# Patient Record
Sex: MALE | Race: ASIAN | NOT HISPANIC OR LATINO | URBAN - METROPOLITAN AREA
[De-identification: names, ages, dates, MRNs, and addresses within clinical notes are randomized per-mention and may not be internally consistent; named-entity substitution may affect disease eponyms.]

---

## 2017-02-06 ENCOUNTER — EMERGENCY (EMERGENCY)
Facility: HOSPITAL | Age: 77
LOS: 1 days | Discharge: HOME | End: 2017-02-06
Attending: EMERGENCY MEDICINE | Admitting: EMERGENCY MEDICINE
Payer: MEDICARE

## 2017-02-06 VITALS
OXYGEN SATURATION: 100 % | SYSTOLIC BLOOD PRESSURE: 157 MMHG | HEART RATE: 71 BPM | DIASTOLIC BLOOD PRESSURE: 74 MMHG | RESPIRATION RATE: 16 BRPM

## 2017-02-06 VITALS
WEIGHT: 139.99 LBS | HEIGHT: 67 IN | HEART RATE: 69 BPM | DIASTOLIC BLOOD PRESSURE: 58 MMHG | TEMPERATURE: 98 F | RESPIRATION RATE: 16 BRPM | OXYGEN SATURATION: 97 % | SYSTOLIC BLOOD PRESSURE: 125 MMHG

## 2017-02-06 LAB
ALBUMIN SERPL ELPH-MCNC: 4.2 G/DL — SIGNIFICANT CHANGE UP (ref 3.3–5)
ALP SERPL-CCNC: 90 U/L — SIGNIFICANT CHANGE UP (ref 40–120)
ALT FLD-CCNC: 20 U/L — SIGNIFICANT CHANGE UP (ref 4–41)
APTT BLD: 31.9 SEC — SIGNIFICANT CHANGE UP (ref 27.5–37.4)
AST SERPL-CCNC: 26 U/L — SIGNIFICANT CHANGE UP (ref 4–40)
BASOPHILS # BLD AUTO: 0.08 K/UL — SIGNIFICANT CHANGE UP (ref 0–0.2)
BASOPHILS NFR BLD AUTO: 0.8 % — SIGNIFICANT CHANGE UP (ref 0–2)
BILIRUB SERPL-MCNC: 0.8 MG/DL — SIGNIFICANT CHANGE UP (ref 0.2–1.2)
BUN SERPL-MCNC: 25 MG/DL — HIGH (ref 7–23)
CALCIUM SERPL-MCNC: 10 MG/DL — SIGNIFICANT CHANGE UP (ref 8.4–10.5)
CHLORIDE SERPL-SCNC: 98 MMOL/L — SIGNIFICANT CHANGE UP (ref 98–107)
CO2 SERPL-SCNC: 27 MMOL/L — SIGNIFICANT CHANGE UP (ref 22–31)
CREAT SERPL-MCNC: 1.21 MG/DL — SIGNIFICANT CHANGE UP (ref 0.5–1.3)
EOSINOPHIL # BLD AUTO: 0.36 K/UL — SIGNIFICANT CHANGE UP (ref 0–0.5)
EOSINOPHIL NFR BLD AUTO: 3.5 % — SIGNIFICANT CHANGE UP (ref 0–6)
GLUCOSE SERPL-MCNC: 132 MG/DL — HIGH (ref 70–99)
HCT VFR BLD CALC: 45.4 % — SIGNIFICANT CHANGE UP (ref 39–50)
HGB BLD-MCNC: 15 G/DL — SIGNIFICANT CHANGE UP (ref 13–17)
IMM GRANULOCYTES NFR BLD AUTO: 0.2 % — SIGNIFICANT CHANGE UP (ref 0–1.5)
INR BLD: 1 — SIGNIFICANT CHANGE UP (ref 0.87–1.18)
LYMPHOCYTES # BLD AUTO: 2.66 K/UL — SIGNIFICANT CHANGE UP (ref 1–3.3)
LYMPHOCYTES # BLD AUTO: 26 % — SIGNIFICANT CHANGE UP (ref 13–44)
MCHC RBC-ENTMCNC: 30.9 PG — SIGNIFICANT CHANGE UP (ref 27–34)
MCHC RBC-ENTMCNC: 33 % — SIGNIFICANT CHANGE UP (ref 32–36)
MCV RBC AUTO: 93.6 FL — SIGNIFICANT CHANGE UP (ref 80–100)
MONOCYTES # BLD AUTO: 0.85 K/UL — SIGNIFICANT CHANGE UP (ref 0–0.9)
MONOCYTES NFR BLD AUTO: 8.3 % — SIGNIFICANT CHANGE UP (ref 2–14)
NEUTROPHILS # BLD AUTO: 6.27 K/UL — SIGNIFICANT CHANGE UP (ref 1.8–7.4)
NEUTROPHILS NFR BLD AUTO: 61.2 % — SIGNIFICANT CHANGE UP (ref 43–77)
PLATELET # BLD AUTO: 195 K/UL — SIGNIFICANT CHANGE UP (ref 150–400)
PMV BLD: 11.5 FL — SIGNIFICANT CHANGE UP (ref 7–13)
POTASSIUM SERPL-MCNC: 4.7 MMOL/L — SIGNIFICANT CHANGE UP (ref 3.5–5.3)
POTASSIUM SERPL-SCNC: 4.7 MMOL/L — SIGNIFICANT CHANGE UP (ref 3.5–5.3)
PROT SERPL-MCNC: 8.4 G/DL — HIGH (ref 6–8.3)
PROTHROM AB SERPL-ACNC: 11.4 SEC — SIGNIFICANT CHANGE UP (ref 10–13.1)
RBC # BLD: 4.85 M/UL — SIGNIFICANT CHANGE UP (ref 4.2–5.8)
RBC # FLD: 12.9 % — SIGNIFICANT CHANGE UP (ref 10.3–14.5)
SODIUM SERPL-SCNC: 137 MMOL/L — SIGNIFICANT CHANGE UP (ref 135–145)
WBC # BLD: 10.24 K/UL — SIGNIFICANT CHANGE UP (ref 3.8–10.5)
WBC # FLD AUTO: 10.24 K/UL — SIGNIFICANT CHANGE UP (ref 3.8–10.5)

## 2017-02-06 PROCEDURE — 72125 CT NECK SPINE W/O DYE: CPT | Mod: 26

## 2017-02-06 PROCEDURE — 99284 EMERGENCY DEPT VISIT MOD MDM: CPT | Mod: GC

## 2017-02-06 PROCEDURE — 70450 CT HEAD/BRAIN W/O DYE: CPT | Mod: 26

## 2017-02-06 RX ORDER — TETANUS TOXOID, REDUCED DIPHTHERIA TOXOID AND ACELLULAR PERTUSSIS VACCINE, ADSORBED 5; 2.5; 8; 8; 2.5 [IU]/.5ML; [IU]/.5ML; UG/.5ML; UG/.5ML; UG/.5ML
0.5 SUSPENSION INTRAMUSCULAR ONCE
Qty: 0 | Refills: 0 | Status: COMPLETED | OUTPATIENT
Start: 2017-02-06 | End: 2017-02-06

## 2017-02-06 RX ADMIN — TETANUS TOXOID, REDUCED DIPHTHERIA TOXOID AND ACELLULAR PERTUSSIS VACCINE, ADSORBED 0.5 MILLILITER(S): 5; 2.5; 8; 8; 2.5 SUSPENSION INTRAMUSCULAR at 21:51

## 2017-02-06 NOTE — ED PROVIDER NOTE - PLAN OF CARE
- Follow up with your primary care doctor in 1-2 days.    - Bring results with you to the appointment.   - Take tylenol 650mg or motrin 600mg every 6 hours for pain or fever.   - Return to the ED for new of worsening symptoms.

## 2017-02-06 NOTE — ED ADULT NURSE NOTE - CHIEF COMPLAINT QUOTE
Pt tripped on uneven raised pavement while outside. Pt hit right forehead, abrasions noted to fingers and abrasions noted to right knee. Pt denies LOC. Denies dizziness or nausea. Pt on Plavix and ASA for cardiac stent.   Note: Pt up triaged at 2020 due to eval by TELMA AWAD, pt to be assessed further in main ED.

## 2017-02-06 NOTE — ED ADULT NURSE REASSESSMENT NOTE - NS ED NURSE REASSESS COMMENT FT1
Patient discharged by MD and given discharge instructions.  IV d/c by MD.  Patient accompanied home by family member.

## 2017-02-06 NOTE — ED PROVIDER NOTE - OBJECTIVE STATEMENT
76M h/o DM, RA, CAD s/p stent w/ASA and plavix presents s/p mechanical fall. Was walking on sidewalk, tripped on uneven sidewalk, has abrasion over R eye and small abrasions to both hands. Denies CP, SOB, dizziness, LOC, weakness, numbness, tingling. Was ambulatory post-fall. Unknown last tetanus.

## 2017-02-06 NOTE — ED PROVIDER NOTE - ATTENDING CONTRIBUTION TO CARE
WADE Sanon MD: Patient seen and evaluated, on plavix, tripped on uneven sidewalk and fell, hit his head, no loc, no n or v, ni numbness or weakness, no cp or sob, no neck pain. Pt also with RA. PE large hematoma on his forehead and abrasion to his cheek, abrasions to his right fingers with RA changes but no limit in motion, abrasion to his right knee, FROM without pain, abd is SNT, CW non tender and FROM of both hips. Will get CT and reassess. Ice to forehead.

## 2017-02-06 NOTE — ED ADULT TRIAGE NOTE - CHIEF COMPLAINT QUOTE
Pt tripped on uneven raised pavement while outside. Pt hit right forehead, abrasions noted to fingers and abrasions noted to right knee. Pt denies LOC. Denies dizziness or nausea. Pt on Plavix and ASA for cardiac stent. Pt tripped on uneven raised pavement while outside. Pt hit right forehead, abrasions noted to fingers and abrasions noted to right knee. Pt denies LOC. Denies dizziness or nausea. Pt on Plavix and ASA for cardiac stent.   Note: Pt up triaged at 2020 due to eval by TELMA AWAD, pt to be assessed further in main ED.

## 2017-02-06 NOTE — ED PROVIDER NOTE - EXTREMITY EXAM
b/l hands with ulnar deviation and RA deformities, FROM. multiple abrasions to knuckles on both hands (R>L). 1 cm abrasion to R knee.

## 2017-02-06 NOTE — ED PROVIDER NOTE - PROGRESS NOTE DETAILS
Magnus PGY-2: While going to discharge the pt he states that his left 5th digit is now more swollen but he does not want an xray, he would like to go home and take care of it outpatient

## 2017-02-06 NOTE — ED PROVIDER NOTE - CARE PLAN
Principal Discharge DX:	Fall  Instructions for follow-up, activity and diet:	- Follow up with your primary care doctor in 1-2 days.    - Bring results with you to the appointment.   - Take tylenol 650mg or motrin 600mg every 6 hours for pain or fever.   - Return to the ED for new of worsening symptoms.

## 2017-06-13 ENCOUNTER — APPOINTMENT (OUTPATIENT)
Dept: DERMATOLOGY | Facility: CLINIC | Age: 77
End: 2017-06-13

## 2017-06-13 VITALS
BODY MASS INDEX: 21.97 KG/M2 | DIASTOLIC BLOOD PRESSURE: 54 MMHG | SYSTOLIC BLOOD PRESSURE: 122 MMHG | WEIGHT: 140 LBS | HEIGHT: 67 IN

## 2017-06-13 DIAGNOSIS — L85.3 XEROSIS CUTIS: ICD-10-CM

## 2019-12-17 ENCOUNTER — EMERGENCY (EMERGENCY)
Facility: HOSPITAL | Age: 79
LOS: 1 days | Discharge: HOME | End: 2019-12-17
Attending: EMERGENCY MEDICINE | Admitting: EMERGENCY MEDICINE
Payer: MEDICARE

## 2019-12-17 VITALS
HEART RATE: 73 BPM | TEMPERATURE: 99 F | DIASTOLIC BLOOD PRESSURE: 59 MMHG | RESPIRATION RATE: 17 BRPM | SYSTOLIC BLOOD PRESSURE: 127 MMHG

## 2019-12-17 VITALS
DIASTOLIC BLOOD PRESSURE: 51 MMHG | HEART RATE: 74 BPM | OXYGEN SATURATION: 99 % | RESPIRATION RATE: 16 BRPM | SYSTOLIC BLOOD PRESSURE: 143 MMHG | TEMPERATURE: 98 F

## 2019-12-17 LAB
ALBUMIN SERPL ELPH-MCNC: 3.7 G/DL — SIGNIFICANT CHANGE UP (ref 3.3–5)
ALP SERPL-CCNC: 74 U/L — SIGNIFICANT CHANGE UP (ref 40–120)
ALT FLD-CCNC: 19 U/L — SIGNIFICANT CHANGE UP (ref 4–41)
ANION GAP SERPL CALC-SCNC: 11 MMO/L — SIGNIFICANT CHANGE UP (ref 7–14)
AST SERPL-CCNC: 25 U/L — SIGNIFICANT CHANGE UP (ref 4–40)
BASOPHILS # BLD AUTO: 0.03 K/UL — SIGNIFICANT CHANGE UP (ref 0–0.2)
BASOPHILS NFR BLD AUTO: 0.4 % — SIGNIFICANT CHANGE UP (ref 0–2)
BILIRUB SERPL-MCNC: 0.9 MG/DL — SIGNIFICANT CHANGE UP (ref 0.2–1.2)
BUN SERPL-MCNC: 26 MG/DL — HIGH (ref 7–23)
CALCIUM SERPL-MCNC: 9.1 MG/DL — SIGNIFICANT CHANGE UP (ref 8.4–10.5)
CHLORIDE SERPL-SCNC: 98 MMOL/L — SIGNIFICANT CHANGE UP (ref 98–107)
CO2 SERPL-SCNC: 25 MMOL/L — SIGNIFICANT CHANGE UP (ref 22–31)
CREAT SERPL-MCNC: 1.55 MG/DL — HIGH (ref 0.5–1.3)
EOSINOPHIL # BLD AUTO: 0.03 K/UL — SIGNIFICANT CHANGE UP (ref 0–0.5)
EOSINOPHIL NFR BLD AUTO: 0.4 % — SIGNIFICANT CHANGE UP (ref 0–6)
GLUCOSE SERPL-MCNC: 260 MG/DL — HIGH (ref 70–99)
HCT VFR BLD CALC: 38.6 % — LOW (ref 39–50)
HGB BLD-MCNC: 12.4 G/DL — LOW (ref 13–17)
IMM GRANULOCYTES NFR BLD AUTO: 0.3 % — SIGNIFICANT CHANGE UP (ref 0–1.5)
LYMPHOCYTES # BLD AUTO: 0.61 K/UL — LOW (ref 1–3.3)
LYMPHOCYTES # BLD AUTO: 8.8 % — LOW (ref 13–44)
MCHC RBC-ENTMCNC: 31.7 PG — SIGNIFICANT CHANGE UP (ref 27–34)
MCHC RBC-ENTMCNC: 32.1 % — SIGNIFICANT CHANGE UP (ref 32–36)
MCV RBC AUTO: 98.7 FL — SIGNIFICANT CHANGE UP (ref 80–100)
MONOCYTES # BLD AUTO: 0.78 K/UL — SIGNIFICANT CHANGE UP (ref 0–0.9)
MONOCYTES NFR BLD AUTO: 11.3 % — SIGNIFICANT CHANGE UP (ref 2–14)
NEUTROPHILS # BLD AUTO: 5.43 K/UL — SIGNIFICANT CHANGE UP (ref 1.8–7.4)
NEUTROPHILS NFR BLD AUTO: 78.8 % — HIGH (ref 43–77)
NRBC # FLD: 0 K/UL — SIGNIFICANT CHANGE UP (ref 0–0)
PLATELET # BLD AUTO: 134 K/UL — LOW (ref 150–400)
PMV BLD: 13.1 FL — HIGH (ref 7–13)
POTASSIUM SERPL-MCNC: 4.6 MMOL/L — SIGNIFICANT CHANGE UP (ref 3.5–5.3)
POTASSIUM SERPL-SCNC: 4.6 MMOL/L — SIGNIFICANT CHANGE UP (ref 3.5–5.3)
PROT SERPL-MCNC: 7 G/DL — SIGNIFICANT CHANGE UP (ref 6–8.3)
RBC # BLD: 3.91 M/UL — LOW (ref 4.2–5.8)
RBC # FLD: 13.9 % — SIGNIFICANT CHANGE UP (ref 10.3–14.5)
SODIUM SERPL-SCNC: 134 MMOL/L — LOW (ref 135–145)
WBC # BLD: 6.9 K/UL — SIGNIFICANT CHANGE UP (ref 3.8–10.5)
WBC # FLD AUTO: 6.9 K/UL — SIGNIFICANT CHANGE UP (ref 3.8–10.5)

## 2019-12-17 PROCEDURE — 70498 CT ANGIOGRAPHY NECK: CPT | Mod: 26

## 2019-12-17 PROCEDURE — 70450 CT HEAD/BRAIN W/O DYE: CPT | Mod: 26

## 2019-12-17 PROCEDURE — 71045 X-RAY EXAM CHEST 1 VIEW: CPT | Mod: 26

## 2019-12-17 PROCEDURE — 99284 EMERGENCY DEPT VISIT MOD MDM: CPT

## 2019-12-17 PROCEDURE — 70496 CT ANGIOGRAPHY HEAD: CPT | Mod: 26

## 2019-12-17 RX ORDER — LIDOCAINE 4 G/100G
1 CREAM TOPICAL ONCE
Refills: 0 | Status: COMPLETED | OUTPATIENT
Start: 2019-12-17 | End: 2019-12-17

## 2019-12-17 RX ORDER — DIAZEPAM 5 MG
2 TABLET ORAL ONCE
Refills: 0 | Status: DISCONTINUED | OUTPATIENT
Start: 2019-12-17 | End: 2019-12-17

## 2019-12-17 RX ORDER — SODIUM CHLORIDE 9 MG/ML
1000 INJECTION INTRAMUSCULAR; INTRAVENOUS; SUBCUTANEOUS ONCE
Refills: 0 | Status: COMPLETED | OUTPATIENT
Start: 2019-12-17 | End: 2019-12-17

## 2019-12-17 RX ORDER — ACETAMINOPHEN 500 MG
650 TABLET ORAL ONCE
Refills: 0 | Status: COMPLETED | OUTPATIENT
Start: 2019-12-17 | End: 2019-12-17

## 2019-12-17 RX ADMIN — SODIUM CHLORIDE 1000 MILLILITER(S): 9 INJECTION INTRAMUSCULAR; INTRAVENOUS; SUBCUTANEOUS at 17:15

## 2019-12-17 NOTE — ED ADULT NURSE NOTE - OBJECTIVE STATEMENT
Received pt to spot 21 A&Ox4 ambulatory at baseline reports generalized weakness and posterior neck pain. Denies CP or SOB, N/V, subjective fevers, no upper or lower extremity weakness noted. On assessment, pt offered medications states "I don't want medication I want you to scan my head". Uncooperative with staff, repeating same statement. MD made aware, IV placed, labs sent, VS as documented, will continue to monitor.

## 2019-12-17 NOTE — ED PROVIDER NOTE - CLINICAL SUMMARY MEDICAL DECISION MAKING FREE TEXT BOX
78 y/o M w/ hx DM2, x1 stent p/w episode of blacking out. Seizure vs syncope. Labs, ekg, CTH, neurology consult. Likely d/c with neurology outpatient f/u. 78 y/o M w/ hx DM2, x1 stent p/w episode of blacking out. Seizure vs syncope. Labs, ekg, CTH, neurology consult. will reassess 78 y/o M w/ hx DM2, x1 stent p/w episode of blacking out. Seizure vs syncope. Labs, ekg, CTH, neurology consult. will reassess  CTH without new intracranial pathology  CTA head and neck without any new acute pathology  CXR showed poss infectious process, but pt has no cough, sob, fevers, chills therefore will not treat  Can follow up with neurology clinic

## 2019-12-17 NOTE — ED PROVIDER NOTE - PATIENT PORTAL LINK FT
You can access the FollowMyHealth Patient Portal offered by [facility] by registering at the following website: [email or web address]. By joining [employer]’s FollowMyHealth portal, you will also be able to view your health information using other applications (apps) compatible with our system.

## 2019-12-17 NOTE — ED PROVIDER NOTE - NSFOLLOWUPINSTRUCTIONS_ED_ALL_ED_FT
You came to the hospital because you had an episode of blacking out and being confused for about 10 minutes. We worked you up for this syncopal episode and found that your head CT scan and CTA of your head and neck were negative. Additionally we found that there was an opacity in the lungs, which could be infectious etiology, but because you have not had fevers, chills, shortness of breath or cough, we will not treat you. Additionally, we recommend that you follow with neurology clinic as outpatient.    Should you have seizure activity, loss of consciousness, fevers, chills or nausea and vomiting please return to the emergency room.

## 2019-12-17 NOTE — CONSULT NOTE ADULT - SUBJECTIVE AND OBJECTIVE BOX
SHANNA [id number] is a 79y old  Male who presents with a chief complaint of     HPI: 79 y/ RH male with past medical history of RA, DM2, CAD s/p stent on Aspirin and Plavix, chronic LBP and neck pain (at least for past 1 year) presents to the ED for possible syncopal episode. Patient reports he was sitting at the dining table around 12:30pm on 12/17 eating lunch when he started to feel a dull pain in the back of his head (occipital region). Patient reports that he then slowly lowered his head onto to the dining table and when he awoke his daughter had called 911. Per EMR, patient's daughter reported patient was not responding for ~5 minutes. Patient admits to loss of consciousness but denies any prodromal symptoms such as blurry vision, unilateral weakness, problems with speech. Patient reports after he came to, he recognized his environment (knew he was home and in his dining room) and was not confused. Denied any accompanying urinary incontinence or tongue biting.   At the time of my interview, patient denies any focal neurologic complaints. Reports he had a low grade temperature "99.6F" earlier in the morning and felt generally weak after awakening on 12/17. Denies any prior history of seizures or stroke.       MEDICATIONS  (STANDING):    MEDICATIONS  (PRN):    PAST MEDICAL & SURGICAL HISTORY:    FAMILY HISTORY:    Allergies    tetracyclines (Rash)    Intolerances      Review of Systems:  As per HPI, otherwise negative.     Vital Signs Last 24 Hrs  T(C): 37.2 (17 Dec 2019 19:11), Max: 37.2 (17 Dec 2019 19:11)  T(F): 99 (17 Dec 2019 19:11), Max: 99 (17 Dec 2019 19:11)  HR: 75 (17 Dec 2019 19:11) (73 - 75)  BP: 130/53 (17 Dec 2019 19:11) (130/53 - 146/64)  BP(mean): --  RR: 17 (17 Dec 2019 19:11) (16 - 17)  SpO2: 98% (17 Dec 2019 19:11) (98% - 100%)    General Exam:   General appearance: No acute distress            HEENT: No meningismus    Neurological Exam:  Mental Status: Orientated to self, date and place.  Attention intact.  No dysarthria. Speech fluent.  Cranial Nerves: EOMI, VFF, no nystagmus. CN V1-3 intact to light touch. No facial asymmetry. Tongue, uvula and palate midline.  Sternocleidomastoid and Trapezius intact bilaterally.    Motor:   Tone: normal.                  Strength:     [] Upper extremity                      Delt       Bicep    Tricep                                                  R         5/5        5/5        5/5       5/5                                               L          5/5        5/5        5/5       5/5  [] Lower extremity                       HF          KE          KF        DF         PF                                               R        5/5 5/5 5/5 5/5       5/5                                               L         5/5 5/5       5/5       5/5        5/5  Pronator drift: none                 Dysmetria: None to finger-nose-finger or heel-shin-heel  No truncal ataxia  Tremor: No resting, postural or action tremor.  No myoclonus.    Sensation: intact to light touch    Deep Tendon Reflexes:     Biceps          Triceps      BR        Patellar        Ankle         Babinski                                         R       2+                   2+           2+            2+               0           downgoing                                         L        2+                  2+           2+            2+               0           downgoing    Gait: Normal arm swing. Shortened stride length.     Other:    12-17    134<L>  |  98  |  26<H>  ----------------------------<  260<H>  4.6   |  25  |  1.55<H>    Ca    9.1      17 Dec 2019 17:15    TPro  7.0  /  Alb  3.7  /  TBili  0.9  /  DBili  x   /  AST  25  /  ALT  19  /  AlkPhos  74  12-17                            12.4   6.90  )-----------( 134      ( 17 Dec 2019 17:15 )             38.6       Radiology    CT:   MRI  EKG:  tele:  TTE:  EEG:

## 2019-12-17 NOTE — ED ADULT NURSE REASSESSMENT NOTE - NS ED NURSE REASSESS COMMENT FT1
Received report from JIMMY Joshi. Pt resting comfortably with breathing even and unlabored. Pt tolerating PO intake well. VSS and as noted. Pt awaiting head CT. MD at bedside, will continue to monitor. Received report from JIMMY Joshi. Pt is AxOx4 at this time. Pt resting comfortably with breathing even and unlabored. Pt tolerating PO intake well. VSS and as noted. Pt awaiting head CT. MD at bedside, will continue to monitor.

## 2019-12-17 NOTE — ED ADULT TRIAGE NOTE - CHIEF COMPLAINT QUOTE
Arrives from home with ems, his daughter called stating he is weak today. Patient states he had pain in the back of his neck from the weather and his daughter got nervous and called ems. Patient comfortable in triage, has chronic  leg pain.

## 2019-12-17 NOTE — CONSULT NOTE ADULT - ASSESSMENT
79 y/ RH male with past medical history of RA, DM2, CAD s/p stent on Aspirin and Plavix, chronic LBP and neck pain (at least for past 1 year) presents to the ED for possible syncopal episode. Patient reports he was sitting at the dining table around 12:30pm on 12/17 eating lunch when he started to feel a dull pain in the back of his head (occipital region). Patient reports that he then slowly lowered his head onto to the dining table and when he awoke his daughter had called 911. Per EMR, patient's daughter reported patient was not responding for ~5 minutes. Patient admits to loss of consciousness but denies any prodromal symptoms such as blurry vision, unilateral weakness, problems with speech. Patient reports after he came to, he recognized his environment (knew he was home and in his dining room) and was not confused. Denied any accompanying urinary incontinence or tongue biting.   At the time of my interview, patient denies any focal neurologic complaints. Reports he had a low grade temperature "99.6F" earlier in the morning and felt generally weak after awakening on 12/17. Denies any prior history of seizures or stroke.   Neurologic exam non-focal.     Impression: Unresponsiveness a/w loss of consciousness w/ recovery w/o post-ictal state likely 2/2 cardiac etiology due to sudden onset of symptoms; would rule out global hypoperfusion due to vessel stenosis; r/o toxic/metabolic etiology; seizure is considered less likely    Recommendations:   [] UA + CXR  [] Cardiac enzymes  [] CTH non-contrast  [] CTA H/N w/ contrast   [] If CTH, and CTA H/N w/o abnormality, patient can follow-up with outpatient Neurology Clinic at [facility]  [] Remaining management and disposition per ED

## 2019-12-17 NOTE — ED PROVIDER NOTE - NSFOLLOWUPCLINICS_GEN_ALL_ED_FT
[facility]  Neurology  [address] [place] [address] [address] [place] [address]  Phone: [phone]  Fax:   Follow Up Time:

## 2019-12-17 NOTE — ED PROVIDER NOTE - OBJECTIVE STATEMENT
78 y/o M w/ hx RA, eczema, DM2, CAD s/p x1 stent, chronic neck/back pain here for evaluation of x1 episode of syncope around 1pm. 80 y/o M w/ hx RA, eczema, DM2, CAD s/p x1 stent, chronic neck/back pain here for evaluation of x1 episode of seizure vs syncope around 1pm. Patient states he "blacked out" does not seem sure of his symptoms. Collateral obtained from daughter who witnessed event--lasted 5-10 minutes and during this time patient did not respond to commands, eyes rolled back/were crooked, and patient had difficulty breathing. Afterwards, patient was very sleeping/lethargic for 10 minutes. Patient states these episodes of blacking out have happened before but nobody has witnessed them (his daughter lives in [place], his wife is 'sick.'). Does not have a neurologist. Also states for the past few months he's had chronic neck/back pain and cramping/pain in his buttocks/thighs/calves when he walks, worsens with standing/walking and improves with sitting. 78 y/o M w/ hx RA, eczema, DM2, CAD s/p x1 stent, chronic neck/back pain here for evaluation of x1 episode of seizure vs syncope around 1pm. Patient states he "blacked out" does not seem sure of his symptoms. Collateral obtained from daughter who witnessed event--lasted 5-10 minutes and during this time patient did not respond to commands, eyes rolled back/were crooked, and patient had difficulty breathing. Afterwards, patient was very sleeping/lethargic for 10 minutes. Patient states these episodes of blacking out have happened before but nobody has witnessed them (his daughter lives in [place], his wife is 'sick.'). Does not have a neurologist. Also states for the past few months he's had chronic neck/back pain and cramping/pain in his buttocks/thighs/calves when he walks, worsens with standing/walking and improves with sitting.    Attendinyo male presents with episode of 'blacking out' for about 15 min.  pt states he does not recall episode and was confused for a period of time after. feels fine now.  daughter stated he was not responsive for about 10 min and his head was having a shaking movement.

## 2019-12-17 NOTE — ED PROVIDER NOTE - PROGRESS NOTE DETAILS
Jennifer: pt seen and examined at bedside, confirmed story. States that he does not have a headache anymore, no focal neurologic deficits at this point, able to ambulate. Neurology consulted, will f/u recommendations. UA, CXR, CTH, CTA head and neck w IV con, cardiac enzymes. CTM. Jennifer: pt reevaluated, would like to go home, pending CTA head and neck. CXR demonstrated poss infectious etiology, but patient afebrile without cough, chest pain, shortness of breath, would not treat at this point.

## 2019-12-17 NOTE — ED PROVIDER NOTE - PHYSICAL EXAMINATION
[Const] well-appearing, resting comfortably, no acute distress  [HEENT] PERRL, EOM, MMM  [Neck] Supple, trachea midline  [CV] +S1/S2, no m/r/g appreciated  [Lungs] CTABL, no adventitious lung sounds  [Abd] soft, non-tender, nondistended in all 4 quadrants  [MSK] 5/5 UE and LR str BL  [Skin] warm, dry, well-perfused  [Neuro] A&Ox3, CN II-XII intact, no pronator drift, dysmetria/ataxia, gait intact

## 2019-12-18 PROBLEM — I25.10 ATHEROSCLEROTIC HEART DISEASE OF NATIVE CORONARY ARTERY WITHOUT ANGINA PECTORIS: Chronic | Status: ACTIVE | Noted: 2017-02-06

## 2019-12-18 LAB — TROPONIN T, HIGH SENSITIVITY: 20 NG/L — SIGNIFICANT CHANGE UP (ref ?–14)

## 2020-01-24 ENCOUNTER — APPOINTMENT (OUTPATIENT)
Dept: ANESTHESIOLOGY | Facility: CLINIC | Age: 80
End: 2020-01-24

## 2020-01-28 ENCOUNTER — APPOINTMENT (OUTPATIENT)
Dept: ANESTHESIOLOGY | Facility: CLINIC | Age: 80
End: 2020-01-28

## 2020-01-28 ENCOUNTER — OUTPATIENT (OUTPATIENT)
Dept: OUTPATIENT SERVICES | Facility: HOSPITAL | Age: 80
LOS: 1 days | End: 2020-01-28
Payer: MEDICARE

## 2020-01-28 DIAGNOSIS — M54.16 RADICULOPATHY, LUMBAR REGION: ICD-10-CM

## 2020-01-28 PROCEDURE — 64483 NJX AA&/STRD TFRM EPI L/S 1: CPT

## 2020-02-03 DIAGNOSIS — E11.65 TYPE 2 DIABETES MELLITUS WITH HYPERGLYCEMIA: ICD-10-CM

## 2021-03-10 ENCOUNTER — INPATIENT (INPATIENT)
Facility: HOSPITAL | Age: 81
LOS: 0 days | Discharge: HOME | End: 2021-03-10
Attending: HOSPITALIST | Admitting: HOSPITALIST
Payer: MEDICARE

## 2021-03-10 VITALS
HEIGHT: 67 IN | SYSTOLIC BLOOD PRESSURE: 137 MMHG | TEMPERATURE: 97 F | OXYGEN SATURATION: 97 % | RESPIRATION RATE: 20 BRPM | HEART RATE: 62 BPM | DIASTOLIC BLOOD PRESSURE: 66 MMHG

## 2021-03-10 VITALS
DIASTOLIC BLOOD PRESSURE: 85 MMHG | RESPIRATION RATE: 16 BRPM | HEART RATE: 88 BPM | OXYGEN SATURATION: 100 % | SYSTOLIC BLOOD PRESSURE: 150 MMHG

## 2021-03-10 DIAGNOSIS — R41.89 OTHER SYMPTOMS AND SIGNS INVOLVING COGNITIVE FUNCTIONS AND AWARENESS: ICD-10-CM

## 2021-03-10 LAB
A1C WITH ESTIMATED AVERAGE GLUCOSE RESULT: 6.4 % — HIGH (ref 4–5.6)
ALBUMIN SERPL ELPH-MCNC: 4 G/DL — SIGNIFICANT CHANGE UP (ref 3.3–5)
ALP SERPL-CCNC: 81 U/L — SIGNIFICANT CHANGE UP (ref 40–120)
ALT FLD-CCNC: 14 U/L — SIGNIFICANT CHANGE UP (ref 4–41)
ANION GAP SERPL CALC-SCNC: 12 MMOL/L — SIGNIFICANT CHANGE UP (ref 7–14)
APPEARANCE UR: CLEAR — SIGNIFICANT CHANGE UP
AST SERPL-CCNC: 27 U/L — SIGNIFICANT CHANGE UP (ref 4–40)
BASOPHILS # BLD AUTO: 0 K/UL — SIGNIFICANT CHANGE UP (ref 0–0.2)
BASOPHILS NFR BLD AUTO: 0 % — SIGNIFICANT CHANGE UP (ref 0–2)
BILIRUB SERPL-MCNC: 0.6 MG/DL — SIGNIFICANT CHANGE UP (ref 0.2–1.2)
BILIRUB UR-MCNC: NEGATIVE — SIGNIFICANT CHANGE UP
BLOOD GAS VENOUS COMPREHENSIVE RESULT: SIGNIFICANT CHANGE UP
BUN SERPL-MCNC: 27 MG/DL — HIGH (ref 7–23)
CALCIUM SERPL-MCNC: 10 MG/DL — SIGNIFICANT CHANGE UP (ref 8.4–10.5)
CHLORIDE SERPL-SCNC: 99 MMOL/L — SIGNIFICANT CHANGE UP (ref 98–107)
CO2 SERPL-SCNC: 26 MMOL/L — SIGNIFICANT CHANGE UP (ref 22–31)
COLOR SPEC: SIGNIFICANT CHANGE UP
CREAT SERPL-MCNC: 1.36 MG/DL — HIGH (ref 0.5–1.3)
DIFF PNL FLD: NEGATIVE — SIGNIFICANT CHANGE UP
EOSINOPHIL # BLD AUTO: 0.26 K/UL — SIGNIFICANT CHANGE UP (ref 0–0.5)
EOSINOPHIL NFR BLD AUTO: 2.6 % — SIGNIFICANT CHANGE UP (ref 0–6)
ESTIMATED AVERAGE GLUCOSE: 137 MG/DL — HIGH (ref 68–114)
GLUCOSE BLDC GLUCOMTR-MCNC: 299 MG/DL — HIGH (ref 70–99)
GLUCOSE SERPL-MCNC: 93 MG/DL — SIGNIFICANT CHANGE UP (ref 70–99)
GLUCOSE UR QL: NEGATIVE — SIGNIFICANT CHANGE UP
HCT VFR BLD CALC: 43.9 % — SIGNIFICANT CHANGE UP (ref 39–50)
HGB BLD-MCNC: 13.7 G/DL — SIGNIFICANT CHANGE UP (ref 13–17)
IANC: 6.95 K/UL — SIGNIFICANT CHANGE UP (ref 1.5–8.5)
KETONES UR-MCNC: NEGATIVE — SIGNIFICANT CHANGE UP
LEUKOCYTE ESTERASE UR-ACNC: NEGATIVE — SIGNIFICANT CHANGE UP
LYMPHOCYTES # BLD AUTO: 1.31 K/UL — SIGNIFICANT CHANGE UP (ref 1–3.3)
LYMPHOCYTES # BLD AUTO: 13 % — SIGNIFICANT CHANGE UP (ref 13–44)
MCHC RBC-ENTMCNC: 29.3 PG — SIGNIFICANT CHANGE UP (ref 27–34)
MCHC RBC-ENTMCNC: 31.2 GM/DL — LOW (ref 32–36)
MCV RBC AUTO: 93.8 FL — SIGNIFICANT CHANGE UP (ref 80–100)
MONOCYTES # BLD AUTO: 1.23 K/UL — HIGH (ref 0–0.9)
MONOCYTES NFR BLD AUTO: 12.2 % — SIGNIFICANT CHANGE UP (ref 2–14)
NEUTROPHILS # BLD AUTO: 7.19 K/UL — SIGNIFICANT CHANGE UP (ref 1.8–7.4)
NEUTROPHILS NFR BLD AUTO: 67 % — SIGNIFICANT CHANGE UP (ref 43–77)
NITRITE UR-MCNC: NEGATIVE — SIGNIFICANT CHANGE UP
PH UR: 6.5 — SIGNIFICANT CHANGE UP (ref 5–8)
PLATELET # BLD AUTO: 194 K/UL — SIGNIFICANT CHANGE UP (ref 150–400)
POTASSIUM SERPL-MCNC: 4.2 MMOL/L — SIGNIFICANT CHANGE UP (ref 3.5–5.3)
POTASSIUM SERPL-SCNC: 4.2 MMOL/L — SIGNIFICANT CHANGE UP (ref 3.5–5.3)
PROT SERPL-MCNC: 7.6 G/DL — SIGNIFICANT CHANGE UP (ref 6–8.3)
PROT UR-MCNC: NEGATIVE — SIGNIFICANT CHANGE UP
RBC # BLD: 4.68 M/UL — SIGNIFICANT CHANGE UP (ref 4.2–5.8)
RBC # FLD: 13.2 % — SIGNIFICANT CHANGE UP (ref 10.3–14.5)
SARS-COV-2 RNA SPEC QL NAA+PROBE: SIGNIFICANT CHANGE UP
SODIUM SERPL-SCNC: 137 MMOL/L — SIGNIFICANT CHANGE UP (ref 135–145)
SP GR SPEC: 1.01 — SIGNIFICANT CHANGE UP (ref 1.01–1.02)
TROPONIN T, HIGH SENSITIVITY RESULT: 19 NG/L — SIGNIFICANT CHANGE UP
TROPONIN T, HIGH SENSITIVITY RESULT: 20 NG/L — SIGNIFICANT CHANGE UP
TSH SERPL-MCNC: 2.82 UIU/ML — SIGNIFICANT CHANGE UP (ref 0.27–4.2)
UROBILINOGEN FLD QL: SIGNIFICANT CHANGE UP
WBC # BLD: 10.08 K/UL — SIGNIFICANT CHANGE UP (ref 3.8–10.5)
WBC # FLD AUTO: 10.08 K/UL — SIGNIFICANT CHANGE UP (ref 3.8–10.5)

## 2021-03-10 PROCEDURE — 99285 EMERGENCY DEPT VISIT HI MDM: CPT | Mod: CS,25

## 2021-03-10 PROCEDURE — 70450 CT HEAD/BRAIN W/O DYE: CPT | Mod: 26

## 2021-03-10 PROCEDURE — 71046 X-RAY EXAM CHEST 2 VIEWS: CPT | Mod: 26

## 2021-03-10 PROCEDURE — 93010 ELECTROCARDIOGRAM REPORT: CPT

## 2021-03-10 RX ORDER — GLUCAGON INJECTION, SOLUTION 0.5 MG/.1ML
1 INJECTION, SOLUTION SUBCUTANEOUS ONCE
Refills: 0 | Status: DISCONTINUED | OUTPATIENT
Start: 2021-03-10 | End: 2021-03-10

## 2021-03-10 RX ORDER — ACETAMINOPHEN 500 MG
650 TABLET ORAL EVERY 4 HOURS
Refills: 0 | Status: DISCONTINUED | OUTPATIENT
Start: 2021-03-10 | End: 2021-03-10

## 2021-03-10 RX ORDER — DEXTROSE 50 % IN WATER 50 %
25 SYRINGE (ML) INTRAVENOUS ONCE
Refills: 0 | Status: DISCONTINUED | OUTPATIENT
Start: 2021-03-10 | End: 2021-03-10

## 2021-03-10 RX ORDER — INSULIN LISPRO 100/ML
VIAL (ML) SUBCUTANEOUS AT BEDTIME
Refills: 0 | Status: DISCONTINUED | OUTPATIENT
Start: 2021-03-10 | End: 2021-03-10

## 2021-03-10 RX ORDER — DEXTROSE 50 % IN WATER 50 %
15 SYRINGE (ML) INTRAVENOUS ONCE
Refills: 0 | Status: DISCONTINUED | OUTPATIENT
Start: 2021-03-10 | End: 2021-03-10

## 2021-03-10 RX ORDER — SODIUM CHLORIDE 9 MG/ML
1000 INJECTION, SOLUTION INTRAVENOUS
Refills: 0 | Status: DISCONTINUED | OUTPATIENT
Start: 2021-03-10 | End: 2021-03-10

## 2021-03-10 RX ORDER — INSULIN LISPRO 100/ML
VIAL (ML) SUBCUTANEOUS
Refills: 0 | Status: DISCONTINUED | OUTPATIENT
Start: 2021-03-10 | End: 2021-03-10

## 2021-03-10 RX ORDER — ONDANSETRON 8 MG/1
4 TABLET, FILM COATED ORAL EVERY 6 HOURS
Refills: 0 | Status: DISCONTINUED | OUTPATIENT
Start: 2021-03-10 | End: 2021-03-10

## 2021-03-10 RX ORDER — DEXTROSE 50 % IN WATER 50 %
12.5 SYRINGE (ML) INTRAVENOUS ONCE
Refills: 0 | Status: DISCONTINUED | OUTPATIENT
Start: 2021-03-10 | End: 2021-03-10

## 2021-03-10 RX ORDER — INSULIN GLARGINE 100 [IU]/ML
5 INJECTION, SOLUTION SUBCUTANEOUS AT BEDTIME
Refills: 0 | Status: DISCONTINUED | OUTPATIENT
Start: 2021-03-10 | End: 2021-03-10

## 2021-03-10 RX ORDER — INSULIN GLARGINE 100 [IU]/ML
5 INJECTION, SOLUTION SUBCUTANEOUS ONCE
Refills: 0 | Status: COMPLETED | OUTPATIENT
Start: 2021-03-10 | End: 2021-03-10

## 2021-03-10 RX ADMIN — INSULIN GLARGINE 5 UNIT(S): 100 INJECTION, SOLUTION SUBCUTANEOUS at 21:31

## 2021-03-10 RX ADMIN — Medication 1: at 21:46

## 2021-03-10 NOTE — ED PROVIDER NOTE - CARE PROVIDER_API CALL
Ludy Manning  ENDOCRINOLOGY/METAB/DIABETES  [address] [place] [address] [address]  [place] [address]  Phone: [phone]  Fax: [phone]  Follow Up Time: 1-3 days    Karson Garcia  INTERNAL MEDICINE  [address] [place] [address]  Phone: [phone]  Fax: [phone]  Follow Up Time: 1-3 days

## 2021-03-10 NOTE — ED PROVIDER NOTE - PROVIDER TOKENS
PROVIDER:[TOKEN:[[id number]],FOLLOWUP:[1-3 days]],PROVIDER:[TOKEN:[[id number]],FOLLOWUP:[1-3 days]]

## 2021-03-10 NOTE — ED ADULT NURSE REASSESSMENT NOTE - NS ED NURSE REASSESS COMMENT FT1
Handoff receive pt in bed, pt awaiting insulin, no signs of hyperglycemia or hypoglycemia will monitor pt

## 2021-03-10 NOTE — ED PROVIDER NOTE - PROGRESS NOTE DETAILS
Patient refused admission. Will try to contact endocrinologist however patient will call his endocrinologists offcie tomorrow about hypoglycemic event. Will give 5 units of lantus tonight and patient will not take any insulin tonight before bed and agrees to call endocrinology in the AM.

## 2021-03-10 NOTE — ED PROVIDER NOTE - NSFOLLOWUPINSTRUCTIONS_ED_ALL_ED_FT
You came to the office because of drowsiness and were found to have severely low blood sugars. Your A1C was low at 6.4 and may indicate other episodes of low blood sugars. We initially wanted you to be admitted for further monitoring however you preferred to manage this at home and follow up with your endocrinologist in the morning. We gave you 5 units of lantus just prior to discharge. DO NOT take more insulin until you speak to your endocrinologist in the morning. If you find that your blood sugars are persistently over 300 please seek medical attention.

## 2021-03-10 NOTE — ED PROVIDER NOTE - PLAN OF CARE
patient responded to IV glucose. Patient is now eating and no longer hypoglycemic. Will give 5 units lantus and send home with endocrinology follow up.

## 2021-03-10 NOTE — ED ADULT TRIAGE NOTE - BP NONINVASIVE SYSTOLIC (MM HG)
Data: Vital signs stable, postpartum assessments within normal limits.   Eating and drinking without nausea or vomiting.  Up ad juan carlos, and voiding without difficulty. Passing gas, declined Senna today.   Feeding baby independently-  breast and formula.   Pain managed with ibuprofen . Pt states she is comfortable.  Perineum appears to be healing well, no s/s infection.  Discharge outcomes on care plan met.   Action: Review of care plan, teaching, and discharge instructions done. Infant identification with ID bands done, verification with signature obtained.   Response: Patient states understanding and comfort with her discharge instructions and plan of care. All questions addressed. She will discharge home with her infant.      137

## 2021-03-10 NOTE — ED PROVIDER NOTE - CARE PLAN
Principal Discharge DX:	Unresponsive episode  Secondary Diagnosis:	Hypoglycemia   Principal Discharge DX:	Hypoglycemia  Assessment and plan of treatment:	patient responded to IV glucose. Patient is now eating and no longer hypoglycemic. Will give 5 units lantus and send home with endocrinology follow up.

## 2021-03-10 NOTE — ED PROVIDER NOTE - CLINICAL SUMMARY MEDICAL DECISION MAKING FREE TEXT BOX
Pt is an 79 y/o M PMHx RA, DM, CAD x 1 stent p/w headache and drowsiness today -- hypoglycemia -- labs, a1c, trop, ekg, cxr Pt is an 81 y/o M PMHx RA, DM, CAD x 1 stent p/w headache and drowsiness today -- hypoglycemia -- labs, a1c, trop, ekg, cxr. patient refused admission. Will give 5 units of lantus to cover until he can talk to endocrinologist in the AM.

## 2021-03-10 NOTE — ED PROVIDER NOTE - OBJECTIVE STATEMENT
Pt is an 79 y/o M PMHx RA, DM, CAD x 1 stent p/w headache and drowsiness today.  Pt states he last took Insulin 70/30 17 units yesterday at 7 PM and Novolog Insulin 17 units today at 11:30 AM.  At 11:30 AM today, pt had toast and drank juice for brunch.  At ~ noon, pt reports developing gradual onset occipital headache associated with feeling drowsy.  He reports that family members state that he was unresponsive for which EMS was notified.  EMS found pt to be hypoglycemic Pt is an 79 y/o M PMHx RA, DM, CAD x 1 stent p/w headache and drowsiness today.  Pt states he last took Insulin 70/30 17 units yesterday at 7 PM and Novolog Insulin 17 units today at 11:30 AM.  At 11:30 AM today, pt had toast and drank juice for brunch.  At ~ noon, pt reports developing gradual onset occipital headache associated with feeling drowsy.  He reports that family members state that he was unresponsive for which EMS was notified.  EMS found pt to be hypoglycemic and administered D10 with which pt has returned to baseline.  Pt also reports associated nausea and vomiting en route to hospital.  Collateral information provided by pt's wife and daughter who states that pt was observed to be unresponsive, seated upright w/o fall or head trauma, for ~ 10 minutes w/o associated convulsions or incontinence.  Pt denies any fevers, chills, chest pain, neck pain/stiffness, numbness, weakness, dizziness, abdominal pain, dysuria, illicit drug use, ETOH abuse.

## 2021-03-10 NOTE — ED ADULT TRIAGE NOTE - CHIEF COMPLAINT QUOTE
from home for some unresponsiveness while EMS  was home had similar episode pt became near syncope her BP 88/50,  FS=45, IV Left arm 18g angio cath by EMS D 10% given at approx. 100cc given prior to arrival.

## 2021-03-10 NOTE — ED PROVIDER NOTE - PATIENT PORTAL LINK FT
You can access the FollowMyHealth Patient Portal offered by [facility] by registering at the following website: [email or web address]. By joining [employer]’s FollowMyHealth portal, you will also be able to view your health information using other applications (apps) compatible with our system.

## 2021-03-10 NOTE — ED ADULT NURSE REASSESSMENT NOTE - NS ED NURSE REASSESS COMMENT FT1
Break RN: A&Ox4 and alert. BS 79, MD Del Angel aware. Pt. given apple juice and 1/2 turkey sandwich to eat. BS to be checked in 30 min.

## 2021-03-10 NOTE — ED PROVIDER NOTE - ATTENDING CONTRIBUTION TO CARE
Dr. Del Angel:  I performed a face to face bedside interview with patient regarding history of present illness, review of symptoms and past medical history. I completed an independent physical exam.  I have discussed patient's plan of care with PA.   I agree with note as stated above, having amended the EMR as needed to reflect my findings.   This includes HISTORY OF PRESENT ILLNESS, HIV, PAST MEDICAL/SURGICAL/FAMILY/SOCIAL HISTORY, ALLERGIES AND HOME MEDICATIONS, REVIEW OF SYSTEMS, PHYSICAL EXAM, and any PROGRESS NOTES during the time I functioned as the attending physician for this patient.    80M h/o CAD, DM, bibems after episode of ?near syncope today.  Pt found to have BP 80's/50s and fingerstick 45 upon EMS arrival.  Pt states he was sitting and eating breakfast this morning as per usual, noted gradual onset occipital headache with feeling tired/drowsy, and put his head down on the table.  Family members thought he was unresponsive, but pt states he was hearing them speak around him.  Currently feels tired but denies other complaints.    Exam:  - nad  - rrr  - ctab  - abd soft ntd  - no focal neuro deficits    A/P  - near syncope, hypoglycemia, transient hypotension; eval cardiac etiology, eval infectious source; given headache, r/o bleed  - cbc, cmp, trop, coags, ekg, CT head, CXR, UA

## 2021-03-10 NOTE — ED ADULT TRIAGE NOTE - NS ED NURSE BANDS TYPE
Name band; seen with acp: well appearing adult female with palpitations and LH; found to have tachyarrythmia, afib vs SVT; responded well to ED intervention; appreciate cards consult; will remain in obs o/n for echo, tele, serial labs, and med management

## 2021-03-10 NOTE — ED ADULT NURSE NOTE - OBJECTIVE STATEMENT
Received pt to bed 15, A+Ox4, ambulatory. arrived to the ED post near syncopal episode. as per pt, family told him he "passed out" pt states he was sitting down to eat breakfast at 1130AM, took 17 units of Novalog prior to breakfast. FS 45 upon EMS arrival. pt states he vomited x1 after incident. Respirations even and unlabored, normal work of breathing, no accessory muscle use, speaking in full clear uninterrupted sentences. ABD is soft, non tender, non distended. Pt denies any chest pain, SOB, headache, dizziness,  fever, chills. . 18G to LFA from EMS, no redness or swelling noted, flushed well, Labs sent,  will continue to monitor.

## 2021-03-12 LAB
CULTURE RESULTS: SIGNIFICANT CHANGE UP
SPECIMEN SOURCE: SIGNIFICANT CHANGE UP

## 2021-06-21 NOTE — ED PROVIDER NOTE - GASTROINTESTINAL, MLM
Problem: Pain - Standard  Goal: Alleviation of pain or a reduction in pain to the patient’s comfort goal  Outcome: Progressing     The patient is Stable - Low risk of patient condition declining or worsening    Shift Goals  Clinical Goals: pain control  Patient Goals: pain control, comfort   Family Goals: n/a    Progress made toward(s) clinical / shift goals: PRN pain medications administered; pt repositioned Q2 hr; extra pillows and blankets provided for comfort     Patient is not progressing towards the following goals:       Abdomen soft, non-tender, no guarding.

## 2021-08-09 ENCOUNTER — APPOINTMENT (OUTPATIENT)
Dept: ORTHOPEDIC SURGERY | Facility: CLINIC | Age: 81
End: 2021-08-09
Payer: MEDICARE

## 2021-08-09 VITALS — HEART RATE: 76 BPM | DIASTOLIC BLOOD PRESSURE: 65 MMHG | SYSTOLIC BLOOD PRESSURE: 148 MMHG

## 2021-08-09 DIAGNOSIS — M48.061 SPINAL STENOSIS, LUMBAR REGION WITHOUT NEUROGENIC CLAUDICATION: ICD-10-CM

## 2021-08-09 PROCEDURE — 72100 X-RAY EXAM L-S SPINE 2/3 VWS: CPT

## 2021-08-09 PROCEDURE — 99204 OFFICE O/P NEW MOD 45 MIN: CPT

## 2021-09-01 ENCOUNTER — APPOINTMENT (OUTPATIENT)
Dept: ORTHOPEDIC SURGERY | Facility: CLINIC | Age: 81
End: 2021-09-01

## 2021-09-03 NOTE — ED ADULT NURSE NOTE - HOW OFTEN DO YOU HAVE A DRINK CONTAINING ALCOHOL?
Never Mohs Histo Method Verbiage: Each section was then chromacoded and processed in the Mohs lab using the Mohs protocol and submitted for frozen section.

## 2021-09-09 ENCOUNTER — APPOINTMENT (OUTPATIENT)
Dept: ORTHOPEDIC SURGERY | Facility: CLINIC | Age: 81
End: 2021-09-09
Payer: MEDICARE

## 2021-09-09 VITALS
DIASTOLIC BLOOD PRESSURE: 68 MMHG | SYSTOLIC BLOOD PRESSURE: 127 MMHG | WEIGHT: 139 LBS | HEIGHT: 67 IN | HEART RATE: 98 BPM | BODY MASS INDEX: 21.82 KG/M2

## 2021-09-09 PROCEDURE — 99214 OFFICE O/P EST MOD 30 MIN: CPT

## 2021-09-15 ENCOUNTER — OUTPATIENT (OUTPATIENT)
Dept: OUTPATIENT SERVICES | Facility: HOSPITAL | Age: 81
LOS: 1 days | End: 2021-09-15
Payer: MEDICARE

## 2021-09-15 VITALS
TEMPERATURE: 97 F | SYSTOLIC BLOOD PRESSURE: 126 MMHG | HEIGHT: 66 IN | WEIGHT: 136.91 LBS | DIASTOLIC BLOOD PRESSURE: 58 MMHG | OXYGEN SATURATION: 96 % | HEART RATE: 63 BPM | RESPIRATION RATE: 16 BRPM

## 2021-09-15 DIAGNOSIS — Z87.438 PERSONAL HISTORY OF OTHER DISEASES OF MALE GENITAL ORGANS: ICD-10-CM

## 2021-09-15 DIAGNOSIS — E78.5 HYPERLIPIDEMIA, UNSPECIFIED: ICD-10-CM

## 2021-09-15 DIAGNOSIS — I10 ESSENTIAL (PRIMARY) HYPERTENSION: ICD-10-CM

## 2021-09-15 DIAGNOSIS — M48.07 SPINAL STENOSIS, LUMBOSACRAL REGION: ICD-10-CM

## 2021-09-15 DIAGNOSIS — E11.9 TYPE 2 DIABETES MELLITUS WITHOUT COMPLICATIONS: ICD-10-CM

## 2021-09-15 DIAGNOSIS — I25.10 ATHEROSCLEROTIC HEART DISEASE OF NATIVE CORONARY ARTERY WITHOUT ANGINA PECTORIS: ICD-10-CM

## 2021-09-15 LAB
A1C WITH ESTIMATED AVERAGE GLUCOSE RESULT: 7.1 % — HIGH (ref 4–5.6)
ALBUMIN SERPL ELPH-MCNC: 4.2 G/DL — SIGNIFICANT CHANGE UP (ref 3.3–5)
ALP SERPL-CCNC: 105 U/L — SIGNIFICANT CHANGE UP (ref 40–120)
ALT FLD-CCNC: 15 U/L — SIGNIFICANT CHANGE UP (ref 4–41)
ANION GAP SERPL CALC-SCNC: 13 MMOL/L — SIGNIFICANT CHANGE UP (ref 7–14)
AST SERPL-CCNC: 23 U/L — SIGNIFICANT CHANGE UP (ref 4–40)
BILIRUB SERPL-MCNC: 0.5 MG/DL — SIGNIFICANT CHANGE UP (ref 0.2–1.2)
BLD GP AB SCN SERPL QL: NEGATIVE — SIGNIFICANT CHANGE UP
BUN SERPL-MCNC: 30 MG/DL — HIGH (ref 7–23)
CALCIUM SERPL-MCNC: 9.6 MG/DL — SIGNIFICANT CHANGE UP (ref 8.4–10.5)
CHLORIDE SERPL-SCNC: 102 MMOL/L — SIGNIFICANT CHANGE UP (ref 98–107)
CO2 SERPL-SCNC: 22 MMOL/L — SIGNIFICANT CHANGE UP (ref 22–31)
CREAT SERPL-MCNC: 1.51 MG/DL — HIGH (ref 0.5–1.3)
ESTIMATED AVERAGE GLUCOSE: 157 — SIGNIFICANT CHANGE UP
GLUCOSE SERPL-MCNC: 187 MG/DL — HIGH (ref 70–99)
HCT VFR BLD CALC: 40 % — SIGNIFICANT CHANGE UP (ref 39–50)
HGB BLD-MCNC: 13.4 G/DL — SIGNIFICANT CHANGE UP (ref 13–17)
MCHC RBC-ENTMCNC: 30.9 PG — SIGNIFICANT CHANGE UP (ref 27–34)
MCHC RBC-ENTMCNC: 33.5 GM/DL — SIGNIFICANT CHANGE UP (ref 32–36)
MCV RBC AUTO: 92.4 FL — SIGNIFICANT CHANGE UP (ref 80–100)
NRBC # BLD: 0 /100 WBCS — SIGNIFICANT CHANGE UP
NRBC # FLD: 0 K/UL — SIGNIFICANT CHANGE UP
PLATELET # BLD AUTO: 178 K/UL — SIGNIFICANT CHANGE UP (ref 150–400)
POTASSIUM SERPL-MCNC: 4.4 MMOL/L — SIGNIFICANT CHANGE UP (ref 3.5–5.3)
POTASSIUM SERPL-SCNC: 4.4 MMOL/L — SIGNIFICANT CHANGE UP (ref 3.5–5.3)
PROT SERPL-MCNC: 8 G/DL — SIGNIFICANT CHANGE UP (ref 6–8.3)
RBC # BLD: 4.33 M/UL — SIGNIFICANT CHANGE UP (ref 4.2–5.8)
RBC # FLD: 12.9 % — SIGNIFICANT CHANGE UP (ref 10.3–14.5)
RH IG SCN BLD-IMP: POSITIVE — SIGNIFICANT CHANGE UP
SODIUM SERPL-SCNC: 137 MMOL/L — SIGNIFICANT CHANGE UP (ref 135–145)
WBC # BLD: 5.99 K/UL — SIGNIFICANT CHANGE UP (ref 3.8–10.5)
WBC # FLD AUTO: 5.99 K/UL — SIGNIFICANT CHANGE UP (ref 3.8–10.5)

## 2021-09-15 PROCEDURE — 93010 ELECTROCARDIOGRAM REPORT: CPT

## 2021-09-15 NOTE — H&P PST ADULT - NSICDXPASTMEDICALHX_GEN_ALL_CORE_FT
PAST MEDICAL HISTORY:  CAD (coronary artery disease)     DM (diabetes mellitus)     HLD (hyperlipidemia)     HTN (hypertension)     RA (rheumatoid arthritis)     Spinal stenosis

## 2021-09-15 NOTE — H&P PST ADULT - PROBLEM SELECTOR PLAN 2
pt instructed to continue meds as prescribed and take ramipril on am of the surgery with a sip of water

## 2021-09-15 NOTE — H&P PST ADULT - PROBLEM SELECTOR PLAN 5
pt instructed to hold insulin on am of the procedure pt instructed to decrease Novolog mix 70/30 dose by 20 % night before surgery and use 13 units, pt instructed to hold insulin on am of the procedure, pt verbalized understanding

## 2021-09-15 NOTE — H&P PST ADULT - MUSCULOSKELETAL
detailed exam low back pain, ambulates with cane/no calf tenderness/decreased ROM due to pain details…

## 2021-09-15 NOTE — H&P PST ADULT - NSANTHOSAYNRD_GEN_A_CORE
No. SIMÓN screening performed.  STOP BANG Legend: 0-2 = LOW Risk; 3-4 = INTERMEDIATE Risk; 5-8 = HIGH Risk

## 2021-09-15 NOTE — H&P PST ADULT - HISTORY OF PRESENT ILLNESS
80 y.o male with h/o HTN, HLD, CAD x 1 stent, DM, BPH, RA, c/o low back pain x 10 years, states pain got progressively worse in the last year, c/o difficulty ambulating due pain, ambulates with cane, states pain is severe, 10/10, aggravated by activity, radiating to b/l lower extremities, c/o numbness, s/p Xray and MRI, preop diagnosis spinal stenosis lumbosacral region, scheduled for lumbar laminectomy 80 y.o male with h/o HTN, HLD, CAD x 1 stent, DM, BPH, RA, c/o low back pain x 10 years, states pain got progressively worse in the last year, c/o difficulty ambulating due to pain, ambulates with cane, states pain is severe, 10/10, aggravated by activity, radiating to b/l lower extremities, c/o numbness, s/p Xray and MRI, preop diagnosis spinal stenosis lumbosacral region, scheduled for lumbar laminectomy

## 2021-09-15 NOTE — H&P PST ADULT - PROBLEM SELECTOR PLAN 4
x 1 stent, on Plavix and Aspirin,   pt instructed to continue Aspirin, and follow cardiologist instructions regarding Plavix use preop  cardiac eval pending on 09/16/21

## 2021-09-15 NOTE — H&P PST ADULT - PROBLEM SELECTOR PLAN 1
Pt scheduled for lumbar laminectomy L4-L5 on 09/28/21  Preop instructions provided. Pt verbalized understanding.    written and verbal instructions with teach back on chlorhexidine shampoo provided,  pt verbalized understanding with returned demonstration  cardiac eval scheduled on 09/16/21, h/o CAD-copy requested

## 2021-09-16 LAB
MRSA PCR RESULT.: SIGNIFICANT CHANGE UP
S AUREUS DNA NOSE QL NAA+PROBE: SIGNIFICANT CHANGE UP

## 2021-09-25 ENCOUNTER — APPOINTMENT (OUTPATIENT)
Dept: DISASTER EMERGENCY | Facility: CLINIC | Age: 81
End: 2021-09-25

## 2021-09-25 DIAGNOSIS — Z01.818 ENCOUNTER FOR OTHER PREPROCEDURAL EXAMINATION: ICD-10-CM

## 2021-09-25 PROBLEM — M48.00 SPINAL STENOSIS, SITE UNSPECIFIED: Chronic | Status: ACTIVE | Noted: 2021-09-15

## 2021-09-25 PROBLEM — E78.5 HYPERLIPIDEMIA, UNSPECIFIED: Chronic | Status: ACTIVE | Noted: 2021-09-15

## 2021-09-25 PROBLEM — I10 ESSENTIAL (PRIMARY) HYPERTENSION: Chronic | Status: ACTIVE | Noted: 2021-09-15

## 2021-09-28 ENCOUNTER — APPOINTMENT (OUTPATIENT)
Dept: ORTHOPEDIC SURGERY | Facility: HOSPITAL | Age: 81
End: 2021-09-28

## 2021-10-05 ENCOUNTER — APPOINTMENT (OUTPATIENT)
Dept: PULMONOLOGY | Facility: CLINIC | Age: 81
End: 2021-10-05

## 2021-10-11 ENCOUNTER — APPOINTMENT (OUTPATIENT)
Dept: ORTHOPEDIC SURGERY | Facility: CLINIC | Age: 81
End: 2021-10-11

## 2021-10-28 ENCOUNTER — APPOINTMENT (OUTPATIENT)
Dept: PULMONOLOGY | Facility: CLINIC | Age: 81
End: 2021-10-28

## 2021-11-01 ENCOUNTER — APPOINTMENT (OUTPATIENT)
Dept: PULMONOLOGY | Facility: CLINIC | Age: 81
End: 2021-11-01
Payer: MEDICARE

## 2021-11-01 PROCEDURE — 36600 WITHDRAWAL OF ARTERIAL BLOOD: CPT | Mod: 59

## 2021-11-01 PROCEDURE — 82803 BLOOD GASES ANY COMBINATION: CPT

## 2022-03-30 ENCOUNTER — APPOINTMENT (OUTPATIENT)
Dept: SPINE | Facility: CLINIC | Age: 82
End: 2022-03-30

## 2022-04-20 ENCOUNTER — APPOINTMENT (OUTPATIENT)
Dept: SPINE | Facility: CLINIC | Age: 82
End: 2022-04-20
Payer: MEDICARE

## 2022-04-20 VITALS
WEIGHT: 137 LBS | DIASTOLIC BLOOD PRESSURE: 70 MMHG | HEART RATE: 91 BPM | SYSTOLIC BLOOD PRESSURE: 120 MMHG | OXYGEN SATURATION: 95 % | HEIGHT: 63 IN | BODY MASS INDEX: 24.27 KG/M2

## 2022-04-20 DIAGNOSIS — M54.16 RADICULOPATHY, LUMBAR REGION: ICD-10-CM

## 2022-04-20 DIAGNOSIS — Z83.3 FAMILY HISTORY OF DIABETES MELLITUS: ICD-10-CM

## 2022-04-20 DIAGNOSIS — Z86.39 PERSONAL HISTORY OF OTHER ENDOCRINE, NUTRITIONAL AND METABOLIC DISEASE: ICD-10-CM

## 2022-04-20 PROCEDURE — 99204 OFFICE O/P NEW MOD 45 MIN: CPT

## 2022-04-20 RX ORDER — PANTOPRAZOLE 40 MG/1
40 TABLET, DELAYED RELEASE ORAL
Refills: 0 | Status: ACTIVE | COMMUNITY

## 2022-04-20 RX ORDER — RAMIPRIL 2.5 MG/1
2.5 CAPSULE ORAL
Refills: 0 | Status: ACTIVE | COMMUNITY

## 2022-04-20 RX ORDER — CLOPIDOGREL BISULFATE 75 MG/1
75 TABLET, FILM COATED ORAL
Refills: 0 | Status: ACTIVE | COMMUNITY

## 2022-04-20 RX ORDER — EZETIMIBE AND SIMVASTATIN 10; 20 MG/1; MG/1
10-20 TABLET ORAL
Refills: 0 | Status: ACTIVE | COMMUNITY

## 2022-04-20 RX ORDER — MELOXICAM 15 MG/1
TABLET ORAL
Refills: 0 | Status: ACTIVE | COMMUNITY

## 2022-04-20 RX ORDER — TAMSULOSIN HYDROCHLORIDE 0.4 MG/1
0.4 CAPSULE ORAL
Refills: 0 | Status: ACTIVE | COMMUNITY

## 2022-04-20 RX ORDER — INSULIN ASPART 100 [IU]/ML
INJECTION, SOLUTION INTRAVENOUS; SUBCUTANEOUS
Refills: 0 | Status: ACTIVE | COMMUNITY

## 2022-04-20 RX ORDER — ETANERCEPT 50 MG/ML
50 SOLUTION SUBCUTANEOUS
Refills: 0 | Status: ACTIVE | COMMUNITY

## 2022-05-05 NOTE — ED ADULT TRIAGE NOTE - NS ED NURSE AMBULANCES
What Type Of Note Output Would You Prefer (Optional)?: Standard Output How Severe Are Your Spot(S)?: mild Have Your Spot(S) Been Treated In The Past?: has not been treated Hpi Title: Evaluation of Skin Lesions [facility]

## 2022-05-17 ENCOUNTER — RESULT REVIEW (OUTPATIENT)
Age: 82
End: 2022-05-17

## 2022-05-17 ENCOUNTER — APPOINTMENT (OUTPATIENT)
Dept: MRI IMAGING | Facility: IMAGING CENTER | Age: 82
End: 2022-05-17

## 2022-05-17 ENCOUNTER — OUTPATIENT (OUTPATIENT)
Dept: OUTPATIENT SERVICES | Facility: HOSPITAL | Age: 82
LOS: 1 days | End: 2022-05-17
Payer: MEDICARE

## 2022-05-17 ENCOUNTER — APPOINTMENT (OUTPATIENT)
Dept: CT IMAGING | Facility: IMAGING CENTER | Age: 82
End: 2022-05-17

## 2022-05-17 DIAGNOSIS — M48.061 SPINAL STENOSIS, LUMBAR REGION WITHOUT NEUROGENIC CLAUDICATION: ICD-10-CM

## 2022-05-17 PROCEDURE — 72148 MRI LUMBAR SPINE W/O DYE: CPT | Mod: 26,MH

## 2022-05-17 PROCEDURE — 72131 CT LUMBAR SPINE W/O DYE: CPT | Mod: 26,MH

## 2022-05-17 PROCEDURE — 72141 MRI NECK SPINE W/O DYE: CPT | Mod: 26,MH

## 2022-05-17 PROCEDURE — 72148 MRI LUMBAR SPINE W/O DYE: CPT | Mod: MH

## 2022-05-17 PROCEDURE — 72141 MRI NECK SPINE W/O DYE: CPT | Mod: MH

## 2022-05-17 PROCEDURE — 72131 CT LUMBAR SPINE W/O DYE: CPT | Mod: MH

## 2022-07-27 ENCOUNTER — APPOINTMENT (OUTPATIENT)
Dept: SPINE | Facility: CLINIC | Age: 82
End: 2022-07-27

## 2022-07-27 VITALS
HEIGHT: 66 IN | SYSTOLIC BLOOD PRESSURE: 150 MMHG | DIASTOLIC BLOOD PRESSURE: 69 MMHG | BODY MASS INDEX: 22.02 KG/M2 | WEIGHT: 137 LBS | HEART RATE: 97 BPM | OXYGEN SATURATION: 95 %

## 2022-07-27 DIAGNOSIS — M48.07 SPINAL STENOSIS, LUMBOSACRAL REGION: ICD-10-CM

## 2022-07-27 PROCEDURE — 99213 OFFICE O/P EST LOW 20 MIN: CPT

## 2023-02-27 ENCOUNTER — OUTPATIENT (OUTPATIENT)
Dept: OUTPATIENT SERVICES | Facility: HOSPITAL | Age: 83
LOS: 1 days | End: 2023-02-27
Payer: MEDICARE

## 2023-02-27 ENCOUNTER — TRANSCRIPTION ENCOUNTER (OUTPATIENT)
Age: 83
End: 2023-02-27

## 2023-02-27 VITALS
DIASTOLIC BLOOD PRESSURE: 69 MMHG | TEMPERATURE: 98 F | WEIGHT: 138.01 LBS | RESPIRATION RATE: 18 BRPM | OXYGEN SATURATION: 98 % | HEIGHT: 67 IN | SYSTOLIC BLOOD PRESSURE: 149 MMHG | HEART RATE: 66 BPM

## 2023-02-27 VITALS
DIASTOLIC BLOOD PRESSURE: 65 MMHG | RESPIRATION RATE: 18 BRPM | SYSTOLIC BLOOD PRESSURE: 136 MMHG | HEART RATE: 65 BPM | OXYGEN SATURATION: 99 %

## 2023-02-27 DIAGNOSIS — Z98.61 CORONARY ANGIOPLASTY STATUS: ICD-10-CM

## 2023-02-27 LAB
ANION GAP SERPL CALC-SCNC: 11 MMOL/L — SIGNIFICANT CHANGE UP (ref 5–17)
BUN SERPL-MCNC: 29 MG/DL — HIGH (ref 7–23)
CALCIUM SERPL-MCNC: 9.7 MG/DL — SIGNIFICANT CHANGE UP (ref 8.4–10.5)
CHLORIDE SERPL-SCNC: 102 MMOL/L — SIGNIFICANT CHANGE UP (ref 96–108)
CO2 SERPL-SCNC: 23 MMOL/L — SIGNIFICANT CHANGE UP (ref 22–31)
CREAT SERPL-MCNC: 1.39 MG/DL — HIGH (ref 0.5–1.3)
EGFR: 50 ML/MIN/1.73M2 — LOW
GLUCOSE SERPL-MCNC: 118 MG/DL — HIGH (ref 70–99)
HCT VFR BLD CALC: 39.9 % — SIGNIFICANT CHANGE UP (ref 39–50)
HGB BLD-MCNC: 12.9 G/DL — LOW (ref 13–17)
MCHC RBC-ENTMCNC: 29.9 PG — SIGNIFICANT CHANGE UP (ref 27–34)
MCHC RBC-ENTMCNC: 32.3 GM/DL — SIGNIFICANT CHANGE UP (ref 32–36)
MCV RBC AUTO: 92.6 FL — SIGNIFICANT CHANGE UP (ref 80–100)
NRBC # BLD: 0 /100 WBCS — SIGNIFICANT CHANGE UP (ref 0–0)
PLATELET # BLD AUTO: 171 K/UL — SIGNIFICANT CHANGE UP (ref 150–400)
POTASSIUM SERPL-MCNC: 4.5 MMOL/L — SIGNIFICANT CHANGE UP (ref 3.5–5.3)
POTASSIUM SERPL-SCNC: 4.5 MMOL/L — SIGNIFICANT CHANGE UP (ref 3.5–5.3)
RBC # BLD: 4.31 M/UL — SIGNIFICANT CHANGE UP (ref 4.2–5.8)
RBC # FLD: 14 % — SIGNIFICANT CHANGE UP (ref 10.3–14.5)
SODIUM SERPL-SCNC: 136 MMOL/L — SIGNIFICANT CHANGE UP (ref 135–145)
WBC # BLD: 7.75 K/UL — SIGNIFICANT CHANGE UP (ref 3.8–10.5)
WBC # FLD AUTO: 7.75 K/UL — SIGNIFICANT CHANGE UP (ref 3.8–10.5)

## 2023-02-27 PROCEDURE — 93005 ELECTROCARDIOGRAM TRACING: CPT

## 2023-02-27 PROCEDURE — 93010 ELECTROCARDIOGRAM REPORT: CPT

## 2023-02-27 PROCEDURE — 80048 BASIC METABOLIC PNL TOTAL CA: CPT

## 2023-02-27 PROCEDURE — C1769: CPT

## 2023-02-27 PROCEDURE — C1894: CPT

## 2023-02-27 PROCEDURE — C1887: CPT

## 2023-02-27 PROCEDURE — 93454 CORONARY ARTERY ANGIO S&I: CPT

## 2023-02-27 PROCEDURE — 82962 GLUCOSE BLOOD TEST: CPT

## 2023-02-27 PROCEDURE — 85027 COMPLETE CBC AUTOMATED: CPT

## 2023-02-27 RX ORDER — BIMATOPROST 0.3 MG/ML
1 SOLUTION/ DROPS OPHTHALMIC
Qty: 0 | Refills: 0 | DISCHARGE

## 2023-02-27 RX ORDER — CHONDROITIN SULFATE A SODIUM 100 %
1 POWDER (GRAM) MISCELLANEOUS
Qty: 0 | Refills: 0 | DISCHARGE

## 2023-02-27 RX ORDER — INSULIN ASPART 100 [IU]/ML
17 INJECTION, SOLUTION SUBCUTANEOUS
Qty: 0 | Refills: 0 | DISCHARGE

## 2023-02-27 RX ORDER — SODIUM CHLORIDE 9 MG/ML
1000 INJECTION INTRAMUSCULAR; INTRAVENOUS; SUBCUTANEOUS
Refills: 0 | Status: DISCONTINUED | OUTPATIENT
Start: 2023-02-27 | End: 2023-03-14

## 2023-02-27 RX ORDER — CHOLECALCIFEROL (VITAMIN D3) 125 MCG
1 CAPSULE ORAL
Qty: 0 | Refills: 0 | DISCHARGE

## 2023-02-27 RX ORDER — SODIUM CHLORIDE 9 MG/ML
250 INJECTION INTRAMUSCULAR; INTRAVENOUS; SUBCUTANEOUS ONCE
Refills: 0 | Status: COMPLETED | OUTPATIENT
Start: 2023-02-27 | End: 2023-02-27

## 2023-02-27 RX ORDER — MULTIVIT-MIN/FERROUS GLUCONATE 9 MG/15 ML
1 LIQUID (ML) ORAL
Qty: 0 | Refills: 0 | DISCHARGE

## 2023-02-27 RX ORDER — INSULIN ASPART 100 [IU]/ML
17 INJECTION, SUSPENSION SUBCUTANEOUS
Qty: 0 | Refills: 0 | DISCHARGE

## 2023-02-27 RX ADMIN — SODIUM CHLORIDE 1000 MILLILITER(S): 9 INJECTION INTRAMUSCULAR; INTRAVENOUS; SUBCUTANEOUS at 14:36

## 2023-02-27 RX ADMIN — SODIUM CHLORIDE 75 MILLILITER(S): 9 INJECTION INTRAMUSCULAR; INTRAVENOUS; SUBCUTANEOUS at 14:36

## 2023-02-27 NOTE — ASU PATIENT PROFILE, ADULT - FALL HARM RISK - HARM RISK INTERVENTIONS

## 2023-02-27 NOTE — H&P CARDIOLOGY - HISTORY OF PRESENT ILLNESS
82 year old male with PMHx of CAD s/p PCI x1 (20 years ago), HTN, HLD, T2DM, rheumatoid arthritis, BPH presents to his cardiologist office Dr. Youngblood w/ complaints of PEREZ. He had an abnormal stress test showing moderate hypokinesis of apical cap, moderate hypokinesis of the apical inferior to apical inferoseptal wall, myocardial perfusion images show a large defect during stress consistent w/ ischemia. He currently denies chest pain, palpitations, SOB, dizziness, N/V, fatigue.    Cards: Eduardo Garcia 82 year old male with PMHx of CAD s/p PCI x1 (20 years ago, done at [place]), HTN, HLD, T2DM, rheumatoid arthritis, BPH presents to his cardiologist office Dr. Youngblood for a routine visit and had an abnormal stress test showing moderate hypokinesis of apical cap, moderate hypokinesis of the apical inferior to apical inferoseptal wall, myocardial perfusion images show a large defect during stress consistent w/ ischemia. Patient presents for [facility] w/ Dr. Downing today. He currently denies chest pain, palpitations, SOB, dizziness, N/V, fatigue.    Cards: Eduardo Garcia

## 2023-02-27 NOTE — H&P CARDIOLOGY - NSICDXPASTMEDICALHX_GEN_ALL_CORE_FT
PAST MEDICAL HISTORY:  CAD (coronary artery disease)     DM (diabetes mellitus)     HLD (hyperlipidemia)     HTN (hypertension)     RA (rheumatoid arthritis)     Spinal stenosis      PAST MEDICAL HISTORY:  BPH (benign prostatic hyperplasia)     CAD (coronary artery disease)     DM (diabetes mellitus)     HLD (hyperlipidemia)     HTN (hypertension)     RA (rheumatoid arthritis)     Spinal stenosis

## 2023-02-27 NOTE — ASU DISCHARGE PLAN (ADULT/PEDIATRIC) - CARE PROVIDER_API CALL
Eduardo Garcia  CARDIOVASCULAR DISEASE  [address], [place] [id number]  Phone: [phone]  Fax: [phone]  Follow Up Time: 2 weeks

## 2023-02-27 NOTE — ASU DISCHARGE PLAN (ADULT/PEDIATRIC) - ASU DC SPECIAL INSTRUCTIONSFT
Wound Care: The day AFTER your procedure:  Remove bandage GENTLY, and clean using mild soap and gentle warm, water stream, pat dry.   Leave OPEN to air. YOU MAY SHOWER  DO NOT SOAK your procedure site for 1 week (no baths, no pools, no tubs)  Check your wrist or groin puncture site everyday.  A small amount of soreness and bruising is normal  ACTIVITY for the next 24 hours:  1) DO NOT DRIVE  2) DO NOT make any important decisions or sign legal documents  3) DO NOT operate heavy "[employer]ary   You may resume sexual activity in 48 hours, unless otherwise instructed by your cardiologist  If your procedure was done through the WRIST, for the NEXT 3 DAYS:  AVOID pushing pulling  or repeated movement of that hand and wrist (eg: typing, hammering)  DO NOT LIFT anything more than 5 lbs     If your procedure was done through the GROIN, for the NEXT 5 DAYS:  Limit climbing the stairs, no strenuous activities, no pushing, no pulling, no straining  Do not lift anything that is 10lbs or heavier   MEDICATION:  Take your medications as explained (see discharge paperwork). If you received a stent, you will be taking medication to KEEP YOUR STENT OPEN. DO NOT STOP THESE MEDICATIONS UNLESS DIRECTED BY A CARDIOLOGIST  If you smoke, WE RECOMMEND YOU QUIT (you may call [phone] the [facility] if you need assistance)   FOLLOW UP: Please follow up with your private cardiologist (insert name) in 2 weeks.  Please call immediately for an appointment upon discharge from the hospital.    ***CALL YOUR DOCTOR***   If you experience: chest pain, fever, chills, body aches, or severe pain, swelling, redness, heat or yellow discharge at incision site or if you experience bleeding, temperature change, numbness or excruciating pain at the procedural site  If you are unable to reach your doctor, you may contact:    Cardiology Office at [facility] at [phone]   Cardiac Short Stay Unit (CSSU) [phone]    Cardiac Recovery Suite (CRS) [phone]

## 2023-03-30 ENCOUNTER — APPOINTMENT (OUTPATIENT)
Dept: UROLOGY | Facility: CLINIC | Age: 83
End: 2023-03-30
Payer: MEDICARE

## 2023-03-30 VITALS
BODY MASS INDEX: 20.4 KG/M2 | HEIGHT: 67 IN | SYSTOLIC BLOOD PRESSURE: 118 MMHG | HEART RATE: 93 BPM | WEIGHT: 130 LBS | DIASTOLIC BLOOD PRESSURE: 67 MMHG

## 2023-03-30 DIAGNOSIS — Z00.00 ENCOUNTER FOR GENERAL ADULT MEDICAL EXAMINATION W/OUT ABNORMAL FINDINGS: ICD-10-CM

## 2023-03-30 DIAGNOSIS — Z78.9 OTHER SPECIFIED HEALTH STATUS: ICD-10-CM

## 2023-03-30 DIAGNOSIS — R35.1 NOCTURIA: ICD-10-CM

## 2023-03-30 DIAGNOSIS — R35.0 FREQUENCY OF MICTURITION: ICD-10-CM

## 2023-03-30 DIAGNOSIS — N40.1 BENIGN PROSTATIC HYPERPLASIA WITH LOWER URINARY TRACT SYMPMS: ICD-10-CM

## 2023-03-30 DIAGNOSIS — N13.8 BENIGN PROSTATIC HYPERPLASIA WITH LOWER URINARY TRACT SYMPMS: ICD-10-CM

## 2023-03-30 DIAGNOSIS — L30.9 DERMATITIS, UNSPECIFIED: ICD-10-CM

## 2023-03-30 PROCEDURE — 99204 OFFICE O/P NEW MOD 45 MIN: CPT

## 2023-04-01 LAB
ANION GAP SERPL CALC-SCNC: 13 MMOL/L
APPEARANCE: CLEAR
BACTERIA UR CULT: NORMAL
BACTERIA: NEGATIVE
BILIRUBIN URINE: NEGATIVE
BLOOD URINE: NEGATIVE
BUN SERPL-MCNC: 27 MG/DL
CALCIUM SERPL-MCNC: 10.4 MG/DL
CHLORIDE SERPL-SCNC: 98 MMOL/L
CO2 SERPL-SCNC: 26 MMOL/L
COLOR: YELLOW
CREAT SERPL-MCNC: 1.46 MG/DL
EGFR: 47 ML/MIN/1.73M2
GLUCOSE QUALITATIVE U: NEGATIVE
GLUCOSE SERPL-MCNC: 189 MG/DL
HYALINE CASTS: 0 /LPF
KETONES URINE: NEGATIVE
LEUKOCYTE ESTERASE URINE: NEGATIVE
MICROSCOPIC-UA: NORMAL
NITRITE URINE: NEGATIVE
PH URINE: 6
POTASSIUM SERPL-SCNC: 5.2 MMOL/L
PROTEIN URINE: NORMAL
PSA FREE FLD-MCNC: 32 %
PSA FREE SERPL-MCNC: 0.26 NG/ML
PSA SERPL-MCNC: 0.79 NG/ML
RED BLOOD CELLS URINE: 0 /HPF
SODIUM SERPL-SCNC: 137 MMOL/L
SPECIFIC GRAVITY URINE: 1.02
SQUAMOUS EPITHELIAL CELLS: 0 /HPF
UROBILINOGEN URINE: NORMAL
WHITE BLOOD CELLS URINE: 0 /HPF

## 2023-04-01 NOTE — LETTER BODY
[FreeTextEntry1] : Karson Garcia MD\par [address] [facility]\[place] [address]\par [phone]\par \par Dear Dr. Garcia, \par \par REASON FOR VISIT: BPH.\par \par This is an 82 year-old male with lower urinary tract symptoms and BPH. Patient is here today for evaluation. Patient reports he has irritative symptoms. He has weak uroflow, frequency, urgency, and hesitancy despite medical therapy. He reports nocturia. He denies any hematuria or urinary incontinence. His symptoms are aggravated by hydration. He denies any alleviating factors. He has tried Flomax without improvement. He denies any pain.  All other review of systems are negative. He has no cancer in his family medical history. He has a history of coronary artery stent placement. Past medical history, family history and social history were inquired and were noncontributory to current condition. The patient does not use tobacco or drink alcohol. Medications and allergies were reviewed. He is allergic to Tetracyclines.\par \par On examination, the patient is a well-appearing male in no acute distress. He is alert and oriented follows commands. He has normal mood and affect. He is normocephalic. Neck is supple. Oral no thrush. Respirations are unlabored. His abdomen is soft and nontender. Bladder is nonpalpable. No CVA tenderness. Neurologically he is grossly intact. No peripheral edema. Skin without gross abnormality. He has normal male external genitalia. Normal meatus. Bilateral testes are descended intrascrotally and normal to palpation. On rectal examination, there is normal sphincter tone. The prostate is clinically benign without focal induration or nodularity.\par \par Post-void residual on bladder scan today was 7 cc.\par \par ASSESSMENT: BPH\par \par I counseled the patient on the various etiology of his symptoms. I discussed the natural history of BPH and the treatment options available. I discussed the options of conservative management with fluid in dietary restrictions, herbal therapy, medical therapy, and minimally invasive procedures.  Risk and benefits were discussed. I answered his questions. I recommended he undergo a cystoscopy urodynamics procedure. He will obtain PSA and BMP to establish baselines. Risks and alternatives were discussed. I answered the patient questions. The patient will follow-up as directed and will contact me with any questions or concerns. Thank you for the opportunity to participate in the care of Mr. OSMAN. I will keep you updated on his progress.\par \par Plan: Cysto UDS. PSA. BMP. Follow up as directed.

## 2023-04-01 NOTE — ADDENDUM
[FreeTextEntry1] : Entered by Lidya Hector, acting as scribe for Dr. Adonay Redmond.\par The documentation recorded by the scribe accurately reflects the service I personally performed and the decisions made by me.

## 2023-06-21 PROBLEM — N40.0 BENIGN PROSTATIC HYPERPLASIA WITHOUT LOWER URINARY TRACT SYMPTOMS: Chronic | Status: ACTIVE | Noted: 2023-02-27

## 2023-07-06 ENCOUNTER — APPOINTMENT (OUTPATIENT)
Dept: UROLOGY | Facility: CLINIC | Age: 83
End: 2023-07-06

## 2023-10-30 NOTE — H&P PST ADULT - GASTROINTESTINAL
Interventional Radiology  History and Physical 10/30/2023     Ignacia Navarrete   1972   [id number]    Assessment/Plan:  46year-old female with history of abnormal bleeding from the uterus. Please see the clinic note from 6/29/23 for details. Patient states having persistent bleeding symptoms as well as occasional pelvic pain. Pelvic MRI was performed on 7/16/2023 which showed a 10.6 cm which may be a hemorrhagic fibroid, however with imaging characteristics not matching the other smaller fibroids. At the time, I recommended an endometrial biopsy, which patient reports was performed at her primary OB/GYN office at [facility] 2-3 weeks ago. She has not received a phone call regarding the results of the biopsy yet. Given that the endometrial biopsy has been performed as per the patient, we will proceed with uterine fibroid embolization today. The benefits, risks, and alternatives of the procedure were discussed with the patient. Problem List Items Addressed This Visit          Genitourinary    Abnormal uterine bleeding due to submucousal leiomyoma of uterus    Relevant Orders    IR uterine artery embolization          Subjective:     Patient ID: Ignacia Navarrete is a 46 y.o. female. History of Present Illness  Patient with history of abnormal bleeding from the uterus, with persistent bleeding and bulk symptoms. She denies any other symptoms such as fevers, chills, nausea, or any other symptoms. Review of Systems   All other systems reviewed and are negative. No past medical history on file. No past surgical history on file. Social History     Tobacco Use   Smoking Status Not on file   Smokeless Tobacco Not on file        Social History     Substance and Sexual Activity   Alcohol Use Not on file        Social History     Substance and Sexual Activity   Drug Use Not on file        No Known Allergies    No current outpatient medications on file.      Current Facility-Administered Medications   Medication Dose Route Frequency Provider Last Rate Last Admin    ceFAZolin (ANCEF) IVPB (premix in dextrose) 1,000 mg 50 mL  1,000 mg Intravenous Once Cassie Fernández MD        dexamethasone (PF) (DECADRON) injection 10 mg  10 mg Intravenous Once Cassie Fernández MD        ketorolac (TORADOL) injection 30 mg  30 mg Intravenous Once Cassie Fernández MD              Objective:    Vitals:    10/30/23 0800   BP: 136/61   Pulse: 72   Resp: 20   Temp: 97.8 °F (36.6 °C)   TempSrc: Oral   SpO2: 98%   Weight: 56.7 kg (125 lb)   Height: 5' 1" (1.549 m)        Physical Exam  Constitutional:       Appearance: Normal appearance. HENT:      Head: Normocephalic and atraumatic. Nose: Nose normal.   Eyes:      Extraocular Movements: Extraocular movements intact. Cardiovascular:      Rate and Rhythm: Normal rate. Pulmonary:      Effort: Pulmonary effort is normal.   Neurological:      General: No focal deficit present. Mental Status: She is alert. No results found for: "BNP"   No results found for: "WBC", "HGB", "HCT", "MCV", "PLT"  No results found for: "INR", "PROTIME"  No results found for: "PTT"      I have personally reviewed pertinent imaging and laboratory results. Code Status: No Order  Advance Directive and Living Will:      Power of :    POLST:      This text is generated with voice recognition software. There may be translation, syntax,  or grammatical errors. If you have any questions, please contact the dictating provider. detailed exam details…

## 2023-11-01 ENCOUNTER — NON-APPOINTMENT (OUTPATIENT)
Age: 83
End: 2023-11-01

## 2024-05-22 ENCOUNTER — INPATIENT (INPATIENT)
Facility: HOSPITAL | Age: 84
LOS: 8 days | Discharge: HOME HEALTH | End: 2024-05-31
Attending: HOSPITALIST | Admitting: HOSPITALIST
Payer: MEDICARE

## 2024-05-22 VITALS
OXYGEN SATURATION: 97 % | RESPIRATION RATE: 18 BRPM | TEMPERATURE: 100 F | HEIGHT: 67 IN | DIASTOLIC BLOOD PRESSURE: 52 MMHG | HEART RATE: 105 BPM | WEIGHT: 143.3 LBS | SYSTOLIC BLOOD PRESSURE: 100 MMHG

## 2024-05-22 LAB
ALBUMIN SERPL ELPH-MCNC: 3.5 G/DL — SIGNIFICANT CHANGE UP (ref 3.3–5)
ALP SERPL-CCNC: 83 U/L — SIGNIFICANT CHANGE UP (ref 40–120)
ALT FLD-CCNC: 16 U/L — SIGNIFICANT CHANGE UP (ref 4–41)
ANION GAP SERPL CALC-SCNC: 11 MMOL/L — SIGNIFICANT CHANGE UP (ref 7–14)
APPEARANCE UR: CLEAR — SIGNIFICANT CHANGE UP
APTT BLD: 29.9 SEC — SIGNIFICANT CHANGE UP (ref 24.5–35.6)
AST SERPL-CCNC: 26 U/L — SIGNIFICANT CHANGE UP (ref 4–40)
BASE EXCESS BLDV CALC-SCNC: -3.7 MMOL/L — LOW (ref -2–3)
BASE EXCESS BLDV CALC-SCNC: 0 MMOL/L — SIGNIFICANT CHANGE UP (ref -2–3)
BASOPHILS # BLD AUTO: 0.05 K/UL — SIGNIFICANT CHANGE UP (ref 0–0.2)
BASOPHILS NFR BLD AUTO: 0.3 % — SIGNIFICANT CHANGE UP (ref 0–2)
BILIRUB SERPL-MCNC: 0.6 MG/DL — SIGNIFICANT CHANGE UP (ref 0.2–1.2)
BILIRUB UR-MCNC: NEGATIVE — SIGNIFICANT CHANGE UP
BLOOD GAS VENOUS COMPREHENSIVE RESULT: SIGNIFICANT CHANGE UP
BLOOD GAS VENOUS COMPREHENSIVE RESULT: SIGNIFICANT CHANGE UP
BUN SERPL-MCNC: 27 MG/DL — HIGH (ref 7–23)
CALCIUM SERPL-MCNC: 9.2 MG/DL — SIGNIFICANT CHANGE UP (ref 8.4–10.5)
CHLORIDE BLDV-SCNC: 102 MMOL/L — SIGNIFICANT CHANGE UP (ref 96–108)
CHLORIDE BLDV-SCNC: 103 MMOL/L — SIGNIFICANT CHANGE UP (ref 96–108)
CHLORIDE SERPL-SCNC: 102 MMOL/L — SIGNIFICANT CHANGE UP (ref 98–107)
CO2 BLDV-SCNC: 23.4 MMOL/L — SIGNIFICANT CHANGE UP (ref 22–26)
CO2 BLDV-SCNC: 26 MMOL/L — SIGNIFICANT CHANGE UP (ref 22–26)
CO2 SERPL-SCNC: 22 MMOL/L — SIGNIFICANT CHANGE UP (ref 22–31)
COLOR SPEC: YELLOW — SIGNIFICANT CHANGE UP
CREAT SERPL-MCNC: 1.85 MG/DL — HIGH (ref 0.5–1.3)
DIFF PNL FLD: NEGATIVE — SIGNIFICANT CHANGE UP
EGFR: 35 ML/MIN/1.73M2 — LOW
EOSINOPHIL # BLD AUTO: 0.02 K/UL — SIGNIFICANT CHANGE UP (ref 0–0.5)
EOSINOPHIL NFR BLD AUTO: 0.1 % — SIGNIFICANT CHANGE UP (ref 0–6)
FLUAV AG NPH QL: SIGNIFICANT CHANGE UP
FLUBV AG NPH QL: SIGNIFICANT CHANGE UP
GAS PNL BLDV: 130 MMOL/L — LOW (ref 136–145)
GAS PNL BLDV: 130 MMOL/L — LOW (ref 136–145)
GAS PNL BLDV: SIGNIFICANT CHANGE UP
GLUCOSE BLDV-MCNC: 104 MG/DL — HIGH (ref 70–99)
GLUCOSE BLDV-MCNC: 135 MG/DL — HIGH (ref 70–99)
GLUCOSE SERPL-MCNC: 106 MG/DL — HIGH (ref 70–99)
GLUCOSE UR QL: NEGATIVE MG/DL — SIGNIFICANT CHANGE UP
HCO3 BLDV-SCNC: 22 MMOL/L — SIGNIFICANT CHANGE UP (ref 22–29)
HCO3 BLDV-SCNC: 25 MMOL/L — SIGNIFICANT CHANGE UP (ref 22–29)
HCT VFR BLD CALC: 34.3 % — LOW (ref 39–50)
HCT VFR BLDA CALC: 34 % — LOW (ref 39–51)
HCT VFR BLDA CALC: 36 % — LOW (ref 39–51)
HGB BLD CALC-MCNC: 11.2 G/DL — LOW (ref 12.6–17.4)
HGB BLD CALC-MCNC: 12.1 G/DL — LOW (ref 12.6–17.4)
HGB BLD-MCNC: 11.8 G/DL — LOW (ref 13–17)
IANC: 14.21 K/UL — HIGH (ref 1.8–7.4)
IMM GRANULOCYTES NFR BLD AUTO: 0.6 % — SIGNIFICANT CHANGE UP (ref 0–0.9)
INR BLD: 1.05 RATIO — SIGNIFICANT CHANGE UP (ref 0.85–1.18)
KETONES UR-MCNC: NEGATIVE MG/DL — SIGNIFICANT CHANGE UP
LACTATE BLDV-MCNC: 2.1 MMOL/L — HIGH (ref 0.5–2)
LACTATE BLDV-MCNC: 2.3 MMOL/L — HIGH (ref 0.5–2)
LACTATE SERPL-SCNC: 2.1 MMOL/L — HIGH (ref 0.5–2)
LEUKOCYTE ESTERASE UR-ACNC: NEGATIVE — SIGNIFICANT CHANGE UP
LYMPHOCYTES # BLD AUTO: 0.87 K/UL — LOW (ref 1–3.3)
LYMPHOCYTES # BLD AUTO: 5.2 % — LOW (ref 13–44)
MCHC RBC-ENTMCNC: 30.3 PG — SIGNIFICANT CHANGE UP (ref 27–34)
MCHC RBC-ENTMCNC: 34.4 GM/DL — SIGNIFICANT CHANGE UP (ref 32–36)
MCV RBC AUTO: 87.9 FL — SIGNIFICANT CHANGE UP (ref 80–100)
MONOCYTES # BLD AUTO: 1.35 K/UL — HIGH (ref 0–0.9)
MONOCYTES NFR BLD AUTO: 8.1 % — SIGNIFICANT CHANGE UP (ref 2–14)
NEUTROPHILS # BLD AUTO: 14.21 K/UL — HIGH (ref 1.8–7.4)
NEUTROPHILS NFR BLD AUTO: 85.7 % — HIGH (ref 43–77)
NITRITE UR-MCNC: NEGATIVE — SIGNIFICANT CHANGE UP
NRBC # BLD: 0 /100 WBCS — SIGNIFICANT CHANGE UP (ref 0–0)
NRBC # FLD: 0 K/UL — SIGNIFICANT CHANGE UP (ref 0–0)
PCO2 BLDV: 40 MMHG — LOW (ref 42–55)
PCO2 BLDV: 42 MMHG — SIGNIFICANT CHANGE UP (ref 42–55)
PH BLDV: 7.33 — SIGNIFICANT CHANGE UP (ref 7.32–7.43)
PH BLDV: 7.4 — SIGNIFICANT CHANGE UP (ref 7.32–7.43)
PH UR: 7 — SIGNIFICANT CHANGE UP (ref 5–8)
PLATELET # BLD AUTO: 243 K/UL — SIGNIFICANT CHANGE UP (ref 150–400)
PO2 BLDV: 37 MMHG — SIGNIFICANT CHANGE UP (ref 25–45)
PO2 BLDV: 44 MMHG — SIGNIFICANT CHANGE UP (ref 25–45)
POTASSIUM BLDV-SCNC: 4.7 MMOL/L — SIGNIFICANT CHANGE UP (ref 3.5–5.1)
POTASSIUM BLDV-SCNC: 4.8 MMOL/L — SIGNIFICANT CHANGE UP (ref 3.5–5.1)
POTASSIUM SERPL-MCNC: 4.6 MMOL/L — SIGNIFICANT CHANGE UP (ref 3.5–5.3)
POTASSIUM SERPL-SCNC: 4.6 MMOL/L — SIGNIFICANT CHANGE UP (ref 3.5–5.3)
PROT SERPL-MCNC: 7.6 G/DL — SIGNIFICANT CHANGE UP (ref 6–8.3)
PROT UR-MCNC: NEGATIVE MG/DL — SIGNIFICANT CHANGE UP
PROTHROM AB SERPL-ACNC: 11.8 SEC — SIGNIFICANT CHANGE UP (ref 9.5–13)
RBC # BLD: 3.9 M/UL — LOW (ref 4.2–5.8)
RBC # FLD: 13 % — SIGNIFICANT CHANGE UP (ref 10.3–14.5)
RSV RNA NPH QL NAA+NON-PROBE: SIGNIFICANT CHANGE UP
SAO2 % BLDV: 60.6 % — LOW (ref 67–88)
SAO2 % BLDV: 73 % — SIGNIFICANT CHANGE UP (ref 67–88)
SARS-COV-2 RNA SPEC QL NAA+PROBE: SIGNIFICANT CHANGE UP
SODIUM SERPL-SCNC: 135 MMOL/L — SIGNIFICANT CHANGE UP (ref 135–145)
SP GR SPEC: 1.02 — SIGNIFICANT CHANGE UP (ref 1–1.03)
TROPONIN T, HIGH SENSITIVITY RESULT: 71 NG/L — CRITICAL HIGH
TROPONIN T, HIGH SENSITIVITY RESULT: 83 NG/L — CRITICAL HIGH
UROBILINOGEN FLD QL: 0.2 MG/DL — SIGNIFICANT CHANGE UP (ref 0.2–1)
WBC # BLD: 16.6 K/UL — HIGH (ref 3.8–10.5)
WBC # FLD AUTO: 16.6 K/UL — HIGH (ref 3.8–10.5)

## 2024-05-22 PROCEDURE — 99285 EMERGENCY DEPT VISIT HI MDM: CPT

## 2024-05-22 PROCEDURE — 71045 X-RAY EXAM CHEST 1 VIEW: CPT | Mod: 26

## 2024-05-22 RX ORDER — SODIUM CHLORIDE 9 MG/ML
500 INJECTION INTRAMUSCULAR; INTRAVENOUS; SUBCUTANEOUS ONCE
Refills: 0 | Status: COMPLETED | OUTPATIENT
Start: 2024-05-22 | End: 2024-05-22

## 2024-05-22 RX ORDER — PIPERACILLIN AND TAZOBACTAM 4; .5 G/20ML; G/20ML
3.38 INJECTION, POWDER, LYOPHILIZED, FOR SOLUTION INTRAVENOUS ONCE
Refills: 0 | Status: COMPLETED | OUTPATIENT
Start: 2024-05-22 | End: 2024-05-22

## 2024-05-22 RX ORDER — SODIUM CHLORIDE 9 MG/ML
1000 INJECTION INTRAMUSCULAR; INTRAVENOUS; SUBCUTANEOUS ONCE
Refills: 0 | Status: COMPLETED | OUTPATIENT
Start: 2024-05-22 | End: 2024-05-22

## 2024-05-22 RX ORDER — ACETAMINOPHEN 500 MG
650 TABLET ORAL ONCE
Refills: 0 | Status: COMPLETED | OUTPATIENT
Start: 2024-05-22 | End: 2024-05-22

## 2024-05-22 RX ORDER — AZITHROMYCIN 500 MG/1
500 TABLET, FILM COATED ORAL ONCE
Refills: 0 | Status: COMPLETED | OUTPATIENT
Start: 2024-05-22 | End: 2024-05-22

## 2024-05-22 RX ORDER — SODIUM CHLORIDE 9 MG/ML
2000 INJECTION INTRAMUSCULAR; INTRAVENOUS; SUBCUTANEOUS ONCE
Refills: 0 | Status: DISCONTINUED | OUTPATIENT
Start: 2024-05-22 | End: 2024-05-22

## 2024-05-22 RX ADMIN — PIPERACILLIN AND TAZOBACTAM 200 GRAM(S): 4; .5 INJECTION, POWDER, LYOPHILIZED, FOR SOLUTION INTRAVENOUS at 19:29

## 2024-05-22 RX ADMIN — SODIUM CHLORIDE 1000 MILLILITER(S): 9 INJECTION INTRAMUSCULAR; INTRAVENOUS; SUBCUTANEOUS at 19:28

## 2024-05-22 RX ADMIN — AZITHROMYCIN 255 MILLIGRAM(S): 500 TABLET, FILM COATED ORAL at 21:08

## 2024-05-22 RX ADMIN — SODIUM CHLORIDE 500 MILLILITER(S): 9 INJECTION INTRAMUSCULAR; INTRAVENOUS; SUBCUTANEOUS at 22:01

## 2024-05-22 RX ADMIN — Medication 650 MILLIGRAM(S): at 19:29

## 2024-05-22 NOTE — ED PROVIDER NOTE - PROGRESS NOTE DETAILS
White count of 16 neutrophil 85% CMP notable for a creatinine of 1.85 lactate 2.1 troponin 71 urine clear flu COVID-negative chest x-ray right lower lobe consolidation may represent atelectasis and/or effusion.  Underlying pneumonia cannot be excluded.  Enlarged right apical lung opacity associated right tracheal deviation which is likely due to volume loss.  Diffuse chronic fibrotic changes.  Antibiotics were given.  Pending repeat Trope repeat lactate and admission Soniya Fuentes (PGY1): Called Dr. Downing, awaiting call back. Second troponin increased from 71-83.  Pending callback from his cardiologist Dr. Hernandez and admission  Patient is otherwise hemodynamically stable.  Signed out to night team at end of shift endorsed to

## 2024-05-22 NOTE — ED PROVIDER NOTE - CLINICAL SUMMARY MEDICAL DECISION MAKING FREE TEXT BOX
Code sepsis was called (temp 100.2 and ), pt well appearing, smiling, talking well, able to describe his entire medical history and HPI. Looks dry, mild dry cough on exam. No other significant findings. Will eval for PNA. Likely admission given pt age and weakness, fall risk

## 2024-05-22 NOTE — ED PROVIDER NOTE - PHYSICAL EXAMINATION
Gen: thin, very dry appearing, NAD, AAOx3, non-toxic appearing  HEENT: NCAT, normal conjunctiva, oral mucosa dry  Lung: speaking in full sentences, good aeration bilaterally, lungs CTA b/l  CV: regular rate and rhythm. cap refill <2x. peripheral pulses 2+bilaterally   Abd: soft, ND, NT  MSK: no visible deformities  Neuro: No focal deficits  Skin: Intact, very dry  Psych: normal, pleasant affect

## 2024-05-22 NOTE — ED ADULT NURSE REASSESSMENT NOTE - NS ED NURSE REASSESS COMMENT FT1
report received from JIMMY Chaney, pt A&Ox4 report received from JIMMY Chaney, pt A&Ox4 appears comfortable and in no acute distress. reposition in bed with help from family. NSR on  tele.

## 2024-05-22 NOTE — ED PROVIDER NOTE - ATTENDING CONTRIBUTION TO CARE
Attending Statement: I have personally seen and examined this patient. I have fully participated in the care of this patient. I have reviewed all pertinent clinical information, including history physical exam, plan and the Resident's note and agree except as noted   83-year-old male history of CAD, diabetes type 2, hypertension, high cholesterol, rheumatoid arthritis, spinal stenosis is a walker at baseline, BPH from home chief complaints of feeling weak for the last 2 days.  States that yesterday he had a fever 101.  Has had myalgias, nonproductive cough, today he was feeling very weak today.  Endorsing a generalized weakness.  No focal weakness no focal numbness.  He was having hard time walking with his walker family helped him no falls.  Denies headache denies chest pain denies abdominal pain denies nausea or vomiting.  Endorses poor p.o. intake.  No dysuria.  No travel history.  And denies any sick contacts.  Vital signs noted blood pressure 100/52 heart rate of 105 oral temp 100.2 pulse ox 98 on room air.  Elderly male laying in bed ANO x 3.  No photophobia supple neck.  Not icteric not jaundiced.  Poor dentition but moist mucous membranes.  Not coughing has no oxygen requirement.  Poor inspiratory effort.  Abdomen soft nontender nondistended.  No inguinal hernia noted examined with PCA at bedside.  No pedal edema bilaterally.  Gait not tested  Plan sepsis workup, fall precautions, IV antibiotics IV fluids chest x-ray, flu COVID and reassess plan to be admitted

## 2024-05-22 NOTE — ED PROVIDER NOTE - OBJECTIVE STATEMENT
83-year-old male with PMH of RA, DM 2, CAD x 1 stent many years ago presents to the ED with complaints of 1 day of weakness and 2 days of fever.  Patient is alert and oriented and without family or aide at the time of arrival, he is able to give his full history.  Patient states that he was so weak today that he could not use his walker, he had to have much more help than usual from his aide and family member at home. He de denies focal weakness instead describes weakness as global.  Yesterday he also had fever with a Tmax of 100.3.  The only complain pt endorses is cough, which has been ongoing for several weeks, but has become productive of phlegm in the last 2 days. No hemoptysis.  Patient has been on a course of Cipro for the last 6 days and for this he stopped taking his AC as directed.  Patient says the Cipro was for cough.  Patient denies any urinary complaints including dysuria, retention, frequency urgency incontinence.  Patient denies sore throat, congestion, nausea, vomiting, abdominal pain, diarrhea.  Patient endorses chronic constipation, last bowel movement yesterday passing gas.  Patient denies any neck stiffness, headaches, dizziness, vision or hearing disturbances.  No recent travel, recent procedures, CP, SOB, palps.

## 2024-05-22 NOTE — ED ADULT NURSE NOTE - NSFALLRISKASMT_ED_ALL_ED_DT
Darnell Castro MD  You 4 hours ago (7:43 AM)      At this point, nothing further to add but continued Elmiron and rescue's. Patient should be on amitriptyline for chronic pain if he is not. Could also consider a pain clinic referral.    Routing comment      Patient notified of message above. He will try amitriptyline 25mg daily and call in 1 week with update. He will continue to receive rescues treatment as need and remain on Elmiron. No further questions at this time Rx sent   22-May-2024 20:17

## 2024-05-22 NOTE — ED ADULT NURSE NOTE - OBJECTIVE STATEMENT
Pt AAOX4, nonambulatory at baseline presenting to room 14. Pt coming to ER after having a fever x1 day. Pt states that he has been experiencing a cough over the last week and has been on ciprofloxacin. Pt states that he fell today and that was the main concern for coming to the ER. Pt arrives with #20g placed to R Forearm. PT denies chest pain, headache, dizziness. Breathing even and unlabored. No pallor or diaphoresis noted at this time. Pt denies urinary symptoms, diarrhea and constipation. Abdomen soft nontender at this time. #18g placed to L forearm. Labs drawn and sent. Medications administered as per MD order.

## 2024-05-22 NOTE — ED ADULT TRIAGE NOTE - CHIEF COMPLAINT QUOTE
pt living with DM type2, c/o of generalized weakness, fever and cough for few days, pt had chills earlier this afternoon as well, denies dysuria

## 2024-05-23 DIAGNOSIS — Z87.39 PERSONAL HISTORY OF OTHER DISEASES OF THE MUSCULOSKELETAL SYSTEM AND CONNECTIVE TISSUE: ICD-10-CM

## 2024-05-23 DIAGNOSIS — J18.9 PNEUMONIA, UNSPECIFIED ORGANISM: ICD-10-CM

## 2024-05-23 DIAGNOSIS — R79.89 OTHER SPECIFIED ABNORMAL FINDINGS OF BLOOD CHEMISTRY: ICD-10-CM

## 2024-05-23 DIAGNOSIS — N17.9 ACUTE KIDNEY FAILURE, UNSPECIFIED: ICD-10-CM

## 2024-05-23 DIAGNOSIS — Z79.899 OTHER LONG TERM (CURRENT) DRUG THERAPY: ICD-10-CM

## 2024-05-23 DIAGNOSIS — E11.65 TYPE 2 DIABETES MELLITUS WITH HYPERGLYCEMIA: ICD-10-CM

## 2024-05-23 DIAGNOSIS — Z29.9 ENCOUNTER FOR PROPHYLACTIC MEASURES, UNSPECIFIED: ICD-10-CM

## 2024-05-23 DIAGNOSIS — I25.10 ATHEROSCLEROTIC HEART DISEASE OF NATIVE CORONARY ARTERY WITHOUT ANGINA PECTORIS: ICD-10-CM

## 2024-05-23 LAB
-  STREPTOCOCCUS SP. (NOT GRP A, B OR S PNEUMONIAE): SIGNIFICANT CHANGE UP
A1C WITH ESTIMATED AVERAGE GLUCOSE RESULT: 7.2 % — HIGH (ref 4–5.6)
ADD ON TEST-SPECIMEN IN LAB: SIGNIFICANT CHANGE UP
ADD ON TEST-SPECIMEN IN LAB: SIGNIFICANT CHANGE UP
ALBUMIN SERPL ELPH-MCNC: 3.5 G/DL — SIGNIFICANT CHANGE UP (ref 3.3–5)
ALP SERPL-CCNC: 82 U/L — SIGNIFICANT CHANGE UP (ref 40–120)
ALT FLD-CCNC: 17 U/L — SIGNIFICANT CHANGE UP (ref 4–41)
ANION GAP SERPL CALC-SCNC: 14 MMOL/L — SIGNIFICANT CHANGE UP (ref 7–14)
AST SERPL-CCNC: 33 U/L — SIGNIFICANT CHANGE UP (ref 4–40)
BASE EXCESS BLDV CALC-SCNC: -3.3 MMOL/L — LOW (ref -2–3)
BASOPHILS # BLD AUTO: 0.03 K/UL — SIGNIFICANT CHANGE UP (ref 0–0.2)
BASOPHILS NFR BLD AUTO: 0.2 % — SIGNIFICANT CHANGE UP (ref 0–2)
BILIRUB SERPL-MCNC: 0.8 MG/DL — SIGNIFICANT CHANGE UP (ref 0.2–1.2)
BLOOD GAS VENOUS COMPREHENSIVE RESULT: SIGNIFICANT CHANGE UP
BUN SERPL-MCNC: 23 MG/DL — SIGNIFICANT CHANGE UP (ref 7–23)
CALCIUM SERPL-MCNC: 8.9 MG/DL — SIGNIFICANT CHANGE UP (ref 8.4–10.5)
CHLORIDE BLDV-SCNC: 101 MMOL/L — SIGNIFICANT CHANGE UP (ref 96–108)
CHLORIDE SERPL-SCNC: 101 MMOL/L — SIGNIFICANT CHANGE UP (ref 98–107)
CK MB BLD-MCNC: 5.4 % — HIGH (ref 0–2.5)
CK MB CFR SERPL CALC: 11.7 NG/ML — HIGH
CK SERPL-CCNC: 218 U/L — HIGH (ref 30–200)
CO2 BLDV-SCNC: 22.5 MMOL/L — SIGNIFICANT CHANGE UP (ref 22–26)
CO2 SERPL-SCNC: 18 MMOL/L — LOW (ref 22–31)
CREAT SERPL-MCNC: 1.75 MG/DL — HIGH (ref 0.5–1.3)
CULTURE RESULTS: NO GROWTH — SIGNIFICANT CHANGE UP
EGFR: 38 ML/MIN/1.73M2 — LOW
EOSINOPHIL # BLD AUTO: 0.02 K/UL — SIGNIFICANT CHANGE UP (ref 0–0.5)
EOSINOPHIL NFR BLD AUTO: 0.1 % — SIGNIFICANT CHANGE UP (ref 0–6)
ESTIMATED AVERAGE GLUCOSE: 160 — SIGNIFICANT CHANGE UP
GAS PNL BLDV: 129 MMOL/L — LOW (ref 136–145)
GLUCOSE BLDV-MCNC: 207 MG/DL — HIGH (ref 70–99)
GLUCOSE SERPL-MCNC: 187 MG/DL — HIGH (ref 70–99)
GRAM STN FLD: ABNORMAL
HCO3 BLDV-SCNC: 21 MMOL/L — LOW (ref 22–29)
HCT VFR BLD CALC: 39 % — SIGNIFICANT CHANGE UP (ref 39–50)
HCT VFR BLDA CALC: 52 % — HIGH (ref 39–51)
HGB BLD CALC-MCNC: 17.2 G/DL — SIGNIFICANT CHANGE UP (ref 12.6–17.4)
HGB BLD-MCNC: 13.1 G/DL — SIGNIFICANT CHANGE UP (ref 13–17)
IANC: 14.17 K/UL — HIGH (ref 1.8–7.4)
IMM GRANULOCYTES NFR BLD AUTO: 0.4 % — SIGNIFICANT CHANGE UP (ref 0–0.9)
LACTATE BLDV-MCNC: 3.1 MMOL/L — HIGH (ref 0.5–2)
LYMPHOCYTES # BLD AUTO: 0.91 K/UL — LOW (ref 1–3.3)
LYMPHOCYTES # BLD AUTO: 5.7 % — LOW (ref 13–44)
MAGNESIUM SERPL-MCNC: 2 MG/DL — SIGNIFICANT CHANGE UP (ref 1.6–2.6)
MCHC RBC-ENTMCNC: 30.1 PG — SIGNIFICANT CHANGE UP (ref 27–34)
MCHC RBC-ENTMCNC: 33.6 GM/DL — SIGNIFICANT CHANGE UP (ref 32–36)
MCV RBC AUTO: 89.7 FL — SIGNIFICANT CHANGE UP (ref 80–100)
METHOD TYPE: SIGNIFICANT CHANGE UP
MONOCYTES # BLD AUTO: 0.86 K/UL — SIGNIFICANT CHANGE UP (ref 0–0.9)
MONOCYTES NFR BLD AUTO: 5.4 % — SIGNIFICANT CHANGE UP (ref 2–14)
MRSA PCR RESULT.: SIGNIFICANT CHANGE UP
NEUTROPHILS # BLD AUTO: 14.17 K/UL — HIGH (ref 1.8–7.4)
NEUTROPHILS NFR BLD AUTO: 88.2 % — HIGH (ref 43–77)
NRBC # BLD: 0 /100 WBCS — SIGNIFICANT CHANGE UP (ref 0–0)
NRBC # FLD: 0 K/UL — SIGNIFICANT CHANGE UP (ref 0–0)
PCO2 BLDV: 37 MMHG — LOW (ref 42–55)
PH BLDV: 7.37 — SIGNIFICANT CHANGE UP (ref 7.32–7.43)
PLATELET # BLD AUTO: 249 K/UL — SIGNIFICANT CHANGE UP (ref 150–400)
PO2 BLDV: 32 MMHG — SIGNIFICANT CHANGE UP (ref 25–45)
POTASSIUM BLDV-SCNC: 5 MMOL/L — SIGNIFICANT CHANGE UP (ref 3.5–5.1)
POTASSIUM SERPL-MCNC: 5.1 MMOL/L — SIGNIFICANT CHANGE UP (ref 3.5–5.3)
POTASSIUM SERPL-SCNC: 5.1 MMOL/L — SIGNIFICANT CHANGE UP (ref 3.5–5.3)
PROT SERPL-MCNC: 7.7 G/DL — SIGNIFICANT CHANGE UP (ref 6–8.3)
RBC # BLD: 4.35 M/UL — SIGNIFICANT CHANGE UP (ref 4.2–5.8)
RBC # FLD: 13.2 % — SIGNIFICANT CHANGE UP (ref 10.3–14.5)
S AUREUS DNA NOSE QL NAA+PROBE: SIGNIFICANT CHANGE UP
SAO2 % BLDV: 50.5 % — LOW (ref 67–88)
SODIUM SERPL-SCNC: 133 MMOL/L — LOW (ref 135–145)
SPECIMEN SOURCE: SIGNIFICANT CHANGE UP
TROPONIN T, HIGH SENSITIVITY RESULT: 122 NG/L — CRITICAL HIGH
WBC # BLD: 16.05 K/UL — HIGH (ref 3.8–10.5)
WBC # FLD AUTO: 16.05 K/UL — HIGH (ref 3.8–10.5)

## 2024-05-23 PROCEDURE — 99223 1ST HOSP IP/OBS HIGH 75: CPT

## 2024-05-23 PROCEDURE — 71250 CT THORAX DX C-: CPT | Mod: 26

## 2024-05-23 RX ORDER — ASCORBIC ACID 60 MG
500 TABLET,CHEWABLE ORAL DAILY
Refills: 0 | Status: DISCONTINUED | OUTPATIENT
Start: 2024-05-23 | End: 2024-05-31

## 2024-05-23 RX ORDER — DEXTROSE 50 % IN WATER 50 %
15 SYRINGE (ML) INTRAVENOUS ONCE
Refills: 0 | Status: DISCONTINUED | OUTPATIENT
Start: 2024-05-23 | End: 2024-05-31

## 2024-05-23 RX ORDER — GLUCAGON INJECTION, SOLUTION 0.5 MG/.1ML
1 INJECTION, SOLUTION SUBCUTANEOUS ONCE
Refills: 0 | Status: DISCONTINUED | OUTPATIENT
Start: 2024-05-23 | End: 2024-05-31

## 2024-05-23 RX ORDER — IPRATROPIUM/ALBUTEROL SULFATE 18-103MCG
3 AEROSOL WITH ADAPTER (GRAM) INHALATION EVERY 6 HOURS
Refills: 0 | Status: DISCONTINUED | OUTPATIENT
Start: 2024-05-23 | End: 2024-05-31

## 2024-05-23 RX ORDER — DEXTROSE 50 % IN WATER 50 %
12.5 SYRINGE (ML) INTRAVENOUS ONCE
Refills: 0 | Status: DISCONTINUED | OUTPATIENT
Start: 2024-05-23 | End: 2024-05-31

## 2024-05-23 RX ORDER — SODIUM CHLORIDE 9 MG/ML
1000 INJECTION, SOLUTION INTRAVENOUS
Refills: 0 | Status: DISCONTINUED | OUTPATIENT
Start: 2024-05-23 | End: 2024-05-31

## 2024-05-23 RX ORDER — HEPARIN SODIUM 5000 [USP'U]/ML
5000 INJECTION INTRAVENOUS; SUBCUTANEOUS EVERY 12 HOURS
Refills: 0 | Status: DISCONTINUED | OUTPATIENT
Start: 2024-05-23 | End: 2024-05-31

## 2024-05-23 RX ORDER — EZETIMIBE AND SIMVASTATIN 10; 80 MG/1; MG/1
1 TABLET, FILM COATED ORAL
Qty: 0 | Refills: 0 | DISCHARGE

## 2024-05-23 RX ORDER — INSULIN GLARGINE 100 [IU]/ML
14 INJECTION, SOLUTION SUBCUTANEOUS AT BEDTIME
Refills: 0 | Status: DISCONTINUED | OUTPATIENT
Start: 2024-05-23 | End: 2024-05-24

## 2024-05-23 RX ORDER — LANOLIN ALCOHOL/MO/W.PET/CERES
3 CREAM (GRAM) TOPICAL AT BEDTIME
Refills: 0 | Status: DISCONTINUED | OUTPATIENT
Start: 2024-05-23 | End: 2024-05-24

## 2024-05-23 RX ORDER — INSULIN LISPRO 100/ML
4 VIAL (ML) SUBCUTANEOUS
Refills: 0 | Status: DISCONTINUED | OUTPATIENT
Start: 2024-05-23 | End: 2024-05-24

## 2024-05-23 RX ORDER — INSULIN GLARGINE 100 [IU]/ML
14 INJECTION, SOLUTION SUBCUTANEOUS ONCE
Refills: 0 | Status: COMPLETED | OUTPATIENT
Start: 2024-05-23 | End: 2024-05-23

## 2024-05-23 RX ORDER — PIPERACILLIN AND TAZOBACTAM 4; .5 G/20ML; G/20ML
3.38 INJECTION, POWDER, LYOPHILIZED, FOR SOLUTION INTRAVENOUS EVERY 12 HOURS
Refills: 0 | Status: DISCONTINUED | OUTPATIENT
Start: 2024-05-23 | End: 2024-05-24

## 2024-05-23 RX ORDER — SODIUM CHLORIDE 9 MG/ML
500 INJECTION INTRAMUSCULAR; INTRAVENOUS; SUBCUTANEOUS ONCE
Refills: 0 | Status: COMPLETED | OUTPATIENT
Start: 2024-05-23 | End: 2024-05-23

## 2024-05-23 RX ORDER — DEXTROSE 50 % IN WATER 50 %
25 SYRINGE (ML) INTRAVENOUS ONCE
Refills: 0 | Status: DISCONTINUED | OUTPATIENT
Start: 2024-05-23 | End: 2024-05-31

## 2024-05-23 RX ORDER — ACETAMINOPHEN 500 MG
650 TABLET ORAL EVERY 6 HOURS
Refills: 0 | Status: DISCONTINUED | OUTPATIENT
Start: 2024-05-23 | End: 2024-05-31

## 2024-05-23 RX ORDER — INSULIN LISPRO 100/ML
VIAL (ML) SUBCUTANEOUS AT BEDTIME
Refills: 0 | Status: DISCONTINUED | OUTPATIENT
Start: 2024-05-23 | End: 2024-05-28

## 2024-05-23 RX ORDER — DEXTROSE 10 % IN WATER 10 %
125 INTRAVENOUS SOLUTION INTRAVENOUS ONCE
Refills: 0 | Status: DISCONTINUED | OUTPATIENT
Start: 2024-05-23 | End: 2024-05-31

## 2024-05-23 RX ORDER — ASPIRIN/CALCIUM CARB/MAGNESIUM 324 MG
81 TABLET ORAL DAILY
Refills: 0 | Status: DISCONTINUED | OUTPATIENT
Start: 2024-05-23 | End: 2024-05-31

## 2024-05-23 RX ORDER — TAMSULOSIN HYDROCHLORIDE 0.4 MG/1
1 CAPSULE ORAL
Qty: 0 | Refills: 0 | DISCHARGE

## 2024-05-23 RX ORDER — ASCORBIC ACID 60 MG
1 TABLET,CHEWABLE ORAL
Qty: 0 | Refills: 0 | DISCHARGE

## 2024-05-23 RX ORDER — ASPIRIN/CALCIUM CARB/MAGNESIUM 324 MG
1 TABLET ORAL
Qty: 0 | Refills: 0 | DISCHARGE

## 2024-05-23 RX ORDER — INSULIN LISPRO 100/ML
VIAL (ML) SUBCUTANEOUS
Refills: 0 | Status: DISCONTINUED | OUTPATIENT
Start: 2024-05-23 | End: 2024-05-28

## 2024-05-23 RX ORDER — RAMIPRIL 5 MG
1 CAPSULE ORAL
Qty: 0 | Refills: 0 | DISCHARGE

## 2024-05-23 RX ORDER — ETANERCEPT 25 MG
50 VIAL (EA) SUBCUTANEOUS
Refills: 0 | DISCHARGE

## 2024-05-23 RX ORDER — PIPERACILLIN AND TAZOBACTAM 4; .5 G/20ML; G/20ML
3.38 INJECTION, POWDER, LYOPHILIZED, FOR SOLUTION INTRAVENOUS EVERY 8 HOURS
Refills: 0 | Status: DISCONTINUED | OUTPATIENT
Start: 2024-05-23 | End: 2024-05-23

## 2024-05-23 RX ORDER — AZITHROMYCIN 500 MG/1
500 TABLET, FILM COATED ORAL EVERY 24 HOURS
Refills: 0 | Status: DISCONTINUED | OUTPATIENT
Start: 2024-05-23 | End: 2024-05-23

## 2024-05-23 RX ADMIN — Medication 600 MILLIGRAM(S): at 07:11

## 2024-05-23 RX ADMIN — Medication 4 UNIT(S): at 09:12

## 2024-05-23 RX ADMIN — HEPARIN SODIUM 5000 UNIT(S): 5000 INJECTION INTRAVENOUS; SUBCUTANEOUS at 18:25

## 2024-05-23 RX ADMIN — Medication 600 MILLIGRAM(S): at 18:26

## 2024-05-23 RX ADMIN — Medication 40 MILLIGRAM(S): at 07:11

## 2024-05-23 RX ADMIN — Medication 3 MILLILITER(S): at 08:15

## 2024-05-23 RX ADMIN — SODIUM CHLORIDE 500 MILLILITER(S): 9 INJECTION INTRAMUSCULAR; INTRAVENOUS; SUBCUTANEOUS at 03:46

## 2024-05-23 RX ADMIN — Medication 4 UNIT(S): at 18:25

## 2024-05-23 RX ADMIN — Medication 5: at 12:48

## 2024-05-23 RX ADMIN — Medication 3 MILLILITER(S): at 22:11

## 2024-05-23 RX ADMIN — Medication 3 MILLILITER(S): at 04:51

## 2024-05-23 RX ADMIN — INSULIN GLARGINE 14 UNIT(S): 100 INJECTION, SOLUTION SUBCUTANEOUS at 22:15

## 2024-05-23 RX ADMIN — Medication 3: at 22:16

## 2024-05-23 RX ADMIN — PIPERACILLIN AND TAZOBACTAM 25 GRAM(S): 4; .5 INJECTION, POWDER, LYOPHILIZED, FOR SOLUTION INTRAVENOUS at 18:26

## 2024-05-23 RX ADMIN — Medication 5: at 18:25

## 2024-05-23 RX ADMIN — Medication 500 MILLIGRAM(S): at 11:12

## 2024-05-23 RX ADMIN — PIPERACILLIN AND TAZOBACTAM 25 GRAM(S): 4; .5 INJECTION, POWDER, LYOPHILIZED, FOR SOLUTION INTRAVENOUS at 07:10

## 2024-05-23 RX ADMIN — Medication 4 UNIT(S): at 12:49

## 2024-05-23 RX ADMIN — Medication 81 MILLIGRAM(S): at 11:11

## 2024-05-23 RX ADMIN — Medication 2: at 09:12

## 2024-05-23 RX ADMIN — Medication 3 MILLILITER(S): at 15:30

## 2024-05-23 RX ADMIN — HEPARIN SODIUM 5000 UNIT(S): 5000 INJECTION INTRAVENOUS; SUBCUTANEOUS at 07:10

## 2024-05-23 NOTE — PROGRESS NOTE ADULT - SUBJECTIVE AND OBJECTIVE BOX
Medicine Progress Note  --------------------  Nohemy Washington M.D.  EM/IM PGY-1  Contact via TEAMS   --------------------      Patient: SHANNA OSMAN, MRN: [id number], : 1940  Admitted on 24 for Pneumonia due to infectious organism      LANGUAGE - English ]OR[ PI (_): #                ------------------------------------------------------------------------------------------------------------  SUMMARY (from last progress note through 24 @ 07:58):   -  ------------------------------------------------------------------------------------------------------------  OVERNIGHT/INTERVAL EVENTS  No events overnight. Vitals remained stable on room air. Reviewed all scheduled medications.  In the last 24 hours, patient required the following PRNs: none     SUBJECTIVE  - Pt was seen and examined at bedside this am.       ROS negative unless noted above.  ------------------------------------------------------------------------------------------------------------  OBJECTIVE:  Physical Exam  CONST:     NAD, well-appearing; well-developed; appears stated age  EYES:         Conjunctiva clear; PERRL; no conjunctival pallor; no lid lag  ENMT:       MMM, no pharyngeal injection or exudates; normal dentition/dentures present  NECK:        Supple, no LAD; no palpable masses; no thyromegaly  RESP :        Normal respiratory effort; CTA bilaterally; no W/R/R  CHEST:       No TTP, no lines/ports; symmetric chest expansion  CARDIO:     RRR, normal S1 and S2, no M/R/G; apical impulse at MCL  VASC:         No JVD/AJR, no peripheral edema, pulses 2+ B/L, Cap refill <2s  ABD:           Soft, NT/ND, norm bowel sounds; no R/G; No HSM; No CVAT  MSK:          No clubbing of digits; no joint swelling or TTP  EXT:           WWP, no reduction in body hair, no LE skin changes  PSYCH        A&O to person, place, and time; affect appropriate  NEURO:     Non-focal; no gross sensory deficits; moving all extremities   SKIN:          No rashes; no palpable lesions; axillae not dry    Vital Signs Last 24 Hrs  T(F): 99, Max: 100.2 (24 @ 17:19)  HR: 108 (84 - 108)  BP: 178/78 (91/52 - 178/78)  RR: 20 (18 - 22)  SpO2: 94% (94% - 100%)        DAILY MEASUREMENTS:  I&O's Summary    Daily Height in cm: 170.18 (23 May 2024 04:30)    Daily Weight in k (23 May 2024 04:30)  Weight (kg): 62 (24 @ 04:30)  Orthostatic VS        LABS:                        13.1   16.05 )-----------( 249      ( 23 May 2024 06:00 )             39.0     Hgb Trend: 13.1<--, 11.8<--      135  |  102  |  27<H>  ----------------------------<  106<H>  4.6   |  22  |  1.85<H>    Ca    9.2      22 May 2024 19:15    TPro  7.6  /  Alb  3.5  /  TBili  0.6  /  DBili  x   /  AST  26  /  ALT  16  /  AlkPhos  83  -    PT/INR - ( 22 May 2024 19:15 )   PT: 11.8 sec;   INR: 1.05 ratio         PTT - ( 22 May 2024 19:15 )  PTT:29.9 sec  CAPILLARY BLOOD GLUCOSE      POCT Blood Glucose.: 152 mg/dL (23 May 2024 04:13)  POCT Blood Glucose.: 165 mg/dL (23 May 2024 03:30)    CARDIAC MARKERS ( 23 May 2024 06:00 )  x     / x     / x     / x     / 11.7 ng/mL  CARDIAC MARKERS ( 22 May 2024 21:40 )  x     / x     / 153 U/L / x     / 7.3 ng/mL      Urinalysis Basic - ( 22 May 2024 20:00 )    Color: Yellow / Appearance: Clear / S.016 / pH: x  Gluc: x / Ketone: Negative mg/dL  / Bili: Negative / Urobili: 0.2 mg/dL   Blood: x / Protein: Negative mg/dL / Nitrite: Negative   Leuk Esterase: Negative / RBC: x / WBC x   Sq Epi: x / Non Sq Epi: x / Bacteria: x        Culture - Blood (collected 22 May 2024 19:30)  Source: .Blood Blood-Peripheral  Gram Stain (23 May 2024 07:51):    Growth in anaerobic bottle: Gram positive cocci in pairs  Preliminary Report (23 May 2024 07:52):    Growth in anaerobic bottle: Gram positive cocci in pairs    Culture - Blood (collected 22 May 2024 19:15)  Source: .Blood Blood-Peripheral  Gram Stain (23 May 2024 07:50):    Growth in anaerobic bottle: Gram positive cocci in pairs  Preliminary Report (23 May 2024 07:50):    Growth in anaerobic bottle: Gram positive cocci in pairs    Direct identification is available within approximately 3-5    hours either by Blood Panel Multiplexed PCR or Direct    MALDI-TOF. Details: [email or web address]        Venous Blood Gas:  24 @ 06:00  7.37/37/32/21/50.5  VBG Lactate: 3.1  Venous Blood Gas:  24 @ 21:40  7.33/42/37/22/60.6  VBG Lactate: 2.1  Venous Blood Gas:  24 @ 19:15  7.40/40/44/25/73.0  VBG Lactate: 2.3      RADIOLOGY & ADDITIONAL TESTS:  Results Reviewed:     Imaging Reviewed:  Electrocardiogram Reviewed:      ------------------------------------------------------------------------------------------------------------  MEDICATIONS  (STANDING):  albuterol/ipratropium for Nebulization 3 milliLiter(s) Nebulizer every 6 hours  ascorbic acid 500 milliGRAM(s) Oral daily  aspirin enteric coated 81 milliGRAM(s) Oral daily  azithromycin  IVPB 500 milliGRAM(s) IV Intermittent every 24 hours  dextrose 10% Bolus 125 milliLiter(s) IV Bolus once  dextrose 5%. 1000 milliLiter(s) (50 mL/Hr) IV Continuous <Continuous>  dextrose 5%. 1000 milliLiter(s) (100 mL/Hr) IV Continuous <Continuous>  dextrose 50% Injectable 25 Gram(s) IV Push once  dextrose 50% Injectable 12.5 Gram(s) IV Push once  glucagon  Injectable 1 milliGRAM(s) IntraMuscular once  guaiFENesin  milliGRAM(s) Oral every 12 hours  heparin   Injectable 5000 Unit(s) SubCutaneous every 12 hours  insulin glargine Injectable (LANTUS) 14 Unit(s) SubCutaneous once  insulin glargine Injectable (LANTUS) 14 Unit(s) SubCutaneous at bedtime  insulin lispro (ADMELOG) corrective regimen sliding scale   SubCutaneous three times a day before meals  insulin lispro (ADMELOG) corrective regimen sliding scale   SubCutaneous at bedtime  insulin lispro Injectable (ADMELOG) 4 Unit(s) SubCutaneous three times a day before meals  piperacillin/tazobactam IVPB.. 3.375 Gram(s) IV Intermittent every 12 hours  predniSONE   Tablet 40 milliGRAM(s) Oral daily    MEDICATIONS  (PRN):  acetaminophen     Tablet .. 650 milliGRAM(s) Oral every 6 hours PRN Temp greater or equal to 38C (100.4F), Mild Pain (1 - 3)  dextrose Oral Gel 15 Gram(s) Oral once PRN Blood Glucose LESS THAN 70 milliGRAM(s)/deciliter  melatonin 3 milliGRAM(s) Oral at bedtime PRN Insomnia    ------------------------------------------------------------------------------------------------------------  COORDINATION OF CARE:  Care discussed with consultants/other providers and notes reviewed [Y]

## 2024-05-23 NOTE — H&P ADULT - HISTORY OF PRESENT ILLNESS
83M with PMHx RA on enbrel, DM2 on insulin, CAD s/p stent, CKD? presenting with fever, cough and weakness. The patient notes having a fever 102F yesterday and having difficulty ambulating with his walker. He felt generally weak but no focal unilateral weakness/numbness/tingling. Felt unbalanced overall and dizzy but no syncope or fall. He also has been having a dry cough for several weeks that recently developed into a productive cough. He denies hemoptysis. He has been on 2 days of abx by PMD. Unable to confirm which abx but per EMS report it was bactrim (and noted on surescripts 5/20). Called family for collateral info and med list but not able to reach. Patient able to provide med list and insulin regimen but notes stopping multiple meds from previous lists including flomax, ezetimibe/simvastatin, miralax, ramipril. He denies any CP, SOB, palpitations, LE edema, abdominal pain, vomiting, diarrhea, dysuria, hematuria but does endorse decreased PO intake.. Of not he stopped his plavix ~1 month ago but continues to take ASA. It is not clear why he stopped but stated he felt like he didn't need it anymore. His cardiologist is Dr. Downing.     In ED, CXR concerning for PNA. Meets sepsis criteria with HR >90 and WBC 16. Received 1.5L IVF and zosyn/azithromycin  At bedside repeat /50s upon my assessment and asymptomatic.

## 2024-05-23 NOTE — H&P ADULT - PROBLEM SELECTOR PLAN 3
Troponin 71--83. Denies CP. EKG nonischemic. Not consistent with ACS given lack of CP or EKG findings  -Add on CK and CKMB, repeat troponin with CK in AM. If uptrending would consult patient's cardiologist Dr. Downing Troponin 71--83. Denies CP. EKG nonischemic. Not consistent with ACS given lack of CP or EKG findings  -Add on CK and CKMB, repeat troponin with CK in AM. If uptrending would consult patient's cardiologist Dr. Downing  -repeat EKG in AM

## 2024-05-23 NOTE — H&P ADULT - PROBLEM SELECTOR PLAN 7
Caesar emailed for complete med rec as the patient does not know all his meds. Unable to reach family - went to [facility]

## 2024-05-23 NOTE — PROGRESS NOTE ADULT - PROBLEM SELECTOR PLAN 1
Sepsis 2/2 PNA. HR >90 initially and WBC 16.   CXR Right lower lobe consolidation, may represent atelectasis and/or   effusion. Underlying pneumonia cannot be excluded. Enlarging right apical lung hazy opacity, with associated right tracheal deviation, which is likely due to volume loss. Diffuse chronic fibrotic lung changes.  Lactate 2.3--2.1  -CT chest noncontrast  -s/p 1.5 L IVF given  -additional 500cc bolus ordered, f/u repeat lactate in AM  -c/w zosyn/azithromycin  -wheezing noted on exam. no hx asthma/COPD. Duonebs, pred 40mg x5 days ordered  -cough suppressants PRN, tylenol PRN  -urine strep/legionella, sputum culture Sepsis 2/2 PNA. HR >90 initially and WBC 16.   CXR Right lower lobe consolidation, may represent atelectasis and/or   effusion. Underlying pneumonia cannot be excluded. Enlarging right apical lung hazy opacity, with associated right tracheal deviation, which is likely due to volume loss. Diffuse chronic fibrotic lung changes.  Lactate 2.3--2.1  -CT chest noncontrast  -s/p 1.5 L IVF given  -Additional 500cc bolus ordered, f/u repeat lactate in AM  -c/w Zosyn, hold azithromycin as cultures grew gram positive cocci in pairs  -No hx asthma/COPD, so hold prednisone 40mg.  -urine strep/legionella, sputum culture.  -cough suppressants PRN, tylenol PRN  -urine strep/legionella, sputum culture

## 2024-05-23 NOTE — H&P ADULT - NSHPLABSRESULTS_GEN_ALL_CORE
Personally reviewed labs:                        11.8   16.60 )-----------( 243      ( 22 May 2024 19:15 )             34.3     05-22-24 @ 19:15    135  |  102  |  27<H>             --------------------------< 106<H>     4.6  |  22  | 1.85<H>    eGFR AA: --  eGFR N-AA: --    Calcium: 9.2  Phosphorus: --  Magnesium: --    AST: 26    ALT: 16  AlkPhos: 83  Protein: 7.6  Albumin: 3.5  TBili: 0.6  D-Bili: --    VBG - ( 22 May 2024 21:40 )  pH: 7.33  /  pCO2: 42    /  pO2: 37    / HCO3: 22    / Base Excess: -3.7  /  SvO2: 60.6  / Lactate: 2.1              RADIOLOGY & ADDITIONAL TESTS:    EKG my independent interpretation: NSR, no ST changes      Imaging personally reviewed:  CXR:  IMPRESSION:  Right lower lobe consolidation, may represent atelectasis and/or   effusion. Underlying pneumonia cannot be excluded.  Enlarging right apical lung hazy opacity, with associated right tracheal   deviation, which is likely due to volume loss.  Diffuse chronic fibrotic lung changes.

## 2024-05-23 NOTE — H&P ADULT - NSHPPHYSICALEXAM_GEN_ALL_CORE
PHYSICAL EXAM:  Vital Signs Last 24 Hrs  T(C): 36.9 (05-22-24 @ 23:55)  T(F): 98.4 (05-22-24 @ 23:55), Max: 100.2 (05-22-24 @ 17:19)  HR: 84 (05-22-24 @ 23:55) (84 - 105)  BP: 107/64 (05-22-24 @ 23:55)  BP(mean): 66 (05-22-24 @ 22:05) (66 - 67)  RR: 20 (05-22-24 @ 23:55) (18 - 22)  SpO2: 95% (05-22-24 @ 23:55) (95% - 99%)  Wt(kg): --    Constitutional: NAD, awake and alert, well developed  EYES: EOMI, conjunctiva clear  ENT:  Normal Hearing, no tonsillar exudates   Neck: Soft and supple , no thyromegaly   Respiratory: +bilateral wheezing noted, no tachypnea, no accessory muscle use  Cardiovascular: S1 and S2, regular rate and rhythm, no Murmurs, gallops or rubs, no JVD, no leg edema  Gastrointestinal: Bowel Sounds present, soft, nontender, nondistended, no guarding, no rebound  Extremities: No cyanosis or clubbing; warm to touch  Vascular: 2+ peripheral pulses lower ex  Neurological: A&Ox4, No focal deficits, CN II-XII intact bilaterally, sensation to light touch intact in all extremities.   Musculoskeletal: 5/5 strength b/l upper and lower extremities; no joint swelling.  Skin: No rashes, no ulcerations

## 2024-05-23 NOTE — PROGRESS NOTE ADULT - PROBLEM SELECTOR PLAN 7
Caesar emailed for complete med rec as the patient does not know all his meds. Unable to reach family - went to [facility] emailed for complete med rec as the patient does not know all his meds.

## 2024-05-23 NOTE — PROGRESS NOTE ADULT - PROBLEM SELECTOR PLAN 4
On novolog 16u in AM and 6u in afternoon. Then on novolin 70/30 16u in PM, TDD 38u. Will give 75% TDD in basal/bolus regumen  -lantus 14u stat and qhs  -admelog 4u TID  -add on A1C  -ISS On novolog 16u in AM and 6u in afternoon. Then on novolin 70/30 16u in PM, TDD 38u. Will give 75% TDD in basal/bolus regumen  -lantus 14u stat and qhs. Readjust as needed.   -admelog 4u TID  -add on A1C  -ISS.

## 2024-05-23 NOTE — H&P ADULT - PROBLEM SELECTOR PLAN 1
Sepsis 2/2 PNA. HR >90 initially and WBC 16.   CXR Right lower lobe consolidation, may represent atelectasis and/or   effusion. Underlying pneumonia cannot be excluded. Enlarging right apical lung hazy opacity, with associated right tracheal deviation, which is likely due to volume loss. Diffuse chronic fibrotic lung changes.  Lactate 2.3--2.1  -CT chest noncontrast  -s/p 1.5 L IVF given  -additional 500cc bolus ordered  -zosyn/azithromycin  -wheezing noted on exam. no hx asthma/COPD. Duonebs, pred 40mg x5 days ordered  -cough suppressants PRN, tylenol PRN  -urine strep/legionella, sputum culture Sepsis 2/2 PNA. HR >90 initially and WBC 16.   CXR Right lower lobe consolidation, may represent atelectasis and/or   effusion. Underlying pneumonia cannot be excluded. Enlarging right apical lung hazy opacity, with associated right tracheal deviation, which is likely due to volume loss. Diffuse chronic fibrotic lung changes.  Lactate 2.3--2.1  -CT chest noncontrast  -s/p 1.5 L IVF given  -additional 500cc bolus ordered, f/u repeat lactate in AM  -c/w zosyn/azithromycin  -wheezing noted on exam. no hx asthma/COPD. Duonebs, pred 40mg x5 days ordered  -cough suppressants PRN, tylenol PRN  -urine strep/legionella, sputum culture

## 2024-05-23 NOTE — H&P ADULT - PROBLEM/PLAN-7
Detail Level: Detailed Patient Specific Counseling (Will Not Stick From Patient To Patient): I counseled the patient regarding the following:\\nPrior or morning of procedure with antibacterial soap and water. No makeup, lotion or ointments are to be applied\\nprior to surgery. Trimming of hair on surgical site is optional.\\nPatient to arrive 15 minutes early, bring sweater, entertainment. Bring  if necessary. Have breakfast and a\\nfull nights sleep. Surgical visits are confirmed 24-48 hours prior to procedure. Patient advised if they are\\nscheduled for a Mohs procedure, they could be here all morning possibly into the afternoon.\\nThere are any further questions prior to the procedure. Patient is aware 2-3 weeks of healing. Patient to d/c\\nalcohol 24 hours prior to and post surgery. Patient is advised to stop smoking 1 week prior. Activity restrictions will\\nlast 2-3 weeks after procedure depending on site, size of defect, and other unforeseen factors. Patient may eat\\nand drink as usual before appointment. Patient advised to discontinue ETOH 24 hours prior to surgery and stop\\nsmoking 7 days prior to appointment. Activity restrictions were discussed and will last 1-3 weeks after procedure\\ndepending on the site, type of closure and any unforeseen factors.\\nPerforming Provider: Dr. Sauer\\nRepairing Provider (if applicable):\\nProcedure: MOHS\\nSurgical Location: A: left radial dorsal hand\\nDiagnosis (per pathology report): Superficially Invasive Squamous Cell Carcinoma\\nSee a physician for any heart conditions (If yes, who and for what): No\\nArtificial Heart Valves: No\\nMitral valve prolapse: No\\nPacemaker: No\\nDefibrillator: No\\nArtificial joints (if yes, indicate location and year): No\\nDoes the patient pre-op medicate with antibiotics (if yes, what medication): No\\nHistory of renal disease or on dialysis: No\\nHistory of liver disease: No\\nHistory of bleeding disorder: No\\nLow platelet count (if the patient can provide): No\\nWill patient discontinue blood thinners, including NSAIDS (Advil, Motrin, ibuprofen & fish oil) (discontinue only by\\nprovider order): No\\nImmunosuppressed: No\\nPatient verbalizes understanding of pre-operative instructions: Yes DISPLAY PLAN FREE TEXT

## 2024-05-23 NOTE — H&P ADULT - PROBLEM SELECTOR PLAN 2
Creatinine 1.85 up from 1.39 previously 2/2023. Likely prerenal in setting of decreased PO intake vs sepsis  -hold ACE-I though patient denies taking  -s/p 1.5L IVF, additional 500cc bolus  -UA neg  -repeat BMP in AM

## 2024-05-23 NOTE — PROGRESS NOTE ADULT - ATTENDING COMMENTS
83M with PMHx RA on enbrel, DM2 on insulin, CAD s/p stent, CKD? a/w sepsis 2/2 PNA c/b strep bacteremia , NILES and elevated HsT (in the absence of acute ischemic changes and chest pain).     -C/w zosyn for now. Will dc azithromycin given PNA likely in setting of strep. F/u blood cx sensitivities and f/u urine cx  -monitor resp status. Pt currently not requiring O2.   -Pt initially on prednisone. Mild wheezing heard on exam. Pt denies any hx of COPD/asthma and states he smoked minimally for a short period of time in his 20s. Would dc prednisone for now and monitor clinical course.   -Trend renal output and creatine levels. Cr improving on todays labs s/p ivfs. Continue to encourage PO hydration.   -Trend HsT to peak. Suspect demand ischemia. However, given Hst continues to rise and pt w/ hx of CAD will reach out to cardiology   -continue to hold enbrel for now in setting of infection    Pt DNR/DNI. During bedside rounds today pt identified his son and daughter as his HCP. No prior paper work therefor will provide this while admitted. CPR/intubation explained and pt states he would not want these measures. Asked if this has been discussed with his family/children and he stated no. Explained to pt that he should have this conversation as well with his family which he agreed to. MOSLT to be filled out and added to chart. Spent 17 min in advanced care planning and coordination.     Dispo- pending further optimization. PT rec rehab. 83M with PMHx RA on enbrel, DM2 on insulin, CAD s/p stent, CKD? a/w sepsis 2/2 PNA c/b strep bacteremia , NILES and elevated HsT (in the absence of acute ischemic changes and chest pain).     -C/w zosyn for now. Will dc azithromycin given PNA likely in setting of strep. F/u blood cx sensitivities and f/u urine cx  -monitor resp status. Pt currently not requiring O2.   -Pt initially on prednisone. Mild wheezing heard on exam. Pt denies any hx of COPD/asthma and states he smoked minimally for a short period of time in his 20s. Would dc prednisone for now and monitor clinical course.   -Trend renal output and creatine levels. Cr improving on todays labs s/p ivfs. Continue to encourage PO hydration.   -Trend HsT to peak. Suspect demand ischemia. However, given Hst continues to rise and pt w/ hx of CAD will reach out to cardiology   -continue to hold enbrel for now in setting of infection    Dispo- pending further optimization. PT rec rehab.    Pt seen and examined on the am of 5/23

## 2024-05-23 NOTE — H&P ADULT - ASSESSMENT
83M with PMHx RA on enbrel, DM2 on insulin, CAD s/p stent, CKD? presenting with fever, cough and weakness. Found to have sepsis 2/2 PNA, NILES.

## 2024-05-23 NOTE — H&P ADULT - NSICDXPASTMEDICALHX_GEN_ALL_CORE_FT
PAST MEDICAL HISTORY:  BPH (benign prostatic hyperplasia)     CAD (coronary artery disease)     DM (diabetes mellitus)     HLD (hyperlipidemia)     HTN (hypertension)     RA (rheumatoid arthritis)     Spinal stenosis

## 2024-05-23 NOTE — ED ADULT NURSE REASSESSMENT NOTE - NS ED NURSE REASSESS COMMENT FT1
break coverage rn. received report from JIMMY pulido. pt A&Ox4, vitally stable. awaiting admission to hospital at this time. safety maintained. resp even unlabored, abd soft, pedal pulses 2+ bilaterally.

## 2024-05-23 NOTE — PROGRESS NOTE ADULT - SUBJECTIVE AND OBJECTIVE BOX
PROGRESS NOTE:     Patient is a 83y old  Male who presents with a chief complaint of cough, fever, weakness (23 May 2024 03:08)      SUBJECTIVE / OVERNIGHT EVENTS:        MEDICATIONS  (STANDING):  albuterol/ipratropium for Nebulization 3 milliLiter(s) Nebulizer every 6 hours  ascorbic acid 500 milliGRAM(s) Oral daily  aspirin enteric coated 81 milliGRAM(s) Oral daily  azithromycin  IVPB 500 milliGRAM(s) IV Intermittent every 24 hours  dextrose 10% Bolus 125 milliLiter(s) IV Bolus once  dextrose 5%. 1000 milliLiter(s) (50 mL/Hr) IV Continuous <Continuous>  dextrose 5%. 1000 milliLiter(s) (100 mL/Hr) IV Continuous <Continuous>  dextrose 50% Injectable 25 Gram(s) IV Push once  dextrose 50% Injectable 12.5 Gram(s) IV Push once  glucagon  Injectable 1 milliGRAM(s) IntraMuscular once  guaiFENesin  milliGRAM(s) Oral every 12 hours  heparin   Injectable 5000 Unit(s) SubCutaneous every 12 hours  insulin glargine Injectable (LANTUS) 14 Unit(s) SubCutaneous once  insulin glargine Injectable (LANTUS) 14 Unit(s) SubCutaneous at bedtime  insulin lispro (ADMELOG) corrective regimen sliding scale   SubCutaneous three times a day before meals  insulin lispro (ADMELOG) corrective regimen sliding scale   SubCutaneous at bedtime  insulin lispro Injectable (ADMELOG) 4 Unit(s) SubCutaneous three times a day before meals  piperacillin/tazobactam IVPB.. 3.375 Gram(s) IV Intermittent every 12 hours  predniSONE   Tablet 40 milliGRAM(s) Oral daily    MEDICATIONS  (PRN):  acetaminophen     Tablet .. 650 milliGRAM(s) Oral every 6 hours PRN Temp greater or equal to 38C (100.4F), Mild Pain (1 - 3)  dextrose Oral Gel 15 Gram(s) Oral once PRN Blood Glucose LESS THAN 70 milliGRAM(s)/deciliter  melatonin 3 milliGRAM(s) Oral at bedtime PRN Insomnia      CAPILLARY BLOOD GLUCOSE      POCT Blood Glucose.: 152 mg/dL (23 May 2024 04:13)  POCT Blood Glucose.: 165 mg/dL (23 May 2024 03:30)    I&O's Summary      PHYSICAL EXAM:  Vital Signs Last 24 Hrs  T(C): 37.2 (23 May 2024 04:30), Max: 37.9 (22 May 2024 17:19)  T(F): 99 (23 May 2024 04:30), Max: 100.2 (22 May 2024 17:19)  HR: 108 (23 May 2024 04:30) (84 - 108)  BP: 178/78 (23 May 2024 04:30) (91/52 - 178/78)  BP(mean): 66 (22 May 2024 22:05) (66 - 67)  RR: 20 (23 May 2024 04:30) (18 - 22)  SpO2: 94% (23 May 2024 04:30) (94% - 100%)    Parameters below as of 23 May 2024 04:30  Patient On (Oxygen Delivery Method): room air        CONSTITUTIONAL: NAD, well-developed  RESPIRATORY: Normal respiratory effort; lungs are clear to auscultation bilaterally  CARDIOVASCULAR: Regular rate and rhythm, normal S1 and S2, no murmur/rub/gallop; No lower extremity edema; Peripheral pulses are 2+ bilaterally  ABDOMEN: Nontender to palpation, normoactive bowel sounds, no rebound/guarding; No hepatosplenomegaly  MUSCLOSKELETAL: no clubbing or cyanosis of digits; no joint swelling or tenderness to palpation  NEURO: CN 2-12 grossly intact, moves all limbs spontaneously  PSYCH: A+O to person, place, and time; affect appropriate    LABS:                        13.1   16.05 )-----------( 249      ( 23 May 2024 06:00 )             39.0     05-    135  |  102  |  27<H>  ----------------------------<  106<H>  4.6   |  22  |  1.85<H>    Ca    9.2      22 May 2024 19:15    TPro  7.6  /  Alb  3.5  /  TBili  0.6  /  DBili  x   /  AST  26  /  ALT  16  /  AlkPhos  83  05-22    PT/INR - ( 22 May 2024 19:15 )   PT: 11.8 sec;   INR: 1.05 ratio         PTT - ( 22 May 2024 19:15 )  PTT:29.9 sec  CARDIAC MARKERS ( 22 May 2024 21:40 )  x     / x     / 153 U/L / x     / 7.3 ng/mL      Urinalysis Basic - ( 22 May 2024 20:00 )    Color: Yellow / Appearance: Clear / S.016 / pH: x  Gluc: x / Ketone: Negative mg/dL  / Bili: Negative / Urobili: 0.2 mg/dL   Blood: x / Protein: Negative mg/dL / Nitrite: Negative   Leuk Esterase: Negative / RBC: x / WBC x   Sq Epi: x / Non Sq Epi: x / Bacteria: x        Culture - Blood (collected 22 May 2024 19:30)  Source: .Blood Blood-Peripheral  Gram Stain (23 May 2024 07:51):    Growth in anaerobic bottle: Gram positive cocci in pairs  Preliminary Report (23 May 2024 07:52):    Growth in anaerobic bottle: Gram positive cocci in pairs    Culture - Blood (collected 22 May 2024 19:15)  Source: .Blood Blood-Peripheral  Gram Stain (23 May 2024 07:50):    Growth in anaerobic bottle: Gram positive cocci in pairs  Preliminary Report (23 May 2024 07:50):    Growth in anaerobic bottle: Gram positive cocci in pairs    Direct identification is available within approximately 3-5    hours either by Blood Panel Multiplexed PCR or Direct    MALDI-TOF. Details: [email or web address]        RADIOLOGY & ADDITIONAL TESTS:  Results Reviewed:   Imaging Personally Reviewed:  Electrocardiogram Personally Reviewed:    COORDINATION OF CARE:  Care Discussed with Consultants/Other Providers [Y/N]:  Prior or Outpatient Records Reviewed [Y/N]:   PROGRESS NOTE:     Patient is a 83y old  Male who presents with a chief complaint of cough, fever, weakness (23 May 2024 03:08)      SUBJECTIVE / OVERNIGHT EVENTS:    No overnight events.     Pt seen at the bedside this AM. Pt states that he is still experiencing congestion in his chest, with feeling of "stickiness" in the chest.    MEDICATIONS  (STANDING):  albuterol/ipratropium for Nebulization 3 milliLiter(s) Nebulizer every 6 hours  ascorbic acid 500 milliGRAM(s) Oral daily  aspirin enteric coated 81 milliGRAM(s) Oral daily  azithromycin  IVPB 500 milliGRAM(s) IV Intermittent every 24 hours  dextrose 10% Bolus 125 milliLiter(s) IV Bolus once  dextrose 5%. 1000 milliLiter(s) (50 mL/Hr) IV Continuous <Continuous>  dextrose 5%. 1000 milliLiter(s) (100 mL/Hr) IV Continuous <Continuous>  dextrose 50% Injectable 25 Gram(s) IV Push once  dextrose 50% Injectable 12.5 Gram(s) IV Push once  glucagon  Injectable 1 milliGRAM(s) IntraMuscular once  guaiFENesin  milliGRAM(s) Oral every 12 hours  heparin   Injectable 5000 Unit(s) SubCutaneous every 12 hours  insulin glargine Injectable (LANTUS) 14 Unit(s) SubCutaneous once  insulin glargine Injectable (LANTUS) 14 Unit(s) SubCutaneous at bedtime  insulin lispro (ADMELOG) corrective regimen sliding scale   SubCutaneous three times a day before meals  insulin lispro (ADMELOG) corrective regimen sliding scale   SubCutaneous at bedtime  insulin lispro Injectable (ADMELOG) 4 Unit(s) SubCutaneous three times a day before meals  piperacillin/tazobactam IVPB.. 3.375 Gram(s) IV Intermittent every 12 hours  predniSONE   Tablet 40 milliGRAM(s) Oral daily    MEDICATIONS  (PRN):  acetaminophen     Tablet .. 650 milliGRAM(s) Oral every 6 hours PRN Temp greater or equal to 38C (100.4F), Mild Pain (1 - 3)  dextrose Oral Gel 15 Gram(s) Oral once PRN Blood Glucose LESS THAN 70 milliGRAM(s)/deciliter  melatonin 3 milliGRAM(s) Oral at bedtime PRN Insomnia      CAPILLARY BLOOD GLUCOSE      POCT Blood Glucose.: 152 mg/dL (23 May 2024 04:13)  POCT Blood Glucose.: 165 mg/dL (23 May 2024 03:30)    I&O's Summary      PHYSICAL EXAM:  Vital Signs Last 24 Hrs  T(C): 37.2 (23 May 2024 04:30), Max: 37.9 (22 May 2024 17:19)  T(F): 99 (23 May 2024 04:30), Max: 100.2 (22 May 2024 17:19)  HR: 108 (23 May 2024 04:30) (84 - 108)  BP: 178/78 (23 May 2024 04:30) (91/52 - 178/78)  BP(mean): 66 (22 May 2024 22:05) (66 - 67)  RR: 20 (23 May 2024 04:30) (18 - 22)  SpO2: 94% (23 May 2024 04:30) (94% - 100%)    Parameters below as of 23 May 2024 04:30  Patient On (Oxygen Delivery Method): room air        CONSTITUTIONAL: NAD, well-developed  RESPIRATORY: Normal respiratory effort; lungs are clear to auscultation bilaterally  CARDIOVASCULAR: Regular rate and rhythm, normal S1 and S2, no murmur/rub/gallop; No lower extremity edema; Peripheral pulses are 2+ bilaterally  ABDOMEN: Nontender to palpation, normoactive bowel sounds, no rebound/guarding; No hepatosplenomegaly  MUSCLOSKELETAL: no clubbing or cyanosis of digits; no joint swelling or tenderness to palpation  NEURO: CN 2-12 grossly intact, moves all limbs spontaneously  PSYCH: A+O to person, place, and time; affect appropriate    LABS:                        13.1   16.05 )-----------( 249      ( 23 May 2024 06:00 )             39.0         135  |  102  |  27<H>  ----------------------------<  106<H>  4.6   |  22  |  1.85<H>    Ca    9.2      22 May 2024 19:15    TPro  7.6  /  Alb  3.5  /  TBili  0.6  /  DBili  x   /  AST  26  /  ALT  16  /  AlkPhos  83  05-22    PT/INR - ( 22 May 2024 19:15 )   PT: 11.8 sec;   INR: 1.05 ratio         PTT - ( 22 May 2024 19:15 )  PTT:29.9 sec  CARDIAC MARKERS ( 22 May 2024 21:40 )  x     / x     / 153 U/L / x     / 7.3 ng/mL      Urinalysis Basic - ( 22 May 2024 20:00 )    Color: Yellow / Appearance: Clear / S.016 / pH: x  Gluc: x / Ketone: Negative mg/dL  / Bili: Negative / Urobili: 0.2 mg/dL   Blood: x / Protein: Negative mg/dL / Nitrite: Negative   Leuk Esterase: Negative / RBC: x / WBC x   Sq Epi: x / Non Sq Epi: x / Bacteria: x        Culture - Blood (collected 22 May 2024 19:30)  Source: .Blood Blood-Peripheral  Gram Stain (23 May 2024 07:51):    Growth in anaerobic bottle: Gram positive cocci in pairs  Preliminary Report (23 May 2024 07:52):    Growth in anaerobic bottle: Gram positive cocci in pairs    Culture - Blood (collected 22 May 2024 19:15)  Source: .Blood Blood-Peripheral  Gram Stain (23 May 2024 07:50):    Growth in anaerobic bottle: Gram positive cocci in pairs  Preliminary Report (23 May 2024 07:50):    Growth in anaerobic bottle: Gram positive cocci in pairs    Direct identification is available within approximately 3-5    hours either by Blood Panel Multiplexed PCR or Direct    MALDI-TOF. Details: [email or web address]        RADIOLOGY & ADDITIONAL TESTS:  Results Reviewed:   Imaging Personally Reviewed:  Electrocardiogram Personally Reviewed:    COORDINATION OF CARE:  Care Discussed with Consultants/Other Providers [Y/N]:  Prior or Outpatient Records Reviewed [Y/N]:   PROGRESS NOTE:     Patient is a 83y old  Male who presents with a chief complaint of cough, fever, weakness (23 May 2024 03:08)      SUBJECTIVE / OVERNIGHT EVENTS:    No overnight events.     Pt seen at the bedside this AM. Pt states that he is still experiencing congestion in his chest, with feeling of "stickiness" in the chest. He is feeling constipated, but states that he had difficulty passing bowel movements for a while prior to hospital admission.     MEDICATIONS  (STANDING):  albuterol/ipratropium for Nebulization 3 milliLiter(s) Nebulizer every 6 hours  ascorbic acid 500 milliGRAM(s) Oral daily  aspirin enteric coated 81 milliGRAM(s) Oral daily  azithromycin  IVPB 500 milliGRAM(s) IV Intermittent every 24 hours  dextrose 10% Bolus 125 milliLiter(s) IV Bolus once  dextrose 5%. 1000 milliLiter(s) (50 mL/Hr) IV Continuous <Continuous>  dextrose 5%. 1000 milliLiter(s) (100 mL/Hr) IV Continuous <Continuous>  dextrose 50% Injectable 25 Gram(s) IV Push once  dextrose 50% Injectable 12.5 Gram(s) IV Push once  glucagon  Injectable 1 milliGRAM(s) IntraMuscular once  guaiFENesin  milliGRAM(s) Oral every 12 hours  heparin   Injectable 5000 Unit(s) SubCutaneous every 12 hours  insulin glargine Injectable (LANTUS) 14 Unit(s) SubCutaneous once  insulin glargine Injectable (LANTUS) 14 Unit(s) SubCutaneous at bedtime  insulin lispro (ADMELOG) corrective regimen sliding scale   SubCutaneous three times a day before meals  insulin lispro (ADMELOG) corrective regimen sliding scale   SubCutaneous at bedtime  insulin lispro Injectable (ADMELOG) 4 Unit(s) SubCutaneous three times a day before meals  piperacillin/tazobactam IVPB.. 3.375 Gram(s) IV Intermittent every 12 hours  predniSONE   Tablet 40 milliGRAM(s) Oral daily    MEDICATIONS  (PRN):  acetaminophen     Tablet .. 650 milliGRAM(s) Oral every 6 hours PRN Temp greater or equal to 38C (100.4F), Mild Pain (1 - 3)  dextrose Oral Gel 15 Gram(s) Oral once PRN Blood Glucose LESS THAN 70 milliGRAM(s)/deciliter  melatonin 3 milliGRAM(s) Oral at bedtime PRN Insomnia      CAPILLARY BLOOD GLUCOSE      POCT Blood Glucose.: 152 mg/dL (23 May 2024 04:13)  POCT Blood Glucose.: 165 mg/dL (23 May 2024 03:30)    I&O's Summary      PHYSICAL EXAM:  Vital Signs Last 24 Hrs  T(C): 37.2 (23 May 2024 04:30), Max: 37.9 (22 May 2024 17:19)  T(F): 99 (23 May 2024 04:30), Max: 100.2 (22 May 2024 17:19)  HR: 108 (23 May 2024 04:30) (84 - 108)  BP: 178/78 (23 May 2024 04:30) (91/52 - 178/78)  BP(mean): 66 (22 May 2024 22:05) (66 - 67)  RR: 20 (23 May 2024 04:30) (18 - 22)  SpO2: 94% (23 May 2024 04:30) (94% - 100%)    Parameters below as of 23 May 2024 04:30  Patient On (Oxygen Delivery Method): room air      CONSTITUTIONAL: NAD, well-developed, weak-appearing  RESPIRATORY: Increased respiratory effort, decreased breath sounds on the right side accompanied by wheezing  CARDIOVASCULAR: Regular rate and rhythm, normal S1 and S2, no murmur/rub/gallop; No lower extremity edema; Peripheral pulses are 2+ bilaterally  ABDOMEN: Nontender to palpation, normoactive bowel sounds, no rebound/guarding; No hepatosplenomegaly  MUSCLOSKELETAL: no clubbing or cyanosis of digits; no joint swelling or tenderness to palpation  NEURO: CN 2-12 grossly intact, moves all limbs spontaneously  PSYCH: A+O to person, place, and time; affect appropriate    LABS:                        13.1   16.05 )-----------( 249      ( 23 May 2024 06:00 )             39.0     05-    135  |  102  |  27<H>  ----------------------------<  106<H>  4.6   |  22  |  1.85<H>    Ca    9.2      22 May 2024 19:15    TPro  7.6  /  Alb  3.5  /  TBili  0.6  /  DBili  x   /  AST  26  /  ALT  16  /  AlkPhos  83  05-    PT/INR - ( 22 May 2024 19:15 )   PT: 11.8 sec;   INR: 1.05 ratio         PTT - ( 22 May 2024 19:15 )  PTT:29.9 sec  CARDIAC MARKERS ( 22 May 2024 21:40 )  x     / x     / 153 U/L / x     / 7.3 ng/mL      Urinalysis Basic - ( 22 May 2024 20:00 )    Color: Yellow / Appearance: Clear / S.016 / pH: x  Gluc: x / Ketone: Negative mg/dL  / Bili: Negative / Urobili: 0.2 mg/dL   Blood: x / Protein: Negative mg/dL / Nitrite: Negative   Leuk Esterase: Negative / RBC: x / WBC x   Sq Epi: x / Non Sq Epi: x / Bacteria: x    Culture - Blood (collected 22 May 2024 19:30)  Source: .Blood Blood-Peripheral  Gram Stain (23 May 2024 07:51):    Growth in anaerobic bottle: Gram positive cocci in pairs  Preliminary Report (23 May 2024 07:52):    Growth in anaerobic bottle: Gram positive cocci in pairs    Culture - Blood (collected 22 May 2024 19:15)  Source: .Blood Blood-Peripheral  Gram Stain (23 May 2024 07:50):    Growth in anaerobic bottle: Gram positive cocci in pairs  Preliminary Report (23 May 2024 07:50):    Growth in anaerobic bottle: Gram positive cocci in pairs    Direct identification is available within approximately 3-5    hours either by Blood Panel Multiplexed PCR or Direct    MALDI-TOF. Details: [email or web address]        RADIOLOGY & ADDITIONAL TESTS:  Results Reviewed:   Imaging Personally Reviewed:  Electrocardiogram Personally Reviewed:    COORDINATION OF CARE:  Care Discussed with Consultants/Other Providers [Y/N]:  Prior or Outpatient Records Reviewed [Y/N]:

## 2024-05-23 NOTE — PROGRESS NOTE ADULT - PROBLEM SELECTOR PLAN 6
Resume ASA  Patient self-discontinued plavix ~1month ago and unclear why. Resume for now inpatient. Has hx of distal LAD occlusion on [facility] 2023. Patient needs f/u with his cardiologist Dr. Downing Resume ASA  Patient self-discontinued plavix ~1month ago and unclear why. Resume for now inpatient. Has hx of distal LAD occlusion on [facility] 2023. Patient needs f/u with his cardiologist Dr. Downing.

## 2024-05-23 NOTE — PHYSICAL THERAPY INITIAL EVALUATION ADULT - PREDICTED DURATION OF THERAPY (DAYS/WKS), PT EVAL
Patient: Vince Olivera    Procedure Summary     Date:  10/11/19 Room / Location:   [facility]  [facility]  [facility]    Anesthesia Start:  0930 Anesthesia Stop:  1425    Procedure:  AORTA FEMORAL BYPASS, BILATERAL FEMORAL ENDARTERECTOMY (N/A Abdomen) Diagnosis:       Aortic occlusion (CMS/HCC)      (Aortic occlusion (CMS/HCC) [I74.10])    Surgeon:  Dylan Weaver MD Provider:  William Banda MD    Anesthesia Type:  general ASA Status:  3          Anesthesia Type: general  Last vitals  BP   (!) 169/101 (10/16/19 0900)   Temp   98.6 °F (37 °C) (10/16/19 0800)   Pulse   88 (10/16/19 0900)   Resp   18 (10/16/19 0800)     SpO2   96 % (10/16/19 0900)     Post Anesthesia Care and Evaluation    Patient location during evaluation: PACU  Patient participation: complete - patient participated  Level of consciousness: lethargic  Pain score: 0  Pain management: adequate  Airway patency: patent  Anesthetic complications: No anesthetic complications  PONV Status: none  Cardiovascular status: acceptable  Respiratory status: acceptable  Hydration status: acceptable      
Until discharge

## 2024-05-23 NOTE — PROGRESS NOTE ADULT - PROBLEM SELECTOR PLAN 2
Creatinine 1.85 up from 1.39 previously 2/2023. Likely prerenal in setting of decreased PO intake vs sepsis  -hold ACE-I though patient denies taking  -s/p 1.5L IVF, additional 500cc bolus  -UA neg  -repeat BMP in AM Creatinine 1.75 up from 1.39 previously 2/2023, but relatively stable since hospital admission. Likely prerenal in setting of decreased PO intake vs sepsis  -Hold ACE-I though patient denies taking  -s/p 1.5L IVF, additional 500cc bolus  -UA neg  -Repeat BMP in AM.

## 2024-05-23 NOTE — PATIENT PROFILE ADULT - FALL HARM RISK - RISK INTERVENTIONS
Assistance OOB with selected safe patient handling equipment/Assistance with ambulation/Communicate Fall Risk and Risk Factors to all staff, patient, and family/Discuss with provider need for PT consult/Monitor gait and stability/Provide patient with walking aids - walker, cane, crutches/Reinforce activity limits and safety measures with patient and family/Review medications for side effects contributing to fall risk/Use of alarms - bed, chair and/or voice tab/Visual Cue: Yellow wristband/Bed in lowest position, wheels locked, appropriate side rails in place/Call bell, personal items and telephone in reach/Instruct patient to call for assistance before getting out of bed or chair/Non-slip footwear when patient is out of bed/[place] to call system/Physically safe environment - no spills, clutter or unnecessary equipment/Purposeful Proactive Rounding/Room/bathroom lighting operational, light cord in reach

## 2024-05-23 NOTE — PHYSICAL THERAPY INITIAL EVALUATION ADULT - ADDITIONAL COMMENTS
Pt states he lives with his wife in a house with 4 steps to enter and remains on the main level. Prior to admission, pt was ambulating independently with a rolling walker. Pt states he recently feel and his daughter helped him up. Pt states he has a home health aide - unsure of how many days/hours.   Post PT evaluation, pt left semi-supine, alarm on, call bell and remote within reach, all precautions maintained, NAD. RN aware.

## 2024-05-23 NOTE — PHARMACOTHERAPY INTERVENTION NOTE - COMMENTS
Medication history updated, saved as incomplete. Patient reports stopping multiple medications (Vytorin 10/20, Ramipril 2.5mg, Flomax 0.4mg & Plavix 75mg) - should follow up with PCP and clarify.   Of Note:  - Bactrim DS - 1 tablet orally BID x 7 days (filled on 5/20/24)

## 2024-05-23 NOTE — H&P ADULT - PROBLEM SELECTOR PLAN 4
On novolog 16u in AM and 6u in afternoon. Then on novolin 70/30 16u in PM, TDD 38u. Will give 75% TDD in basal/bolus regumen  -lantus 14u stat and qhs  -admelog 4u TID  -add on A1C  -ISS

## 2024-05-23 NOTE — H&P ADULT - NSHPREVIEWOFSYSTEMS_GEN_ALL_CORE
ROS:    Constitutional: [x ] fevers [ ] chills   HEENT: [ ] postnasal drip [ ] nasal congestion  CV: [ ] chest pain [ ] orthopnea [ ] palpitations   Resp: [x ] cough [ ] shortness of breath [ ] dyspnea [x ] wheezing [ x] sputum   GI: [ ] nausea [ ] vomiting [ ] diarrhea [ ] constipation [ ] abd pain   : [ ] dysuria  [ ] increased urinary frequency  Musculoskeletal: [ ] back pain [ ] myalgias [ ] arthralgias [ ] fracture  Skin: [ ] rash [ ] itch  Neurological: [ ] headache [x ] dizziness [ ] syncope [ ] weakness [ ] numbness  Endocrine: [x ] diabetes [ ] thyroid problem  Hematologic/Lymphatic: [ ] anemia [ ] bleeding problem  [x ] All other systems negative

## 2024-05-23 NOTE — H&P ADULT - PROBLEM SELECTOR PLAN 6
Resume ASA  Patient self-discontinued plavix ~1month ago and unclear why. Resume for now inpatient. Has hx of distal LAD occlusion on [facility] 2023. Patient needs f/u with his cardiologist Dr. Downing

## 2024-05-23 NOTE — PHYSICAL THERAPY INITIAL EVALUATION ADULT - LEVEL OF INDEPENDENCE: GAIT, REHAB EVAL
Pt complains of bilateral knee pain, deferred further ambulation. JIMMY Alicia made aware/contact guard/minimum assist (75% patients effort)

## 2024-05-23 NOTE — PROGRESS NOTE ADULT - PROBLEM SELECTOR PLAN 3
Troponin 71--83. Denies CP. EKG nonischemic. Not consistent with ACS given lack of CP or EKG findings  -Add on CK and CKMB, repeat troponin with CK in AM. If uptrending would consult patient's cardiologist Dr. Downing  -repeat EKG in AM Troponin 71--83. Denies CP. EKG nonischemic with no ST changes. Not consistent with ACS given lack of CP or EKG findings  -Add on CK and CKMB, repeat troponin with CK in AM. If uptrending would consult patient's cardiologist Dr. Downing  -Repeat EKG in AM.

## 2024-05-24 ENCOUNTER — TRANSCRIPTION ENCOUNTER (OUTPATIENT)
Age: 84
End: 2024-05-24

## 2024-05-24 DIAGNOSIS — R78.81 BACTEREMIA: ICD-10-CM

## 2024-05-24 LAB
-  CEFTRIAXONE: SIGNIFICANT CHANGE UP
-  PENICILLIN: SIGNIFICANT CHANGE UP
-  VANCOMYCIN: SIGNIFICANT CHANGE UP
A1C WITH ESTIMATED AVERAGE GLUCOSE RESULT: 7.4 % — HIGH (ref 4–5.6)
ALBUMIN SERPL ELPH-MCNC: 3.3 G/DL — SIGNIFICANT CHANGE UP (ref 3.3–5)
ALP SERPL-CCNC: 83 U/L — SIGNIFICANT CHANGE UP (ref 40–120)
ALT FLD-CCNC: 23 U/L — SIGNIFICANT CHANGE UP (ref 4–41)
ANION GAP SERPL CALC-SCNC: 15 MMOL/L — HIGH (ref 7–14)
AST SERPL-CCNC: 62 U/L — HIGH (ref 4–40)
BASOPHILS # BLD AUTO: 0 K/UL — SIGNIFICANT CHANGE UP (ref 0–0.2)
BASOPHILS NFR BLD AUTO: 0 % — SIGNIFICANT CHANGE UP (ref 0–2)
BILIRUB SERPL-MCNC: 0.4 MG/DL — SIGNIFICANT CHANGE UP (ref 0.2–1.2)
BUN SERPL-MCNC: 26 MG/DL — HIGH (ref 7–23)
CALCIUM SERPL-MCNC: 9.2 MG/DL — SIGNIFICANT CHANGE UP (ref 8.4–10.5)
CHLORIDE SERPL-SCNC: 98 MMOL/L — SIGNIFICANT CHANGE UP (ref 98–107)
CO2 SERPL-SCNC: 20 MMOL/L — LOW (ref 22–31)
CREAT SERPL-MCNC: 1.71 MG/DL — HIGH (ref 0.5–1.3)
CULTURE RESULTS: ABNORMAL
EGFR: 39 ML/MIN/1.73M2 — LOW
EOSINOPHIL # BLD AUTO: 0 K/UL — SIGNIFICANT CHANGE UP (ref 0–0.5)
EOSINOPHIL NFR BLD AUTO: 0 % — SIGNIFICANT CHANGE UP (ref 0–6)
ESTIMATED AVERAGE GLUCOSE: 166 — SIGNIFICANT CHANGE UP
GLUCOSE SERPL-MCNC: 276 MG/DL — HIGH (ref 70–99)
HCT VFR BLD CALC: 35.9 % — LOW (ref 39–50)
HGB BLD-MCNC: 12.3 G/DL — LOW (ref 13–17)
IANC: 13.83 K/UL — HIGH (ref 1.8–7.4)
LEGIONELLA AG UR QL: NEGATIVE — SIGNIFICANT CHANGE UP
LYMPHOCYTES # BLD AUTO: 11.2 % — LOW (ref 13–44)
LYMPHOCYTES # BLD AUTO: 2.1 K/UL — SIGNIFICANT CHANGE UP (ref 1–3.3)
MCHC RBC-ENTMCNC: 30.1 PG — SIGNIFICANT CHANGE UP (ref 27–34)
MCHC RBC-ENTMCNC: 34.3 GM/DL — SIGNIFICANT CHANGE UP (ref 32–36)
MCV RBC AUTO: 88 FL — SIGNIFICANT CHANGE UP (ref 80–100)
METHOD TYPE: SIGNIFICANT CHANGE UP
MONOCYTES # BLD AUTO: 0.81 K/UL — SIGNIFICANT CHANGE UP (ref 0–0.9)
MONOCYTES NFR BLD AUTO: 4.3 % — SIGNIFICANT CHANGE UP (ref 2–14)
NEUTROPHILS # BLD AUTO: 15.85 K/UL — HIGH (ref 1.8–7.4)
NEUTROPHILS NFR BLD AUTO: 84.5 % — HIGH (ref 43–77)
ORGANISM # SPEC MICROSCOPIC CNT: ABNORMAL
PLATELET # BLD AUTO: 248 K/UL — SIGNIFICANT CHANGE UP (ref 150–400)
POTASSIUM SERPL-MCNC: 3.9 MMOL/L — SIGNIFICANT CHANGE UP (ref 3.5–5.3)
POTASSIUM SERPL-SCNC: 3.9 MMOL/L — SIGNIFICANT CHANGE UP (ref 3.5–5.3)
PROT SERPL-MCNC: 7.4 G/DL — SIGNIFICANT CHANGE UP (ref 6–8.3)
RBC # BLD: 4.08 M/UL — LOW (ref 4.2–5.8)
RBC # FLD: 13.3 % — SIGNIFICANT CHANGE UP (ref 10.3–14.5)
SODIUM SERPL-SCNC: 133 MMOL/L — LOW (ref 135–145)
SPECIMEN SOURCE: SIGNIFICANT CHANGE UP
TROPONIN T, HIGH SENSITIVITY RESULT: 283 NG/L — CRITICAL HIGH
TROPONIN T, HIGH SENSITIVITY RESULT: 326 NG/L — CRITICAL HIGH
WBC # BLD: 18.76 K/UL — HIGH (ref 3.8–10.5)
WBC # FLD AUTO: 18.76 K/UL — HIGH (ref 3.8–10.5)

## 2024-05-24 PROCEDURE — 93010 ELECTROCARDIOGRAM REPORT: CPT

## 2024-05-24 PROCEDURE — 99232 SBSQ HOSP IP/OBS MODERATE 35: CPT | Mod: GC

## 2024-05-24 RX ORDER — SODIUM CHLORIDE 9 MG/ML
500 INJECTION, SOLUTION INTRAVENOUS ONCE
Refills: 0 | Status: COMPLETED | OUTPATIENT
Start: 2024-05-24 | End: 2024-05-24

## 2024-05-24 RX ORDER — INSULIN GLARGINE 100 [IU]/ML
14 INJECTION, SOLUTION SUBCUTANEOUS ONCE
Refills: 0 | Status: COMPLETED | OUTPATIENT
Start: 2024-05-24 | End: 2024-05-24

## 2024-05-24 RX ORDER — INSULIN LISPRO 100/ML
6 VIAL (ML) SUBCUTANEOUS
Refills: 0 | Status: DISCONTINUED | OUTPATIENT
Start: 2024-05-24 | End: 2024-05-27

## 2024-05-24 RX ORDER — LANOLIN ALCOHOL/MO/W.PET/CERES
6 CREAM (GRAM) TOPICAL AT BEDTIME
Refills: 0 | Status: DISCONTINUED | OUTPATIENT
Start: 2024-05-25 | End: 2024-05-31

## 2024-05-24 RX ORDER — CEFTRIAXONE 500 MG/1
2000 INJECTION, POWDER, FOR SOLUTION INTRAMUSCULAR; INTRAVENOUS EVERY 24 HOURS
Refills: 0 | Status: DISCONTINUED | OUTPATIENT
Start: 2024-05-25 | End: 2024-05-31

## 2024-05-24 RX ORDER — LANOLIN ALCOHOL/MO/W.PET/CERES
6 CREAM (GRAM) TOPICAL ONCE
Refills: 0 | Status: COMPLETED | OUTPATIENT
Start: 2024-05-24 | End: 2024-05-24

## 2024-05-24 RX ORDER — CEFTRIAXONE 500 MG/1
2000 INJECTION, POWDER, FOR SOLUTION INTRAMUSCULAR; INTRAVENOUS EVERY 24 HOURS
Refills: 0 | Status: DISCONTINUED | OUTPATIENT
Start: 2024-05-24 | End: 2024-05-24

## 2024-05-24 RX ORDER — CEFTRIAXONE 500 MG/1
1000 INJECTION, POWDER, FOR SOLUTION INTRAMUSCULAR; INTRAVENOUS ONCE
Refills: 0 | Status: COMPLETED | OUTPATIENT
Start: 2024-05-24 | End: 2024-05-24

## 2024-05-24 RX ORDER — INSULIN GLARGINE 100 [IU]/ML
20 INJECTION, SOLUTION SUBCUTANEOUS AT BEDTIME
Refills: 0 | Status: DISCONTINUED | OUTPATIENT
Start: 2024-05-24 | End: 2024-05-28

## 2024-05-24 RX ORDER — CEFTRIAXONE 500 MG/1
1000 INJECTION, POWDER, FOR SOLUTION INTRAMUSCULAR; INTRAVENOUS EVERY 24 HOURS
Refills: 0 | Status: DISCONTINUED | OUTPATIENT
Start: 2024-05-24 | End: 2024-05-24

## 2024-05-24 RX ADMIN — Medication 6 MILLIGRAM(S): at 23:48

## 2024-05-24 RX ADMIN — Medication 4 UNIT(S): at 09:06

## 2024-05-24 RX ADMIN — Medication 4: at 12:45

## 2024-05-24 RX ADMIN — SODIUM CHLORIDE 500 MILLILITER(S): 9 INJECTION, SOLUTION INTRAVENOUS at 16:18

## 2024-05-24 RX ADMIN — Medication 3: at 09:06

## 2024-05-24 RX ADMIN — Medication 81 MILLIGRAM(S): at 11:39

## 2024-05-24 RX ADMIN — Medication 500 MILLIGRAM(S): at 11:39

## 2024-05-24 RX ADMIN — Medication 3 MILLIGRAM(S): at 03:55

## 2024-05-24 RX ADMIN — Medication 2: at 18:03

## 2024-05-24 RX ADMIN — Medication 600 MILLIGRAM(S): at 04:08

## 2024-05-24 RX ADMIN — Medication 600 MILLIGRAM(S): at 17:04

## 2024-05-24 RX ADMIN — Medication 100 MILLIGRAM(S): at 04:08

## 2024-05-24 RX ADMIN — Medication 3 MILLILITER(S): at 04:32

## 2024-05-24 RX ADMIN — PIPERACILLIN AND TAZOBACTAM 25 GRAM(S): 4; .5 INJECTION, POWDER, LYOPHILIZED, FOR SOLUTION INTRAVENOUS at 06:59

## 2024-05-24 RX ADMIN — HEPARIN SODIUM 5000 UNIT(S): 5000 INJECTION INTRAVENOUS; SUBCUTANEOUS at 07:01

## 2024-05-24 RX ADMIN — Medication 6 UNIT(S): at 18:03

## 2024-05-24 RX ADMIN — CEFTRIAXONE 100 MILLIGRAM(S): 500 INJECTION, POWDER, FOR SOLUTION INTRAMUSCULAR; INTRAVENOUS at 17:04

## 2024-05-24 RX ADMIN — Medication 100 MILLIGRAM(S): at 22:38

## 2024-05-24 RX ADMIN — Medication 4 UNIT(S): at 12:45

## 2024-05-24 RX ADMIN — CEFTRIAXONE 100 MILLIGRAM(S): 500 INJECTION, POWDER, FOR SOLUTION INTRAMUSCULAR; INTRAVENOUS at 11:39

## 2024-05-24 RX ADMIN — Medication 3 MILLILITER(S): at 08:30

## 2024-05-24 RX ADMIN — Medication 3 MILLILITER(S): at 14:17

## 2024-05-24 NOTE — DISCHARGE NOTE PROVIDER - NSDCMRMEDTOKEN_GEN_ALL_CORE_FT
aspirin 81 mg oral tablet: 1 tab(s) orally once a day  Bactrim  mg-160 mg oral tablet: 1 tab(s) orally 2 times a day x 7 days (Filled on 5/20/24)  clopidogrel 75 mg oral tablet: 1 tab(s) orally once a day  Enbrel SureClick 50 mg/mL subcutaneous solution: 50 milligram(s) subcutaneous once a week  Mobic 7.5 mg oral tablet: 1 tab(s) orally once a day as needed for  moderate pain MDD: 1  NovoLOG FlexPen 100 units/mL injectable solution: 6 unit(s) injectable once a day (in the afternoon)  NovoLOG FlexPen 100 units/mL injectable solution: 16 unit(s) subcutaneous once a day -AM  NovoLOG Mix 70/30 FlexPen subcutaneous suspension: 16 unit(s) subcutaneous once a day (at bedtime)  Vitamin C 500 mg oral tablet: 1 tab(s) orally once a day   aspirin 81 mg oral tablet: 1 tab(s) orally once a day  Bactrim  mg-160 mg oral tablet: 1 tab(s) orally 2 times a day x 7 days (Filled on 5/20/24)  Blood work labs: CBC and CMP weekly. Fax to: [phone] Dr. Humphrey Pandey MD  Ceftriaxone 2g IV q24hrs: Ceftriaxone 2g IV q24hrs via midline through to 6/7/2024  clopidogrel 75 mg oral tablet: 1 tab(s) orally once a day  Enbrel SureClick 50 mg/mL subcutaneous solution: 50 milligram(s) subcutaneous once a week  midline care and dressing change: midline care and dressing change weekly per protocol and as needed  midline flush: 10ml: midline flush: 10ml normal saline 0.9% pre and post infusion and per protocol  Mobic 7.5 mg oral tablet: 1 tab(s) orally once a day as needed for  moderate pain MDD: 1  NovoLOG FlexPen 100 units/mL injectable solution: 6 unit(s) injectable once a day (in the afternoon)  NovoLOG FlexPen 100 units/mL injectable solution: 16 unit(s) subcutaneous once a day -AM  NovoLOG Mix 70/30 FlexPen subcutaneous suspension: 16 unit(s) subcutaneous once a day (at bedtime)  Vitamin C 500 mg oral tablet: 1 tab(s) orally once a day   aspirin 81 mg oral tablet: 1 tab(s) orally once a day  Blood work labs: CBC and CMP weekly. Fax to: [phone] Dr. Humphrey Pandey MD  Ceftriaxone 2g IV q24hrs: Ceftriaxone 2g IV q24hrs via midline through to 6/7/2024  Enbrel SureClick 50 mg/mL subcutaneous solution: 50 milligram(s) subcutaneous once a week  midline care and dressing change: midline care and dressing change weekly per protocol and as needed  midline flush: 10ml: midline flush: 10ml normal saline 0.9% pre and post infusion and per protocol  Mobic 7.5 mg oral tablet: 1 tab(s) orally once a day as needed for  moderate pain MDD: 1  NovoLOG FlexPen 100 units/mL injectable solution: 5 unit(s) injectable once a day (in the afternoon)  NovoLOG FlexPen 100 units/mL injectable solution: 15 unit(s) subcutaneous once a day in the morning  NovoLOG Mix 70/30 FlexPen subcutaneous suspension: 15 unit(s) subcutaneous once a day (at bedtime)  Vitamin C 500 mg oral tablet: 1 tab(s) orally once a day

## 2024-05-24 NOTE — PROGRESS NOTE ADULT - PROBLEM SELECTOR PLAN 2
Creatinine 1.75 up from 1.39 previously 2/2023, but relatively stable since hospital admission. Likely prerenal in setting of decreased PO intake vs sepsis  -Hold ACE-I though patient denies taking  -s/p 1.5L IVF, additional 500cc bolus  -UA neg  -Repeat BMP in AM. Sepsis 2/2 PNA. HR >90 initially and WBC 16.   CXR Right lower lobe consolidation, may represent atelectasis and/or   effusion. Underlying pneumonia cannot be excluded. Enlarging right apical lung hazy opacity, with associated right tracheal deviation, which is likely due to volume loss. Diffuse chronic fibrotic lung changes.  Lactate 2.3--2.1  -s/p 1.5 L IVF given  -Additional 500cc bolus ordered, f/u repeat lactate in AM  -Hold Zosyn, start ceftriaxone -- dose  -No hx asthma/COPD, so hold prednisone 40mg.  -Urine strep/legionella, sputum culture sent 5/24  -Cough suppressants PRN, tylenol PRN  -Consult pulmonology on discharge Sepsis with BCx +strep gallolyticus, c/f PNA. HR >90 initially and WBC 16.   CXR Right lower lobe consolidation, may represent atelectasis and/or   effusion. Underlying pneumonia cannot be excluded. Enlarging right apical lung hazy opacity, with associated right tracheal deviation, which is likely due to volume loss. Diffuse chronic fibrotic lung changes.  Lactate 2.3--2.1  -s/p 1.5 L IVF given  -5/22 BCx +strep gallolyticus  -5/24 BCx pending: if positive will c/s ID given c/f endocarditis, TTE pending as above  -S/p Zosyn, start ceftriaxone 2g  -No hx asthma/COPD, so hold prednisone 40mg.  -Urine strep/legionella, sputum culture sent 5/24  -Cough suppressants PRN, tylenol PRN  -Follow up pulmonology on discharge Sepsis with BCx +strep gallolyticus, c/f PNA. HR >90 initially and WBC 16.   CXR Right lower lobe consolidation, may represent atelectasis and/or   effusion. Underlying pneumonia cannot be excluded. Enlarging right apical lung hazy opacity, with associated right tracheal deviation, which is likely due to volume loss. Diffuse chronic fibrotic lung changes.  Lactate 2.3--2.1  -s/p 1.5 L IVF given  -5/22 BCx +strep gallolyticus  -5/24 BCx pending: if positive will c/s ID given c/f endocarditis, TTE pending as above  -S/p Zosyn, start ceftriaxone 2g  -No hx asthma/COPD, so hold prednisone 40mg.  -Urine strep/legionella, sputum culture sent 5/24  -Cough suppressants PRN, tylenol PRN  -Follow up pulmonology on discharge  -s. gallolyticus risk for colon ca. last colonoscopy >10 yrs ago, was normal per patient

## 2024-05-24 NOTE — PROGRESS NOTE ADULT - PROBLEM SELECTOR PLAN 6
Patient self-discontinued plavix ~1month ago and unclear why. Resume for now inpatient. Has hx of distal LAD occlusion on [facility] 2023. Patient needs f/u with his cardiologist Dr. Downing. -Pt is s/p stent 12 years ago, with hx of distal LAD occlusion on [facility] 2023  -Patient self-discontinued plavix ~1month ago and unclear why. Pt is continuing on aspirin 81 mg.  -Patient needs f/u with his cardiologist Dr. Downing. -Pt is s/p stent ~20 years ago, with hx of distal LAD occlusion on [facility] 2023  -Patient self-discontinued plavix ~1month ago and unclear why. Pt is continuing on aspirin 81 mg.  -Patient needs f/u with his cardiologist Dr. Downing.

## 2024-05-24 NOTE — DISCHARGE NOTE PROVIDER - NSDCCPTREATMENT_GEN_ALL_CORE_FT
PRINCIPAL PROCEDURE  Procedure: CT chest wo con  Findings and Treatment: 5/23/2024  FINDINGS:  LYMPH NODES: No lymphadenopathy. Diffusely dilated esophagus.  HEART/VASCULATURE: The heart is normal in size. No pericardial effusion.   Aortic and coronary artery calcification.  AIRWAYS/LUNGS/PLEURA: Rightward tracheal deviation without associated   mass, unchanged since 2019, and likely related to right lung volume   loss.. The central airways are patent. No endobronchial lesions. Lower   lobe and peripheral predominant interstitial lung disease largely   characterized by honeycombing, reticular abnormality, and traction   bronchiectasis and bronchiolectasis. Mild diffuse right pleural   thickening and trace right pleural effusion. Small calcifications within   the right pleural effusion. Left lower lobe calcified granuloma.  UPPER ABDOMEN: Tiny hiatal hernia. Calcified hepatic granuloma.  BONES/SOFT TISSUES: Degenerative changes of the spine.  IMPRESSION:  UIP pattern of interstitial lung disease.  Rightward tracheal deviation, which is likely related to right lung   volume loss, and unchanged since 2019. No mediastinal mass.       PRINCIPAL PROCEDURE  Procedure: CT chest wo con  Findings and Treatment: 5/23/2024  FINDINGS:  LYMPH NODES: No lymphadenopathy. Diffusely dilated esophagus.  HEART/VASCULATURE: The heart is normal in size. No pericardial effusion.   Aortic and coronary artery calcification.  AIRWAYS/LUNGS/PLEURA: Rightward tracheal deviation without associated   mass, unchanged since 2019, and likely related to right lung volume   loss.. The central airways are patent. No endobronchial lesions. Lower   lobe and peripheral predominant interstitial lung disease largely   characterized by honeycombing, reticular abnormality, and traction   bronchiectasis and bronchiolectasis. Mild diffuse right pleural   thickening and trace right pleural effusion. Small calcifications within   the right pleural effusion. Left lower lobe calcified granuloma.  UPPER ABDOMEN: Tiny hiatal hernia. Calcified hepatic granuloma.  BONES/SOFT TISSUES: Degenerative changes of the spine.  IMPRESSION:  UIP pattern of interstitial lung disease.  Rightward tracheal deviation, which is likely related to right lung   volume loss, and unchanged since 2019. No mediastinal mass.        SECONDARY PROCEDURE  Procedure: CT abdomen pelvis  Findings and Treatment: 5/25/2024  FINDINGS:  LOWER CHEST: Small left and trace right pleural effusion with associated   passive atelectasis. Bilateral lower lobe reticular opacities,   bronchiolectasis and honeycombing, right greater than left. Aortic and   coronary artery calcifications.  LIVER: Within normal limits.  BILE DUCTS: Normal caliber.  GALLBLADDER: Contracted.  SPLEEN: Within normal limits.  PANCREAS: Within normal limits.  ADRENALS: Thickened left adrenal gland. Right adrenal gland within normal   limits.  KIDNEYS/URETERS: No obstructive calculi. No hydronephrosis bilaterally.   Bilateral renal cysts. Multifocal renal cortical scarring, right greater   than left.  BLADDER: Diffuse bladder wall thickening and trabeculation.  REPRODUCTIVE ORGANS: Prostate is mildly enlarged.  BOWEL: No bowel obstruction. Appendix is not visualized. Small hiatal   hernia.  PERITONEUM: No ascites.  VESSELS: Atherosclerotic changes.  RETROPERITONEUM/LYMPH NODES: No lymphadenopathy.  ABDOMINAL WALL: Small fat-containing umbilical hernia. Rectus diastasis.  BONES: Degenerative changes.  IMPRESSION:  No bowel obstruction or inflammation.  Mild bladder wall thickening, which may represent cystitis or bladder   outlet obstruction the setting of prostatomegaly. Correlate with   urinalysis.  Small left and trace right pleural effusion with associated passive   atelectasis. Fibrotic changes at the lung bases, better characterized on   the prior CT chest from 5/23/2024.      Procedure: Transthoracic echocardiography (TTE)  Findings and Treatment: 5/25/2024  CONCLUSIONS:      1. Technically difficult image quality.   2. There is akinesis of the apical wall.   3. Normal right ventricular cavity size and probably normal systolic function.   4. The left atrium is normal in size.   5. The right atrium is normal in size.   6. The inferior vena cava is normal in size measuring 1.09 cm in diameter, (normal <2.1cm) with normal inspiratory collapse (normal >50%) consistent with normal right atrial pressure (~3, range 0-5mmHg).   7. Mid and apical anterior septum and apex are abnormal.   8. Left ventricular systolic function is mildly decreased with an ejection fraction of 47 % by Dyson's method of disks. Regional wall motion abnormalities present.   9. There is no evidence of a left ventricular thrombus.  10. No pericardial effusion seen.  11. No prior echocardiogram is available for comparison.       PRINCIPAL PROCEDURE  Procedure: CT chest wo con  Findings and Treatment: 5/23/2024  FINDINGS:  LYMPH NODES: No lymphadenopathy. Diffusely dilated esophagus.  HEART/VASCULATURE: The heart is normal in size. No pericardial effusion.   Aortic and coronary artery calcification.  AIRWAYS/LUNGS/PLEURA: Rightward tracheal deviation without associated   mass, unchanged since 2019, and likely related to right lung volume   loss.. The central airways are patent. No endobronchial lesions. Lower   lobe and peripheral predominant interstitial lung disease largely   characterized by honeycombing, reticular abnormality, and traction   bronchiectasis and bronchiolectasis. Mild diffuse right pleural   thickening and trace right pleural effusion. Small calcifications within   the right pleural effusion. Left lower lobe calcified granuloma.  UPPER ABDOMEN: Tiny hiatal hernia. Calcified hepatic granuloma.  BONES/SOFT TISSUES: Degenerative changes of the spine.  IMPRESSION:  UIP pattern of interstitial lung disease.  Rightward tracheal deviation, which is likely related to right lung   volume loss, and unchanged since 2019. No mediastinal mass.        SECONDARY PROCEDURE  Procedure: CT abdomen pelvis  Findings and Treatment: 5/25/2024  FINDINGS:  LOWER CHEST: Small left and trace right pleural effusion with associated   passive atelectasis. Bilateral lower lobe reticular opacities,   bronchiolectasis and honeycombing, right greater than left. Aortic and   coronary artery calcifications.  LIVER: Within normal limits.  BILE DUCTS: Normal caliber.  GALLBLADDER: Contracted.  SPLEEN: Within normal limits.  PANCREAS: Within normal limits.  ADRENALS: Thickened left adrenal gland. Right adrenal gland within normal   limits.  KIDNEYS/URETERS: No obstructive calculi. No hydronephrosis bilaterally.   Bilateral renal cysts. Multifocal renal cortical scarring, right greater   than left.  BLADDER: Diffuse bladder wall thickening and trabeculation.  REPRODUCTIVE ORGANS: Prostate is mildly enlarged.  BOWEL: No bowel obstruction. Appendix is not visualized. Small hiatal   hernia.  PERITONEUM: No ascites.  VESSELS: Atherosclerotic changes.  RETROPERITONEUM/LYMPH NODES: No lymphadenopathy.  ABDOMINAL WALL: Small fat-containing umbilical hernia. Rectus diastasis.  BONES: Degenerative changes.  IMPRESSION:  No bowel obstruction or inflammation.  Mild bladder wall thickening, which may represent cystitis or bladder   outlet obstruction the setting of prostatomegaly. Correlate with   urinalysis.  Small left and trace right pleural effusion with associated passive   atelectasis. Fibrotic changes at the lung bases, better characterized on   the prior CT chest from 5/23/2024.      Procedure: Transthoracic echocardiography (TTE)  Findings and Treatment: 5/25/2024  CONCLUSIONS:      1. Technically difficult image quality.   2. There is akinesis of the apical wall.   3. Normal right ventricular cavity size and probably normal systolic function.   4. The left atrium is normal in size.   5. The right atrium is normal in size.   6. The inferior vena cava is normal in size measuring 1.09 cm in diameter, (normal <2.1cm) with normal inspiratory collapse (normal >50%) consistent with normal right atrial pressure (~3, range 0-5mmHg).   7. Mid and apical anterior septum and apex are abnormal.   8. Left ventricular systolic function is mildly decreased with an ejection fraction of 47 % by Dyson's method of disks. Regional wall motion abnormalities present.   9. There is no evidence of a left ventricular thrombus.  10. No pericardial effusion seen.  11. No prior echocardiogram is available for comparison.      Procedure: SIMEON (transesophageal echocardiography)  Findings and Treatment: 5/29/2024   1. Left ventricular systolic function is normal. There are no regional wall motion abnormalities seen.   2. Normal right ventricular cavity size and normal systolic function.   3. Structurally normal mitral valve with normal leaflet excursion. No vegetations seen in association with the mitral valve. There is calcification of the mitral valve annulus. There is mild mitral regurgitation.   4. The aortic valve appears trileaflet with normal systolic excursion. There is calcification of the aortic valve leaflets. No vegetations seen in association with the aortic valve. There is mild to moderate aortic regurgitation. Vena contracta width ~ 0.35 cm.   5. No atheroma in the visualized portions of the proximal ascending aorta. Mild non-mobile atheroma in the visualized portions of the transverse aortic arch. Mild non-mobile atheroma in the visualized portions of the descending aorta.   6. The left atrium is normal in size. There is no evidence of left atrial or left atrial appendage thrombus. The left atrial appendage emptying velocity is normal.   7. Agitated saline injection was negative for intracardiac shunt.   8. No echocardiographic evidence of vegetations.

## 2024-05-24 NOTE — PROGRESS NOTE ADULT - ASSESSMENT
83M with PMHx RA on enbrel, DM2 on insulin, CAD s/p stent, CKD? presenting with fever, cough and weakness. Found to have sepsis 2/2 PNA, NILES. 83M with PMHx RA on enbrel, DM2 on insulin, CAD s/p stent, presenting with fever, cough and weakness. Found to have sepsis 2/2 PNA, NILES. 83M with PMHx RA on enbrel, DM2 on insulin, CAD s/p stent, presenting with fever, cough and weakness. Found to be septic with strep galloyticus bacteremia, elevated troponins. Cards c/s, c/f demand ischemia.

## 2024-05-24 NOTE — DISCHARGE NOTE PROVIDER - NSFOLLOWUPCLINICS_GEN_ALL_ED_FT
[facility] - Primary Care  Primary Care  [address] [place] [address]  Phone: [phone]  Fax:      [facility] - Primary Care  Primary Care  [address] [place] [address]  Phone: [phone]  Fax:     [facility]  Gastroenterology  [address] [place] [address]  Phone: [phone]  Fax:      [facility]  Gastroenterology  [address] [place] [address]  Phone: [phone]  Fax:   Follow Up Time: 2 weeks    [facility] - Infectious Disease  Infectious Disease  [address], [place] [address]  Phone: [phone]  Fax:   Follow Up Time: 1 week    [facility] - Primary Care  Primary Care  [address] [place] [address]  Phone: [phone]  Fax:   Follow Up Time: 2 weeks     [facility]  Gastroenterology  [address] [place] [address]  Phone: [phone]  Fax:   Follow Up Time: 2 weeks    [facility] - Infectious Disease  Infectious Disease  [address] [place] [address], [place] [address]  Phone: [phone]  Fax:   Follow Up Time: 1 week     [facility] [place]  Gastroenterology  [address] [place] [address]  Phone: [phone]  Fax:   Follow Up Time: 2 weeks    [facility] - Infectious Disease  Infectious Disease  [address] [place] [address] [place] [address]  Phone: [phone]  Fax:   Follow Up Time: 1 week    [facility] Pulmonolgy and Sleep Medicine  Pulmonology  [address] [place] [address], [facility] [address]  [place] [address]  Phone: [phone]  Fax:   Follow Up Time: 2 weeks

## 2024-05-24 NOTE — DISCHARGE NOTE PROVIDER - NSFOLLOWUPCLINICSTOKEN_GEN_ALL_ED_FT
[id number]: || ||00\01||False; [id number]: || ||00\01||False;[id number]: || ||00\01||False; 629090:2 weeks|| ||00\01||False;084593:1 week|| ||00\01||False;723838:2 weeks|| ||00\01||False; 089208:2 weeks|| ||00\01||False;795063:1 week|| ||00\01||False; 907188:2 weeks|| ||00\01||False;318517:1 week|| ||00\01||False;205264:2 weeks|| ||00\01||False;

## 2024-05-24 NOTE — DISCHARGE NOTE PROVIDER - NSDCFUADDAPPT_GEN_ALL_CORE_FT
[facility] - Primary Care  Primary Care  [address] [place] [address]  Phone: [phone]  July 3rd 2024 2:30PM

## 2024-05-24 NOTE — PROGRESS NOTE ADULT - SUBJECTIVE AND OBJECTIVE BOX
Medicine Progress Note  --------------------  Giovanni Chung  MS3  Contact via TEAMS   --------------------    Patient: SHANNA OSMAN, MRN: [id number], : 1940  Admitted on 24 for Pneumonia due to infectious organism      LANGUAGE - English ]OR[ PI (_): #                ------------------------------------------------------------------------------------------------------------  SUMMARY (from last progress note through 24 @ 07:04):   -  ------------------------------------------------------------------------------------------------------------  OVERNIGHT/INTERVAL EVENTS  No events overnight. Vitals remained stable on room air. Reviewed all scheduled medications.  In the last 24 hours, patient required the following PRNs: none     SUBJECTIVE  - Pt was seen and examined at bedside this am.       ROS negative unless noted above.  ------------------------------------------------------------------------------------------------------------  OBJECTIVE:  Physical Exam  CONST:     NAD, well-appearing; well-developed; appears stated age  EYES:         Conjunctiva clear; PERRL; no conjunctival pallor; no lid lag  ENMT:       MMM, no pharyngeal injection or exudates; normal dentition/dentures present  NECK:        Supple, no LAD; no palpable masses; no thyromegaly  RESP :        Normal respiratory effort; CTA bilaterally; no W/R/R  CHEST:       No TTP, no lines/ports; symmetric chest expansion  CARDIO:     RRR, normal S1 and S2, no M/R/G; apical impulse at MCL  VASC:         No JVD/AJR, no peripheral edema, pulses 2+ B/L, Cap refill <2s  ABD:           Soft, NT/ND, norm bowel sounds; no R/G; No HSM; No CVAT  MSK:          No clubbing of digits; no joint swelling or TTP  EXT:           WWP, no reduction in body hair, no LE skin changes  PSYCH        A&O to person, place, and time; affect appropriate  NEURO:     Non-focal; no gross sensory deficits; moving all extremities   SKIN:          No rashes; no palpable lesions; axillae not dry    Vital Signs Last 24 Hrs  T(F): 98.6, Max: 98.8 (24 @ 12:30)  HR: 97 (91 - 99)  BP: 120/62 (107/62 - 165/80)  RR: 18 (17 - 18)  SpO2: 95% (93% - 97%)        DAILY MEASUREMENTS:  I&O's Summary    23 May 2024 07:01  -  24 May 2024 07:00  --------------------------------------------------------  IN: 630 mL / OUT: 2220 mL / NET: -1590 mL      Daily     Daily   Weight (kg): 62 (24 @ 04:30)  Orthostatic VS        LABS:                        13.1   16.05 )-----------( 249      ( 23 May 2024 06:00 )             39.0     Hgb Trend: 13.1<--, 11.8<--      133<L>  |  101  |    ----------------------------<  187<H>  5.1   |  18<L>  |  1.75<H>    Ca    8.9      23 May 2024 06:00  Mg     2.00         TPro  7.7  /  Alb  3.5  /  TBili  0.8  /  DBili  x   /  AST  33  /  ALT  17  /  AlkPhos  82      PT/INR - ( 22 May 2024 19:15 )   PT: 11.8 sec;   INR: 1.05 ratio         PTT - ( 22 May 2024 19:15 )  PTT:29.9 sec  CAPILLARY BLOOD GLUCOSE      POCT Blood Glucose.: 356 mg/dL (23 May 2024 22:09)  POCT Blood Glucose.: 396 mg/dL (23 May 2024 17:42)  POCT Blood Glucose.: 357 mg/dL (23 May 2024 12:15)  POCT Blood Glucose.: 214 mg/dL (23 May 2024 08:47)    CARDIAC MARKERS ( 23 May 2024 06:00 )  x     / x     / 218 U/L / x     / 11.7 ng/mL  CARDIAC MARKERS ( 22 May 2024 21:40 )  x     / x     / 153 U/L / x     / 7.3 ng/mL      Urinalysis Basic - ( 23 May 2024 06:00 )    Color: x / Appearance: x / SG: x / pH: x  Gluc: 187 mg/dL / Ketone: x  / Bili: x / Urobili: x   Blood: x / Protein: x / Nitrite: x   Leuk Esterase: x / RBC: x / WBC x   Sq Epi: x / Non Sq Epi: x / Bacteria: x        Culture - Urine (collected 22 May 2024 21:43)  Source: Clean Catch Clean Catch (Midstream)  Final Report (23 May 2024 23:44):    No growth    Culture - Blood (collected 22 May 2024 19:30)  Source: .Blood Blood-Peripheral  Gram Stain (23 May 2024 09:35):    Growth in anaerobic bottle: Gram positive cocci in pairs    Growth in aerobic bottle: Gram positive cocci in pairs  Preliminary Report (23 May 2024 23:48):    Growth in anaerobic bottle: Streptococcus gallolyticus    See previous culture [id number]    Growth in aerobic bottle: Gram positive cocci in pairs    Culture - Blood (collected 22 May 2024 19:15)  Source: .Blood Blood-Peripheral  Gram Stain (23 May 2024 08:40):    Growth in anaerobic bottle: Gram positive cocci in pairs    Growth in aerobic bottle: Gram positive cocci in pairs  Preliminary Report (23 May 2024 23:47):    Growth in aerobic and anaerobic bottles: Streptococcus gallolyticus    Direct identification is available within approximately 3-5    hours either by Blood Panel Multiplexed PCR or Direct    MALDI-TOF. Details: [email or web address]  Organism: Blood Culture PCR (23 May 2024 09:38)  Organism: Blood Culture PCR (23 May 2024 09:38)            RADIOLOGY & ADDITIONAL TESTS:  Results Reviewed:     Imaging Reviewed:  Electrocardiogram Reviewed:      ------------------------------------------------------------------------------------------------------------  MEDICATIONS  (STANDING):  albuterol/ipratropium for Nebulization 3 milliLiter(s) Nebulizer every 6 hours  ascorbic acid 500 milliGRAM(s) Oral daily  aspirin enteric coated 81 milliGRAM(s) Oral daily  dextrose 10% Bolus 125 milliLiter(s) IV Bolus once  dextrose 5%. 1000 milliLiter(s) (50 mL/Hr) IV Continuous <Continuous>  dextrose 5%. 1000 milliLiter(s) (100 mL/Hr) IV Continuous <Continuous>  dextrose 50% Injectable 25 Gram(s) IV Push once  dextrose 50% Injectable 12.5 Gram(s) IV Push once  glucagon  Injectable 1 milliGRAM(s) IntraMuscular once  guaiFENesin  milliGRAM(s) Oral every 12 hours  heparin   Injectable 5000 Unit(s) SubCutaneous every 12 hours  insulin glargine Injectable (LANTUS) 14 Unit(s) SubCutaneous at bedtime  insulin lispro (ADMELOG) corrective regimen sliding scale   SubCutaneous three times a day before meals  insulin lispro (ADMELOG) corrective regimen sliding scale   SubCutaneous at bedtime  insulin lispro Injectable (ADMELOG) 4 Unit(s) SubCutaneous three times a day before meals  piperacillin/tazobactam IVPB.. 3.375 Gram(s) IV Intermittent every 12 hours    MEDICATIONS  (PRN):  acetaminophen     Tablet .. 650 milliGRAM(s) Oral every 6 hours PRN Temp greater or equal to 38C (100.4F), Mild Pain (1 - 3)  dextrose Oral Gel 15 Gram(s) Oral once PRN Blood Glucose LESS THAN 70 milliGRAM(s)/deciliter  melatonin 3 milliGRAM(s) Oral at bedtime PRN Insomnia    ------------------------------------------------------------------------------------------------------------  COORDINATION OF CARE:  Care discussed with consultants/other providers and notes reviewed [Y]       Medicine Progress Note  --------------------  Giovanni Chung  MS3  Contact via TEAMS   --------------------    Patient: SHANNA OSMAN, MRN: [id number], : 1940  Admitted on 24 for Pneumonia due to infectious organism      LANGUAGE - English ]OR[ PI (_): #                ------------------------------------------------------------------------------------------------------------  SUMMARY (from last progress note through 24 @ 07:04):     84 yo male presenting with fever, cough, and weakness for the past few weeks most consistent with CAP. Pt troponin and glucose levels have increased from yesterday.   ------------------------------------------------------------------------------------------------------------  OVERNIGHT/INTERVAL EVENTS    Pt had a hypoxic episode to the high 80s overnight. Episode was corrected, currently with good wave form.   In the last 24 hours, patient required the following PRNs: none     SUBJECTIVE    Pt was seen and examined at bedside this am. He states that he was feeling better yesterday and since his hypoxic episode last night he has been feeling uncomfortable and weak. He denies chest pain and states that his shortness of breath has improved slightly from yesterday., He had a few questions regarding his insulin regimen, as he feels as though he is not receiving enough. Additionally, he feels as though too much blood is being drawn and wanted to know what the blood was being drawn for. He continues to be constipated, with the last BM being 2 days ago. Otherwise, patient denies chest pain, nausea, and vomiting.       ROS negative unless noted above.  ------------------------------------------------------------------------------------------------------------  OBJECTIVE:  Physical Exam  CONST:     General discomfort, weak appearing; appears stated age  EYES:         Conjunctiva clear; PERRL; no conjunctival pallor; no lid lag  ENMT:       MMM, no pharyngeal injection or exudates; normal dentition/dentures present  NECK:        Supple, no LAD; no palpable masses; no thyromegaly  RESP :        Increased respiratory effort; decreased breath sounds on the right accompanied by wheezing  CHEST:       No TTP, no lines/ports; symmetric chest expansion  CARDIO:     RRR, normal S1 and S2, no M/R/G; apical impulse at MCL  VASC:         No JVD/AJR, no peripheral edema, pulses 2+ B/L, Cap refill <2s  ABD:           Soft, NT/ND, norm bowel sounds; no R/G; No HSM; No CVAT  MSK:          No clubbing of digits; no joint swelling or TTP  EXT:           WWP, no reduction in body hair, no LE skin changes  PSYCH        A&O to person, place, and time; affect appropriate  NEURO:     Non-focal; no gross sensory deficits; moving all extremities   SKIN:          No rashes; no palpable lesions; axillae not dry    Vital Signs Last 24 Hrs  T(F): 98.6, Max: 98.8 (24 @ 12:30)  HR: 97 (91 - 99)  BP: 120/62 (107/62 - 165/80)  RR: 18 (17 - 18)  SpO2: 95% (93% - 97%)        DAILY MEASUREMENTS:  I&O's Summary    23 May 2024 07:01  -  24 May 2024 07:00  --------------------------------------------------------  IN: 630 mL / OUT: 2220 mL / NET: -1590 mL      Daily     Daily   Weight (kg): 62 (24 @ 04:30)  Orthostatic VS        LABS:                        13.1   16.05 )-----------( 249      ( 23 May 2024 06:00 )             39.0     Hgb Trend: 13.1<--, 11.8<--      133<L>  |  101  |  23  ----------------------------<  187<H>  5.1   |  18<L>  |  1.75<H>    Ca    8.9      23 May 2024 06:00  Mg     2.00         TPro  7.7  /  Alb  3.5  /  TBili  0.8  /  DBili  x   /  AST  33  /  ALT  17  /  AlkPhos  82  23    PT/INR - ( 22 May 2024 19:15 )   PT: 11.8 sec;   INR: 1.05 ratio         PTT - ( 22 May 2024 19:15 )  PTT:29.9 sec  CAPILLARY BLOOD GLUCOSE      POCT Blood Glucose.: 356 mg/dL (23 May 2024 22:09)  POCT Blood Glucose.: 396 mg/dL (23 May 2024 17:42)  POCT Blood Glucose.: 357 mg/dL (23 May 2024 12:15)  POCT Blood Glucose.: 214 mg/dL (23 May 2024 08:47)    CARDIAC MARKERS ( 23 May 2024 06:00 )  x     / x     / 218 U/L / x     / 11.7 ng/mL  CARDIAC MARKERS ( 22 May 2024 21:40 )  x     / x     / 153 U/L / x     / 7.3 ng/mL    Troponin: 283 ng/L <-- 122 ng/L    Urinalysis Basic - ( 23 May 2024 06:00 )    Color: x / Appearance: x / SG: x / pH: x  Gluc: 187 mg/dL / Ketone: x  / Bili: x / Urobili: x   Blood: x / Protein: x / Nitrite: x   Leuk Esterase: x / RBC: x / WBC x   Sq Epi: x / Non Sq Epi: x / Bacteria: x      Culture - Urine (collected 22 May 2024 21:43)  Source: Clean Catch Clean Catch (Midstream)  Final Report (23 May 2024 23:44):    No growth    Culture - Blood (collected 22 May 2024 19:30)  Source: .Blood Blood-Peripheral  Gram Stain (23 May 2024 09:35):    Growth in anaerobic bottle: Gram positive cocci in pairs    Growth in aerobic bottle: Gram positive cocci in pairs  Preliminary Report (23 May 2024 23:48):    Growth in anaerobic bottle: Streptococcus gallolyticus    See previous culture [id number]    Growth in aerobic bottle: Gram positive cocci in pairs    Culture - Blood (collected 22 May 2024 19:15)  Source: .Blood Blood-Peripheral  Gram Stain (23 May 2024 08:40):    Growth in anaerobic bottle: Gram positive cocci in pairs    Growth in aerobic bottle: Gram positive cocci in pairs  Preliminary Report (23 May 2024 23:47):    Growth in aerobic and anaerobic bottles: Streptococcus gallolyticus    Direct identification is available within approximately 3-5    hours either by Blood Panel Multiplexed PCR or Direct    MALDI-TOF. Details: [email or web address]  Organism: Blood Culture PCR (23 May 2024 09:38)  Organism: Blood Culture PCR (23 May 2024 09:38)      RADIOLOGY & ADDITIONAL TESTS:  Results Reviewed: Growth of Strep gallolyticus on blood culture    Imaging Reviewed: CT chest showed UIP pattern of ILD with rightward tracheal deviation (likely related to R lung volume loss)    Electrocardiogram Reviewed: sinus tachycardia, no ST changes observed      ------------------------------------------------------------------------------------------------------------  MEDICATIONS  (STANDING):  albuterol/ipratropium for Nebulization 3 milliLiter(s) Nebulizer every 6 hours  ascorbic acid 500 milliGRAM(s) Oral daily  aspirin enteric coated 81 milliGRAM(s) Oral daily  dextrose 10% Bolus 125 milliLiter(s) IV Bolus once  dextrose 5%. 1000 milliLiter(s) (50 mL/Hr) IV Continuous <Continuous>  dextrose 5%. 1000 milliLiter(s) (100 mL/Hr) IV Continuous <Continuous>  dextrose 50% Injectable 25 Gram(s) IV Push once  dextrose 50% Injectable 12.5 Gram(s) IV Push once  glucagon  Injectable 1 milliGRAM(s) IntraMuscular once  guaiFENesin  milliGRAM(s) Oral every 12 hours  heparin   Injectable 5000 Unit(s) SubCutaneous every 12 hours  insulin glargine Injectable (LANTUS) 14 Unit(s) SubCutaneous at bedtime  insulin lispro (ADMELOG) corrective regimen sliding scale   SubCutaneous three times a day before meals  insulin lispro (ADMELOG) corrective regimen sliding scale   SubCutaneous at bedtime  insulin lispro Injectable (ADMELOG) 4 Unit(s) SubCutaneous three times a day before meals  piperacillin/tazobactam IVPB.. 3.375 Gram(s) IV Intermittent every 12 hours    MEDICATIONS  (PRN):  acetaminophen     Tablet .. 650 milliGRAM(s) Oral every 6 hours PRN Temp greater or equal to 38C (100.4F), Mild Pain (1 - 3)  dextrose Oral Gel 15 Gram(s) Oral once PRN Blood Glucose LESS THAN 70 milliGRAM(s)/deciliter  melatonin 3 milliGRAM(s) Oral at bedtime PRN Insomnia    ------------------------------------------------------------------------------------------------------------  COORDINATION OF CARE:  Care discussed with consultants/other providers and notes reviewed [Y]

## 2024-05-24 NOTE — PROGRESS NOTE ADULT - PROBLEM SELECTOR PLAN 1
Sepsis 2/2 PNA. HR >90 initially and WBC 16.   CXR Right lower lobe consolidation, may represent atelectasis and/or   effusion. Underlying pneumonia cannot be excluded. Enlarging right apical lung hazy opacity, with associated right tracheal deviation, which is likely due to volume loss. Diffuse chronic fibrotic lung changes.  Lactate 2.3--2.1  CT chest noncontrast  -s/p 1.5 L IVF given  -Additional 500cc bolus ordered, f/u repeat lactate in AM  -c/w Zosyn  -No hx asthma/COPD, so hold prednisone 40mg.  -urine strep/legionella, sputum culture sent 5/24  -cough suppressants PRN, tylenol PRN Troponin is up to 283 from 122. Denies CP. EKG nonischemic with no ST changes. Not consistent with ACS given lack of CP or EKG findings  -Add on CK and CKMB, repeat troponin with CK in AM. If uptrending would consult patient's cardiologist Dr. Downing  -Repeat EKG in AM.  -Echocardiogram to assess heart function and rule out possible bacterial endocarditis. Troponin is up to 326 from 283 from 122. Has consistently denied CP. EKG nonischemic with no ST changes. Not consistent with ACS given lack of CP or EKG findings. Likely due to demand ischemia  -Add on CK and CKMB, repeat troponin with CK in AM. Trend trop to peak per cards  -cards following: appreciate recs  -EKG showed sinus tach w/o T wave or ST changes  -Echocardiogram to assess heart function and rule out possible bacterial endocarditis. Troponin is up to 326 from 283 from 122. Has consistently denied CP. EKG nonischemic with no ST changes. Not consistent with ACS given lack of CP or EKG findings. Likely due to demand ischemia  -Add on CK and CKMB, repeat troponin with CK in AM. Trend trop, CK, CKMB daily to peak per cards  -cards following: appreciate recs  -EKG showed sinus tach w/o T wave or ST changes  -Echocardiogram to assess heart function and rule out possible bacterial endocarditis.

## 2024-05-24 NOTE — DISCHARGE NOTE PROVIDER - NSDCCPCAREPLAN_GEN_ALL_CORE_FT
PRINCIPAL DISCHARGE DIAGNOSIS  Diagnosis: Bacteremia  Assessment and Plan of Treatment: You were treated for a severe infection likely leading to your presenting symptoms. You showed signs of a pneumonia as well as grew bacteria in your blood. You were treated with antibiotics until the blood cultures cleared. You were also assess with an echocardiogram to assess for an infection in the heart which showed*****. Due to the species of bacteria called Streptococcus gallolyticus, there is a risk for colon cancer. You should have this assessed with a repeat colonoscopy. Follow up with your primary care provider for further evaluatio and management of this.      SECONDARY DISCHARGE DIAGNOSES  Diagnosis: Elevated troponin  Assessment and Plan of Treatment: During your admission, you were found to have elevated cardiac enzymes. You were assessed by the cardiologist for this. The cardiologist was not concerned for a heart attack. The results are likely due to stress on the heart from your acute illnesses. You should follow up with your cardiologist and primary care provider for further evaluation and management of this.     PRINCIPAL DISCHARGE DIAGNOSIS  Diagnosis: Bacteremia  Assessment and Plan of Treatment: You were treated for a severe infection likely leading to your presenting symptoms. You showed signs of a pneumonia as well as grew bacteria in your blood. You were treated with antibiotics until the blood cultures cleared. You were also assess with an echocardiogram to assess for an infection in the heart which showed no sign of an infection growth in the heart. Due to the species of bacteria called Streptococcus gallolyticus, there is a risk for colon cancer. You should have this assessed with a repeat colonoscopy. Follow up with your primary care provider for further evaluatio and management of this.      SECONDARY DISCHARGE DIAGNOSES  Diagnosis: Elevated troponin  Assessment and Plan of Treatment: During your admission, you were found to have elevated cardiac enzymes. You were assessed by the cardiologist for this. The cardiologist was not concerned for a heart attack. The results are likely due to stress on the heart from your acute illnesses. You should follow up with your cardiologist and primary care provider for further evaluation and management of this.     PRINCIPAL DISCHARGE DIAGNOSIS  Diagnosis: Bacteremia  Assessment and Plan of Treatment: You were treated for a severe infection likely leading to your presenting symptoms. You showed signs of a pneumonia as well as grew bacteria in your blood. You were treated with antibiotics until the blood cultures cleared. You were also assess with an echocardiogram to assess for an infection in the heart which showed no sign of an infection growth in the heart. Due to the species of bacteria called Streptococcus gallolyticus, there is a risk for colon cancer. You should have this assessed with a repeat colonoscopy. Follow up with your primary care provider for further evaluation and management of this. Your Mobic and Enbrel medications for Rheumatoid Arthritis were held in the setting of an infection. You should follow up with the physician prescribing these medications to determine when you may restart taking them      SECONDARY DISCHARGE DIAGNOSES  Diagnosis: Elevated troponin  Assessment and Plan of Treatment: During your admission, you were found to have elevated cardiac enzymes. You were assessed by the cardiologist for this. The cardiologist was not concerned for a heart attack. The results are likely due to stress on the heart from your acute illnesses. You should follow up with your cardiologist and primary care provider for further evaluation and management of this.     PRINCIPAL DISCHARGE DIAGNOSIS  Diagnosis: Bacteremia  Assessment and Plan of Treatment: You were treated for a severe infection likely leading to your presenting symptoms. You showed signs of a pneumonia as well as grew bacteria in your blood. You were treated with antibiotics until the blood cultures cleared. You were also assess with an echocardiogram to assess for an infection in the heart which showed no sign of an infection growth in the heart. You are being sent home with IV antibiotics to be completed on June 7th. There is blood work that should be completed in 1 week and faxed to [phone]. Due to the species of bacteria called Streptococcus gallolyticus, there is a risk for colon cancer. You should have this assessed with a repeat colonoscopy. Follow up with your primary care provider as well as the infectious diseases clinic for further evaluation and management of this. Your Mobic and Enbrel medications for Rheumatoid Arthritis were held in the setting of an infection. You should follow up with the physician prescribing these medications to determine when you may restart taking them      SECONDARY DISCHARGE DIAGNOSES  Diagnosis: Elevated troponin  Assessment and Plan of Treatment: During your admission, you were found to have elevated cardiac enzymes. You were assessed by the cardiologist for this. The cardiologist was not concerned for a heart attack. The results are likely due to stress on the heart from your acute illnesses. You should follow up with your cardiologist and primary care provider for further evaluation and management of this.     PRINCIPAL DISCHARGE DIAGNOSIS  Diagnosis: Bacteremia  Assessment and Plan of Treatment: You were treated for a severe infection likely leading to your presenting symptoms. You showed signs of a pneumonia as well as grew bacteria in your blood. You were treated with antibiotics until the blood cultures cleared. You were also assess with an echocardiogram to assess for an infection in the heart which showed no sign of an infection growth in the heart. You are being sent home with IV antibiotics to be completed on June 7th. There is blood work that should be completed in 1 week and faxed to [phone]. Due to the species of bacteria called Streptococcus gallolyticus, there is a risk for colon cancer. You should have this assessed with a repeat colonoscopy. Follow up with your primary care provider as well as the infectious diseases clinic for further evaluation and management of this. Your Mobic and Enbrel medications for Rheumatoid Arthritis were held in the setting of an infection. You should follow up with the physician prescribing these medications to determine when you may restart taking them      SECONDARY DISCHARGE DIAGNOSES  Diagnosis: Elevated troponin  Assessment and Plan of Treatment: During your admission, you were found to have elevated cardiac enzymes. You were assessed by the cardiologist for this. The cardiologist was not concerned for a heart attack. The results are likely due to stress on the heart from your acute illnesses. You should follow up with your cardiologist and primary care provider for further evaluation and management of this.    Diagnosis: Interstitial lung disease  Assessment and Plan of Treatment: Your CAT scan of the chest showed evidence of an underlying lung disease. You should follow up with the pulmology clinic for further evaluation and management.

## 2024-05-24 NOTE — DISCHARGE NOTE PROVIDER - CARE PROVIDER_API CALL
Christian Downing  Cardiovascular Disease  [address], [address]  [place] [address]  Phone: [phone]  Fax: [phone]  Follow Up Time:     Eduardo Garcia  Cardiovascular Disease  [address] [place] [address]  Phone: [phone]  Fax: [phone]  Follow Up Time:    Adonay Downing  Cardiovascular Disease  [address], [address]  [place] [address]  Phone: [phone]  Fax: [phone]  Established Patient  Scheduled Appointment: 06/28/2024 03:15 AM

## 2024-05-24 NOTE — DISCHARGE NOTE PROVIDER - NSDCFUSCHEDAPPT_GEN_ALL_CORE_FT
[facility]  INTMED  [facility]   Scheduled Appointment: 07/03/2024     [facility]  PULED [id number] [facility]  Scheduled Appointment: 07/19/2024

## 2024-05-24 NOTE — PROGRESS NOTE ADULT - CONVERSATION DETAILS
Discussed GOC and code status with patient and granddaughter who is a physician. Patient has stated that he wants full code for now (is A&Ox3) but he wishes to discuss further with family. Granddaughter states that code status and GOC have not previously been discussed with family but full code seems in line with his previous disposition. Patient and family state that they will discuss further amongst themselves. They are amenable to revisiting the discussion the team later.

## 2024-05-24 NOTE — PROGRESS NOTE ADULT - PROBLEM SELECTOR PROBLEM 4
Type 2 diabetes mellitus with hyperglycemia, with long-term current use of insulin NILES (acute kidney injury)

## 2024-05-24 NOTE — PROGRESS NOTE ADULT - ATTENDING COMMENTS
83M with PMHx RA on enbrel, DM2 on insulin, CAD s/p stent, CKD? a/w sepsis 2/2 PNA c/b strep bacteremia , NILES and elevated HsT (in the absence of acute ischemic changes and chest pain).     -Blood cx + for strep gallolyticus. Pt on zosyn for now. Can deescalate to ceftriaxone. F/u blood cx sensitivities. Pending repeat blood cx from today 5/24. Given the type of strep and its association with endocarditis would also obtain TTE. Will consider ID as well pending repeat blood cxs. Continue to hold enbrel for now in setting of infection  -Monitor resp status. Pt currently not requiring O2.   -Pt w/ continued increase in Hst w/ known hx of CAD. Currently pt is w/o chest pain and EKG w/o acute ischemic changes. Would trend Hst to peak. Cardiology consulted. Awaiting recs. Will obtain TTE. C/w ASA . Pt reports he stopped taking his plavix about 15 days PTA. Will plan to restart. Need to f/u on why he is not on statin. Team attempting to obtain collateral from outside cardiologist. Will transfer to tele   -Pt initially on prednisone on admission. Mild wheezing heard on exam, improved compared to prior.  Pt denies any hx of COPD/asthma and states he smoked minimally for a short period of time in his 20s. Prednisone dcd. Pt now s/p CT chest personally reviewed and w/ underlying lung disease that may be c/w UIP. Pt will need further w/u and evaluation w/ pulm especially for PFTs to determine if any further medications would be indicated. C/w inhalers for now  -Trend renal output and creatine levels. Cr improving overall. Continue to encourage PO hydration.     Dispo- pending further optimization. PT rec rehab. 83M with PMHx RA on enbrel, DM2 on insulin, CAD s/p stent, CKD? a/w sepsis 2/2 PNA c/b strep bacteremia , NILES and elevated HsT (in the absence of acute ischemic changes and chest pain).     -Blood cx + for strep gallolyticus. Pt on zosyn for now. Can deescalate to ceftriaxone. F/u blood cx sensitivities. Pending repeat blood cx from today 5/24. Given the type of strep and its association with endocarditis would also obtain TTE. Will consider ID as well pending repeat blood cxs. Continue to hold enbrel for now in setting of infection  -Monitor resp status. Pt currently not requiring O2.   -Pt w/ continued increase in Hst w/ known hx of CAD. Currently pt is w/o chest pain and EKG w/o acute ischemic changes. Would trend Hst to peak. House cardiology consulted. Awaiting recs. Will obtain TTE. C/w ASA . Pt reports he stopped taking his plavix about 15 days PTA. Will plan to restart. Need to f/u on why he is not on statin. Team attempting to obtain collateral from outside cardiologist. Will transfer to tele   -Pt initially on prednisone on admission. Mild wheezing heard on exam, improved compared to prior.  Pt denies any hx of COPD/asthma and states he smoked minimally for a short period of time in his 20s. Prednisone dcd. Pt now s/p CT chest personally reviewed and w/ underlying lung disease that may be c/w UIP. Pt will need further w/u and evaluation w/ pulm especially for PFTs to determine if any further medications would be indicated. C/w inhalers for now  -Trend renal output and creatine levels. Cr improving overall. Continue to encourage PO hydration.     Dispo- pending further optimization. PT rec rehab. 83M with PMHx RA on enbrel, DM2 on insulin, CAD s/p stent, CKD? a/w sepsis 2/2 PNA c/b strep bacteremia , NILES and elevated HsT (in the absence of acute ischemic changes and chest pain).     -Blood cx + for strep gallolyticus. Pt on zosyn for now. Can deescalate to ceftriaxone. F/u blood cx sensitivities. Pending repeat blood cx from today 5/24. Given the type of strep and its association with endocarditis would also obtain TTE. Will consider ID as well pending repeat blood cxs. Continue to hold enbrel for now in setting of infection  -Monitor resp status. Pt currently not requiring O2.   -Pt w/ continued increase in Hst w/ known hx of CAD. Currently pt is w/o chest pain and EKG w/o acute ischemic changes. Would trend Hst to peak. House cardiology consulted. Awaiting recs. Will obtain TTE. C/w ASA . Pt reports he stopped taking his plavix about 15 days PTA. Will plan to restart. Need to f/u on why he is not on statin. Team attempting to obtain collateral from outside cardiologist. Will transfer to tele   -Pt initially on prednisone on admission. Mild wheezing heard on exam, improved compared to prior.  Pt denies any hx of COPD/asthma and states he smoked minimally for a short period of time in his 20s. Prednisone dcd. Pt now s/p CT chest personally reviewed and w/ underlying lung disease that may be c/w UIP. Pt will need further w/u and evaluation w/ pulm especially for PFTs to determine if any further medications would be indicated. C/w inhalers for now  -Trend renal output and creatine levels. Cr improving overall. Continue to encourage PO hydration.     GOC conversation started today. Pt states his wife and son is his HCP. His current stated and management plan were discussed. CPR/ intubation was also discussed including the process in detail and the risk. Patient reported being scared. He would like to have this conversation with his son on the line. Ongoing GOC conversation.      Dispo- pending further optimization. PT rec rehab. 83M with PMHx RA on enbrel, DM2 on insulin, CAD s/p stent, CKD? a/w sepsis 2/2 PNA c/b strep bacteremia , NILES and elevated HsT (in the absence of acute ischemic changes and chest pain).     -Blood cx + for strep gallolyticus. Pt on zosyn for now. Can deescalate to ceftriaxone. F/u blood cx sensitivities. Pending repeat blood cx from today 5/24. Given the type of strep and its association with endocarditis would also obtain TTE. Will consider ID as well pending repeat blood cxs. Continue to hold enbrel for now in setting of infection  -Monitor resp status. Pt currently not requiring O2.   -Pt w/ continued increase in Hst w/ known hx of CAD. Currently pt is w/o chest pain and EKG w/o acute ischemic changes. Would trend Hst to peak. House cardiology consulted. Awaiting recs. Will obtain TTE. C/w ASA . Pt reports he stopped taking his plavix about 15 days PTA. Will plan to restart. Need to f/u on why he is not on statin. Team attempting to obtain collateral from outside cardiologist. Will transfer to tele   -Pt initially on prednisone on admission. Mild wheezing heard on exam, improved compared to prior.  Pt denies any hx of COPD/asthma and states he smoked minimally for a short period of time in his 20s. Prednisone dcd. Pt now s/p CT chest personally reviewed and w/ underlying lung disease that may be c/w UIP. Pt will need further w/u and evaluation w/ pulm especially for PFTs to determine if any further medications would be indicated. C/w inhalers for now  -Trend renal output and creatine levels. Cr improving overall. Continue to encourage PO hydration.     GOC conversation started today. Pt states his wife and son is his HCP. His current stated and management plan were discussed. CPR/ intubation was also discussed including the process in detail and the risk. Patient reported being scared. He would like to have this conversation with his son on the line. Ongoing GOC conversation.      Dispo- pending further optimization. PT rec rehab.    Pt seen and examined on the am of 5/24

## 2024-05-24 NOTE — PROGRESS NOTE ADULT - PROBLEM SELECTOR PLAN 7
[facility] emailed for complete med rec as the patient does not know all his meds. Hospital Bundle  Fluids: IVF  Electrolytes: Replete K > 4, Mg > 2, Phos > 3  Nutrition: Diet regular  PPX  ---VTE: HSQ  ---GI: diet  ---Resp: IS  Access: PIV  Code Status: FULL  Dispo: pending medical management

## 2024-05-24 NOTE — PROGRESS NOTE ADULT - PROBLEM SELECTOR PLAN 4
On novolog 16u in AM and 6u in afternoon. Then on novolin 70/30 16u in PM, TDD 38u. Will give 75% TDD in basal/bolus regimen.  Lantus 14u qhs Creatinine 1.71, but relatively stable since hospital admission. Likely prerenal in setting of decreased PO intake vs sepsis  -Hold ACE-I though patient denies taking  -s/p 1.5L IVF, additional 500cc bolus  -UA neg  -Repeat BMP in AM.

## 2024-05-24 NOTE — DISCHARGE NOTE PROVIDER - HOSPITAL COURSE
HPI:  83M with PMHx RA on enbrel, DM2 on insulin, CAD s/p stent, CKD? presenting with fever, cough and weakness. The patient notes having a fever 102F yesterday and having difficulty ambulating with his walker. He felt generally weak but no focal unilateral weakness/numbness/tingling. Felt unbalanced overall and dizzy but no syncope or fall. He also has been having a dry cough for several weeks that recently developed into a productive cough. He denies hemoptysis. He has been on 2 days of abx by PMD. Unable to confirm which abx but per EMS report it was bactrim (and noted on surescripts 5/20). Called family for collateral info and med list but not able to reach. Patient able to provide med list and insulin regimen but notes stopping multiple meds from previous lists including flomax, ezetimibe/simvastatin, miralax, ramipril. He denies any CP, SOB, palpitations, LE edema, abdominal pain, vomiting, diarrhea, dysuria, hematuria but does endorse decreased PO intake.. Of not he stopped his plavix ~1 month ago but continues to take ASA. It is not clear why he stopped but stated he felt like he didn't need it anymore. His cardiologist is Dr. Downing.     In ED, CXR concerning for PNA. Meets sepsis criteria with HR >90 and WBC 16. Received 1.5L IVF and zosyn/azithromycin    Hospital Course:  During admission, patient initially continued on zosyn. BCx +strep gallolyticus. Zosyn switched to CTX. Demonstrated a rising trop but without CP or EKG changes. Cards consulted, likely c/f demand ischemia iso CAD, sepsis, NILES. TTE obtained to eval for cardiac function and endocarditis, showed*****. Patient reports a normal colonoscopy >10yrs ago. HPI:  83M with PMHx RA on enbrel, DM2 on insulin, CAD s/p stent, CKD? presenting with fever, cough and weakness. The patient notes having a fever 102F yesterday and having difficulty ambulating with his walker. He felt generally weak but no focal unilateral weakness/numbness/tingling. Felt unbalanced overall and dizzy but no syncope or fall. He also has been having a dry cough for several weeks that recently developed into a productive cough. He denies hemoptysis. He has been on 2 days of abx by PMD. Unable to confirm which abx but per EMS report it was bactrim (and noted on surescripts 5/20). Called family for collateral info and med list but not able to reach. Patient able to provide med list and insulin regimen but notes stopping multiple meds from previous lists including flomax, ezetimibe/simvastatin, miralax, ramipril. He denies any CP, SOB, palpitations, LE edema, abdominal pain, vomiting, diarrhea, dysuria, hematuria but does endorse decreased PO intake.. Of not he stopped his plavix ~1 month ago but continues to take ASA. It is not clear why he stopped but stated he felt like he didn't need it anymore. His cardiologist is Dr. Downing.     In ED, CXR concerning for PNA. Meets sepsis criteria with HR >90 and WBC 16. Received 1.5L IVF and zosyn/azithromycin    Hospital Course:  During admission, patient initially continued on zosyn. BCx +strep gallolyticus. Zosyn switched to CTX. Demonstrated a rising trop but without CP or EKG changes. Cards consulted, likely c/f demand ischemia iso CAD, sepsis, NILES. Per cards: could stop trending cardiac enzymes. TTE obtained to eval for cardiac function and endocarditis, showed Left ventricular systolic function is mildly decreased with an ejection fraction of 47 % but no clear vegetations.  A SIMEON was obtained showing ******. Patient reports a normal colonoscopy >10yrs ago. NILES resolved with IVF. Developed mild hyponatremia to 130*****. HPI:  83M with PMHx RA on enbrel, DM2 on insulin, CAD s/p stent, CKD? presenting with fever, cough and weakness. The patient notes having a fever 102F yesterday and having difficulty ambulating with his walker. He felt generally weak but no focal unilateral weakness/numbness/tingling. Felt unbalanced overall and dizzy but no syncope or fall. He also has been having a dry cough for several weeks that recently developed into a productive cough. He denies hemoptysis. He has been on 2 days of abx by PMD. Unable to confirm which abx but per EMS report it was bactrim (and noted on surescripts 5/20). Called family for collateral info and med list but not able to reach. Patient able to provide med list and insulin regimen but notes stopping multiple meds from previous lists including flomax, ezetimibe/simvastatin, miralax, ramipril. He denies any CP, SOB, palpitations, LE edema, abdominal pain, vomiting, diarrhea, dysuria, hematuria but does endorse decreased PO intake.. Of not he stopped his plavix ~1 month ago but continues to take ASA. It is not clear why he stopped but stated he felt like he didn't need it anymore. His cardiologist is Dr. Downing.     In ED, CXR concerning for PNA. Meets sepsis criteria with HR >90 and WBC 16. Received 1.5L IVF and zosyn/azithromycin    Hospital Course:  During admission, patient initially continued on zosyn. BCx +strep gallolyticus. Zosyn switched to CTX. Demonstrated a rising trop but without CP or EKG changes. Cards consulted, likely c/f demand ischemia iso CAD, sepsis, NILES. Per cards: could stop trending cardiac enzymes. TTE obtained to eval for cardiac function and endocarditis, showed Left ventricular systolic function is mildly decreased with an ejection fraction of 47 % but no clear vegetations.  A SIMEON was obtained showing no vegetations. Patient reports a normal colonoscopy >10yrs ago. NILES resolved with IVF. HPI:  83M with PMHx RA on enbrel, DM2 on insulin, CAD s/p stent, CKD? presenting with fever, cough and weakness. The patient notes having a fever 102F yesterday and having difficulty ambulating with his walker. He felt generally weak but no focal unilateral weakness/numbness/tingling. Felt unbalanced overall and dizzy but no syncope or fall. He also has been having a dry cough for several weeks that recently developed into a productive cough. He denies hemoptysis. He has been on 2 days of abx by PMD. Unable to confirm which abx but per EMS report it was bactrim (and noted on surescripts 5/20). Called family for collateral info and med list but not able to reach. Patient able to provide med list and insulin regimen but notes stopping multiple meds from previous lists including flomax, ezetimibe/simvastatin, miralax, ramipril. He denies any CP, SOB, palpitations, LE edema, abdominal pain, vomiting, diarrhea, dysuria, hematuria but does endorse decreased PO intake.. Of not he stopped his plavix ~1 month ago but continues to take ASA. It is not clear why he stopped but stated he felt like he didn't need it anymore. His cardiologist is Dr. oDwning.     In ED, CXR concerning for PNA. Meets sepsis criteria with HR >90 and WBC 16. Received 1.5L IVF and zosyn/azithromycin    Hospital Course:  During admission, patient initially continued on zosyn. BCx +strep gallolyticus. Zosyn switched to CTX. Demonstrated a rising trop but without CP or EKG changes. Cards consulted, likely c/f demand ischemia iso CAD, sepsis, NILES. Per cards: could stop trending cardiac enzymes. TTE obtained to eval for cardiac function and endocarditis, showed Left ventricular systolic function is mildly decreased with an ejection fraction of 47 % but no clear vegetations.  A SIMEON was obtained showing no vegetations. Patient reports a normal colonoscopy >10yrs ago. NILES resolved with IVF. Midline placed and discharged on IV CTX until 6/7/2024. To follow up with ID, GI, cards, PCP outpatient. Medically optimized for DC with ID, GI, cards, PCP F/u HPI:  83M with PMHx RA on enbrel, DM2 on insulin, CAD s/p stent, CKD? presenting with fever, cough and weakness. The patient notes having a fever 102F yesterday and having difficulty ambulating with his walker. He felt generally weak but no focal unilateral weakness/numbness/tingling. Felt unbalanced overall and dizzy but no syncope or fall. He also has been having a dry cough for several weeks that recently developed into a productive cough. He denies hemoptysis. He has been on 2 days of abx by PMD. Unable to confirm which abx but per EMS report it was bactrim (and noted on surescripts 5/20). Called family for collateral info and med list but not able to reach. Patient able to provide med list and insulin regimen but notes stopping multiple meds from previous lists including flomax, ezetimibe/simvastatin, miralax, ramipril. He denies any CP, SOB, palpitations, LE edema, abdominal pain, vomiting, diarrhea, dysuria, hematuria but does endorse decreased PO intake.. Of not he stopped his plavix ~1 month ago but continues to take ASA. It is not clear why he stopped but stated he felt like he didn't need it anymore. His cardiologist is Dr. Downing.     In ED, CXR concerning for PNA. Meets sepsis criteria with HR >90 and WBC 16. Received 1.5L IVF and zosyn/azithromycin    Hospital Course:  During admission, patient initially continued on zosyn. BCx +strep gallolyticus. Zosyn switched to CTX. Demonstrated a rising trop but without CP or EKG changes. Cards consulted, likely c/f demand ischemia iso CAD, sepsis, NILES. Per cards: could stop trending cardiac enzymes. TTE obtained to eval for cardiac function and endocarditis, showed Left ventricular systolic function is mildly decreased with an ejection fraction of 47 % but no clear vegetations.  A SIMEON was obtained showing no vegetations. CT chest showed signs of underlying interstitial lung disease. Patient reports a normal colonoscopy >10yrs ago. NILES resolved with IVF. Midline placed and discharged on IV CTX until 6/7/2024. To follow up with ID, GI, cards, pulm, PCP outpatient. Medically optimized for DC with ID, GI, cards, PCP F/u HPI:  83M with PMHx RA on enbrel, DM2 on insulin, CAD s/p stent, CKD? presenting with fever, cough and weakness. The patient notes having a fever 102F yesterday and having difficulty ambulating with his walker. He felt generally weak but no focal unilateral weakness/numbness/tingling. Felt unbalanced overall and dizzy but no syncope or fall. He also has been having a dry cough for several weeks that recently developed into a productive cough. He denies hemoptysis. He has been on 2 days of abx by PMD. Unable to confirm which abx but per EMS report it was bactrim (and noted on surescripts 5/20). Called family for collateral info and med list but not able to reach. Patient able to provide med list and insulin regimen but notes stopping multiple meds from previous lists including flomax, ezetimibe/simvastatin, miralax, ramipril. He denies any CP, SOB, palpitations, LE edema, abdominal pain, vomiting, diarrhea, dysuria, hematuria but does endorse decreased PO intake.. Of not he stopped his plavix ~1 month ago but continues to take ASA. It is not clear why he stopped but stated he felt like he didn't need it anymore. His cardiologist is Dr. Downing.     In ED, CXR concerning for PNA. Meets sepsis criteria with HR >90 and WBC 16. Received 1.5L IVF and zosyn/azithromycin    Hospital Course:  During admission, patient initially continued on zosyn. BCx +strep gallolyticus. Zosyn switched to CTX. Demonstrated a rising trop but without CP or EKG changes. Cards consulted, likely c/f demand ischemia iso CAD, sepsis, NILES. Per cards: could stop trending cardiac enzymes. TTE obtained to eval for cardiac function and endocarditis, showed Left ventricular systolic function is mildly decreased with an ejection fraction of 47 % but no clear vegetations.  A SIMEON was obtained showing no vegetations. CT chest showed signs of underlying interstitial lung disease. Patient reports a normal colonoscopy >10yrs ago. NILES resolved with IVF. Midline placed and discharged on IV CTX until 6/7/2024. To follow up with ID, GI, cards, pulm, PCP outpatient. Medically optimized for DC with ID, GI, cards, PCP F/u.

## 2024-05-24 NOTE — DISCHARGE NOTE PROVIDER - PROVIDER TOKENS
PROVIDER:[TOKEN:[[id number]]],PROVIDER:[TOKEN:[[id number]]] PROVIDER:[TOKEN:[[id number]],SCHEDULEDAPPT:[06/28/2024],SCHEDULEDAPPTTIME:[03:15 AM],ESTABLISHEDPATIENT:[T]]

## 2024-05-24 NOTE — PROGRESS NOTE ADULT - PROBLEM SELECTOR PLAN 3
Troponin 71--83. Denies CP. EKG nonischemic with no ST changes. Not consistent with ACS given lack of CP or EKG findings  -Add on CK and CKMB, repeat troponin with CK in AM. If uptrending would consult patient's cardiologist Dr. Downing  -Repeat EKG in AM. Glucose trending upwards (214-396)  -Adjust novolog to 18u in AM (previously 16u) and 8u (previously 6u) in the afternoon.   -Adjust novolin 70/30 to 18u (previously 16u) in PM  -Adjust Lantus to 20u (previously 14u) qhs Glucose trending upwards (214-396). Takes 16U basal and 6U premeal at home. A1c 7.4  -lantus 20U, premeal 6U, MARTY

## 2024-05-24 NOTE — CONSULT NOTE ADULT - SUBJECTIVE AND OBJECTIVE BOX
CARDIOLOGY CONSULT NOTE - DR. VILLEGAS        Date of Service: 05-24-24 @ 15:56      HPI:  83M with PMHx RA on enbrel, DM2 on insulin, CAD s/p stent, CKD? presenting with fever, cough and weakness. The patient notes having a fever 102F yesterday and having difficulty ambulating with his walker. He felt generally weak but no focal unilateral weakness/numbness/tingling. Felt unbalanced overall and dizzy but no syncope or fall. He also has been having a dry cough for several weeks that recently developed into a productive cough. He denies hemoptysis. He has been on 2 days of abx by PMD. Unable to confirm which abx but per EMS report it was bactrim (and noted on surescripts 5/20). Called family for collateral info and med list but not able to reach. Patient able to provide med list and insulin regimen but notes stopping multiple meds from previous lists including flomax, ezetimibe/simvastatin, miralax, ramipril. He denies any CP, SOB, palpitations, LE edema, abdominal pain, vomiting, diarrhea, dysuria, hematuria but does endorse decreased PO intake.. Of not he stopped his plavix ~1 month ago but continues to take ASA. It is not clear why he stopped but stated he felt like he didn't need it anymore. His cardiologist is Dr. Villegas.     In ED, CXR concerning for PNA. Meets sepsis criteria with HR >90 and WBC 16. Received 1.5L IVF and zosyn/azithromycin  At bedside repeat /50s upon my assessment and asymptomatic. (23 May 2024 03:08)    no inc dyspnea  some mild chest discomfort with cough        PAST MEDICAL & SURGICAL HISTORY:  DM (diabetes mellitus)      RA (rheumatoid arthritis)      CAD (coronary artery disease)      HTN (hypertension)      HLD (hyperlipidemia)      Spinal stenosis      BPH (benign prostatic hyperplasia)      No significant past surgical history            PREVIOUS DIAGNOSTIC TESTING:    [ ] Echocardiogram:  [ ]  Catheterization:  [ ] Stress Test:  	    MEDICATIONS:    Home Medications:  aspirin 81 mg oral tablet: 1 tab(s) orally once a day (23 May 2024 11:08)  Bactrim  mg-160 mg oral tablet: 1 tab(s) orally 2 times a day x 7 days (Filled on 5/20/24) (23 May 2024 11:09)  clopidogrel 75 mg oral tablet: 1 tab(s) orally once a day (23 May 2024 11:08)  Enbrel SureClick 50 mg/mL subcutaneous solution: 50 milligram(s) subcutaneous once a week (23 May 2024 11:07)  NovoLOG FlexPen 100 units/mL injectable solution: 6 unit(s) injectable once a day (in the afternoon) (23 May 2024 11:08)  NovoLOG FlexPen 100 units/mL injectable solution: 16 unit(s) subcutaneous once a day -AM (23 May 2024 11:08)  NovoLOG Mix 70/30 FlexPen subcutaneous suspension: 16 unit(s) subcutaneous once a day (at bedtime) (23 May 2024 11:08)  Vitamin C 500 mg oral tablet: 1 tab(s) orally once a day (23 May 2024 03:00)      MEDICATIONS  (STANDING):  albuterol/ipratropium for Nebulization 3 milliLiter(s) Nebulizer every 6 hours  ascorbic acid 500 milliGRAM(s) Oral daily  aspirin enteric coated 81 milliGRAM(s) Oral daily  cefTRIAXone   IVPB 2000 milliGRAM(s) IV Intermittent every 24 hours  cefTRIAXone   IVPB 1000 milliGRAM(s) IV Intermittent once  dextrose 10% Bolus 125 milliLiter(s) IV Bolus once  dextrose 5%. 1000 milliLiter(s) (100 mL/Hr) IV Continuous <Continuous>  dextrose 5%. 1000 milliLiter(s) (50 mL/Hr) IV Continuous <Continuous>  dextrose 50% Injectable 25 Gram(s) IV Push once  dextrose 50% Injectable 12.5 Gram(s) IV Push once  glucagon  Injectable 1 milliGRAM(s) IntraMuscular once  guaiFENesin  milliGRAM(s) Oral every 12 hours  heparin   Injectable 5000 Unit(s) SubCutaneous every 12 hours  insulin glargine Injectable (LANTUS) 20 Unit(s) SubCutaneous at bedtime  insulin lispro (ADMELOG) corrective regimen sliding scale   SubCutaneous three times a day before meals  insulin lispro (ADMELOG) corrective regimen sliding scale   SubCutaneous at bedtime  insulin lispro Injectable (ADMELOG) 6 Unit(s) SubCutaneous three times a day before meals      FAMILY HISTORY:  No pertinent family history in first degree relatives        SOCIAL HISTORY:    [x ] Non-smoker  [ ] Smoker  [ ] Alcohol    Allergies    tetracyclines (Rash)    Intolerances    	    REVIEW OF SYSTEMS:  CONSTITUTIONAL: No fever, weight loss, or fatigue  EYES: No eye pain, visual disturbances, or discharge  ENMT:  No difficulty hearing, tinnitus, vertigo; No sinus or throat pain  NECK: No pain or stiffness  RESPIRATORY: No cough, wheezing, chills or hemoptysis; + Shortness of Breath  CARDIOVASCULAR: as HPI  GASTROINTESTINAL: No abdominal or epigastric pain. No nausea, vomiting, or hematemesis; No diarrhea or constipation. No melena or hematochezia.  GENITOURINARY: No dysuria, frequency, hematuria, or incontinence  NEUROLOGICAL: No headaches, memory loss, loss of strength, numbness, or tremors  SKIN: No itching, burning, rashes, or lesions   	  [ ] All others negative	  [ ] Unable to obtain    PHYSICAL EXAM:    T(C): 36.8 (05-24-24 @ 13:00), Max: 37 (05-24-24 @ 05:10)  HR: 94 (05-24-24 @ 13:00) (91 - 103)  BP: 140/69 (05-24-24 @ 13:00) (118/61 - 140/73)  RR: 17 (05-24-24 @ 13:00) (17 - 18)  SpO2: 94% (05-24-24 @ 13:00) (93% - 96%)  Wt(kg): --  I&O's Summary    23 May 2024 07:01  -  24 May 2024 07:00  --------------------------------------------------------  IN: 630 mL / OUT: 2220 mL / NET: -1590 mL    24 May 2024 07:01  -  24 May 2024 15:56  --------------------------------------------------------  IN: 0 mL / OUT: 500 mL / NET: -500 mL      Daily     Daily     Appearance: Normal	  Psychiatry: A & O x 3, Mood & affect appropriate  HEENT:   Normal oral mucosa, PERRL, EOMI	  Lymphatic: No lymphadenopathy  Cardiovascular: Normal S1 S2,RRR, No JVD, No murmurs  Respiratory: Lungs clear to auscultation	  Gastrointestinal:  Soft, Non-tender, + BS	  Skin: No rashes, No ecchymoses, No cyanosis	  Neurologic: Non-focal  Extremities: Normal range of motion, No clubbing, cyanosis or edema  Vascular: Peripheral pulses palpable 2+ bilaterally    TELEMETRY: 	    ECG:  	no acute ischemic abnl   RADIOLOGY:  OTHER: 	  	  LABS:	 	    CARDIAC MARKERS:        proBNP:     Lipid Profile:   HgA1c:   TSH:                           12.3   18.76 )-----------( 248      ( 24 May 2024 06:30 )             35.9     05-24    133<L>  |  98  |  26<H>  ----------------------------<  276<H>  3.9   |  20<L>  |  1.71<H>    Ca    9.2      24 May 2024 06:30  Mg     2.00     05-23    TPro  7.4  /  Alb  3.3  /  TBili  0.4  /  DBili  x   /  AST  62<H>  /  ALT  23  /  AlkPhos  83  05-24    PT/INR - ( 22 May 2024 19:15 )   PT: 11.8 sec;   INR: 1.05 ratio         PTT - ( 22 May 2024 19:15 )  PTT:29.9 sec    Creatinine: 1.71 mg/dL (05-24-24 @ 06:30)  Creatinine: 1.75 mg/dL (05-23-24 @ 06:00)  Creatinine: 1.85 mg/dL (05-22-24 @ 19:15)        ASSESSMENT/PLAN:

## 2024-05-24 NOTE — PROGRESS NOTE ADULT - PROBLEM SELECTOR PROBLEM 3
Elevated troponin Type 2 diabetes mellitus with hyperglycemia, with long-term current use of insulin

## 2024-05-24 NOTE — DISCHARGE NOTE PROVIDER - CARE PROVIDERS DIRECT ADDRESSES
[email or web address]

## 2024-05-24 NOTE — CHART NOTE - NSCHARTNOTEFT_GEN_A_CORE
Pt evaluated at bedside for alleged hypoxia, pt satting well on room air with good waveform, gave cough meds and repositined pt, will ctm

## 2024-05-24 NOTE — CONSULT NOTE ADULT - ASSESSMENT
{\rtf1\wqxbte29679\ansi\jfjpqbf4653\ftnbj\uc1\deff0  {\fonttbl{\f0 \fnil Segoe UI;}{\f1 \fnil \fcharset0 Segoe UI;}{\f2 \fnil Times New Tay;}}  {\colortbl ;\mxi447\racka778\xexv704 ;\red0\green0\blue0 ;\red0\green0\qwcz738 ;\red0\green0\blue0 ;}  {\stylesheet{\f0\fs20 Normal;}{\cs1 Default Paragraph Font;}{\cs2\f0\fs16 Line Number;}{\cs3\f2\fs24\ul\cf3 Hyperlink;}}  {\*\revtbl{Unknown;}}  \ydbktt25282\jomkso31231\dxcfu1640\avdzx1024\temds0027\sagej3402\dhgpxfz903\iivagqo987\nogrowautofit\fedvvg630\formshade\nofeaturethrottle1\dntblnsbdb\fet4\aendnotes\aftnnrlc\pgbrdrhead\pgbrdrfoot  \sectd\axsluo09930\olzxfi56872\guttersxn0\zfbncbfl5205\jjnwfvzw8274\lhxnvgto7991\podrogrw1797\ciciyef277\lvcvoej948\sbkpage\pgncont\pgndec  \plain\plain\f0\fs24\ql\plain\f0\fs24\plain\f0\fs20\ghxi6456\hich\f0\dbch\f0\loch\f0\fs20 A/P\par  83M with PMHx RA on enbrel, DM2 on insulin, CAD s/p stent, CKD? presenting with fever, cough and weakness. Found to have sepsis 2/2 PNA, NILES.\par  \par  #{\*\bkmkstart [id number]}{\*\bkmkend [id number]}{\*\bkmkstart hl71001364104}{\*\bkmkend [id number]}Pneumonia. \par  -clinically stable\par  -continue abx per medicine \par  \plain\f1\fs20\vnub6952\hich\f1\dbch\f1\loch\f1\cf2\fs20\strike\plain\f0\fs20\tfxe7685\hich\f0\dbch\f0\loch\f0\fs20\par  #{\*\bkmkstart [id number]}{\*\bkmkend [id number]}{\*\bkmkstart qu07560933252}{\*\bkmkend ad00804802350}NILES\par  -in setting of pna, hypovolemia\par  -ivf\par  \par  #{\*\bkmkstart [id number]}{\*\bkmkend [id number]}{\*\bkmkstart el48070180141}{\*\bkmkend pb40029357869}{\*\bkmkstart jf85498213291}{\*\bkmkend ps86141013473}{\*\bkmkstart uj64802226394}{\*\bkmkend go04725939547}Elevated troponin, CAD, s/p PCI\par  -elevated troponin in setting of known cad,  of mid LAD, PNA, niles representing demand ischemia\par  -no ACS, no angina, no acute ischemic ecg abnl \par  -f/u echo \par  -continue ASA only, ok to be off of plavix \par  -cath 2023 with closed mid LAD stent with distal LAD filling with well developed collaterals \par  -cont med tx of cad\par  -check another trop, ck, ckmb in am \par  \par  #{\*\bkmkstart [id number]}{\*\bkmkend wu98289743241}{\*\bkmkstart kb53049062648}{\*\bkmkend cf03412346337}Type 2 diabetes mellitus with hyperglycemia\par  -tx per med\par  \par  #{\*\bkmkstart [id number]}{\*\bkmkend [id number]}{\*\bkmkstart pn38633424407}{\*\bkmkend hw94573889525}H/O rheumatoid arthritis. \par  -tx per med\par  -{\*\bkmkstart [id number]}{\*\bkmkend vv01178178406}{\*\bkmkstart px90935932439}{\*\bkmkend zy20715079733}On mobic/enbrel qweekly as outpt\par  \par  78 minutes spent on total encounter; more than 50% of the visit was spent counseling and/or coordinating care by the attending physician.\par  \par  \par  \par  Adonay Downing M.D. FAC\par  Cardiology/Interventional Cardiology  \par  \par  office [phone]\par  cell [phone]\par  \par  }

## 2024-05-25 ENCOUNTER — RESULT REVIEW (OUTPATIENT)
Age: 84
End: 2024-05-25

## 2024-05-25 DIAGNOSIS — R78.81 BACTEREMIA: ICD-10-CM

## 2024-05-25 LAB
ALBUMIN SERPL ELPH-MCNC: 3.2 G/DL — LOW (ref 3.3–5)
ALP SERPL-CCNC: 79 U/L — SIGNIFICANT CHANGE UP (ref 40–120)
ALT FLD-CCNC: 25 U/L — SIGNIFICANT CHANGE UP (ref 4–41)
ANION GAP SERPL CALC-SCNC: 14 MMOL/L — SIGNIFICANT CHANGE UP (ref 7–14)
AST SERPL-CCNC: 52 U/L — HIGH (ref 4–40)
BASE EXCESS BLDV CALC-SCNC: -1.9 MMOL/L — SIGNIFICANT CHANGE UP (ref -2–3)
BASOPHILS # BLD AUTO: 0.04 K/UL — SIGNIFICANT CHANGE UP (ref 0–0.2)
BASOPHILS NFR BLD AUTO: 0.3 % — SIGNIFICANT CHANGE UP (ref 0–2)
BILIRUB SERPL-MCNC: 1 MG/DL — SIGNIFICANT CHANGE UP (ref 0.2–1.2)
BLOOD GAS VENOUS COMPREHENSIVE RESULT: SIGNIFICANT CHANGE UP
BUN SERPL-MCNC: 21 MG/DL — SIGNIFICANT CHANGE UP (ref 7–23)
CALCIUM SERPL-MCNC: 9.2 MG/DL — SIGNIFICANT CHANGE UP (ref 8.4–10.5)
CHLORIDE BLDV-SCNC: 98 MMOL/L — SIGNIFICANT CHANGE UP (ref 96–108)
CHLORIDE SERPL-SCNC: 96 MMOL/L — LOW (ref 98–107)
CK MB BLD-MCNC: 4.2 % — HIGH (ref 0–2.5)
CK MB CFR SERPL CALC: 13.3 NG/ML — HIGH
CK SERPL-CCNC: 313 U/L — HIGH (ref 30–200)
CO2 BLDV-SCNC: 23.3 MMOL/L — SIGNIFICANT CHANGE UP (ref 22–26)
CO2 SERPL-SCNC: 20 MMOL/L — LOW (ref 22–31)
CREAT SERPL-MCNC: 1.4 MG/DL — HIGH (ref 0.5–1.3)
CULTURE RESULTS: ABNORMAL
EGFR: 49 ML/MIN/1.73M2 — LOW
EOSINOPHIL # BLD AUTO: 0.04 K/UL — SIGNIFICANT CHANGE UP (ref 0–0.5)
EOSINOPHIL NFR BLD AUTO: 0.3 % — SIGNIFICANT CHANGE UP (ref 0–6)
GAS PNL BLDV: 129 MMOL/L — LOW (ref 136–145)
GAS PNL BLDV: SIGNIFICANT CHANGE UP
GLUCOSE BLDV-MCNC: 147 MG/DL — HIGH (ref 70–99)
GLUCOSE SERPL-MCNC: 210 MG/DL — HIGH (ref 70–99)
HCO3 BLDV-SCNC: 22 MMOL/L — SIGNIFICANT CHANGE UP (ref 22–29)
HCT VFR BLD CALC: 36.9 % — LOW (ref 39–50)
HCT VFR BLDA CALC: 39 % — SIGNIFICANT CHANGE UP (ref 39–51)
HGB BLD CALC-MCNC: 13.1 G/DL — SIGNIFICANT CHANGE UP (ref 12.6–17.4)
HGB BLD-MCNC: 12.8 G/DL — LOW (ref 13–17)
HIV 1+2 AB+HIV1 P24 AG SERPL QL IA: SIGNIFICANT CHANGE UP
IANC: 9.48 K/UL — HIGH (ref 1.8–7.4)
IMM GRANULOCYTES NFR BLD AUTO: 0.5 % — SIGNIFICANT CHANGE UP (ref 0–0.9)
LACTATE BLDV-MCNC: 1.9 MMOL/L — SIGNIFICANT CHANGE UP (ref 0.5–2)
LYMPHOCYTES # BLD AUTO: 22.9 % — SIGNIFICANT CHANGE UP (ref 13–44)
LYMPHOCYTES # BLD AUTO: 3.45 K/UL — HIGH (ref 1–3.3)
MCHC RBC-ENTMCNC: 29.8 PG — SIGNIFICANT CHANGE UP (ref 27–34)
MCHC RBC-ENTMCNC: 34.7 GM/DL — SIGNIFICANT CHANGE UP (ref 32–36)
MCV RBC AUTO: 85.8 FL — SIGNIFICANT CHANGE UP (ref 80–100)
MONOCYTES # BLD AUTO: 1.96 K/UL — HIGH (ref 0–0.9)
MONOCYTES NFR BLD AUTO: 13 % — SIGNIFICANT CHANGE UP (ref 2–14)
NEUTROPHILS # BLD AUTO: 9.48 K/UL — HIGH (ref 1.8–7.4)
NEUTROPHILS NFR BLD AUTO: 63 % — SIGNIFICANT CHANGE UP (ref 43–77)
NRBC # BLD: 0 /100 WBCS — SIGNIFICANT CHANGE UP (ref 0–0)
NRBC # FLD: 0 K/UL — SIGNIFICANT CHANGE UP (ref 0–0)
PCO2 BLDV: 35 MMHG — LOW (ref 42–55)
PH BLDV: 7.41 — SIGNIFICANT CHANGE UP (ref 7.32–7.43)
PLATELET # BLD AUTO: 249 K/UL — SIGNIFICANT CHANGE UP (ref 150–400)
PO2 BLDV: 69 MMHG — HIGH (ref 25–45)
POTASSIUM BLDV-SCNC: 4.4 MMOL/L — SIGNIFICANT CHANGE UP (ref 3.5–5.1)
POTASSIUM SERPL-MCNC: 4.2 MMOL/L — SIGNIFICANT CHANGE UP (ref 3.5–5.3)
POTASSIUM SERPL-SCNC: 4.2 MMOL/L — SIGNIFICANT CHANGE UP (ref 3.5–5.3)
PROT SERPL-MCNC: 7.5 G/DL — SIGNIFICANT CHANGE UP (ref 6–8.3)
RBC # BLD: 4.3 M/UL — SIGNIFICANT CHANGE UP (ref 4.2–5.8)
RBC # FLD: 13.2 % — SIGNIFICANT CHANGE UP (ref 10.3–14.5)
SAO2 % BLDV: 92.6 % — HIGH (ref 67–88)
SODIUM SERPL-SCNC: 130 MMOL/L — LOW (ref 135–145)
SPECIMEN SOURCE: SIGNIFICANT CHANGE UP
TROPONIN T, HIGH SENSITIVITY RESULT: 393 NG/L — CRITICAL HIGH
TROPONIN T, HIGH SENSITIVITY RESULT: 406 NG/L — CRITICAL HIGH
WBC # BLD: 15.04 K/UL — HIGH (ref 3.8–10.5)
WBC # FLD AUTO: 15.04 K/UL — HIGH (ref 3.8–10.5)

## 2024-05-25 PROCEDURE — 99233 SBSQ HOSP IP/OBS HIGH 50: CPT | Mod: GC

## 2024-05-25 PROCEDURE — 93306 TTE W/DOPPLER COMPLETE: CPT | Mod: 26

## 2024-05-25 PROCEDURE — 74177 CT ABD & PELVIS W/CONTRAST: CPT | Mod: 26

## 2024-05-25 PROCEDURE — 99223 1ST HOSP IP/OBS HIGH 75: CPT | Mod: GC

## 2024-05-25 RX ORDER — INSULIN GLARGINE 100 [IU]/ML
10 INJECTION, SOLUTION SUBCUTANEOUS ONCE
Refills: 0 | Status: COMPLETED | OUTPATIENT
Start: 2024-05-25 | End: 2024-05-25

## 2024-05-25 RX ADMIN — Medication 1: at 13:06

## 2024-05-25 RX ADMIN — HEPARIN SODIUM 5000 UNIT(S): 5000 INJECTION INTRAVENOUS; SUBCUTANEOUS at 17:56

## 2024-05-25 RX ADMIN — INSULIN GLARGINE 20 UNIT(S): 100 INJECTION, SOLUTION SUBCUTANEOUS at 22:31

## 2024-05-25 RX ADMIN — Medication 6 UNIT(S): at 08:56

## 2024-05-25 RX ADMIN — Medication 6 UNIT(S): at 17:56

## 2024-05-25 RX ADMIN — Medication 1: at 18:04

## 2024-05-25 RX ADMIN — Medication 600 MILLIGRAM(S): at 06:15

## 2024-05-25 RX ADMIN — HEPARIN SODIUM 5000 UNIT(S): 5000 INJECTION INTRAVENOUS; SUBCUTANEOUS at 06:15

## 2024-05-25 RX ADMIN — CEFTRIAXONE 100 MILLIGRAM(S): 500 INJECTION, POWDER, FOR SOLUTION INTRAMUSCULAR; INTRAVENOUS at 13:10

## 2024-05-25 RX ADMIN — Medication 6 MILLIGRAM(S): at 22:32

## 2024-05-25 RX ADMIN — Medication 3 MILLILITER(S): at 03:50

## 2024-05-25 RX ADMIN — Medication 81 MILLIGRAM(S): at 13:11

## 2024-05-25 RX ADMIN — Medication 600 MILLIGRAM(S): at 17:57

## 2024-05-25 RX ADMIN — Medication 6 UNIT(S): at 13:04

## 2024-05-25 RX ADMIN — Medication 3 MILLILITER(S): at 20:48

## 2024-05-25 RX ADMIN — Medication 500 MILLIGRAM(S): at 13:11

## 2024-05-25 RX ADMIN — Medication 3 MILLILITER(S): at 08:07

## 2024-05-25 RX ADMIN — INSULIN GLARGINE 10 UNIT(S): 100 INJECTION, SOLUTION SUBCUTANEOUS at 01:53

## 2024-05-25 NOTE — PROGRESS NOTE ADULT - ATTENDING COMMENTS
83M with PMHx RA on enbrel, DM2 on insulin, CAD s/p stent, CKD? a/w sepsis 2/2 PNA c/b strep bacteremia, NILES and elevated troponin likely demand ischemia.    -Appreciate ID recommendations, c/w CTX, f/u TTE, f/u strongyloides, f/u outpatient colonoscopy  -Appreciate cardiology eval, per cardiology hold off trending troponin further, likely demand ischemia  -continue to hold enbrel for now in setting of infection    Dispo- pending further optimization. PT rec rehab.

## 2024-05-25 NOTE — PROGRESS NOTE ADULT - PROBLEM SELECTOR PLAN 1
Troponin is up to 326 from 283 from 122. Has consistently denied CP. EKG nonischemic with no ST changes. Not consistent with ACS given lack of CP or EKG findings. Likely due to demand ischemia  -Add on CK and CKMB, repeat troponin with CK in AM. Trend trop, CK, CKMB daily to peak per cards  -cards following: appreciate recs  -EKG showed sinus tach w/o T wave or ST changes  -Echocardiogram to assess heart function and rule out possible bacterial endocarditis. Troponin is up to 326 from 283 from 122. Has consistently denied CP. EKG nonischemic with no ST changes. Not consistent with ACS given lack of CP or EKG findings. Likely due to demand ischemia  -Add on CK and CKMB, repeat troponin with CK in AM. Trend trop, CK, CKMB daily to peak per cards  -cards following: appreciate recs  -EKG showed sinus tach w/o T wave or ST changes  -f/u TTE to assess heart function and rule out possible bacterial endocarditis. Sepsis with BCx +strep gallolyticus, c/f PNA. HR >90 initially and WBC 16.   CXR Right lower lobe consolidation, may represent atelectasis and/or   effusion. Underlying pneumonia cannot be excluded. Enlarging right apical lung hazy opacity, with associated right tracheal deviation, which is likely due to volume loss. Diffuse chronic fibrotic lung changes.  Lactate 2.3--2.1  -s/p 1.5 L IVF given  -5/22 BCx +strep gallolyticus  -5/24 BCx pending  - f/u TTE pending as above  - ID consulted  -S/p Zosyn, c/w ceftriaxone 2g q24h  -No hx asthma/COPD, so hold prednisone 40mg.  -Urine strep/legionella, sputum culture sent 5/24  -Cough suppressants PRN, tylenol PRN  -Follow up pulmonology on discharge  -s. gallolyticus risk for colon ca. last colonoscopy >10 yrs ago, was normal per patient

## 2024-05-25 NOTE — PROGRESS NOTE ADULT - PROBLEM SELECTOR PLAN 2
Sepsis with BCx +strep gallolyticus, c/f PNA. HR >90 initially and WBC 16.   CXR Right lower lobe consolidation, may represent atelectasis and/or   effusion. Underlying pneumonia cannot be excluded. Enlarging right apical lung hazy opacity, with associated right tracheal deviation, which is likely due to volume loss. Diffuse chronic fibrotic lung changes.  Lactate 2.3--2.1  -s/p 1.5 L IVF given  -5/22 BCx +strep gallolyticus  -5/24 BCx pending: if positive will c/s ID given c/f endocarditis, TTE pending as above  -S/p Zosyn, start ceftriaxone 2g  -No hx asthma/COPD, so hold prednisone 40mg.  -Urine strep/legionella, sputum culture sent 5/24  -Cough suppressants PRN, tylenol PRN  -Follow up pulmonology on discharge  -s. gallolyticus risk for colon ca. last colonoscopy >10 yrs ago, was normal per patient Sepsis with BCx +strep gallolyticus, c/f PNA. HR >90 initially and WBC 16.   CXR Right lower lobe consolidation, may represent atelectasis and/or   effusion. Underlying pneumonia cannot be excluded. Enlarging right apical lung hazy opacity, with associated right tracheal deviation, which is likely due to volume loss. Diffuse chronic fibrotic lung changes.  Lactate 2.3--2.1  -s/p 1.5 L IVF given  -5/22 BCx +strep gallolyticus  -5/24 BCx pending: if positive will c/s ID given c/f endocarditis, f/u TTE pending as above  -S/p Zosyn, start ceftriaxone 2g  -No hx asthma/COPD, so hold prednisone 40mg.  -Urine strep/legionella, sputum culture sent 5/24  -Cough suppressants PRN, tylenol PRN  -Follow up pulmonology on discharge  -s. gallolyticus risk for colon ca. last colonoscopy >10 yrs ago, was normal per patient Troponin is up to 326 from 283 from 122. Has consistently denied CP. EKG nonischemic with no ST changes. Not consistent with ACS given lack of CP or EKG findings. Likely due to demand ischemia  -Add on CK and CKMB, repeat troponin with CK in AM. Trend trop, CK, CKMB daily to peak per cards  -cards following: appreciate recs  -EKG showed sinus tach w/o T wave or ST changes  -f/u TTE to assess heart function and rule out possible bacterial endocarditis.

## 2024-05-25 NOTE — CONSULT NOTE ADULT - SUBJECTIVE AND OBJECTIVE BOX
HPI:    83M with PMHx RA on enbrel, DM2 on insulin, CAD s/p stent, CKD? presented on 5/23 with fever, cough and weakness. The patient notes having a fever 102F yesterday and having difficulty ambulating with his walker. He felt generally weak but no focal unilateral weakness/numbness/tingling. Felt unbalanced overall and dizzy but no syncope or fall. He also has been having a dry cough for several weeks that recently developed into a productive cough. He denies hemoptysis. He has been on 2 days of abx by PMD. Unable to confirm which abx but per EMS report it was bactrim (and noted on surescripts 5/20). Called family for collateral info and med list but not able to reach. Patient able to provide med list and insulin regimen but notes stopping multiple meds from previous lists including flomax, ezetimibe/simvastatin, miralax, ramipril. He denies any CP, SOB, palpitations, LE edema, abdominal pain, vomiting, diarrhea, dysuria, hematuria but does endorse decreased PO intake.. Of not he stopped his plavix ~1 month ago but continues to take ASA. It is not clear why he stopped but stated he felt like he didn't need it anymore. His cardiologist is Dr. Downing.     In ED, CXR concerning for PNA. Meets sepsis criteria with HR >90 and WBC 16. Received 1.5L IVF and zosyn/azithromycin  At bedside repeat /50s upon my assessment and asymptomatic. (23 May 2024 03:08)       REVIEW OF SYSTEMS  [  ] ROS unobtainable because:    [  ] All other systems negative except as noted below    Constitutional:  [ ] fever [ ] chills  [ ] weight loss  [ ]night sweat  [ ]poor appetite/PO intake [ ]fatigue   Skin:  [ ] rash [ ] phlebitis	  Eyes: [ ] icterus [ ] pain  [ ] discharge	  ENMT: [ ] sore throat  [ ] thrush [ ] ulcers [ ] exudates [ ]anosmia  Respiratory: [ ] dyspnea [ ] hemoptysis [ ] cough [ ] sputum	  Cardiovascular:  [ ] chest pain [ ] palpitations [ ] edema	  Gastrointestinal:  [ ] nausea [ ] vomiting [ ] diarrhea [ ] constipation [ ] pain	  Genitourinary:  [ ] dysuria [ ] frequency [ ] hematuria [ ] discharge [ ] flank pain  [ ] incontinence  Musculoskeletal:  [ ] myalgias [ ] arthralgias [ ] arthritis  [ ] back pain  Neurological:  [ ] headache [ ] weakness [ ] seizures  [ ] confusion/altered mental status    prior hospital charts reviewed [V]  primary team notes reviewed [V]  other consultant notes reviewed [V]    PAST MEDICAL & SURGICAL HISTORY:  DM (diabetes mellitus)    RA (rheumatoid arthritis)    CAD (coronary artery disease)    HTN (hypertension)    HLD (hyperlipidemia)    Spinal stenosis    BPH (benign prostatic hyperplasia)    No significant past surgical history      SOCIAL HISTORY:  - Denied smoking/vaping/alcohol/recreational drug use    FAMILY HISTORY:  No pertinent family history in first degree relatives    Allergies  tetracyclines (Rash)    ANTIMICROBIALS:  cefTRIAXone   IVPB 2000 every 24 hours      ANTIMICROBIALS (past 90 days):  MEDICATIONS  (STANDING):  azithromycin  IVPB   255 mL/Hr IV Intermittent (05-22-24 @ 21:08)    cefTRIAXone   IVPB   100 mL/Hr IV Intermittent (05-24-24 @ 11:39)    cefTRIAXone   IVPB   100 mL/Hr IV Intermittent (05-24-24 @ 17:04)    piperacillin/tazobactam IVPB..   25 mL/Hr IV Intermittent (05-24-24 @ 06:59)   25 mL/Hr IV Intermittent (05-23-24 @ 18:26)   25 mL/Hr IV Intermittent (05-23-24 @ 07:10)    piperacillin/tazobactam IVPB...   200 mL/Hr IV Intermittent (05-22-24 @ 19:29)    OTHER MEDS:   MEDICATIONS  (STANDING):  acetaminophen     Tablet .. 650 every 6 hours PRN  albuterol/ipratropium for Nebulization 3 every 6 hours  aspirin enteric coated 81 daily  benzonatate 100 every 8 hours PRN  dextrose 50% Injectable 25 once  dextrose 50% Injectable 12.5 once  dextrose Oral Gel 15 once PRN  glucagon  Injectable 1 once  guaiFENesin  every 12 hours  heparin   Injectable 5000 every 12 hours  insulin glargine Injectable (LANTUS) 20 at bedtime  insulin lispro (ADMELOG) corrective regimen sliding scale  three times a day before meals  insulin lispro (ADMELOG) corrective regimen sliding scale  at bedtime  insulin lispro Injectable (ADMELOG) 6 three times a day before meals  melatonin 6 at bedtime    VITALS:  Vital Signs Last 24 Hrs  T(F): 97.9 (05-25-24 @ 08:00), Max: 100.2 (05-22-24 @ 17:19)    Vital Signs Last 24 Hrs  HR: 89 (05-25-24 @ 08:07) (71 - 114)  BP: 134/80 (05-25-24 @ 08:00) (125/73 - 152/84)  RR: 18 (05-25-24 @ 08:00)  SpO2: 98% (05-25-24 @ 08:07) (90% - 98%)  Wt(kg): --    EXAM:  Physical Exam:  Constitutional:  well preserved, comfortable  Head/Eyes: no icterus, PERRL, EOMI  ENT:  supple; no thrush  LUNGS:  CTA  CVS:  normal S1, S2, no murmur  Abd:  soft, non-tender; non-distended  Ext:  no edema  Vascular:  IV site no erythema tenderness or discharge  MSK:  joints without swelling  Neuro: AAO X 3, non- focal    Labs:                        12.8   15.04 )-----------( 249      ( 25 May 2024 05:42 )             36.9     05-25    130<L>  |  96<L>  |  21  ----------------------------<  210<H>  4.2   |  20<L>  |  1.40<H>    Ca    9.2      25 May 2024 09:26  Mg     2.00     05-23    TPro  7.5  /  Alb  3.2<L>  /  TBili  1.0  /  DBili  x   /  AST  52<H>  /  ALT  25  /  AlkPhos  79  05-25      WBC Trend:  WBC Count: 15.04 (05-25-24 @ 05:42)  WBC Count: 18.76 (05-24-24 @ 06:30)  WBC Count: 16.05 (05-23-24 @ 06:00)  WBC Count: 16.60 (05-22-24 @ 19:15)    Auto Neutrophil #: 9.48 K/uL (05-25-24 @ 05:42)  Auto Neutrophil #: 15.85 K/uL (05-24-24 @ 06:30)  Auto Neutrophil #: 14.17 K/uL (05-23-24 @ 06:00)  Auto Neutrophil #: 14.21 K/uL (05-22-24 @ 19:15)    Creatine Trend:  Creatinine: 1.40 (05-25)  Creatinine: 1.71 (05-24)  Creatinine: 1.75 (05-23)  Creatinine: 1.85 (05-22)    Liver Biochemical Testing Trend:  Alanine Aminotransferase (ALT/SGPT): 25 (05-25)  Alanine Aminotransferase (ALT/SGPT): 23 (05-24)  Alanine Aminotransferase (ALT/SGPT): 17 (05-23)  Alanine Aminotransferase (ALT/SGPT): 16 (05-22)  Aspartate Aminotransferase (AST/SGOT): 52 (05-25-24 @ 09:26)  Aspartate Aminotransferase (AST/SGOT): 62 (05-24-24 @ 06:30)  Aspartate Aminotransferase (AST/SGOT): 33 (05-23-24 @ 06:00)  Aspartate Aminotransferase (AST/SGOT): 26 (05-22-24 @ 19:15)  Bilirubin Total: 1.0 (05-25)  Bilirubin Total: 0.4 (05-24)  Bilirubin Total: 0.8 (05-23)  Bilirubin Total: 0.6 (05-22)    Trend LDH    Auto Eosinophil %: 0.3 % (05-25-24 @ 05:42)  Auto Eosinophil %: 0.0 % (05-24-24 @ 06:30)  Auto Eosinophil %: 0.1 % (05-23-24 @ 06:00)  Auto Eosinophil %: 0.1 % (05-22-24 @ 19:15)    Urinalysis Basic - ( 25 May 2024 09:26 )    Color: x / Appearance: x / SG: x / pH: x  Gluc: 210 mg/dL / Ketone: x  / Bili: x / Urobili: x   Blood: x / Protein: x / Nitrite: x   Leuk Esterase: x / RBC: x / WBC x   Sq Epi: x / Non Sq Epi: x / Bacteria: x      MICROBIOLOGY:    MRSA PCR Result.: Neyda (05-23-24 @ 10:37)      Culture - Blood (collected 24 May 2024 06:30)  Source: .Blood Blood-Venous  Preliminary Report:    No growth at 24 hours    Culture - Blood (collected 24 May 2024 06:30)  Source: .Blood Blood-Peripheral  Preliminary Report:    No growth at 24 hours    Culture - Urine (collected 22 May 2024 21:43)  Source: Clean Catch Clean Catch (Midstream)  Final Report:    No growth    Culture - Blood (collected 22 May 2024 19:30)  Source: .Blood Blood-Peripheral  Final Report:    Growth in aerobic and anaerobic bottles: Streptococcus gallolyticus    See previous culture [id number]    Culture - Blood (collected 22 May 2024 19:15)  Source: .Blood Blood-Peripheral  Final Report:    Growth in aerobic and anaerobic bottles: Streptococcus gallolyticus    Direct identification is available within approximately 3-5    hours either by Blood Panel Multiplexed PCR or Direct    MALDI-TOF. Details: [email or web address]  Organism: Blood Culture PCR  Streptococcus gallolyticus  Organism: Streptococcus gallolyticus    Sensitivities:      Method Type: YONATAN      -  Ceftriaxone: S <=0.25      -  Penicillin: S 0.06      -  Vancomycin: S 0.5  Organism: Blood Culture PCR    Sensitivities:      Method Type: PCR      -  Streptococcus sp. (Not Grp A, B or S pneumoniae): Detec    HIV-1/2 Combo Result: Nonreact (05-25-24 @ 05:42)  Legionella Antigen, Urine: Negative (05-23 @ 18:37)    Procalcitonin: 0.40 (05-22)    Troponin T, High Sensitivity Result: 406 (05-25)  Troponin T, High Sensitivity Result: 393 (05-25)  Troponin T, High Sensitivity Result: 326 (05-24)  Troponin T, High Sensitivity Result: 283 (05-24)  Troponin T, High Sensitivity Result: 122 (05-23)  Troponin T, High Sensitivity Result: 83 (05-22)  Troponin T, High Sensitivity Result: 71 (05-22)    Blood Gas Venous - Lactate: 1.9 (05-25 @ 05:42)  Blood Gas Venous - Lactate: 3.1 (05-23 @ 06:00)  Blood Gas Venous - Lactate: 2.1 (05-22 @ 21:40)  Lactate, Blood: 2.1 (05-22 @ 19:15)    A1C with Estimated Average Glucose Result: 7.4 % (05-24-24 @ 06:30)  A1C with Estimated Average Glucose Result: 7.2 % (05-23-24 @ 06:00)  A1C with Estimated Average Glucose Result: 7.1 % (05-22-24 @ 19:15)      RADIOLOGY:  imaging below personally reviewed    < from: CT Chest No Cont (05.23.24 @ 03:13) >    UIP pattern of interstitial lung disease.    Rightward tracheal deviation, which is likely related to right lung   volume loss, and unchanged since 2019. No mediastinal mass.    --- End of Report ---    < end of copied text >             HPI:    83M with PMHx RA on Etanercept, DM, CAD s/p stent, CKD? presented on 5/23 with fever, cough and weakness. The patient notes having a fever 102F yesterday and having difficulty ambulating with his walker. He felt generally weak but no focal unilateral weakness/numbness/tingling. Felt unbalanced overall and dizzy but no syncope or fall. He also has been having a dry cough for several weeks that recently developed into a productive cough. He denies hemoptysis. He has been on 2 days of abx by PMD. Unable to confirm which abx but per EMS report it was bactrim (and noted on surescripts 5/20).      REVIEW OF SYSTEMS  [  ] ROS unobtainable because:    [  ] All other systems negative except as noted below    Constitutional:  [ ] fever [ ] chills  [ ] weight loss  [ ]night sweat  [ ]poor appetite/PO intake [ ]fatigue   Skin:  [ ] rash [ ] phlebitis	  Eyes: [ ] icterus [ ] pain  [ ] discharge	  ENMT: [ ] sore throat  [ ] thrush [ ] ulcers [ ] exudates [ ]anosmia  Respiratory: [ ] dyspnea [ ] hemoptysis [ ] cough [ ] sputum	  Cardiovascular:  [ ] chest pain [ ] palpitations [ ] edema	  Gastrointestinal:  [ ] nausea [ ] vomiting [ ] diarrhea [ ] constipation [ ] pain	  Genitourinary:  [ ] dysuria [ ] frequency [ ] hematuria [ ] discharge [ ] flank pain  [ ] incontinence  Musculoskeletal:  [ ] myalgias [ ] arthralgias [ ] arthritis  [ ] back pain  Neurological:  [ ] headache [ ] weakness [ ] seizures  [ ] confusion/altered mental status    prior hospital charts reviewed [V]  primary team notes reviewed [V]  other consultant notes reviewed [V]    PAST MEDICAL & SURGICAL HISTORY:  DM (diabetes mellitus)    RA (rheumatoid arthritis)    CAD (coronary artery disease)    HTN (hypertension)    HLD (hyperlipidemia)    Spinal stenosis    BPH (benign prostatic hyperplasia)    No significant past surgical history      SOCIAL HISTORY:  - Denied smoking/vaping/alcohol/recreational drug use    FAMILY HISTORY:  No pertinent family history in first degree relatives    Allergies  tetracyclines (Rash)    ANTIMICROBIALS:  cefTRIAXone   IVPB 2000 every 24 hours      ANTIMICROBIALS (past 90 days):  MEDICATIONS  (STANDING):  azithromycin  IVPB   255 mL/Hr IV Intermittent (05-22-24 @ 21:08)    cefTRIAXone   IVPB   100 mL/Hr IV Intermittent (05-24-24 @ 11:39)    cefTRIAXone   IVPB   100 mL/Hr IV Intermittent (05-24-24 @ 17:04)    piperacillin/tazobactam IVPB..   25 mL/Hr IV Intermittent (05-24-24 @ 06:59)   25 mL/Hr IV Intermittent (05-23-24 @ 18:26)   25 mL/Hr IV Intermittent (05-23-24 @ 07:10)    piperacillin/tazobactam IVPB...   200 mL/Hr IV Intermittent (05-22-24 @ 19:29)    OTHER MEDS:   MEDICATIONS  (STANDING):  acetaminophen     Tablet .. 650 every 6 hours PRN  albuterol/ipratropium for Nebulization 3 every 6 hours  aspirin enteric coated 81 daily  benzonatate 100 every 8 hours PRN  dextrose 50% Injectable 25 once  dextrose 50% Injectable 12.5 once  dextrose Oral Gel 15 once PRN  glucagon  Injectable 1 once  guaiFENesin  every 12 hours  heparin   Injectable 5000 every 12 hours  insulin glargine Injectable (LANTUS) 20 at bedtime  insulin lispro (ADMELOG) corrective regimen sliding scale  three times a day before meals  insulin lispro (ADMELOG) corrective regimen sliding scale  at bedtime  insulin lispro Injectable (ADMELOG) 6 three times a day before meals  melatonin 6 at bedtime    VITALS:  Vital Signs Last 24 Hrs  T(F): 97.9 (05-25-24 @ 08:00), Max: 100.2 (05-22-24 @ 17:19)    Vital Signs Last 24 Hrs  HR: 89 (05-25-24 @ 08:07) (71 - 114)  BP: 134/80 (05-25-24 @ 08:00) (125/73 - 152/84)  RR: 18 (05-25-24 @ 08:00)  SpO2: 98% (05-25-24 @ 08:07) (90% - 98%)  Wt(kg): --    EXAM:  Physical Exam:  Constitutional:  well preserved, comfortable  Head/Eyes: no icterus, PERRL, EOMI  ENT:  supple; no thrush  LUNGS:  CTA  CVS:  normal S1, S2, no murmur  Abd:  soft, non-tender; non-distended  Ext:  no edema  Vascular:  IV site no erythema tenderness or discharge  MSK:  joints without swelling  Neuro: AAO X 3, non- focal    Labs:                        12.8   15.04 )-----------( 249      ( 25 May 2024 05:42 )             36.9     05-25    130<L>  |  96<L>  |  21  ----------------------------<  210<H>  4.2   |  20<L>  |  1.40<H>    Ca    9.2      25 May 2024 09:26  Mg     2.00     05-23    TPro  7.5  /  Alb  3.2<L>  /  TBili  1.0  /  DBili  x   /  AST  52<H>  /  ALT  25  /  AlkPhos  79  05-25      WBC Trend:  WBC Count: 15.04 (05-25-24 @ 05:42)  WBC Count: 18.76 (05-24-24 @ 06:30)  WBC Count: 16.05 (05-23-24 @ 06:00)  WBC Count: 16.60 (05-22-24 @ 19:15)    Auto Neutrophil #: 9.48 K/uL (05-25-24 @ 05:42)  Auto Neutrophil #: 15.85 K/uL (05-24-24 @ 06:30)  Auto Neutrophil #: 14.17 K/uL (05-23-24 @ 06:00)  Auto Neutrophil #: 14.21 K/uL (05-22-24 @ 19:15)    Creatine Trend:  Creatinine: 1.40 (05-25)  Creatinine: 1.71 (05-24)  Creatinine: 1.75 (05-23)  Creatinine: 1.85 (05-22)    Liver Biochemical Testing Trend:  Alanine Aminotransferase (ALT/SGPT): 25 (05-25)  Alanine Aminotransferase (ALT/SGPT): 23 (05-24)  Alanine Aminotransferase (ALT/SGPT): 17 (05-23)  Alanine Aminotransferase (ALT/SGPT): 16 (05-22)  Aspartate Aminotransferase (AST/SGOT): 52 (05-25-24 @ 09:26)  Aspartate Aminotransferase (AST/SGOT): 62 (05-24-24 @ 06:30)  Aspartate Aminotransferase (AST/SGOT): 33 (05-23-24 @ 06:00)  Aspartate Aminotransferase (AST/SGOT): 26 (05-22-24 @ 19:15)  Bilirubin Total: 1.0 (05-25)  Bilirubin Total: 0.4 (05-24)  Bilirubin Total: 0.8 (05-23)  Bilirubin Total: 0.6 (05-22)    Trend LDH    Auto Eosinophil %: 0.3 % (05-25-24 @ 05:42)  Auto Eosinophil %: 0.0 % (05-24-24 @ 06:30)  Auto Eosinophil %: 0.1 % (05-23-24 @ 06:00)  Auto Eosinophil %: 0.1 % (05-22-24 @ 19:15)    Urinalysis Basic - ( 25 May 2024 09:26 )    Color: x / Appearance: x / SG: x / pH: x  Gluc: 210 mg/dL / Ketone: x  / Bili: x / Urobili: x   Blood: x / Protein: x / Nitrite: x   Leuk Esterase: x / RBC: x / WBC x   Sq Epi: x / Non Sq Epi: x / Bacteria: x      MICROBIOLOGY:    MRSA PCR Result.: NotDete (05-23-24 @ 10:37)      Culture - Blood (collected 24 May 2024 06:30)  Source: .Blood Blood-Venous  Preliminary Report:    No growth at 24 hours    Culture - Blood (collected 24 May 2024 06:30)  Source: .Blood Blood-Peripheral  Preliminary Report:    No growth at 24 hours    Culture - Urine (collected 22 May 2024 21:43)  Source: Clean Catch Clean Catch (Midstream)  Final Report:    No growth    Culture - Blood (collected 22 May 2024 19:30)  Source: .Blood Blood-Peripheral  Final Report:    Growth in aerobic and anaerobic bottles: Streptococcus gallolyticus    See previous culture [id number]    Culture - Blood (collected 22 May 2024 19:15)  Source: .Blood Blood-Peripheral  Final Report:    Growth in aerobic and anaerobic bottles: Streptococcus gallolyticus    Direct identification is available within approximately 3-5    hours either by Blood Panel Multiplexed PCR or Direct    MALDI-TOF. Details: [email or web address]  Organism: Blood Culture PCR  Streptococcus gallolyticus  Organism: Streptococcus gallolyticus    Sensitivities:      Method Type: YONATAN      -  Ceftriaxone: S <=0.25      -  Penicillin: S 0.06      -  Vancomycin: S 0.5  Organism: Blood Culture PCR    Sensitivities:      Method Type: PCR      -  Streptococcus sp. (Not Grp A, B or S pneumoniae): Detec    HIV-1/2 Combo Result: Nonreact (05-25-24 @ 05:42)  Legionella Antigen, Urine: Negative (05-23 @ 18:37)    Procalcitonin: 0.40 (05-22)    Troponin T, High Sensitivity Result: 406 (05-25)  Troponin T, High Sensitivity Result: 393 (05-25)  Troponin T, High Sensitivity Result: 326 (05-24)  Troponin T, High Sensitivity Result: 283 (05-24)  Troponin T, High Sensitivity Result: 122 (05-23)  Troponin T, High Sensitivity Result: 83 (05-22)  Troponin T, High Sensitivity Result: 71 (05-22)    Blood Gas Venous - Lactate: 1.9 (05-25 @ 05:42)  Blood Gas Venous - Lactate: 3.1 (05-23 @ 06:00)  Blood Gas Venous - Lactate: 2.1 (05-22 @ 21:40)  Lactate, Blood: 2.1 (05-22 @ 19:15)    A1C with Estimated Average Glucose Result: 7.4 % (05-24-24 @ 06:30)  A1C with Estimated Average Glucose Result: 7.2 % (05-23-24 @ 06:00)  A1C with Estimated Average Glucose Result: 7.1 % (05-22-24 @ 19:15)      RADIOLOGY:  imaging below personally reviewed    < from: CT Chest No Cont (05.23.24 @ 03:13) >    UIP pattern of interstitial lung disease.    Rightward tracheal deviation, which is likely related to right lung   volume loss, and unchanged since 2019. No mediastinal mass.    --- End of Report ---    < end of copied text >             HPI:    83M with PMHx RA on Etanercept, DM, CAD s/p stent, CKD? presented on 5/23 with fever, cough and weakness. The patient notes having a fever 102F yesterday and having difficulty ambulating with his walker. He felt generally weak but no focal unilateral weakness/numbness/tingling. Felt unbalanced overall and dizzy but no syncope or fall. He also has been having a dry cough for several weeks that recently developed into a productive cough. He denies hemoptysis. He has been on 2 days of abx by PMD. Unable to confirm which abx but per EMS report it was bactrim. Found to have high grade Strep Gallolyticus bacteremia.     ID consulted for further recs.    REVIEW OF SYSTEMS  [  ] ROS unobtainable because:    [X] All other systems negative except as noted below    Constitutional:  [ ] fever [ ] chills  [ ] weight loss  [ ]night sweat  [ ]poor appetite/PO intake [ ]fatigue   Skin:  [ ] rash [ ] phlebitis	  Eyes: [ ] icterus [ ] pain  [ ] discharge	  ENMT: [ ] sore throat  [ ] thrush [ ] ulcers [ ] exudates [ ]anosmia  Respiratory: [ ] dyspnea [ ] hemoptysis [X] cough [ ] sputum	  Cardiovascular:  [ ] chest pain [ ] palpitations [ ] edema	  Gastrointestinal:  [ ] nausea [ ] vomiting [ ] diarrhea [ ] constipation [ ] pain	  Genitourinary:  [ ] dysuria [ ] frequency [ ] hematuria [ ] discharge [ ] flank pain  [ ] incontinence  Musculoskeletal:  [ ] myalgias [ ] arthralgias [ ] arthritis  [ ] back pain  Neurological:  [ ] headache [ ] weakness [ ] seizures  [ ] confusion/altered mental status    prior hospital charts reviewed [V]  primary team notes reviewed [V]  other consultant notes reviewed [V]    PAST MEDICAL & SURGICAL HISTORY:  DM (diabetes mellitus)    RA (rheumatoid arthritis)    CAD (coronary artery disease)    HTN (hypertension)    HLD (hyperlipidemia)    Spinal stenosis    BPH (benign prostatic hyperplasia)    No significant past surgical history      SOCIAL HISTORY:  - Denied smoking/vaping/alcohol/recreational drug use    FAMILY HISTORY:  No pertinent family history in first degree relatives    Allergies  tetracyclines (Rash)    ANTIMICROBIALS:  cefTRIAXone   IVPB 2000 every 24 hours      ANTIMICROBIALS (past 90 days):  MEDICATIONS  (STANDING):  azithromycin  IVPB   255 mL/Hr IV Intermittent (05-22-24 @ 21:08)    cefTRIAXone   IVPB   100 mL/Hr IV Intermittent (05-24-24 @ 11:39)    cefTRIAXone   IVPB   100 mL/Hr IV Intermittent (05-24-24 @ 17:04)    piperacillin/tazobactam IVPB..   25 mL/Hr IV Intermittent (05-24-24 @ 06:59)   25 mL/Hr IV Intermittent (05-23-24 @ 18:26)   25 mL/Hr IV Intermittent (05-23-24 @ 07:10)    piperacillin/tazobactam IVPB...   200 mL/Hr IV Intermittent (05-22-24 @ 19:29)    OTHER MEDS:   MEDICATIONS  (STANDING):  acetaminophen     Tablet .. 650 every 6 hours PRN  albuterol/ipratropium for Nebulization 3 every 6 hours  aspirin enteric coated 81 daily  benzonatate 100 every 8 hours PRN  dextrose 50% Injectable 25 once  dextrose 50% Injectable 12.5 once  dextrose Oral Gel 15 once PRN  glucagon  Injectable 1 once  guaiFENesin  every 12 hours  heparin   Injectable 5000 every 12 hours  insulin glargine Injectable (LANTUS) 20 at bedtime  insulin lispro (ADMELOG) corrective regimen sliding scale  three times a day before meals  insulin lispro (ADMELOG) corrective regimen sliding scale  at bedtime  insulin lispro Injectable (ADMELOG) 6 three times a day before meals  melatonin 6 at bedtime    VITALS:  Vital Signs Last 24 Hrs  T(F): 97.9 (05-25-24 @ 08:00), Max: 100.2 (05-22-24 @ 17:19)    Vital Signs Last 24 Hrs  HR: 89 (05-25-24 @ 08:07) (71 - 114)  BP: 134/80 (05-25-24 @ 08:00) (125/73 - 152/84)  RR: 18 (05-25-24 @ 08:00)  SpO2: 98% (05-25-24 @ 08:07) (90% - 98%)  Wt(kg): --    EXAM:  Physical Exam:  Constitutional:  comfortable  Head/Eyes: no icterus  LUNGS:  crackles  CVS:  normal S1, S2, no murmur  Abd:  soft, non-tender; non-distended  Ext:  no edema  Vascular:  IV site no erythema tenderness or discharge  Neuro: AAO X 3, non- focal    Labs:                        12.8   15.04 )-----------( 249      ( 25 May 2024 05:42 )             36.9     05-25    130<L>  |  96<L>  |  21  ----------------------------<  210<H>  4.2   |  20<L>  |  1.40<H>    Ca    9.2      25 May 2024 09:26  Mg     2.00     05-23    TPro  7.5  /  Alb  3.2<L>  /  TBili  1.0  /  DBili  x   /  AST  52<H>  /  ALT  25  /  AlkPhos  79  05-25      WBC Trend:  WBC Count: 15.04 (05-25-24 @ 05:42)  WBC Count: 18.76 (05-24-24 @ 06:30)  WBC Count: 16.05 (05-23-24 @ 06:00)  WBC Count: 16.60 (05-22-24 @ 19:15)    Auto Neutrophil #: 9.48 K/uL (05-25-24 @ 05:42)  Auto Neutrophil #: 15.85 K/uL (05-24-24 @ 06:30)  Auto Neutrophil #: 14.17 K/uL (05-23-24 @ 06:00)  Auto Neutrophil #: 14.21 K/uL (05-22-24 @ 19:15)    Creatine Trend:  Creatinine: 1.40 (05-25)  Creatinine: 1.71 (05-24)  Creatinine: 1.75 (05-23)  Creatinine: 1.85 (05-22)    Liver Biochemical Testing Trend:  Alanine Aminotransferase (ALT/SGPT): 25 (05-25)  Alanine Aminotransferase (ALT/SGPT): 23 (05-24)  Alanine Aminotransferase (ALT/SGPT): 17 (05-23)  Alanine Aminotransferase (ALT/SGPT): 16 (05-22)  Aspartate Aminotransferase (AST/SGOT): 52 (05-25-24 @ 09:26)  Aspartate Aminotransferase (AST/SGOT): 62 (05-24-24 @ 06:30)  Aspartate Aminotransferase (AST/SGOT): 33 (05-23-24 @ 06:00)  Aspartate Aminotransferase (AST/SGOT): 26 (05-22-24 @ 19:15)  Bilirubin Total: 1.0 (05-25)  Bilirubin Total: 0.4 (05-24)  Bilirubin Total: 0.8 (05-23)  Bilirubin Total: 0.6 (05-22)    Trend LDH    Auto Eosinophil %: 0.3 % (05-25-24 @ 05:42)  Auto Eosinophil %: 0.0 % (05-24-24 @ 06:30)  Auto Eosinophil %: 0.1 % (05-23-24 @ 06:00)  Auto Eosinophil %: 0.1 % (05-22-24 @ 19:15)    Urinalysis Basic - ( 25 May 2024 09:26 )    Color: x / Appearance: x / SG: x / pH: x  Gluc: 210 mg/dL / Ketone: x  / Bili: x / Urobili: x   Blood: x / Protein: x / Nitrite: x   Leuk Esterase: x / RBC: x / WBC x   Sq Epi: x / Non Sq Epi: x / Bacteria: x      MICROBIOLOGY:    MRSA PCR Result.: NotDete (05-23-24 @ 10:37)      Culture - Blood (collected 24 May 2024 06:30)  Source: .Blood Blood-Venous  Preliminary Report:    No growth at 24 hours    Culture - Blood (collected 24 May 2024 06:30)  Source: .Blood Blood-Peripheral  Preliminary Report:    No growth at 24 hours    Culture - Urine (collected 22 May 2024 21:43)  Source: Clean Catch Clean Catch (Midstream)  Final Report:    No growth    Culture - Blood (collected 22 May 2024 19:30)  Source: .Blood Blood-Peripheral  Final Report:    Growth in aerobic and anaerobic bottles: Streptococcus gallolyticus    See previous culture [id number]    Culture - Blood (collected 22 May 2024 19:15)  Source: .Blood Blood-Peripheral  Final Report:    Growth in aerobic and anaerobic bottles: Streptococcus gallolyticus    Direct identification is available within approximately 3-5    hours either by Blood Panel Multiplexed PCR or Direct    MALDI-TOF. Details: [email or web address]  Organism: Blood Culture PCR  Streptococcus gallolyticus  Organism: Streptococcus gallolyticus    Sensitivities:      Method Type: YONATAN      -  Ceftriaxone: S <=0.25      -  Penicillin: S 0.06      -  Vancomycin: S 0.5  Organism: Blood Culture PCR    Sensitivities:      Method Type: PCR      -  Streptococcus sp. (Not Grp A, B or S pneumoniae): Detec    HIV-1/2 Combo Result: Nonreact (05-25-24 @ 05:42)  Legionella Antigen, Urine: Negative (05-23 @ 18:37)    Procalcitonin: 0.40 (05-22)    Troponin T, High Sensitivity Result: 406 (05-25)  Troponin T, High Sensitivity Result: 393 (05-25)  Troponin T, High Sensitivity Result: 326 (05-24)  Troponin T, High Sensitivity Result: 283 (05-24)  Troponin T, High Sensitivity Result: 122 (05-23)  Troponin T, High Sensitivity Result: 83 (05-22)  Troponin T, High Sensitivity Result: 71 (05-22)    Blood Gas Venous - Lactate: 1.9 (05-25 @ 05:42)  Blood Gas Venous - Lactate: 3.1 (05-23 @ 06:00)  Blood Gas Venous - Lactate: 2.1 (05-22 @ 21:40)  Lactate, Blood: 2.1 (05-22 @ 19:15)    A1C with Estimated Average Glucose Result: 7.4 % (05-24-24 @ 06:30)  A1C with Estimated Average Glucose Result: 7.2 % (05-23-24 @ 06:00)  A1C with Estimated Average Glucose Result: 7.1 % (05-22-24 @ 19:15)      RADIOLOGY:  imaging below personally reviewed    < from: CT Chest No Cont (05.23.24 @ 03:13) >    UIP pattern of interstitial lung disease.    Rightward tracheal deviation, which is likely related to right lung   volume loss, and unchanged since 2019. No mediastinal mass.    --- End of Report ---    < end of copied text >

## 2024-05-25 NOTE — PROGRESS NOTE ADULT - PROBLEM SELECTOR PLAN 7
Hospital Bundle  Fluids: IVF  Electrolytes: Replete K > 4, Mg > 2, Phos > 3  Nutrition: Diet regular  PPX  ---VTE: HSQ  ---GI: diet  ---Resp: IS  Access: PIV  Code Status: FULL  Dispo: pending medical management -Pt is s/p stent ~20 years ago, with hx of distal LAD occlusion on [facility] 2023  -Patient self-discontinued plavix ~1month ago and unclear why. Pt is continuing on aspirin 81 mg.  -Patient needs f/u with his cardiologist Dr. Downing.

## 2024-05-25 NOTE — PROGRESS NOTE ADULT - ASSESSMENT
83M with PMHx RA on enbrel, DM2 on insulin, CAD s/p stent, presenting with fever, cough and weakness. Found to be septic with strep galloyticus bacteremia, elevated troponins. Cards c/s, c/f demand ischemia.

## 2024-05-25 NOTE — PROGRESS NOTE ADULT - PROBLEM SELECTOR PLAN 5
On mobic - hold given NILES  On enbrel qweekly - hold in setting of infection. Creatinine 1.71, but relatively stable since hospital admission. Likely prerenal in setting of decreased PO intake vs sepsis, downtrending  -Hold ACE-I though patient denies taking  -s/p 1.5L IVF, additional 500cc bolus  -UA neg  -monitor BMP daily

## 2024-05-25 NOTE — CONSULT NOTE ADULT - ATTENDING COMMENTS
This is an 82 y/o M w/ PMHx RA on enbrel, DM2 on insulin, CAD s/p stent, CKD presenting with fever, cough, weakness,   Tm 100.2, tachy to 105, other VSS. WBC elevated to 16.6, NILES to 1.85, lactate 2.3. U/A w/o pyuria, RVP negative COVID, RSV, Flu.   BCx w/ 4/4 Strep gallolyticus.  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     #Strep gallolyticus bacteremia   #Severe sepsis   #NILES     Overall,  82 y/o M w/ PMHx RA on enbrel, DM2 on insulin, CAD s/p stent, CKD with strep gallolyticus bacteremia. Unclear source, however need to r/o abdominal source and any colorectal malignancy. Would treat with Ceftriaxone 2 g 24, repeat BCx, obtain TTE, will likely need SIMEON as well given high risk of IE with this species of strep. Would obtain CT A/P w/ IV contrast for abdominal source.   TTE poor quality, would pursue SIMEON    Plan:  1. Ceftriaxone 2 g q24  2. Repeat BCx until negative x 48 hours   3. SIMEON  4. CT A/P w/ IV contrast    Thank you for this consult. Inpatient ID team will follow.    Jeison Alvarado M.D.  Attending Physician  Division of Infectious Diseases  Department of Medicine    Please contact through [employer].  Office: [phone] (after 5 PM or weekend)

## 2024-05-25 NOTE — PROGRESS NOTE ADULT - SUBJECTIVE AND OBJECTIVE BOX
*******************************  Eliseo Bernal MD (PGY-2)  Internal Medicine  Contact via [employer] TEAMS  *******************************    PROGRESS NOTE:     Patient is a 83y old  Male who presents with a chief complaint of cough, fever, weakness (25 May 2024 07:34)      INTERVAL EVENTS: No acute overnight events.     SUBJECTIVE: Patient seen and examined at bedside. This morning, the patient is comfortable and doing well. No acute complaints.    MEDICATIONS  (STANDING):  albuterol/ipratropium for Nebulization 3 milliLiter(s) Nebulizer every 6 hours  ascorbic acid 500 milliGRAM(s) Oral daily  aspirin enteric coated 81 milliGRAM(s) Oral daily  cefTRIAXone   IVPB 2000 milliGRAM(s) IV Intermittent every 24 hours  dextrose 10% Bolus 125 milliLiter(s) IV Bolus once  dextrose 5%. 1000 milliLiter(s) (100 mL/Hr) IV Continuous <Continuous>  dextrose 5%. 1000 milliLiter(s) (50 mL/Hr) IV Continuous <Continuous>  dextrose 50% Injectable 25 Gram(s) IV Push once  dextrose 50% Injectable 12.5 Gram(s) IV Push once  glucagon  Injectable 1 milliGRAM(s) IntraMuscular once  guaiFENesin  milliGRAM(s) Oral every 12 hours  heparin   Injectable 5000 Unit(s) SubCutaneous every 12 hours  insulin glargine Injectable (LANTUS) 20 Unit(s) SubCutaneous at bedtime  insulin lispro (ADMELOG) corrective regimen sliding scale   SubCutaneous three times a day before meals  insulin lispro (ADMELOG) corrective regimen sliding scale   SubCutaneous at bedtime  insulin lispro Injectable (ADMELOG) 6 Unit(s) SubCutaneous three times a day before meals  melatonin 6 milliGRAM(s) Oral at bedtime    MEDICATIONS  (PRN):  acetaminophen     Tablet .. 650 milliGRAM(s) Oral every 6 hours PRN Temp greater or equal to 38C (100.4F), Mild Pain (1 - 3)  benzonatate 100 milliGRAM(s) Oral every 8 hours PRN Cough  dextrose Oral Gel 15 Gram(s) Oral once PRN Blood Glucose LESS THAN 70 milliGRAM(s)/deciliter      CAPILLARY BLOOD GLUCOSE      POCT Blood Glucose.: 126 mg/dL (25 May 2024 08:27)  POCT Blood Glucose.: 122 mg/dL (25 May 2024 01:20)  POCT Blood Glucose.: 108 mg/dL (24 May 2024 23:50)  POCT Blood Glucose.: 109 mg/dL (24 May 2024 22:31)  POCT Blood Glucose.: 213 mg/dL (24 May 2024 17:58)  POCT Blood Glucose.: 305 mg/dL (24 May 2024 12:27)    I&O's Summary    24 May 2024 07:01  -  25 May 2024 07:00  --------------------------------------------------------  IN: 0 mL / OUT: 850 mL / NET: -850 mL        PHYSICAL EXAM:  Vital Signs Last 24 Hrs  T(C): 36.5 (25 May 2024 06:34), Max: 37.3 (24 May 2024 20:57)  T(F): 97.7 (25 May 2024 06:34), Max: 99.2 (24 May 2024 20:57)  HR: 92 (25 May 2024 06:34) (71 - 114)  BP: 125/73 (25 May 2024 06:34) (125/73 - 152/84)  BP(mean): --  RR: 18 (25 May 2024 06:34) (17 - 18)  SpO2: 98% (25 May 2024 06:34) (90% - 98%)    Parameters below as of 25 May 2024 06:34  Patient On (Oxygen Delivery Method): nasal cannula  O2 Flow (L/min): 2      GENERAL: NAD, lying in bed comfortably  HEAD: Atraumatic, normocephalic  EYES: EOMI, PERRLA, conjunctiva and sclera clear  ENT: Moist mucous membranes  NECK: Supple, no JVD  HEART: S1, S2, Regular rate and rhythm, no murmurs, rubs, or gallops  LUNGS: Unlabored respirations, clear to auscultation bilaterally, no crackles, wheezing, or rhonchi  ABDOMEN: Soft, nontender, nondistended, +BS  EXTREMITIES: 2+ peripheral pulses bilaterally. No clubbing, cyanosis, or edema  NERVOUS SYSTEM:  A&Ox3, no focal deficits   SKIN: No rashes or lesions    LABS:                        12.8   15.04 )-----------( 249      ( 25 May 2024 05:42 )             36.9     05-24    133<L>  |  98  |  26<H>  ----------------------------<  276<H>  3.9   |  20<L>  |  1.71<H>    Ca    9.2      24 May 2024 06:30  Mg     2.00     05-23    TPro  7.4  /  Alb  3.3  /  TBili  0.4  /  DBili  x   /  AST  62<H>  /  ALT  23  /  AlkPhos  83  05-24      CARDIAC MARKERS ( 25 May 2024 05:42 )  x     / x     / 313 U/L / x     / 13.3 ng/mL      Urinalysis Basic - ( 24 May 2024 06:30 )    Color: x / Appearance: x / SG: x / pH: x  Gluc: 276 mg/dL / Ketone: x  / Bili: x / Urobili: x   Blood: x / Protein: x / Nitrite: x   Leuk Esterase: x / RBC: x / WBC x   Sq Epi: x / Non Sq Epi: x / Bacteria: x        Culture - Urine (collected 22 May 2024 21:43)  Source: Clean Catch Clean Catch (Midstream)  Final Report (23 May 2024 23:44):    No growth    Culture - Blood (collected 22 May 2024 19:30)  Source: .Blood Blood-Peripheral  Gram Stain (23 May 2024 09:35):    Growth in anaerobic bottle: Gram positive cocci in pairs    Growth in aerobic bottle: Gram positive cocci in pairs  Final Report (25 May 2024 07:41):    Growth in aerobic and anaerobic bottles: Streptococcus gallolyticus    See previous culture [id number]    Culture - Blood (collected 22 May 2024 19:15)  Source: .Blood Blood-Peripheral  Gram Stain (23 May 2024 08:40):    Growth in anaerobic bottle: Gram positive cocci in pairs    Growth in aerobic bottle: Gram positive cocci in pairs  Final Report (24 May 2024 19:59):    Growth in aerobic and anaerobic bottles: Streptococcus gallolyticus    Direct identification is available within approximately 3-5    hours either by Blood Panel Multiplexed PCR or Direct    MALDI-TOF. Details: [email or web address]  Organism: Blood Culture PCR  Streptococcus gallolyticus (24 May 2024 19:59)  Organism: Streptococcus gallolyticus (24 May 2024 19:59)  Organism: Blood Culture PCR (24 May 2024 19:59)        RADIOLOGY & ADDITIONAL TESTS:  Results Reviewed:   Imaging Personally Reviewed:  Electrocardiogram Personally Reviewed:  Tele: *******************************  Eliseo Bernal MD (PGY-2)  Internal Medicine  Contact via [employer] TEAMS  *******************************    PROGRESS NOTE:     Patient is a 83y old  Male who presents with a chief complaint of cough, fever, weakness (25 May 2024 07:34)    INTERVAL EVENTS: No acute overnight events.     SUBJECTIVE: Patient seen and examined at bedside. This morning, the patient is comfortable and doing well. No acute complaints. Denies chest pain, SOB, N/V/D, palpitations, LE swelling, orthopnea, PND.    MEDICATIONS  (STANDING):  albuterol/ipratropium for Nebulization 3 milliLiter(s) Nebulizer every 6 hours  ascorbic acid 500 milliGRAM(s) Oral daily  aspirin enteric coated 81 milliGRAM(s) Oral daily  cefTRIAXone   IVPB 2000 milliGRAM(s) IV Intermittent every 24 hours  dextrose 10% Bolus 125 milliLiter(s) IV Bolus once  dextrose 5%. 1000 milliLiter(s) (100 mL/Hr) IV Continuous <Continuous>  dextrose 5%. 1000 milliLiter(s) (50 mL/Hr) IV Continuous <Continuous>  dextrose 50% Injectable 25 Gram(s) IV Push once  dextrose 50% Injectable 12.5 Gram(s) IV Push once  glucagon  Injectable 1 milliGRAM(s) IntraMuscular once  guaiFENesin  milliGRAM(s) Oral every 12 hours  heparin   Injectable 5000 Unit(s) SubCutaneous every 12 hours  insulin glargine Injectable (LANTUS) 20 Unit(s) SubCutaneous at bedtime  insulin lispro (ADMELOG) corrective regimen sliding scale   SubCutaneous three times a day before meals  insulin lispro (ADMELOG) corrective regimen sliding scale   SubCutaneous at bedtime  insulin lispro Injectable (ADMELOG) 6 Unit(s) SubCutaneous three times a day before meals  melatonin 6 milliGRAM(s) Oral at bedtime    MEDICATIONS  (PRN):  acetaminophen     Tablet .. 650 milliGRAM(s) Oral every 6 hours PRN Temp greater or equal to 38C (100.4F), Mild Pain (1 - 3)  benzonatate 100 milliGRAM(s) Oral every 8 hours PRN Cough  dextrose Oral Gel 15 Gram(s) Oral once PRN Blood Glucose LESS THAN 70 milliGRAM(s)/deciliter    CAPILLARY BLOOD GLUCOSE  POCT Blood Glucose.: 126 mg/dL (25 May 2024 08:27)  POCT Blood Glucose.: 122 mg/dL (25 May 2024 01:20)  POCT Blood Glucose.: 108 mg/dL (24 May 2024 23:50)  POCT Blood Glucose.: 109 mg/dL (24 May 2024 22:31)  POCT Blood Glucose.: 213 mg/dL (24 May 2024 17:58)  POCT Blood Glucose.: 305 mg/dL (24 May 2024 12:27)    I&O's Summary    24 May 2024 07:01  -  25 May 2024 07:00  --------------------------------------------------------  IN: 0 mL / OUT: 850 mL / NET: -850 mL    PHYSICAL EXAM:  Vital Signs Last 24 Hrs  T(C): 36.5 (25 May 2024 06:34), Max: 37.3 (24 May 2024 20:57)  T(F): 97.7 (25 May 2024 06:34), Max: 99.2 (24 May 2024 20:57)  HR: 92 (25 May 2024 06:34) (71 - 114)  BP: 125/73 (25 May 2024 06:34) (125/73 - 152/84)  BP(mean): --  RR: 18 (25 May 2024 06:34) (17 - 18)  SpO2: 98% (25 May 2024 06:34) (90% - 98%)    Parameters below as of 25 May 2024 06:34  Patient On (Oxygen Delivery Method): nasal cannula  O2 Flow (L/min): 2    GENERAL: NAD, lying in bed comfortably  NECK: no JVD  HEART: S1, S2, Regular rate and rhythm, no murmurs, rubs, or gallops  LUNGS: Unlabored respirations, clear to auscultation bilaterally, no crackles, wheezing, or rhonchi. On 2L NC  ABDOMEN: Soft, nontender, nondistended, +BS  EXTREMITIES: 2+ peripheral pulses bilaterally. No clubbing, cyanosis, or edema  NERVOUS SYSTEM:  A&Ox3, no focal deficits   SKIN: No rashes or lesions    LABS:                        12.8   15.04 )-----------( 249      ( 25 May 2024 05:42 )             36.9              05-25    130<L>  |  96<L>  |  21  ----------------------------<  210<H>  4.2   |  20<L>  |  1.40<H>    Ca    9.2      25 May 2024 09:26  Mg     2.00     05-23    TPro  7.5  /  Alb  3.2<L>  /  TBili  1.0  /  DBili  x   /  AST  52<H>  /  ALT  25  /  AlkPhos  79  05-25    trops: 70s --> 400s    Culture - Urine (collected 22 May 2024 21:43)  Source: Clean Catch Clean Catch (Midstream)  Final Report (23 May 2024 23:44):    No growth    Culture - Blood (collected 22 May 2024 19:30)  Source: .Blood Blood-Peripheral  Gram Stain (23 May 2024 09:35):    Growth in anaerobic bottle: Gram positive cocci in pairs    Growth in aerobic bottle: Gram positive cocci in pairs  Final Report (25 May 2024 07:41):    Growth in aerobic and anaerobic bottles: Streptococcus gallolyticus    See previous culture [id number]    Culture - Blood (collected 22 May 2024 19:15)  Source: .Blood Blood-Peripheral  Gram Stain (23 May 2024 08:40):    Growth in anaerobic bottle: Gram positive cocci in pairs    Growth in aerobic bottle: Gram positive cocci in pairs  Final Report (24 May 2024 19:59):    Growth in aerobic and anaerobic bottles: Streptococcus gallolyticus    Direct identification is available within approximately 3-5    hours either by Blood Panel Multiplexed PCR or Direct    MALDI-TOF. Details: [email or web address]  Organism: Blood Culture PCR  Streptococcus gallolyticus (24 May 2024 19:59)  Organism: Streptococcus gallolyticus (24 May 2024 19:59)  Organism: Blood Culture PCR (24 May 2024 19:59)    RADIOLOGY & ADDITIONAL TESTS:  Results Reviewed:   Imaging Personally Reviewed:  Electrocardiogram Personally Reviewed:  Tele:

## 2024-05-25 NOTE — PROGRESS NOTE ADULT - PROBLEM SELECTOR PLAN 6
-Pt is s/p stent ~20 years ago, with hx of distal LAD occlusion on [facility] 2023  -Patient self-discontinued plavix ~1month ago and unclear why. Pt is continuing on aspirin 81 mg.  -Patient needs f/u with his cardiologist Dr. Downing. On mobic - hold given NILES  On enbrel qweekly - hold in setting of infection.

## 2024-05-25 NOTE — PROGRESS NOTE ADULT - SUBJECTIVE AND OBJECTIVE BOX
CARDIOLOGY FOLLOW UP - Dr. Downing  DATE OF SERVICE: 5/25/24    CC  No chest pain, no sob  endorsing wheezing     REVIEW OF SYSTEMS:  CONSTITUTIONAL: No fever, weight loss, or fatigue  RESPIRATORY: No cough, ++  wheezing, no chills or hemoptysis; No Shortness of Breath  CARDIOVASCULAR: No chest pain, palpitations, passing out, dizziness, or leg swelling  GASTROINTESTINAL: No abdominal or epigastric pain. No nausea, vomiting, or hematemesis; No diarrhea or constipation. No melena or hematochezia.  VASCULAR: No edema     PHYSICAL EXAM:  T(C): 36.5 (05-25-24 @ 06:34), Max: 37.3 (05-24-24 @ 20:57)  HR: 92 (05-25-24 @ 06:34) (71 - 114)  BP: 125/73 (05-25-24 @ 06:34) (125/73 - 152/84)  RR: 18 (05-25-24 @ 06:34) (17 - 18)  SpO2: 98% (05-25-24 @ 06:34) (90% - 98%)  Wt(kg): --  I&O's Summary    24 May 2024 07:01  -  25 May 2024 07:00  --------------------------------------------------------  IN: 0 mL / OUT: 850 mL / NET: -850 mL        Appearance: Elderly male, +NC	  Cardiovascular: Normal S1 S2,RRR, No JVD, No murmurs  Respiratory: Exp wheezing/rhonchi b/l   Gastrointestinal:  Soft, Non-tender, + BS	  Extremities: Normal range of motion, No clubbing, cyanosis or edema      Home Medications:  aspirin 81 mg oral tablet: 1 tab(s) orally once a day (23 May 2024 11:08)  Bactrim  mg-160 mg oral tablet: 1 tab(s) orally 2 times a day x 7 days (Filled on 5/20/24) (23 May 2024 11:09)  clopidogrel 75 mg oral tablet: 1 tab(s) orally once a day (23 May 2024 11:08)  Enbrel SureClick 50 mg/mL subcutaneous solution: 50 milligram(s) subcutaneous once a week (23 May 2024 11:07)  NovoLOG FlexPen 100 units/mL injectable solution: 6 unit(s) injectable once a day (in the afternoon) (23 May 2024 11:08)  NovoLOG FlexPen 100 units/mL injectable solution: 16 unit(s) subcutaneous once a day -AM (23 May 2024 11:08)  NovoLOG Mix 70/30 FlexPen subcutaneous suspension: 16 unit(s) subcutaneous once a day (at bedtime) (23 May 2024 11:08)  Vitamin C 500 mg oral tablet: 1 tab(s) orally once a day (23 May 2024 03:00)      MEDICATIONS  (STANDING):  albuterol/ipratropium for Nebulization 3 milliLiter(s) Nebulizer every 6 hours  ascorbic acid 500 milliGRAM(s) Oral daily  aspirin enteric coated 81 milliGRAM(s) Oral daily  cefTRIAXone   IVPB 2000 milliGRAM(s) IV Intermittent every 24 hours  dextrose 10% Bolus 125 milliLiter(s) IV Bolus once  dextrose 5%. 1000 milliLiter(s) (50 mL/Hr) IV Continuous <Continuous>  dextrose 5%. 1000 milliLiter(s) (100 mL/Hr) IV Continuous <Continuous>  dextrose 50% Injectable 25 Gram(s) IV Push once  dextrose 50% Injectable 12.5 Gram(s) IV Push once  glucagon  Injectable 1 milliGRAM(s) IntraMuscular once  guaiFENesin  milliGRAM(s) Oral every 12 hours  heparin   Injectable 5000 Unit(s) SubCutaneous every 12 hours  insulin glargine Injectable (LANTUS) 20 Unit(s) SubCutaneous at bedtime  insulin lispro (ADMELOG) corrective regimen sliding scale   SubCutaneous three times a day before meals  insulin lispro (ADMELOG) corrective regimen sliding scale   SubCutaneous at bedtime  insulin lispro Injectable (ADMELOG) 6 Unit(s) SubCutaneous three times a day before meals  melatonin 6 milliGRAM(s) Oral at bedtime      TELEMETRY: SR/ST	    ECG:  	  RADIOLOGY:   DIAGNOSTIC TESTING:  [ ] Echocardiogram:  [ ]  Catheterization:  [ ] Stress Test:    OTHER: 	    LABS:	 	    Troponin T, High Sensitivity Result: 406 ng/L (05-25 @ 05:42)  CKMB Units: 13.3 ng/mL (05-25 @ 05:42)  Creatine Kinase, Serum: 313 U/L [30 - 200] (05-25 @ 05:42)  Troponin T, High Sensitivity Result: 393 ng/L (05-25 @ 05:42)  Troponin T, High Sensitivity Result: 326 ng/L (05-24 @ 15:12)  Troponin T, High Sensitivity Result: 283 ng/L (05-24 @ 06:30)  Troponin T, High Sensitivity Result: 122 ng/L (05-23 @ 06:00)  Creatine Kinase, Serum: 218 U/L [30 - 200] (05-23 @ 06:00)  CKMB Units: 11.7 ng/mL (05-23 @ 06:00)  Troponin T, High Sensitivity Result: 83 ng/L (05-22 @ 21:40)  Creatine Kinase, Serum: 153 U/L [30 - 200] (05-22 @ 21:40)  CKMB Units: 7.3 ng/mL (05-22 @ 21:40)  Troponin T, High Sensitivity Result: 71 ng/L (05-22 @ 19:15)                          12.8   15.04 )-----------( 249      ( 25 May 2024 05:42 )             36.9     05-24    133<L>  |  98  |  26<H>  ----------------------------<  276<H>  3.9   |  20<L>  |  1.71<H>    Ca    9.2      24 May 2024 06:30  Mg     2.00     05-23    TPro  7.4  /  Alb  3.3  /  TBili  0.4  /  DBili  x   /  AST  62<H>  /  ALT  23  /  AlkPhos  83  05-24

## 2024-05-25 NOTE — PROGRESS NOTE ADULT - PROBLEM SELECTOR PLAN 4
Creatinine 1.71, but relatively stable since hospital admission. Likely prerenal in setting of decreased PO intake vs sepsis  -Hold ACE-I though patient denies taking  -s/p 1.5L IVF, additional 500cc bolus  -UA neg  -Repeat BMP in AM. Creatinine 1.71, but relatively stable since hospital admission. Likely prerenal in setting of decreased PO intake vs sepsis, downtrending  -Hold ACE-I though patient denies taking  -s/p 1.5L IVF, additional 500cc bolus  -UA neg  -monitor BMP daily Glucose trending upwards (214-396). Takes 16U basal and 6U premeal at home. A1c 7.4  -lantus 20U, premeal 6U, MARTY  -monitor BG for goal 140-180 inpatient

## 2024-05-25 NOTE — CONSULT NOTE ADULT - ASSESSMENT
83M with PMHx RA on enbrel, DM2 on insulin, CAD s/p stent, CKD? presented on 5/23 with fever, cough and weakness.    On presentation    Febrile 100.2   Tachycardic   Leukocytosis 16-->18-->15    Workup:   Blood Cx 5/22: Strep Gallolyticus (4/4 bottles)   Blood Cx 5/24: NGTD    RSV/Flu/Covid neg  Legionella urine Ag neg   MRSA PCR neg             PT TO BE SEEN. PRELIM NOTE  PENDING RECS. PLEASE WAIT FOR FINAL RECS AFTER DISCUSSION WITH ATTENDING#           83M with PMHx RA on enbrel, DM2 on insulin, CAD s/p stent, CKD? presented on 5/23 with fever, cough and weakness.    On presentation    Febrile 100.2   Tachycardic   Leukocytosis 16-->18-->15    Workup:   Blood Cx 5/22: Strep Gallolyticus (4/4 bottles)   Blood Cx 5/24: NGTD    CT chest: UIP pattern of interstitial lung disease.Rightward tracheal deviation, which is likely related to right lung volume loss, and unchanged since 2019. No mediastinal mass.  RSV/Flu/Covid neg  Legionella urine Ag neg   MRSA PCR neg   UA with no cell count neg     Antimicrobials:   Zosyn 5/22--> 5/24  Azithromycin 5/22  Ceftriaxone 2 g IV daily 5/25-->    #Sepsis, fever, tachycardia, leukocytosis  #High grade Strep Gallolyticus bacteremia     Recommendations:   -Continue Ceftriaxone 2 g IV daily   -Check TTE  -Check Strongyloides antibodies   -Outpatient GI evaluation for colonoscopy as Strep Gallolyticus bacteremia associated with Colon Cancer    PT TO BE SEEN. PRELIM NOTE  PENDING RECS. PLEASE WAIT FOR FINAL RECS AFTER DISCUSSION WITH ATTENDING#           83M with PMHx RA on enbrel, DM2 on insulin, CAD s/p stent, CKD? presented on 5/23 with fever, cough and weakness.    On presentation    Febrile 100.2   Tachycardic   Leukocytosis 16-->18-->15    Workup:   Blood Cx 5/22: Strep Gallolyticus (4/4 bottles)   Blood Cx 5/24: NGTD    CT chest: UIP pattern of interstitial lung disease. Rightward tracheal deviation, which is likely related to right lung volume loss, and unchanged since 2019. No mediastinal mass.  RSV/Flu/Covid neg  Legionella urine Ag neg   MRSA PCR neg   UA with no cell count neg     Antimicrobials:   Zosyn 5/22--> 5/24  Azithromycin 5/22  Ceftriaxone 2 g IV daily 5/25-->    #Sepsis, fever, tachycardia, leukocytosis  #High grade Strep Gallolyticus bacteremia source GI, r/o IE   #Acute hypoxic respiratory failure, ILD finding on chest imaging     Recommendations:   -Continue Ceftriaxone 2 g IV daily   -TTE test technically difficult quality, will need a SIMEON  -Get CT AP w/IV contrast if no C/I  -Check Strongyloides antibodies   -Outpatient GI evaluation for colonoscopy as Strep Gallolyticus bacteremia associated with Colon Cancer  -Trend T curve and WBC trend    Seen and discussed with Dr Christiano Pandey MD, PGY5  ID fellow  [employer] Teams Preferred  After 5pm/weekends call [phone]

## 2024-05-25 NOTE — PROGRESS NOTE ADULT - PROBLEM SELECTOR PLAN 3
Glucose trending upwards (214-396). Takes 16U basal and 6U premeal at home. A1c 7.4  -lantus 20U, premeal 6U, MARTY Glucose trending upwards (214-396). Takes 16U basal and 6U premeal at home. A1c 7.4  -lantus 20U, premeal 6U, MARTY  -monitor BG for goal 140-180 inpatient Sepsis with BCx +strep gallolyticus, c/f PNA. HR >90 initially and WBC 16.   CXR Right lower lobe consolidation, may represent atelectasis and/or   effusion. Underlying pneumonia cannot be excluded. Enlarging right apical lung hazy opacity, with associated right tracheal deviation, which is likely due to volume loss. Diffuse chronic fibrotic lung changes.  Lactate 2.3--2.1  -s/p 1.5 L IVF given  -5/22 BCx +strep gallolyticus  -5/24 BCx pending: if positive will c/s ID given c/f endocarditis, f/u TTE pending as above  -S/p Zosyn, start ceftriaxone 2g  -No hx asthma/COPD, so hold prednisone 40mg.  -Urine strep/legionella, sputum culture sent 5/24  -Cough suppressants PRN, tylenol PRN  -Follow up pulmonology on discharge  -s. gallolyticus risk for colon ca. last colonoscopy >10 yrs ago, was normal per patient

## 2024-05-25 NOTE — PROGRESS NOTE ADULT - ASSESSMENT
A/P  83M with PMHx RA on enbrel, DM2 on insulin, CAD s/p stent, CKD? presenting with fever, cough and weakness. Found to have sepsis 2/2 PNA, NILES.    #Pneumonia  -clinically stable  -continue abx per medicine   -Wean O2 as able     #NILES  -in setting of pna, hypovolemia  -ivf    #Elevated troponin, CAD, s/p PCI  -elevated troponin in setting of known cad,  of mid LAD, PNA, niles representing demand ischemia  -no ACS, no angina, no acute ischemic ecg abnl   -f/u echo   -continue ASA only, ok to be off of plavix   -cath 2023 with closed mid LAD stent with distal LAD filling with well developed collaterals   -Trop, ckmb elevated - TREND TROP TO PEAK  -No cp on exam  -Will consider pharm nuclear stress when acute issues resolve   -cont med tx of cad for now     #Type 2 diabetes mellitus with hyperglycemia  -tx per med    #H/O rheumatoid arthritis  -tx per med  -On mobic/enbrel qweekly as outpt   A/P  83M with PMHx RA on enbrel, DM2 on insulin, CAD s/p stent, CKD? presenting with fever, cough and weakness. Found to have sepsis 2/2 PNA, NILES.    #Pneumonia  -clinically stable  -continue abx per medicine   -Wean O2 as able     #NILES  -in setting of pna, hypovolemia  -ivf    #Elevated troponin, CAD, s/p PCI  -elevated troponin in setting of known cad,  of mid LAD, PNA, niles representing demand ischemia  -no ACS, no angina, no acute ischemic ecg abnl   -f/u echo   -continue ASA only, ok to be off of plavix   -cath 2023 with closed mid LAD stent with distal LAD filling with well developed collaterals   -Trop, ckmb elevated - TREND TROP TO PEAK  -No cp on exam  -cont med tx of cad for now     #Type 2 diabetes mellitus with hyperglycemia  -tx per med    #H/O rheumatoid arthritis  -tx per med  -On mobic/enbrel qweekly as outpt

## 2024-05-25 NOTE — PROGRESS NOTE ADULT - PROBLEM SELECTOR PROBLEM 4
NILES (acute kidney injury) Type 2 diabetes mellitus with hyperglycemia, with long-term current use of insulin

## 2024-05-26 LAB
ALBUMIN SERPL ELPH-MCNC: 3.1 G/DL — LOW (ref 3.3–5)
ALP SERPL-CCNC: 87 U/L — SIGNIFICANT CHANGE UP (ref 40–120)
ALT FLD-CCNC: 35 U/L — SIGNIFICANT CHANGE UP (ref 4–41)
ANION GAP SERPL CALC-SCNC: 16 MMOL/L — HIGH (ref 7–14)
AST SERPL-CCNC: 47 U/L — HIGH (ref 4–40)
BASOPHILS # BLD AUTO: 0.06 K/UL — SIGNIFICANT CHANGE UP (ref 0–0.2)
BASOPHILS NFR BLD AUTO: 0.6 % — SIGNIFICANT CHANGE UP (ref 0–2)
BILIRUB SERPL-MCNC: 0.6 MG/DL — SIGNIFICANT CHANGE UP (ref 0.2–1.2)
BUN SERPL-MCNC: 23 MG/DL — SIGNIFICANT CHANGE UP (ref 7–23)
CALCIUM SERPL-MCNC: 9.2 MG/DL — SIGNIFICANT CHANGE UP (ref 8.4–10.5)
CHLORIDE SERPL-SCNC: 97 MMOL/L — LOW (ref 98–107)
CO2 SERPL-SCNC: 20 MMOL/L — LOW (ref 22–31)
CREAT SERPL-MCNC: 1.37 MG/DL — HIGH (ref 0.5–1.3)
EGFR: 51 ML/MIN/1.73M2 — LOW
EOSINOPHIL # BLD AUTO: 0.4 K/UL — SIGNIFICANT CHANGE UP (ref 0–0.5)
EOSINOPHIL NFR BLD AUTO: 3.9 % — SIGNIFICANT CHANGE UP (ref 0–6)
GLUCOSE SERPL-MCNC: 110 MG/DL — HIGH (ref 70–99)
HCT VFR BLD CALC: 37.8 % — LOW (ref 39–50)
HGB BLD-MCNC: 12.9 G/DL — LOW (ref 13–17)
IANC: 5.35 K/UL — SIGNIFICANT CHANGE UP (ref 1.8–7.4)
IMM GRANULOCYTES NFR BLD AUTO: 0.6 % — SIGNIFICANT CHANGE UP (ref 0–0.9)
LYMPHOCYTES # BLD AUTO: 29.2 % — SIGNIFICANT CHANGE UP (ref 13–44)
LYMPHOCYTES # BLD AUTO: 3 K/UL — SIGNIFICANT CHANGE UP (ref 1–3.3)
MAGNESIUM SERPL-MCNC: 2 MG/DL — SIGNIFICANT CHANGE UP (ref 1.6–2.6)
MCHC RBC-ENTMCNC: 29.4 PG — SIGNIFICANT CHANGE UP (ref 27–34)
MCHC RBC-ENTMCNC: 34.1 GM/DL — SIGNIFICANT CHANGE UP (ref 32–36)
MCV RBC AUTO: 86.1 FL — SIGNIFICANT CHANGE UP (ref 80–100)
MONOCYTES # BLD AUTO: 1.41 K/UL — HIGH (ref 0–0.9)
MONOCYTES NFR BLD AUTO: 13.7 % — SIGNIFICANT CHANGE UP (ref 2–14)
NEUTROPHILS # BLD AUTO: 5.35 K/UL — SIGNIFICANT CHANGE UP (ref 1.8–7.4)
NEUTROPHILS NFR BLD AUTO: 52 % — SIGNIFICANT CHANGE UP (ref 43–77)
NRBC # BLD: 0 /100 WBCS — SIGNIFICANT CHANGE UP (ref 0–0)
NRBC # FLD: 0 K/UL — SIGNIFICANT CHANGE UP (ref 0–0)
PHOSPHATE SERPL-MCNC: 4 MG/DL — SIGNIFICANT CHANGE UP (ref 2.5–4.5)
PLATELET # BLD AUTO: 260 K/UL — SIGNIFICANT CHANGE UP (ref 150–400)
POTASSIUM SERPL-MCNC: 4 MMOL/L — SIGNIFICANT CHANGE UP (ref 3.5–5.3)
POTASSIUM SERPL-SCNC: 4 MMOL/L — SIGNIFICANT CHANGE UP (ref 3.5–5.3)
PROT SERPL-MCNC: 7.2 G/DL — SIGNIFICANT CHANGE UP (ref 6–8.3)
RBC # BLD: 4.39 M/UL — SIGNIFICANT CHANGE UP (ref 4.2–5.8)
RBC # FLD: 13.2 % — SIGNIFICANT CHANGE UP (ref 10.3–14.5)
SODIUM SERPL-SCNC: 133 MMOL/L — LOW (ref 135–145)
WBC # BLD: 10.28 K/UL — SIGNIFICANT CHANGE UP (ref 3.8–10.5)
WBC # FLD AUTO: 10.28 K/UL — SIGNIFICANT CHANGE UP (ref 3.8–10.5)

## 2024-05-26 PROCEDURE — 99233 SBSQ HOSP IP/OBS HIGH 50: CPT | Mod: GC

## 2024-05-26 RX ADMIN — Medication 6 MILLIGRAM(S): at 22:18

## 2024-05-26 RX ADMIN — Medication 500 MILLIGRAM(S): at 13:37

## 2024-05-26 RX ADMIN — Medication 3 MILLILITER(S): at 08:37

## 2024-05-26 RX ADMIN — Medication 3 MILLILITER(S): at 02:12

## 2024-05-26 RX ADMIN — Medication 1: at 13:13

## 2024-05-26 RX ADMIN — Medication 6 UNIT(S): at 09:02

## 2024-05-26 RX ADMIN — Medication 600 MILLIGRAM(S): at 17:57

## 2024-05-26 RX ADMIN — Medication 3 MILLILITER(S): at 14:52

## 2024-05-26 RX ADMIN — Medication 6 UNIT(S): at 17:58

## 2024-05-26 RX ADMIN — Medication 600 MILLIGRAM(S): at 05:58

## 2024-05-26 RX ADMIN — INSULIN GLARGINE 20 UNIT(S): 100 INJECTION, SOLUTION SUBCUTANEOUS at 23:55

## 2024-05-26 RX ADMIN — Medication 6 UNIT(S): at 13:13

## 2024-05-26 RX ADMIN — Medication 1: at 17:58

## 2024-05-26 RX ADMIN — HEPARIN SODIUM 5000 UNIT(S): 5000 INJECTION INTRAVENOUS; SUBCUTANEOUS at 17:57

## 2024-05-26 RX ADMIN — Medication 3 MILLILITER(S): at 20:27

## 2024-05-26 RX ADMIN — CEFTRIAXONE 100 MILLIGRAM(S): 500 INJECTION, POWDER, FOR SOLUTION INTRAMUSCULAR; INTRAVENOUS at 13:35

## 2024-05-26 RX ADMIN — Medication 81 MILLIGRAM(S): at 13:37

## 2024-05-26 NOTE — PROGRESS NOTE ADULT - PROBLEM SELECTOR PLAN 7
-Pt is s/p stent ~20 years ago, with hx of distal LAD occlusion on [facility] 2023  -Patient self-discontinued plavix ~1month ago and unclear why. Pt is continuing on aspirin 81 mg.  -Patient needs f/u with his cardiologist Dr. Downing. Hospital Bundle  Fluids: IVF  Electrolytes: Replete K > 4, Mg > 2, Phos > 3  Nutrition: Diet regular  PPX  ---VTE: HSQ  ---GI: diet  ---Resp: IS  Access: PIV  Code Status: FULL  Dispo: pending medical management

## 2024-05-26 NOTE — PROGRESS NOTE ADULT - SUBJECTIVE AND OBJECTIVE BOX
Dave Slater MD  Emergency Medicine & Internal Medicine PGY-2    PROGRESS NOTE:    ID #:     Patient is a 83y old  Male who presents with a chief complaint of cough, fever, weakness (25 May 2024 11:52)      MAJOR INTERVAL HOSPITAL EVENTS:     SUBJECTIVE / OVERNIGHT EVENTS: No acute overnight events.       MEDICATIONS  (STANDING):  albuterol/ipratropium for Nebulization 3 milliLiter(s) Nebulizer every 6 hours  ascorbic acid 500 milliGRAM(s) Oral daily  aspirin enteric coated 81 milliGRAM(s) Oral daily  cefTRIAXone   IVPB 2000 milliGRAM(s) IV Intermittent every 24 hours  dextrose 10% Bolus 125 milliLiter(s) IV Bolus once  dextrose 5%. 1000 milliLiter(s) (100 mL/Hr) IV Continuous <Continuous>  dextrose 5%. 1000 milliLiter(s) (50 mL/Hr) IV Continuous <Continuous>  dextrose 50% Injectable 25 Gram(s) IV Push once  dextrose 50% Injectable 12.5 Gram(s) IV Push once  glucagon  Injectable 1 milliGRAM(s) IntraMuscular once  guaiFENesin  milliGRAM(s) Oral every 12 hours  heparin   Injectable 5000 Unit(s) SubCutaneous every 12 hours  insulin glargine Injectable (LANTUS) 20 Unit(s) SubCutaneous at bedtime  insulin lispro (ADMELOG) corrective regimen sliding scale   SubCutaneous three times a day before meals  insulin lispro (ADMELOG) corrective regimen sliding scale   SubCutaneous at bedtime  insulin lispro Injectable (ADMELOG) 6 Unit(s) SubCutaneous three times a day before meals  melatonin 6 milliGRAM(s) Oral at bedtime    MEDICATIONS  (PRN):  acetaminophen     Tablet .. 650 milliGRAM(s) Oral every 6 hours PRN Temp greater or equal to 38C (100.4F), Mild Pain (1 - 3)  benzonatate 100 milliGRAM(s) Oral every 8 hours PRN Cough  dextrose Oral Gel 15 Gram(s) Oral once PRN Blood Glucose LESS THAN 70 milliGRAM(s)/deciliter      CAPILLARY BLOOD GLUCOSE      POCT Blood Glucose.: 147 mg/dL (25 May 2024 21:32)  POCT Blood Glucose.: 155 mg/dL (25 May 2024 17:39)  POCT Blood Glucose.: 184 mg/dL (25 May 2024 12:19)  POCT Blood Glucose.: 126 mg/dL (25 May 2024 08:27)    I&O's Summary    25 May 2024 07:01  -  26 May 2024 07:00  --------------------------------------------------------  IN: 0 mL / OUT: 900 mL / NET: -900 mL        PHYSICAL EXAM:  Vital Signs Last 24 Hrs  T(C): 37.2 (25 May 2024 19:42), Max: 37.2 (25 May 2024 19:42)  T(F): 98.9 (25 May 2024 19:42), Max: 98.9 (25 May 2024 19:42)  HR: 89 (26 May 2024 02:32) (86 - 93)  BP: 114/67 (25 May 2024 19:42) (114/67 - 134/80)  BP(mean): --  RR: 18 (25 May 2024 19:42) (17 - 18)  SpO2: 96% (26 May 2024 02:32) (96% - 98%)    Parameters below as of 26 May 2024 02:32  Patient On (Oxygen Delivery Method): room air        GENERAL: No acute distress, well-developed  HEAD:  Atraumatic, Normocephalic  EYES: EOMI, PERRLA, conjunctiva and sclera clear  NECK: Supple, no lymphadenopathy, no JVD  CHEST/LUNG: CTAB; No wheezes, rales, or rhonchi  HEART: Regular rate and rhythm; Normal s1 and s2, No murmurs, rubs, or gallops  ABDOMEN: Soft, non-tender, non-distended; normal bowel sounds, no organomegaly  EXTREMITIES:  2+ peripheral pulses b/l, No clubbing, cyanosis, or edema  NEUROLOGY: A&O x 3, no focal deficits  SKIN: No rashes or lesions          LABS:                        12.8   15.04 )-----------( 249      ( 25 May 2024 05:42 )             36.9     05-25    130<L>  |  96<L>  |  21  ----------------------------<  210<H>  4.2   |  20<L>  |  1.40<H>    Ca    9.2      25 May 2024 09:26    TPro  7.5  /  Alb  3.2<L>  /  TBili  1.0  /  DBili  x   /  AST  52<H>  /  ALT  25  /  AlkPhos  79  05-25      CARDIAC MARKERS ( 25 May 2024 05:42 )  x     / x     / 313 U/L / x     / 13.3 ng/mL      Urinalysis Basic - ( 25 May 2024 09:26 )    Color: x / Appearance: x / SG: x / pH: x  Gluc: 210 mg/dL / Ketone: x  / Bili: x / Urobili: x   Blood: x / Protein: x / Nitrite: x   Leuk Esterase: x / RBC: x / WBC x   Sq Epi: x / Non Sq Epi: x / Bacteria: x        Culture - Blood (collected 24 May 2024 06:30)  Source: .Blood Blood-Venous  Preliminary Report (25 May 2024 10:02):    No growth at 24 hours    Culture - Blood (collected 24 May 2024 06:30)  Source: .Blood Blood-Peripheral  Preliminary Report (25 May 2024 10:02):    No growth at 24 hours        RADIOLOGY & ADDITIONAL TESTS:  Results Reviewed:   Imaging Personally Reviewed:  Electrocardiogram Personally Reviewed:    COORDINATION OF CARE:  Care Discussed with Consultants/Other Providers [Y/N]:  Prior or Outpatient Records Reviewed [Y/N]:   Dave Slater MD  Emergency Medicine & Internal Medicine PGY-2    PROGRESS NOTE:    ID #:     Patient is a 83y old  Male who presents with a chief complaint of cough, fever, weakness (25 May 2024 11:52)      SUBJECTIVE / OVERNIGHT EVENTS: No acute overnight events. Patient reports feeling well and eating/drinking well. Denies chest pain, difficulty breathing, nausea, vomiting, acute pain.      MEDICATIONS  (STANDING):  albuterol/ipratropium for Nebulization 3 milliLiter(s) Nebulizer every 6 hours  ascorbic acid 500 milliGRAM(s) Oral daily  aspirin enteric coated 81 milliGRAM(s) Oral daily  cefTRIAXone   IVPB 2000 milliGRAM(s) IV Intermittent every 24 hours  dextrose 10% Bolus 125 milliLiter(s) IV Bolus once  dextrose 5%. 1000 milliLiter(s) (100 mL/Hr) IV Continuous <Continuous>  dextrose 5%. 1000 milliLiter(s) (50 mL/Hr) IV Continuous <Continuous>  dextrose 50% Injectable 25 Gram(s) IV Push once  dextrose 50% Injectable 12.5 Gram(s) IV Push once  glucagon  Injectable 1 milliGRAM(s) IntraMuscular once  guaiFENesin  milliGRAM(s) Oral every 12 hours  heparin   Injectable 5000 Unit(s) SubCutaneous every 12 hours  insulin glargine Injectable (LANTUS) 20 Unit(s) SubCutaneous at bedtime  insulin lispro (ADMELOG) corrective regimen sliding scale   SubCutaneous three times a day before meals  insulin lispro (ADMELOG) corrective regimen sliding scale   SubCutaneous at bedtime  insulin lispro Injectable (ADMELOG) 6 Unit(s) SubCutaneous three times a day before meals  melatonin 6 milliGRAM(s) Oral at bedtime    MEDICATIONS  (PRN):  acetaminophen     Tablet .. 650 milliGRAM(s) Oral every 6 hours PRN Temp greater or equal to 38C (100.4F), Mild Pain (1 - 3)  benzonatate 100 milliGRAM(s) Oral every 8 hours PRN Cough  dextrose Oral Gel 15 Gram(s) Oral once PRN Blood Glucose LESS THAN 70 milliGRAM(s)/deciliter      CAPILLARY BLOOD GLUCOSE      POCT Blood Glucose.: 147 mg/dL (25 May 2024 21:32)  POCT Blood Glucose.: 155 mg/dL (25 May 2024 17:39)  POCT Blood Glucose.: 184 mg/dL (25 May 2024 12:19)  POCT Blood Glucose.: 126 mg/dL (25 May 2024 08:27)    I&O's Summary    25 May 2024 07:01  -  26 May 2024 07:00  --------------------------------------------------------  IN: 0 mL / OUT: 900 mL / NET: -900 mL        PHYSICAL EXAM:  Vital Signs Last 24 Hrs  T(C): 37.2 (25 May 2024 19:42), Max: 37.2 (25 May 2024 19:42)  T(F): 98.9 (25 May 2024 19:42), Max: 98.9 (25 May 2024 19:42)  HR: 89 (26 May 2024 02:32) (86 - 93)  BP: 114/67 (25 May 2024 19:42) (114/67 - 134/80)  BP(mean): --  RR: 18 (25 May 2024 19:42) (17 - 18)  SpO2: 96% (26 May 2024 02:32) (96% - 98%)    Parameters below as of 26 May 2024 02:32  Patient On (Oxygen Delivery Method): room air      GENERAL: NAD, lying in bed comfortably  NECK: no JVD  HEART: S1, S2, Regular rate and rhythm, no murmurs, rubs, or gallops  LUNGS: Unlabored respirations, clear to auscultation bilaterally, no crackles, wheezing, or rhonchi. On 2L NC  ABDOMEN: Soft, nontender, nondistended, +BS  EXTREMITIES: 2+ peripheral pulses bilaterally. No clubbing, cyanosis, or edema  NERVOUS SYSTEM:  A&Ox3, no focal deficits   SKIN: No rashes or lesions            LABS:                        12.8   15.04 )-----------( 249      ( 25 May 2024 05:42 )             36.9     05-25    130<L>  |  96<L>  |  21  ----------------------------<  210<H>  4.2   |  20<L>  |  1.40<H>    Ca    9.2      25 May 2024 09:26    TPro  7.5  /  Alb  3.2<L>  /  TBili  1.0  /  DBili  x   /  AST  52<H>  /  ALT  25  /  AlkPhos  79  05-25      CARDIAC MARKERS ( 25 May 2024 05:42 )  x     / x     / 313 U/L / x     / 13.3 ng/mL      Urinalysis Basic - ( 25 May 2024 09:26 )    Color: x / Appearance: x / SG: x / pH: x  Gluc: 210 mg/dL / Ketone: x  / Bili: x / Urobili: x   Blood: x / Protein: x / Nitrite: x   Leuk Esterase: x / RBC: x / WBC x   Sq Epi: x / Non Sq Epi: x / Bacteria: x        Culture - Blood (collected 24 May 2024 06:30)  Source: .Blood Blood-Venous  Preliminary Report (25 May 2024 10:02):    No growth at 24 hours    Culture - Blood (collected 24 May 2024 06:30)  Source: .Blood Blood-Peripheral  Preliminary Report (25 May 2024 10:02):    No growth at 24 hours        RADIOLOGY & ADDITIONAL TESTS:  Results Reviewed:   Imaging Personally Reviewed:  Electrocardiogram Personally Reviewed:    COORDINATION OF CARE:  Care Discussed with Consultants/Other Providers [Y/N]:  Prior or Outpatient Records Reviewed [Y/N]:

## 2024-05-26 NOTE — PROGRESS NOTE ADULT - PROBLEM SELECTOR PLAN 2
Troponin is up to 326 from 283 from 122. Has consistently denied CP. EKG nonischemic with no ST changes. Not consistent with ACS given lack of CP or EKG findings. Likely due to demand ischemia  -Add on CK and CKMB, repeat troponin with CK in AM. Trend trop, CK, CKMB daily to peak per cards  -cards following: appreciate recs  -EKG showed sinus tach w/o T wave or ST changes  -f/u TTE to assess heart function and rule out possible bacterial endocarditis. Troponin is up to 326 from 283 from 122. Has consistently denied CP. EKG nonischemic with no ST changes. Not consistent with ACS given lack of CP or EKG findings. Likely due to demand ischemia  - cards following: can stop trending cardiac enzymes  - EKG showed sinus tach w/o T wave or ST changes  - TTE showing LVEF 47%, apical hypokinesis, no clear vegetations  - SIMEON pending as below

## 2024-05-26 NOTE — PROGRESS NOTE ADULT - ATTENDING COMMENTS
83M with PMHx RA on enbrel, DM2 on insulin, CAD s/p stent, CKD? a/w sepsis 2/2 PNA c/b strep bacteremia, NILES and elevated troponin likely demand ischemia.    -Appreciate ID recommendations, c/w CTX, TTE without vegetations however poor quality f/u SIMEON for further assessment, NPO at midnight prior to SIMEON, f/u strongyloides, f/u outpatient colonoscopy, f/u CT A/P read  -Appreciate cardiology eval, per cardiology hold off trending troponin further, likely demand ischemia, TTE EF 47% with apical akinesis, no ischemic eval per cardiology, if vitals remain stable would initiate BB, hold off ACEI/ARB in setting of renal dysfunction  -continue to hold enbrel for now in setting of infection    Dispo- pending further optimization. PT rec rehab. 83M with PMHx RA on enbrel, DM2 on insulin, CAD s/p stent, CKD? a/w sepsis 2/2 PNA c/b strep bacteremia, NILES and elevated troponin likely demand ischemia.    -Appreciate ID recommendations, c/w CTX (5/22 Cx sensitive), BCx 5/24 negative x 24 hours f/u, TTE without vegetations however poor quality f/u SIMEON for further assessment, NPO at midnight prior to SIMEON, f/u strongyloides, f/u outpatient colonoscopy  -Appreciate cardiology eval, per cardiology hold off trending troponin further, likely demand ischemia, TTE EF 47% with apical akinesis, no ischemic eval per cardiology, if vitals remain stable would initiate BB, hold off ACEI/ARB in setting of renal dysfunction  -continue to hold enbrel for now in setting of infection    Dispo- pending SIMEON, f/u 5/24 blood cultures to guide antibiotic duration, PT rec rehab.

## 2024-05-26 NOTE — PROGRESS NOTE ADULT - PROBLEM SELECTOR PLAN 1
Sepsis with BCx +strep gallolyticus, c/f PNA. HR >90 initially and WBC 16.   CXR Right lower lobe consolidation, may represent atelectasis and/or   effusion. Underlying pneumonia cannot be excluded. Enlarging right apical lung hazy opacity, with associated right tracheal deviation, which is likely due to volume loss. Diffuse chronic fibrotic lung changes.  Lactate 2.3--2.1  -s/p 1.5 L IVF given  -5/22 BCx +strep gallolyticus  -5/24 BCx pending  - f/u TTE pending as above  - ID consulted  -S/p Zosyn, c/w ceftriaxone 2g q24h  -No hx asthma/COPD, so hold prednisone 40mg.  -Urine strep/legionella, sputum culture sent 5/24  -Cough suppressants PRN, tylenol PRN  -Follow up pulmonology on discharge  -s. gallolyticus risk for colon ca. last colonoscopy >10 yrs ago, was normal per patient Sepsis with BCx +strep gallolyticus, c/f PNA. HR >90 initially and WBC 16.   CXR Right lower lobe consolidation, may represent atelectasis and/or   effusion. Underlying pneumonia cannot be excluded. Enlarging right apical lung hazy opacity, with associated right tracheal deviation, which is likely due to volume loss. Diffuse chronic fibrotic lung changes.  Lactate 2.3--2.1  - s/p 1.5 L IVF given  - 5/22 BCx +strep gallolyticus  - 5/24 BCx NGTD  - TTE showing LVEF 47%, apical hypokinesis, no clear vegetations  - ID consulted: c/w CTX, get SIMEON, CTAP, stronglyoides   - S/p Zosyn, c/w ceftriaxone 2g q24h  - No hx asthma/COPD, so hold prednisone 40mg  - Cough suppressants PRN, tylenol PRN  - Follow up pulmonology on discharge  - s. gallolyticus risk for colon ca. last colonoscopy >10 yrs ago, was normal per patient. Repeat colonoscopy OP

## 2024-05-26 NOTE — PROGRESS NOTE ADULT - PROBLEM SELECTOR PLAN 3
Sepsis with BCx +strep gallolyticus, c/f PNA. HR >90 initially and WBC 16.   CXR Right lower lobe consolidation, may represent atelectasis and/or   effusion. Underlying pneumonia cannot be excluded. Enlarging right apical lung hazy opacity, with associated right tracheal deviation, which is likely due to volume loss. Diffuse chronic fibrotic lung changes.  Lactate 2.3--2.1  -s/p 1.5 L IVF given  -5/22 BCx +strep gallolyticus  -5/24 BCx pending: if positive will c/s ID given c/f endocarditis, f/u TTE pending as above  -S/p Zosyn, start ceftriaxone 2g  -No hx asthma/COPD, so hold prednisone 40mg.  -Urine strep/legionella, sputum culture sent 5/24  -Cough suppressants PRN, tylenol PRN  -Follow up pulmonology on discharge  -s. gallolyticus risk for colon ca. last colonoscopy >10 yrs ago, was normal per patient Glucose trending upwards (214-396). Takes 16U basal and 6U premeal at home. A1c 7.4  - lantus 20U, premeal 6U, MARTY  - monitor BG for goal 140-180 inpatient

## 2024-05-26 NOTE — PROGRESS NOTE ADULT - PROBLEM SELECTOR PLAN 5
Creatinine 1.71, but relatively stable since hospital admission. Likely prerenal in setting of decreased PO intake vs sepsis, downtrending  -Hold ACE-I though patient denies taking  -s/p 1.5L IVF, additional 500cc bolus  -UA neg  -monitor BMP daily On mobic - hold given NILES  On enbrel qweekly - hold in setting of infection.

## 2024-05-26 NOTE — PROGRESS NOTE ADULT - PROBLEM SELECTOR PLAN 6
On mobic - hold given NILES  On enbrel qweekly - hold in setting of infection. - Pt is s/p stent ~20 years ago, with hx of distal LAD occlusion on [facility] 2023  - Patient self-discontinued plavix ~1month ago and unclear why. Pt is continuing on aspirin 81 mg.  - Patient needs f/u with his cardiologist Dr. Downing.

## 2024-05-26 NOTE — PROGRESS NOTE ADULT - ASSESSMENT
A/P  83M with PMHx RA on enbrel, DM2 on insulin, CAD s/p stent, CKD? presenting with fever, cough and weakness. Found to have sepsis 2/2 PNA, NILES.    #Pneumonia  -clinically stable  -continue abx per medicine     #NILES  -in setting of pna, hypovolemia  -ivf    #Elevated troponin, CAD, s/p PCI  -Trop, ckmb elevated in setting of known cad,  of mid LAD, PNA, niles representing demand ischemia  -no ACS, no angina, no acute ischemic ecg abnl   -Echo with mildly decreased lvef 47%, akinesis of apical wall, regional WMA, no sig valvular dz   -continue ASA only, ok to be off of plavix   -cath 2023 with closed mid LAD stent with distal LAD filling with well developed collaterals   -No cp on exam  -cont med tx of cad for now - no need for ischemic eval     #Type 2 diabetes mellitus with hyperglycemia  -tx per med    #H/O rheumatoid arthritis  -tx per med  -On mobic/enbrel qweekly as outpt

## 2024-05-26 NOTE — PROGRESS NOTE ADULT - SUBJECTIVE AND OBJECTIVE BOX
CARDIOLOGY FOLLOW UP - Dr. Downing  DATE OF SERVICE: 5/26/24    CC  No cp, no sob     REVIEW OF SYSTEMS:  CONSTITUTIONAL: No fever, weight loss, or fatigue  RESPIRATORY: No cough, wheezing, chills or hemoptysis; No Shortness of Breath  CARDIOVASCULAR: No chest pain, palpitations, passing out, dizziness, or leg swelling  GASTROINTESTINAL: No abdominal or epigastric pain. No nausea, vomiting, or hematemesis; No diarrhea or constipation. No melena or hematochezia.  VASCULAR: No edema     PHYSICAL EXAM:  T(C): 37.1 (05-26-24 @ 05:55), Max: 37.2 (05-25-24 @ 19:42)  HR: 97 (05-26-24 @ 05:55) (86 - 97)  BP: 110/78 (05-26-24 @ 05:55) (110/78 - 134/80)  RR: 18 (05-26-24 @ 05:55) (17 - 18)  SpO2: 98% (05-26-24 @ 05:55) (96% - 98%)  Wt(kg): --  I&O's Summary    25 May 2024 07:01  -  26 May 2024 07:00  --------------------------------------------------------  IN: 0 mL / OUT: 900 mL / NET: -900 mL        Appearance: Elderly male 	  Cardiovascular: Normal S1 S2,RRR, No JVD, No murmurs  Respiratory: Lungs clear to auscultation b/l   Gastrointestinal:  Soft, Non-tender, + BS	  Extremities: Normal range of motion, No clubbing, cyanosis or edema      Home Medications:  aspirin 81 mg oral tablet: 1 tab(s) orally once a day (23 May 2024 11:08)  Bactrim  mg-160 mg oral tablet: 1 tab(s) orally 2 times a day x 7 days (Filled on 5/20/24) (23 May 2024 11:09)  clopidogrel 75 mg oral tablet: 1 tab(s) orally once a day (23 May 2024 11:08)  Enbrel SureClick 50 mg/mL subcutaneous solution: 50 milligram(s) subcutaneous once a week (23 May 2024 11:07)  NovoLOG FlexPen 100 units/mL injectable solution: 6 unit(s) injectable once a day (in the afternoon) (23 May 2024 11:08)  NovoLOG FlexPen 100 units/mL injectable solution: 16 unit(s) subcutaneous once a day -AM (23 May 2024 11:08)  NovoLOG Mix 70/30 FlexPen subcutaneous suspension: 16 unit(s) subcutaneous once a day (at bedtime) (23 May 2024 11:08)  Vitamin C 500 mg oral tablet: 1 tab(s) orally once a day (23 May 2024 03:00)      MEDICATIONS  (STANDING):  albuterol/ipratropium for Nebulization 3 milliLiter(s) Nebulizer every 6 hours  ascorbic acid 500 milliGRAM(s) Oral daily  aspirin enteric coated 81 milliGRAM(s) Oral daily  cefTRIAXone   IVPB 2000 milliGRAM(s) IV Intermittent every 24 hours  dextrose 10% Bolus 125 milliLiter(s) IV Bolus once  dextrose 5%. 1000 milliLiter(s) (50 mL/Hr) IV Continuous <Continuous>  dextrose 5%. 1000 milliLiter(s) (100 mL/Hr) IV Continuous <Continuous>  dextrose 50% Injectable 25 Gram(s) IV Push once  dextrose 50% Injectable 12.5 Gram(s) IV Push once  glucagon  Injectable 1 milliGRAM(s) IntraMuscular once  guaiFENesin  milliGRAM(s) Oral every 12 hours  heparin   Injectable 5000 Unit(s) SubCutaneous every 12 hours  insulin glargine Injectable (LANTUS) 20 Unit(s) SubCutaneous at bedtime  insulin lispro (ADMELOG) corrective regimen sliding scale   SubCutaneous three times a day before meals  insulin lispro (ADMELOG) corrective regimen sliding scale   SubCutaneous at bedtime  insulin lispro Injectable (ADMELOG) 6 Unit(s) SubCutaneous three times a day before meals  melatonin 6 milliGRAM(s) Oral at bedtime      TELEMETRY: SR	    ECG:  	  RADIOLOGY:   DIAGNOSTIC TESTING:  [x] Echocardiogram:  < from: TTE W or WO Ultrasound Enhancing Agent (05.25.24 @ 14:27) >  CONCLUSIONS:      1. Technicallydifficult image quality.   2. There is akinesis of the apical wall.   3. Normal right ventricular cavity size and probably normal systolic function.   4. The left atrium is normal in size.   5. The right atrium is normal in size.   6. The inferior vena cava is normal in size measuring 1.09 cm in diameter, (normal <2.1cm) with normal inspiratory collapse (normal >50%) consistent with normal right atrial pressure (~3, range 0-5mmHg).   7. Mid and apical anterior septum and apex are abnormal.   8. Left ventricular systolic function is mildly decreased with an ejection fraction of 47 % by Dyson's method of disks. Regional wall motion abnormalities present.   9. There is no evidence of a left ventricular thrombus.  10. No pericardial effusion seen.  11. No prior echocardiogram is available for comparison.    < end of copied text >    [ ]  Catheterization:  [ ] Stress Test:    OTHER: 	    LABS:	 	    Troponin T, High Sensitivity Result: 406 ng/L (05-25 @ 05:42)  CKMB Units: 13.3 ng/mL (05-25 @ 05:42)  Creatine Kinase, Serum: 313 U/L [30 - 200] (05-25 @ 05:42)  Troponin T, High Sensitivity Result: 393 ng/L (05-25 @ 05:42)  Troponin T, High Sensitivity Result: 326 ng/L (05-24 @ 15:12)  Troponin T, High Sensitivity Result: 283 ng/L (05-24 @ 06:30)  Troponin T, High Sensitivity Result: 122 ng/L (05-23 @ 06:00)  Creatine Kinase, Serum: 218 U/L [30 - 200] (05-23 @ 06:00)  CKMB Units: 11.7 ng/mL (05-23 @ 06:00)  Troponin T, High Sensitivity Result: 83 ng/L (05-22 @ 21:40)  Creatine Kinase, Serum: 153 U/L [30 - 200] (05-22 @ 21:40)  CKMB Units: 7.3 ng/mL (05-22 @ 21:40)  Troponin T, High Sensitivity Result: 71 ng/L (05-22 @ 19:15)                          12.8   15.04 )-----------( 249      ( 25 May 2024 05:42 )             36.9     05-25    130<L>  |  96<L>  |  21  ----------------------------<  210<H>  4.2   |  20<L>  |  1.40<H>    Ca    9.2      25 May 2024 09:26    TPro  7.5  /  Alb  3.2<L>  /  TBili  1.0  /  DBili  x   /  AST  52<H>  /  ALT  25  /  AlkPhos  79  05-25

## 2024-05-26 NOTE — PROGRESS NOTE ADULT - PROBLEM SELECTOR PLAN 4
Glucose trending upwards (214-396). Takes 16U basal and 6U premeal at home. A1c 7.4  -lantus 20U, premeal 6U, MARTY  -monitor BG for goal 140-180 inpatient Creatinine 1.71, but relatively stable since hospital admission. Likely prerenal in setting of decreased PO intake vs sepsis, downtrending  - Hold ACE-I though patient denies taking  - s/p 1.5L IVF, additional 500cc bolus  - UA neg  - monitor BMP daily

## 2024-05-27 LAB
ALBUMIN SERPL ELPH-MCNC: 3.1 G/DL — LOW (ref 3.3–5)
ALP SERPL-CCNC: 98 U/L — SIGNIFICANT CHANGE UP (ref 40–120)
ALT FLD-CCNC: 42 U/L — HIGH (ref 4–41)
ANION GAP SERPL CALC-SCNC: 13 MMOL/L — SIGNIFICANT CHANGE UP (ref 7–14)
AST SERPL-CCNC: 39 U/L — SIGNIFICANT CHANGE UP (ref 4–40)
BASOPHILS # BLD AUTO: 0.06 K/UL — SIGNIFICANT CHANGE UP (ref 0–0.2)
BASOPHILS NFR BLD AUTO: 0.6 % — SIGNIFICANT CHANGE UP (ref 0–2)
BILIRUB SERPL-MCNC: 0.6 MG/DL — SIGNIFICANT CHANGE UP (ref 0.2–1.2)
BUN SERPL-MCNC: 26 MG/DL — HIGH (ref 7–23)
CALCIUM SERPL-MCNC: 9.3 MG/DL — SIGNIFICANT CHANGE UP (ref 8.4–10.5)
CHLORIDE SERPL-SCNC: 96 MMOL/L — LOW (ref 98–107)
CO2 SERPL-SCNC: 21 MMOL/L — LOW (ref 22–31)
CREAT SERPL-MCNC: 1.36 MG/DL — HIGH (ref 0.5–1.3)
EGFR: 51 ML/MIN/1.73M2 — LOW
EOSINOPHIL # BLD AUTO: 0.78 K/UL — HIGH (ref 0–0.5)
EOSINOPHIL NFR BLD AUTO: 8.1 % — HIGH (ref 0–6)
GLUCOSE SERPL-MCNC: 263 MG/DL — HIGH (ref 70–99)
HCT VFR BLD CALC: 35.6 % — LOW (ref 39–50)
HGB BLD-MCNC: 12.5 G/DL — LOW (ref 13–17)
IANC: 5.61 K/UL — SIGNIFICANT CHANGE UP (ref 1.8–7.4)
IMM GRANULOCYTES NFR BLD AUTO: 1.2 % — HIGH (ref 0–0.9)
LYMPHOCYTES # BLD AUTO: 1.99 K/UL — SIGNIFICANT CHANGE UP (ref 1–3.3)
LYMPHOCYTES # BLD AUTO: 20.6 % — SIGNIFICANT CHANGE UP (ref 13–44)
MAGNESIUM SERPL-MCNC: 1.9 MG/DL — SIGNIFICANT CHANGE UP (ref 1.6–2.6)
MCHC RBC-ENTMCNC: 30 PG — SIGNIFICANT CHANGE UP (ref 27–34)
MCHC RBC-ENTMCNC: 35.1 GM/DL — SIGNIFICANT CHANGE UP (ref 32–36)
MCV RBC AUTO: 85.6 FL — SIGNIFICANT CHANGE UP (ref 80–100)
MONOCYTES # BLD AUTO: 1.12 K/UL — HIGH (ref 0–0.9)
MONOCYTES NFR BLD AUTO: 11.6 % — SIGNIFICANT CHANGE UP (ref 2–14)
NEUTROPHILS # BLD AUTO: 5.61 K/UL — SIGNIFICANT CHANGE UP (ref 1.8–7.4)
NEUTROPHILS NFR BLD AUTO: 57.9 % — SIGNIFICANT CHANGE UP (ref 43–77)
NRBC # BLD: 0 /100 WBCS — SIGNIFICANT CHANGE UP (ref 0–0)
NRBC # FLD: 0 K/UL — SIGNIFICANT CHANGE UP (ref 0–0)
PHOSPHATE SERPL-MCNC: 4.2 MG/DL — SIGNIFICANT CHANGE UP (ref 2.5–4.5)
PLATELET # BLD AUTO: 294 K/UL — SIGNIFICANT CHANGE UP (ref 150–400)
POTASSIUM SERPL-MCNC: 4.4 MMOL/L — SIGNIFICANT CHANGE UP (ref 3.5–5.3)
POTASSIUM SERPL-SCNC: 4.4 MMOL/L — SIGNIFICANT CHANGE UP (ref 3.5–5.3)
PROT SERPL-MCNC: 7.4 G/DL — SIGNIFICANT CHANGE UP (ref 6–8.3)
RBC # BLD: 4.16 M/UL — LOW (ref 4.2–5.8)
RBC # FLD: 12.6 % — SIGNIFICANT CHANGE UP (ref 10.3–14.5)
SODIUM SERPL-SCNC: 130 MMOL/L — LOW (ref 135–145)
WBC # BLD: 9.68 K/UL — SIGNIFICANT CHANGE UP (ref 3.8–10.5)
WBC # FLD AUTO: 9.68 K/UL — SIGNIFICANT CHANGE UP (ref 3.8–10.5)

## 2024-05-27 PROCEDURE — 99233 SBSQ HOSP IP/OBS HIGH 50: CPT | Mod: GC

## 2024-05-27 RX ORDER — INSULIN LISPRO 100/ML
8 VIAL (ML) SUBCUTANEOUS
Refills: 0 | Status: DISCONTINUED | OUTPATIENT
Start: 2024-05-27 | End: 2024-05-28

## 2024-05-27 RX ORDER — SODIUM CHLORIDE 9 MG/ML
500 INJECTION INTRAMUSCULAR; INTRAVENOUS; SUBCUTANEOUS
Refills: 0 | Status: DISCONTINUED | OUTPATIENT
Start: 2024-05-27 | End: 2024-05-27

## 2024-05-27 RX ORDER — SODIUM CHLORIDE 9 MG/ML
1000 INJECTION INTRAMUSCULAR; INTRAVENOUS; SUBCUTANEOUS
Refills: 0 | Status: DISCONTINUED | OUTPATIENT
Start: 2024-05-27 | End: 2024-05-31

## 2024-05-27 RX ADMIN — Medication 6 UNIT(S): at 13:09

## 2024-05-27 RX ADMIN — Medication 3 MILLILITER(S): at 09:00

## 2024-05-27 RX ADMIN — SODIUM CHLORIDE 100 MILLILITER(S): 9 INJECTION INTRAMUSCULAR; INTRAVENOUS; SUBCUTANEOUS at 13:10

## 2024-05-27 RX ADMIN — Medication 2: at 13:09

## 2024-05-27 RX ADMIN — Medication 6 UNIT(S): at 09:26

## 2024-05-27 RX ADMIN — Medication 3 MILLILITER(S): at 22:53

## 2024-05-27 RX ADMIN — Medication 3 MILLILITER(S): at 03:31

## 2024-05-27 RX ADMIN — Medication 600 MILLIGRAM(S): at 06:11

## 2024-05-27 RX ADMIN — Medication 6 MILLIGRAM(S): at 20:56

## 2024-05-27 RX ADMIN — CEFTRIAXONE 100 MILLIGRAM(S): 500 INJECTION, POWDER, FOR SOLUTION INTRAMUSCULAR; INTRAVENOUS at 13:09

## 2024-05-27 RX ADMIN — Medication 600 MILLIGRAM(S): at 17:56

## 2024-05-27 RX ADMIN — HEPARIN SODIUM 5000 UNIT(S): 5000 INJECTION INTRAVENOUS; SUBCUTANEOUS at 17:54

## 2024-05-27 RX ADMIN — Medication 500 MILLIGRAM(S): at 13:08

## 2024-05-27 RX ADMIN — Medication 1: at 09:26

## 2024-05-27 RX ADMIN — Medication 8 UNIT(S): at 17:53

## 2024-05-27 RX ADMIN — Medication 3 MILLILITER(S): at 14:43

## 2024-05-27 RX ADMIN — Medication 81 MILLIGRAM(S): at 13:09

## 2024-05-27 RX ADMIN — HEPARIN SODIUM 5000 UNIT(S): 5000 INJECTION INTRAVENOUS; SUBCUTANEOUS at 06:11

## 2024-05-27 RX ADMIN — INSULIN GLARGINE 20 UNIT(S): 100 INJECTION, SOLUTION SUBCUTANEOUS at 21:44

## 2024-05-27 RX ADMIN — Medication 2: at 17:53

## 2024-05-27 NOTE — PROGRESS NOTE ADULT - PROBLEM SELECTOR PLAN 7
Hospital Bundle  Fluids: IVF  Electrolytes: Replete K > 4, Mg > 2, Phos > 3  Nutrition: Diet regular  PPX  ---VTE: HSQ  ---GI: diet  ---Resp: IS  Access: PIV  Code Status: FULL  Dispo: pending medical management

## 2024-05-27 NOTE — PROGRESS NOTE ADULT - ASSESSMENT
83M with PMHx RA on enbrel, DM2 on insulin, CAD s/p stent, presenting with fever, cough and weakness. Found to be septic with strep galloyticus bacteremia, elevated troponins. Cards c/s, c/f demand ischemia.  83M with PMHx RA on enbrel, DM2 on insulin, CAD s/p stent, presenting with fever, cough and weakness. Found to be septic with strep galloyticus bacteremia, elevated troponins. Cards c/s, c/f demand ischemia. Pending further infectious management

## 2024-05-27 NOTE — PROGRESS NOTE ADULT - ATTENDING COMMENTS
83M with PMHx RA on enbrel, DM2 on insulin, CAD s/p stent, CKD? a/w sepsis 2/2 PNA c/b strep bacteremia, NILES and elevated troponin likely demand ischemia.    -Appreciate ID recommendations, c/w CTX (5/22 Cx sensitive), BCx 5/24 negative, TTE without vegetations however poor quality f/u SIMEON for further assessment, NPO at midnight prior to SIMEON, f/u strongyloides, f/u outpatient colonoscopy  -Appreciate cardiology eval, per cardiology hold off trending troponin further, likely demand ischemia, TTE EF 47% with apical akinesis, no ischemic eval per cardiology, if vitals remain stable would initiate BB, hold off ACEI/ARB in setting of renal dysfunction  -continue to hold enbrel for now in setting of infection    Dispo- pending SIMEON, PT rec rehab. 83M with PMHx RA on enbrel, DM2 on insulin, CAD s/p stent, CKD? a/w sepsis 2/2 PNA c/b strep bacteremia, NILES and elevated troponin likely demand ischemia.    -Appreciate ID recommendations, c/w CTX (5/22 Cx sensitive), BCx 5/24 negative, TTE without vegetations however poor quality f/u SIMEON for further assessment, NPO at midnight prior to SIMEON, f/u strongyloides, f/u outpatient colonoscopy  -Appreciate cardiology eval, per cardiology hold off trending troponin further, likely demand ischemia, TTE EF 47% with apical akinesis, no ischemic eval per cardiology, if vitals remain stable would initiate BB, hold off ACEI/ARB in setting of renal dysfunction  -continue to hold enbrel for now in setting of infection  - AM ->263->226, hyperglycemic in setting of DM, Continue lantus 20u qHS, uptitrate lispro from 6 to 8u TID qAC, Maintain MARTY.    Dispo- pending [facility]ON, PT rec rehab.

## 2024-05-27 NOTE — PROGRESS NOTE ADULT - SUBJECTIVE AND OBJECTIVE BOX
Dave Slater MD  Emergency Medicine & Internal Medicine PGY-2    PROGRESS NOTE:    ID #:     Patient is a 83y old  Male who presents with a chief complaint of cough, fever, weakness (27 May 2024 07:41)      MAJOR INTERVAL HOSPITAL EVENTS:     SUBJECTIVE / OVERNIGHT EVENTS: No acute overnight events.       MEDICATIONS  (STANDING):  albuterol/ipratropium for Nebulization 3 milliLiter(s) Nebulizer every 6 hours  ascorbic acid 500 milliGRAM(s) Oral daily  aspirin enteric coated 81 milliGRAM(s) Oral daily  cefTRIAXone   IVPB 2000 milliGRAM(s) IV Intermittent every 24 hours  dextrose 10% Bolus 125 milliLiter(s) IV Bolus once  dextrose 5%. 1000 milliLiter(s) (100 mL/Hr) IV Continuous <Continuous>  dextrose 5%. 1000 milliLiter(s) (50 mL/Hr) IV Continuous <Continuous>  dextrose 50% Injectable 25 Gram(s) IV Push once  dextrose 50% Injectable 12.5 Gram(s) IV Push once  glucagon  Injectable 1 milliGRAM(s) IntraMuscular once  guaiFENesin  milliGRAM(s) Oral every 12 hours  heparin   Injectable 5000 Unit(s) SubCutaneous every 12 hours  insulin glargine Injectable (LANTUS) 20 Unit(s) SubCutaneous at bedtime  insulin lispro (ADMELOG) corrective regimen sliding scale   SubCutaneous three times a day before meals  insulin lispro (ADMELOG) corrective regimen sliding scale   SubCutaneous at bedtime  insulin lispro Injectable (ADMELOG) 6 Unit(s) SubCutaneous three times a day before meals  melatonin 6 milliGRAM(s) Oral at bedtime    MEDICATIONS  (PRN):  acetaminophen     Tablet .. 650 milliGRAM(s) Oral every 6 hours PRN Temp greater or equal to 38C (100.4F), Mild Pain (1 - 3)  benzonatate 100 milliGRAM(s) Oral every 8 hours PRN Cough  dextrose Oral Gel 15 Gram(s) Oral once PRN Blood Glucose LESS THAN 70 milliGRAM(s)/deciliter      CAPILLARY BLOOD GLUCOSE      POCT Blood Glucose.: 158 mg/dL (26 May 2024 23:54)  POCT Blood Glucose.: 165 mg/dL (26 May 2024 22:25)  POCT Blood Glucose.: 179 mg/dL (26 May 2024 17:50)  POCT Blood Glucose.: 165 mg/dL (26 May 2024 12:25)  POCT Blood Glucose.: 110 mg/dL (26 May 2024 08:15)    I&O's Summary    26 May 2024 07:01  -  27 May 2024 07:00  --------------------------------------------------------  IN: 0 mL / OUT: 750 mL / NET: -750 mL        PHYSICAL EXAM:  Vital Signs Last 24 Hrs  T(C): 37 (26 May 2024 19:45), Max: 37 (26 May 2024 19:45)  T(F): 98.6 (26 May 2024 19:45), Max: 98.6 (26 May 2024 19:45)  HR: 91 (26 May 2024 21:38) (76 - 91)  BP: 128/65 (26 May 2024 19:45) (121/60 - 128/65)  BP(mean): --  RR: 18 (26 May 2024 19:45) (17 - 18)  SpO2: 95% (26 May 2024 21:38) (95% - 97%)    Parameters below as of 26 May 2024 21:38  Patient On (Oxygen Delivery Method): room air        GENERAL: No acute distress, well-developed  HEAD:  Atraumatic, Normocephalic  EYES: EOMI, PERRLA, conjunctiva and sclera clear  NECK: Supple, no lymphadenopathy, no JVD  CHEST/LUNG: CTAB; No wheezes, rales, or rhonchi  HEART: Regular rate and rhythm; Normal s1 and s2, No murmurs, rubs, or gallops  ABDOMEN: Soft, non-tender, non-distended; normal bowel sounds, no organomegaly  EXTREMITIES:  2+ peripheral pulses b/l, No clubbing, cyanosis, or edema  NEUROLOGY: A&O x 3, no focal deficits  SKIN: No rashes or lesions          LABS:                        12.9   10.28 )-----------( 260      ( 26 May 2024 06:28 )             37.8     05-26    133<L>  |  97<L>  |  23  ----------------------------<  110<H>  4.0   |  20<L>  |  1.37<H>    Ca    9.2      26 May 2024 06:28  Phos  4.0     05-26  Mg     2.00     05-26    TPro  7.2  /  Alb  3.1<L>  /  TBili  0.6  /  DBili  x   /  AST  47<H>  /  ALT  35  /  AlkPhos  87  05-26          Urinalysis Basic - ( 26 May 2024 06:28 )    Color: x / Appearance: x / SG: x / pH: x  Gluc: 110 mg/dL / Ketone: x  / Bili: x / Urobili: x   Blood: x / Protein: x / Nitrite: x   Leuk Esterase: x / RBC: x / WBC x   Sq Epi: x / Non Sq Epi: x / Bacteria: x          RADIOLOGY & ADDITIONAL TESTS:  Results Reviewed:   Imaging Personally Reviewed:  Electrocardiogram Personally Reviewed:    COORDINATION OF CARE:  Care Discussed with Consultants/Other Providers [Y/N]:  Prior or Outpatient Records Reviewed [Y/N]:   Dave Slater MD  Emergency Medicine & Internal Medicine PGY-2    PROGRESS NOTE:    ID #:     Patient is a 83y old  Male who presents with a chief complaint of cough, fever, weakness (27 May 2024 07:41)    SUBJECTIVE / OVERNIGHT EVENTS: No acute overnight events. Patient reports feeling and sleeping well. Denies acute pain, difficulty eating, fevers, chills, cp, sob, nausea, vomiting, abd pain. Denies other acute complaints       MEDICATIONS  (STANDING):  albuterol/ipratropium for Nebulization 3 milliLiter(s) Nebulizer every 6 hours  ascorbic acid 500 milliGRAM(s) Oral daily  aspirin enteric coated 81 milliGRAM(s) Oral daily  cefTRIAXone   IVPB 2000 milliGRAM(s) IV Intermittent every 24 hours  dextrose 10% Bolus 125 milliLiter(s) IV Bolus once  dextrose 5%. 1000 milliLiter(s) (100 mL/Hr) IV Continuous <Continuous>  dextrose 5%. 1000 milliLiter(s) (50 mL/Hr) IV Continuous <Continuous>  dextrose 50% Injectable 25 Gram(s) IV Push once  dextrose 50% Injectable 12.5 Gram(s) IV Push once  glucagon  Injectable 1 milliGRAM(s) IntraMuscular once  guaiFENesin  milliGRAM(s) Oral every 12 hours  heparin   Injectable 5000 Unit(s) SubCutaneous every 12 hours  insulin glargine Injectable (LANTUS) 20 Unit(s) SubCutaneous at bedtime  insulin lispro (ADMELOG) corrective regimen sliding scale   SubCutaneous three times a day before meals  insulin lispro (ADMELOG) corrective regimen sliding scale   SubCutaneous at bedtime  insulin lispro Injectable (ADMELOG) 6 Unit(s) SubCutaneous three times a day before meals  melatonin 6 milliGRAM(s) Oral at bedtime    MEDICATIONS  (PRN):  acetaminophen     Tablet .. 650 milliGRAM(s) Oral every 6 hours PRN Temp greater or equal to 38C (100.4F), Mild Pain (1 - 3)  benzonatate 100 milliGRAM(s) Oral every 8 hours PRN Cough  dextrose Oral Gel 15 Gram(s) Oral once PRN Blood Glucose LESS THAN 70 milliGRAM(s)/deciliter      CAPILLARY BLOOD GLUCOSE      POCT Blood Glucose.: 158 mg/dL (26 May 2024 23:54)  POCT Blood Glucose.: 165 mg/dL (26 May 2024 22:25)  POCT Blood Glucose.: 179 mg/dL (26 May 2024 17:50)  POCT Blood Glucose.: 165 mg/dL (26 May 2024 12:25)  POCT Blood Glucose.: 110 mg/dL (26 May 2024 08:15)    I&O's Summary    26 May 2024 07:01  -  27 May 2024 07:00  --------------------------------------------------------  IN: 0 mL / OUT: 750 mL / NET: -750 mL        PHYSICAL EXAM:  Vital Signs Last 24 Hrs  T(C): 37 (26 May 2024 19:45), Max: 37 (26 May 2024 19:45)  T(F): 98.6 (26 May 2024 19:45), Max: 98.6 (26 May 2024 19:45)  HR: 91 (26 May 2024 21:38) (76 - 91)  BP: 128/65 (26 May 2024 19:45) (121/60 - 128/65)  BP(mean): --  RR: 18 (26 May 2024 19:45) (17 - 18)  SpO2: 95% (26 May 2024 21:38) (95% - 97%)    Parameters below as of 26 May 2024 21:38  Patient On (Oxygen Delivery Method): room air        GENERAL: NAD, lying in bed comfortably  NECK: no JVD  HEART: S1, S2, Regular rate and rhythm, no murmurs, rubs, or gallops  LUNGS: Unlabored respirations, clear to auscultation bilaterally, no crackles, wheezing, or rhonchi. On 2L NC  ABDOMEN: Soft, nontender, nondistended, +BS  EXTREMITIES: 2+ peripheral pulses bilaterally. No clubbing, cyanosis, or edema  NERVOUS SYSTEM:  A&Ox3, no focal deficits   SKIN: No rashes or lesions          LABS:                        12.9   10.28 )-----------( 260      ( 26 May 2024 06:28 )             37.8     05-26    133<L>  |  97<L>  |  23  ----------------------------<  110<H>  4.0   |  20<L>  |  1.37<H>    Ca    9.2      26 May 2024 06:28  Phos  4.0     05-26  Mg     2.00     05-26    TPro  7.2  /  Alb  3.1<L>  /  TBili  0.6  /  DBili  x   /  AST  47<H>  /  ALT  35  /  AlkPhos  87  05-26          Urinalysis Basic - ( 26 May 2024 06:28 )    Color: x / Appearance: x / SG: x / pH: x  Gluc: 110 mg/dL / Ketone: x  / Bili: x / Urobili: x   Blood: x / Protein: x / Nitrite: x   Leuk Esterase: x / RBC: x / WBC x   Sq Epi: x / Non Sq Epi: x / Bacteria: x          RADIOLOGY & ADDITIONAL TESTS:  Results Reviewed:   Imaging Personally Reviewed:  Electrocardiogram Personally Reviewed:    COORDINATION OF CARE:  Care Discussed with Consultants/Other Providers [Y/N]:  Prior or Outpatient Records Reviewed [Y/N]:

## 2024-05-27 NOTE — PROGRESS NOTE ADULT - PROBLEM SELECTOR PLAN 2
Troponin is up to 326 from 283 from 122. Has consistently denied CP. EKG nonischemic with no ST changes. Not consistent with ACS given lack of CP or EKG findings. Likely due to demand ischemia  - cards following: can stop trending cardiac enzymes  - EKG showed sinus tach w/o T wave or ST changes  - TTE showing LVEF 47%, apical hypokinesis, no clear vegetations  - SIMEON pending as below

## 2024-05-27 NOTE — PROGRESS NOTE ADULT - PROBLEM SELECTOR PLAN 4
Creatinine 1.71, but relatively stable since hospital admission. Likely prerenal in setting of decreased PO intake vs sepsis, downtrending  - Hold ACE-I though patient denies taking  - s/p 1.5L IVF, additional 500cc bolus  - UA neg  - monitor BMP daily Creatinine 1.71, but relatively stable since hospital admission. Likely prerenal in setting of decreased PO intake vs sepsis, downtrending. Cr trending down  - Hold ACE-I though patient denies taking  - s/p 1.5L IVF, additional 500cc bolus  - UA neg  - monitor BMP daily

## 2024-05-27 NOTE — PROGRESS NOTE ADULT - PROBLEM SELECTOR PLAN 6
- Pt is s/p stent ~20 years ago, with hx of distal LAD occlusion on [facility] 2023  - Patient self-discontinued plavix ~1month ago and unclear why. Pt is continuing on aspirin 81 mg.  - Patient needs f/u with his cardiologist Dr. Downing.

## 2024-05-27 NOTE — PROGRESS NOTE ADULT - PROBLEM SELECTOR PLAN 1
Sepsis with BCx +strep gallolyticus, c/f PNA. HR >90 initially and WBC 16.   CXR Right lower lobe consolidation, may represent atelectasis and/or   effusion. Underlying pneumonia cannot be excluded. Enlarging right apical lung hazy opacity, with associated right tracheal deviation, which is likely due to volume loss. Diffuse chronic fibrotic lung changes.  Lactate 2.3--2.1  - s/p 1.5 L IVF given  - 5/22 BCx +strep gallolyticus  - 5/24 BCx NGTD  - TTE showing LVEF 47%, apical hypokinesis, no clear vegetations  - ID consulted: c/w CTX, get SIMEON, CTAP, stronglyoides   - S/p Zosyn, c/w ceftriaxone 2g q24h  - No hx asthma/COPD, so hold prednisone 40mg  - Cough suppressants PRN, tylenol PRN  - Follow up pulmonology on discharge  - s. gallolyticus risk for colon ca. last colonoscopy >10 yrs ago, was normal per patient. Repeat colonoscopy OP Sepsis with BCx +strep gallolyticus, c/f PNA. HR >90 initially and WBC 16.   CXR Right lower lobe consolidation, may represent atelectasis and/or   effusion. Underlying pneumonia cannot be excluded. Enlarging right apical lung hazy opacity, with associated right tracheal deviation, which is likely due to volume loss. Diffuse chronic fibrotic lung changes.  Lactate 2.3--2.1  - s/p 1.5 L IVF given  - 5/22 BCx +strep gallolyticus  - 5/24 BCx NGTD  - TTE showing LVEF 47%, apical hypokinesis, no clear vegetations  - ID consulted: c/w CTX, get SIMEON, stronglyoides   - S/p Zosyn, c/w ceftriaxone 2g q24h  - No hx asthma/COPD, so hold prednisone 40mg  - Cough suppressants PRN, tylenol PRN  - Follow up pulmonology on discharge  - s. gallolyticus risk for colon ca. last colonoscopy >10 yrs ago, was normal per patient. Repeat colonoscopy OP

## 2024-05-27 NOTE — PROGRESS NOTE ADULT - PROBLEM SELECTOR PLAN 3
Glucose trending upwards (214-396). Takes 16U basal and 6U premeal at home. A1c 7.4  - lantus 20U, premeal 6U, MARTY  - monitor BG for goal 140-180 inpatient

## 2024-05-27 NOTE — PROGRESS NOTE ADULT - SUBJECTIVE AND OBJECTIVE BOX
CARDIOLOGY FOLLOW UP - Dr. Downing  DATE OF SERVICE: 5/27/24    CC  No cp, no sob     REVIEW OF SYSTEMS:  CONSTITUTIONAL: No fever, weight loss, or fatigue  RESPIRATORY: No cough, wheezing, chills or hemoptysis; No Shortness of Breath  CARDIOVASCULAR: No chest pain, palpitations, passing out, dizziness, or leg swelling  GASTROINTESTINAL: No abdominal or epigastric pain. No nausea, vomiting, or hematemesis; No diarrhea or constipation. No melena or hematochezia.  VASCULAR: No edema     PHYSICAL EXAM:  T(C): 37 (05-26-24 @ 19:45), Max: 37 (05-26-24 @ 19:45)  HR: 91 (05-26-24 @ 21:38) (76 - 91)  BP: 128/65 (05-26-24 @ 19:45) (121/60 - 128/65)  RR: 18 (05-26-24 @ 19:45) (17 - 18)  SpO2: 95% (05-26-24 @ 21:38) (95% - 97%)  Wt(kg): --  I&O's Summary    26 May 2024 07:01  -  27 May 2024 07:00  --------------------------------------------------------  IN: 0 mL / OUT: 750 mL / NET: -750 mL        Appearance: Elderly male 	  Cardiovascular: Normal S1 S2,RRR, No JVD, No murmurs  Respiratory: Lungs clear to auscultation b/l  Gastrointestinal:  Soft, Non-tender, + BS	  Extremities: Normal range of motion, No clubbing, cyanosis or edema      Home Medications:  aspirin 81 mg oral tablet: 1 tab(s) orally once a day (23 May 2024 11:08)  Bactrim  mg-160 mg oral tablet: 1 tab(s) orally 2 times a day x 7 days (Filled on 5/20/24) (23 May 2024 11:09)  clopidogrel 75 mg oral tablet: 1 tab(s) orally once a day (23 May 2024 11:08)  Enbrel SureClick 50 mg/mL subcutaneous solution: 50 milligram(s) subcutaneous once a week (23 May 2024 11:07)  NovoLOG FlexPen 100 units/mL injectable solution: 6 unit(s) injectable once a day (in the afternoon) (23 May 2024 11:08)  NovoLOG FlexPen 100 units/mL injectable solution: 16 unit(s) subcutaneous once a day -AM (23 May 2024 11:08)  NovoLOG Mix 70/30 FlexPen subcutaneous suspension: 16 unit(s) subcutaneous once a day (at bedtime) (23 May 2024 11:08)  Vitamin C 500 mg oral tablet: 1 tab(s) orally once a day (23 May 2024 03:00)      MEDICATIONS  (STANDING):  albuterol/ipratropium for Nebulization 3 milliLiter(s) Nebulizer every 6 hours  ascorbic acid 500 milliGRAM(s) Oral daily  aspirin enteric coated 81 milliGRAM(s) Oral daily  cefTRIAXone   IVPB 2000 milliGRAM(s) IV Intermittent every 24 hours  dextrose 10% Bolus 125 milliLiter(s) IV Bolus once  dextrose 5%. 1000 milliLiter(s) (100 mL/Hr) IV Continuous <Continuous>  dextrose 5%. 1000 milliLiter(s) (50 mL/Hr) IV Continuous <Continuous>  dextrose 50% Injectable 25 Gram(s) IV Push once  dextrose 50% Injectable 12.5 Gram(s) IV Push once  glucagon  Injectable 1 milliGRAM(s) IntraMuscular once  guaiFENesin  milliGRAM(s) Oral every 12 hours  heparin   Injectable 5000 Unit(s) SubCutaneous every 12 hours  insulin glargine Injectable (LANTUS) 20 Unit(s) SubCutaneous at bedtime  insulin lispro (ADMELOG) corrective regimen sliding scale   SubCutaneous three times a day before meals  insulin lispro (ADMELOG) corrective regimen sliding scale   SubCutaneous at bedtime  insulin lispro Injectable (ADMELOG) 6 Unit(s) SubCutaneous three times a day before meals  melatonin 6 milliGRAM(s) Oral at bedtime      TELEMETRY: SR    ECG:  	  RADIOLOGY:   DIAGNOSTIC TESTING:  [ ] Echocardiogram:  [ ]  Catheterization:  [ ] Stress Test:    OTHER: 	    LABS:	 	    Troponin T, High Sensitivity Result: 406 ng/L (05-25 @ 05:42)  CKMB Units: 13.3 ng/mL (05-25 @ 05:42)  Creatine Kinase, Serum: 313 U/L [30 - 200] (05-25 @ 05:42)  Troponin T, High Sensitivity Result: 393 ng/L (05-25 @ 05:42)  Troponin T, High Sensitivity Result: 326 ng/L (05-24 @ 15:12)  Troponin T, High Sensitivity Result: 283 ng/L (05-24 @ 06:30)  Troponin T, High Sensitivity Result: 122 ng/L (05-23 @ 06:00)  Creatine Kinase, Serum: 218 U/L [30 - 200] (05-23 @ 06:00)  CKMB Units: 11.7 ng/mL (05-23 @ 06:00)  Troponin T, High Sensitivity Result: 83 ng/L (05-22 @ 21:40)  Creatine Kinase, Serum: 153 U/L [30 - 200] (05-22 @ 21:40)  CKMB Units: 7.3 ng/mL (05-22 @ 21:40)  Troponin T, High Sensitivity Result: 71 ng/L (05-22 @ 19:15)                          12.9   10.28 )-----------( 260      ( 26 May 2024 06:28 )             37.8     05-26    133<L>  |  97<L>  |  23  ----------------------------<  110<H>  4.0   |  20<L>  |  1.37<H>    Ca    9.2      26 May 2024 06:28  Phos  4.0     05-26  Mg     2.00     05-26    TPro  7.2  /  Alb  3.1<L>  /  TBili  0.6  /  DBili  x   /  AST  47<H>  /  ALT  35  /  AlkPhos  87  05-26

## 2024-05-28 DIAGNOSIS — E87.1 HYPO-OSMOLALITY AND HYPONATREMIA: ICD-10-CM

## 2024-05-28 LAB
ALBUMIN SERPL ELPH-MCNC: 3.1 G/DL — LOW (ref 3.3–5)
ALP SERPL-CCNC: 109 U/L — SIGNIFICANT CHANGE UP (ref 40–120)
ALT FLD-CCNC: 48 U/L — HIGH (ref 4–41)
ANION GAP SERPL CALC-SCNC: 13 MMOL/L — SIGNIFICANT CHANGE UP (ref 7–14)
AST SERPL-CCNC: 41 U/L — HIGH (ref 4–40)
BASOPHILS # BLD AUTO: 0.08 K/UL — SIGNIFICANT CHANGE UP (ref 0–0.2)
BASOPHILS NFR BLD AUTO: 0.8 % — SIGNIFICANT CHANGE UP (ref 0–2)
BILIRUB SERPL-MCNC: 0.6 MG/DL — SIGNIFICANT CHANGE UP (ref 0.2–1.2)
BUN SERPL-MCNC: 28 MG/DL — HIGH (ref 7–23)
CALCIUM SERPL-MCNC: 9.6 MG/DL — SIGNIFICANT CHANGE UP (ref 8.4–10.5)
CHLORIDE SERPL-SCNC: 97 MMOL/L — LOW (ref 98–107)
CO2 SERPL-SCNC: 20 MMOL/L — LOW (ref 22–31)
CREAT ?TM UR-MCNC: 46 MG/DL — SIGNIFICANT CHANGE UP
CREAT SERPL-MCNC: 1.25 MG/DL — SIGNIFICANT CHANGE UP (ref 0.5–1.3)
EGFR: 56 ML/MIN/1.73M2 — LOW
EOSINOPHIL # BLD AUTO: 0.95 K/UL — HIGH (ref 0–0.5)
EOSINOPHIL NFR BLD AUTO: 9.1 % — HIGH (ref 0–6)
FLUAV AG NPH QL: SIGNIFICANT CHANGE UP
FLUBV AG NPH QL: SIGNIFICANT CHANGE UP
GLUCOSE SERPL-MCNC: 236 MG/DL — HIGH (ref 70–99)
HCT VFR BLD CALC: 37.7 % — LOW (ref 39–50)
HGB BLD-MCNC: 12.5 G/DL — LOW (ref 13–17)
IANC: 5.5 K/UL — SIGNIFICANT CHANGE UP (ref 1.8–7.4)
IMM GRANULOCYTES NFR BLD AUTO: 1.6 % — HIGH (ref 0–0.9)
LYMPHOCYTES # BLD AUTO: 2.41 K/UL — SIGNIFICANT CHANGE UP (ref 1–3.3)
LYMPHOCYTES # BLD AUTO: 23.2 % — SIGNIFICANT CHANGE UP (ref 13–44)
MAGNESIUM SERPL-MCNC: 2.1 MG/DL — SIGNIFICANT CHANGE UP (ref 1.6–2.6)
MCHC RBC-ENTMCNC: 29.1 PG — SIGNIFICANT CHANGE UP (ref 27–34)
MCHC RBC-ENTMCNC: 33.2 GM/DL — SIGNIFICANT CHANGE UP (ref 32–36)
MCV RBC AUTO: 87.9 FL — SIGNIFICANT CHANGE UP (ref 80–100)
MONOCYTES # BLD AUTO: 1.29 K/UL — HIGH (ref 0–0.9)
MONOCYTES NFR BLD AUTO: 12.4 % — SIGNIFICANT CHANGE UP (ref 2–14)
NEUTROPHILS # BLD AUTO: 5.5 K/UL — SIGNIFICANT CHANGE UP (ref 1.8–7.4)
NEUTROPHILS NFR BLD AUTO: 52.9 % — SIGNIFICANT CHANGE UP (ref 43–77)
NRBC # BLD: 0 /100 WBCS — SIGNIFICANT CHANGE UP (ref 0–0)
NRBC # FLD: 0 K/UL — SIGNIFICANT CHANGE UP (ref 0–0)
OSMOLALITY SERPL: 292 MOSM/KG — SIGNIFICANT CHANGE UP (ref 275–295)
OSMOLALITY UR: 422 MOSM/KG — SIGNIFICANT CHANGE UP (ref 50–1200)
PHOSPHATE SERPL-MCNC: 3.4 MG/DL — SIGNIFICANT CHANGE UP (ref 2.5–4.5)
PLATELET # BLD AUTO: 319 K/UL — SIGNIFICANT CHANGE UP (ref 150–400)
POTASSIUM SERPL-MCNC: 4.7 MMOL/L — SIGNIFICANT CHANGE UP (ref 3.5–5.3)
POTASSIUM SERPL-SCNC: 4.7 MMOL/L — SIGNIFICANT CHANGE UP (ref 3.5–5.3)
PROT SERPL-MCNC: 7.5 G/DL — SIGNIFICANT CHANGE UP (ref 6–8.3)
RBC # BLD: 4.29 M/UL — SIGNIFICANT CHANGE UP (ref 4.2–5.8)
RBC # FLD: 12.8 % — SIGNIFICANT CHANGE UP (ref 10.3–14.5)
RSV RNA NPH QL NAA+NON-PROBE: SIGNIFICANT CHANGE UP
SARS-COV-2 RNA SPEC QL NAA+PROBE: SIGNIFICANT CHANGE UP
SODIUM SERPL-SCNC: 130 MMOL/L — LOW (ref 135–145)
SODIUM UR-SCNC: 90 MMOL/L — SIGNIFICANT CHANGE UP
WBC # BLD: 10.4 K/UL — SIGNIFICANT CHANGE UP (ref 3.8–10.5)
WBC # FLD AUTO: 10.4 K/UL — SIGNIFICANT CHANGE UP (ref 3.8–10.5)

## 2024-05-28 PROCEDURE — 99232 SBSQ HOSP IP/OBS MODERATE 35: CPT | Mod: GC

## 2024-05-28 PROCEDURE — 99232 SBSQ HOSP IP/OBS MODERATE 35: CPT

## 2024-05-28 RX ORDER — INSULIN LISPRO 100/ML
9 VIAL (ML) SUBCUTANEOUS
Refills: 0 | Status: DISCONTINUED | OUTPATIENT
Start: 2024-05-28 | End: 2024-05-29

## 2024-05-28 RX ORDER — INSULIN LISPRO 100/ML
VIAL (ML) SUBCUTANEOUS EVERY 6 HOURS
Refills: 0 | Status: DISCONTINUED | OUTPATIENT
Start: 2024-05-28 | End: 2024-05-29

## 2024-05-28 RX ORDER — SODIUM CHLORIDE 9 MG/ML
1 INJECTION INTRAMUSCULAR; INTRAVENOUS; SUBCUTANEOUS DAILY
Refills: 0 | Status: DISCONTINUED | OUTPATIENT
Start: 2024-05-28 | End: 2024-05-28

## 2024-05-28 RX ORDER — INSULIN GLARGINE 100 [IU]/ML
23 INJECTION, SOLUTION SUBCUTANEOUS AT BEDTIME
Refills: 0 | Status: DISCONTINUED | OUTPATIENT
Start: 2024-05-28 | End: 2024-05-29

## 2024-05-28 RX ADMIN — Medication 3 MILLILITER(S): at 21:30

## 2024-05-28 RX ADMIN — Medication 8 UNIT(S): at 08:59

## 2024-05-28 RX ADMIN — Medication 500 MILLIGRAM(S): at 08:59

## 2024-05-28 RX ADMIN — Medication 30 MILLILITER(S): at 21:01

## 2024-05-28 RX ADMIN — Medication 3 MILLILITER(S): at 16:29

## 2024-05-28 RX ADMIN — CEFTRIAXONE 100 MILLIGRAM(S): 500 INJECTION, POWDER, FOR SOLUTION INTRAMUSCULAR; INTRAVENOUS at 12:36

## 2024-05-28 RX ADMIN — Medication 600 MILLIGRAM(S): at 20:52

## 2024-05-28 RX ADMIN — Medication 3 MILLILITER(S): at 08:05

## 2024-05-28 RX ADMIN — Medication 9 UNIT(S): at 16:53

## 2024-05-28 RX ADMIN — Medication 600 MILLIGRAM(S): at 05:30

## 2024-05-28 RX ADMIN — Medication 2: at 12:37

## 2024-05-28 RX ADMIN — INSULIN GLARGINE 23 UNIT(S): 100 INJECTION, SOLUTION SUBCUTANEOUS at 22:44

## 2024-05-28 RX ADMIN — Medication 2: at 08:58

## 2024-05-28 RX ADMIN — HEPARIN SODIUM 5000 UNIT(S): 5000 INJECTION INTRAVENOUS; SUBCUTANEOUS at 05:31

## 2024-05-28 RX ADMIN — Medication 81 MILLIGRAM(S): at 08:59

## 2024-05-28 RX ADMIN — Medication 6 MILLIGRAM(S): at 20:54

## 2024-05-28 RX ADMIN — HEPARIN SODIUM 5000 UNIT(S): 5000 INJECTION INTRAVENOUS; SUBCUTANEOUS at 16:57

## 2024-05-28 RX ADMIN — Medication 30 MILLILITER(S): at 00:45

## 2024-05-28 RX ADMIN — Medication 3 MILLILITER(S): at 03:56

## 2024-05-28 RX ADMIN — Medication 3: at 16:53

## 2024-05-28 NOTE — PROGRESS NOTE ADULT - PROBLEM SELECTOR PLAN 6
- Pt is s/p stent ~20 years ago, with hx of distal LAD occlusion on [facility] 2023  - Patient self-discontinued plavix ~1month ago and unclear why. Pt is continuing on aspirin 81 mg.  - Patient needs f/u with his cardiologist Dr. Downing. On mobic - hold given NILES  On enbrel qweekly - hold in setting of infection.

## 2024-05-28 NOTE — PROGRESS NOTE ADULT - ATTENDING COMMENTS
83M with PMHx RA on enbrel, DM2 on insulin, CAD s/p stent, CKD? a/w sepsis 2/2 PNA c/b strep gallolyticus bacteremia , NILES and elevated HsT (in the absence of acute ischemic changes and chest pain) suspected in the setting of demand ischemia     -Blood cx + for strep gallolyticus on 5/22. Repeat blood cx on 5/24 NTD. C/w ceftriaxone.  CT chest w. findings c/w UIP and CT A/P w/o any nidues of infection identified. Given the type of strep and its association with endocarditis would also obtained TTE w/ no evidence of thrombus. Now pending [facility]. Strep gallolyticus also known to be associated w/ GI pathology/ malignancy.  Pt will need outpt eval w/ GI. ID following and will continue to appreciate recs. Currently holding enbrel for now in setting of infection  -Monitor resp status. Pt currently not requiring O2.   -Pt elevation from Hst w/ known hx of CAD likely in the setting of demand ischemia. Pt is w/o chest pain and EKG w/o acute ischemic changes. Now s/p TTE personally reviewed and notable for EF 47% and apical abnormalities, otherwise was noted to be a technical difficulty study. Cardiology following and recs appreciated. Will c/w medical management. C/w ASA . Pt reports he stopped taking his plavix about 15 days PTA. Per cardiogy ok to be off plavix.  -Pt initially on prednisone on admission. Mild wheezing heard on exam at that time now improved compared to prior.  Pt denies any hx of COPD/asthma and states he smoked minimally for a short period of time in his 20s. Prednisone dcd and pt stable off steroids. CT chest  w/ underlying lung disease that may be c/w UIP. Pt will need further w/u and evaluation w/ pulm especially for PFTs to determine if any further medications would be indicated. C/w inhalers for now  -Trend renal output and creatine levels. Cr improving overall. Continue to encourage PO hydration.      Dispo- pending [facility]. PT rec rehab. 83M with PMHx RA on enbrel, DM2 on insulin, CAD s/p stent, CKD? a/w sepsis 2/2 PNA c/b strep gallolyticus bacteremia , NILES and elevated HsT (in the absence of acute ischemic changes and chest pain) suspected in the setting of demand ischemia     -Blood cx + for strep gallolyticus on 5/22. Repeat blood cx on 5/24 NTD. C/w ceftriaxone.  CT chest w. findings c/w UIP and CT A/P w/o any nidues of infection identified. Given the type of strep and its association with endocarditis would also obtained TTE w/ no evidence of thrombus. Now pending [facility]. Strep gallolyticus also known to be associated w/ GI pathology/ malignancy.  Pt will need outpt eval w/ GI. ID following and will continue to appreciate recs. Currently holding enbrel for now in setting of infection  -Monitor resp status. Pt currently not requiring O2.   -Pt elevation from Hst w/ known hx of CAD likely in the setting of demand ischemia. Pt is w/o chest pain and EKG w/o acute ischemic changes. Now s/p TTE personally reviewed and notable for EF 47% and apical abnormalities, otherwise was noted to be a technical difficulty study. Cardiology following and recs appreciated. Will c/w medical management. C/w ASA . Pt reports he stopped taking his plavix about 15 days PTA. Per cardiogy ok to be off plavix.  -Pt initially on prednisone on admission. Mild wheezing heard on exam at that time now improved compared to prior.  Pt denies any hx of COPD/asthma and states he smoked minimally for a short period of time in his 20s. Prednisone dcd and pt stable off steroids. CT chest  w/ underlying lung disease that may be c/w UIP. Pt will need further w/u and evaluation w/ pulm especially for PFTs to determine if any further medications would be indicated. C/w inhalers for now  -Trend renal output and creatine levels. Cr improving overall. Continue to encourage PO hydration.    -Noted mild hyponatremia. F/u urine studies. Trend BMP daily for now     Dispo- pending [facility]. PT rec rehab. 83M with PMHx RA on enbrel, DM2 on insulin, CAD s/p stent, CKD? a/w sepsis 2/2 PNA c/b strep gallolyticus bacteremia , NILES and elevated HsT (in the absence of acute ischemic changes and chest pain) suspected in the setting of demand ischemia     -Blood cx + for strep gallolyticus on 5/22. Repeat blood cx on 5/24 NTD. C/w ceftriaxone.  CT chest w. findings c/w UIP and CT A/P w/o any nidues of infection identified. Given the type of strep and its association with endocarditis would also obtained TTE w/ no evidence of thrombus. Now pending [facility]. Strep gallolyticus also known to be associated w/ GI pathology/ malignancy.  Pt will need outpt eval w/ GI. ID following and will continue to appreciate recs. Currently holding enbrel for now in setting of infection  -Monitor resp status. Pt currently not requiring O2.   -Pt elevation from Hst w/ known hx of CAD likely in the setting of demand ischemia. Pt is w/o chest pain and EKG w/o acute ischemic changes. Now s/p TTE personally reviewed and notable for EF 47% and apical abnormalities, otherwise was noted to be a technical difficulty study. Cardiology following and recs appreciated. Will c/w medical management. C/w ASA . Pt reports he stopped taking his plavix about 15 days PTA. Per cardiogy ok to be off plavix.  -Pt initially on prednisone on admission. Mild wheezing heard on exam at that time now improved compared to prior.  Pt denies any hx of COPD/asthma and states he smoked minimally for a short period of time in his 20s. Prednisone dcd and pt stable off steroids. CT chest  w/ underlying lung disease that may be c/w UIP. Pt will need further w/u and evaluation w/ pulm especially for PFTs to determine if any further medications would be indicated. C/w inhalers for now  -Trend renal output and creatine levels. Cr improving overall. Continue to encourage PO hydration.    -Noted mild hyponatremia. Can f/u urine studies. However, This is likely in the setting of patients hyperglycemia. Will continue to optimize DM control as stated above    Dispo- pending [facility]. PT rec rehab.    Pt seen and examined on the am of 5/28

## 2024-05-28 NOTE — PROGRESS NOTE ADULT - PROBLEM SELECTOR PLAN 8
HSQ  CC diet
Hospital Bundle  Fluids: IVF  Electrolytes: Replete K > 4, Mg > 2, Phos > 3  Nutrition: Diet regular  PPX  ---VTE: HSQ  ---GI: diet  ---Resp: IS  Access: PIV  Code Status: FULL  Dispo: pending medical management
TARA  CC diet.
Hospital Bundle  Fluids: IVF  Electrolytes: Replete K > 4, Mg > 2, Phos > 3  Nutrition: Diet regular  PPX  ---VTE: HSQ  ---GI: diet  ---Resp: IS  Access: PIV  Code Status: FULL  Dispo: pending medical management
Hospital Bundle  Fluids: IVF  Electrolytes: Replete K > 4, Mg > 2, Phos > 3  Nutrition: Diet regular  PPX  ---VTE: HSQ  ---GI: diet  ---Resp: IS  Access: PIV  Code Status: FULL  Dispo: pending medical management. PT recs BAN

## 2024-05-28 NOTE — PROGRESS NOTE ADULT - PROBLEM SELECTOR PLAN 7
Hospital Bundle  Fluids: IVF  Electrolytes: Replete K > 4, Mg > 2, Phos > 3  Nutrition: Diet regular  PPX  ---VTE: HSQ  ---GI: diet  ---Resp: IS  Access: PIV  Code Status: FULL  Dispo: pending medical management - Pt is s/p stent ~20 years ago, with hx of distal LAD occlusion on [facility] 2023  - Patient self-discontinued plavix ~1month ago and unclear why. Pt is continuing on aspirin 81 mg.  - Patient needs f/u with his cardiologist Dr. Downing.

## 2024-05-28 NOTE — PROGRESS NOTE ADULT - SUBJECTIVE AND OBJECTIVE BOX
Dave Slater MD  Emergency Medicine & Internal Medicine PGY-2    PROGRESS NOTE:    ID #:     Patient is a 83y old  Male who presents with a chief complaint of cough, fever, weakness (27 May 2024 07:55)      MAJOR INTERVAL HOSPITAL EVENTS:     SUBJECTIVE / OVERNIGHT EVENTS: No acute overnight events.       MEDICATIONS  (STANDING):  albuterol/ipratropium for Nebulization 3 milliLiter(s) Nebulizer every 6 hours  ascorbic acid 500 milliGRAM(s) Oral daily  aspirin enteric coated 81 milliGRAM(s) Oral daily  cefTRIAXone   IVPB 2000 milliGRAM(s) IV Intermittent every 24 hours  dextrose 10% Bolus 125 milliLiter(s) IV Bolus once  dextrose 5%. 1000 milliLiter(s) (50 mL/Hr) IV Continuous <Continuous>  dextrose 5%. 1000 milliLiter(s) (100 mL/Hr) IV Continuous <Continuous>  dextrose 50% Injectable 25 Gram(s) IV Push once  dextrose 50% Injectable 12.5 Gram(s) IV Push once  glucagon  Injectable 1 milliGRAM(s) IntraMuscular once  guaiFENesin  milliGRAM(s) Oral every 12 hours  heparin   Injectable 5000 Unit(s) SubCutaneous every 12 hours  insulin glargine Injectable (LANTUS) 20 Unit(s) SubCutaneous at bedtime  insulin lispro (ADMELOG) corrective regimen sliding scale   SubCutaneous three times a day before meals  insulin lispro (ADMELOG) corrective regimen sliding scale   SubCutaneous at bedtime  insulin lispro Injectable (ADMELOG) 8 Unit(s) SubCutaneous three times a day before meals  melatonin 6 milliGRAM(s) Oral at bedtime  sodium chloride 0.9%. 1000 milliLiter(s) (100 mL/Hr) IV Continuous <Continuous>    MEDICATIONS  (PRN):  acetaminophen     Tablet .. 650 milliGRAM(s) Oral every 6 hours PRN Temp greater or equal to 38C (100.4F), Mild Pain (1 - 3)  aluminum hydroxide/magnesium hydroxide/simethicone Suspension 30 milliLiter(s) Oral every 4 hours PRN Dyspepsia  benzonatate 100 milliGRAM(s) Oral every 8 hours PRN Cough  dextrose Oral Gel 15 Gram(s) Oral once PRN Blood Glucose LESS THAN 70 milliGRAM(s)/deciliter      CAPILLARY BLOOD GLUCOSE      POCT Blood Glucose.: 150 mg/dL (27 May 2024 21:42)  POCT Blood Glucose.: 207 mg/dL (27 May 2024 17:27)  POCT Blood Glucose.: 226 mg/dL (27 May 2024 12:35)  POCT Blood Glucose.: 159 mg/dL (27 May 2024 08:41)    I&O's Summary    27 May 2024 07:01  -  28 May 2024 07:00  --------------------------------------------------------  IN: 660 mL / OUT: 2200 mL / NET: -1540 mL        PHYSICAL EXAM:  Vital Signs Last 24 Hrs  T(C): 36.7 (28 May 2024 05:31), Max: 36.7 (27 May 2024 10:34)  T(F): 98.1 (28 May 2024 05:31), Max: 98.1 (27 May 2024 10:34)  HR: 90 (28 May 2024 05:31) (80 - 94)  BP: 123/61 (28 May 2024 05:31) (118/64 - 151/93)  BP(mean): --  RR: 17 (28 May 2024 05:31) (16 - 17)  SpO2: 96% (28 May 2024 05:31) (96% - 98%)    Parameters below as of 28 May 2024 05:31  Patient On (Oxygen Delivery Method): room air        GENERAL: No acute distress, well-developed  HEAD:  Atraumatic, Normocephalic  EYES: EOMI, PERRLA, conjunctiva and sclera clear  NECK: Supple, no lymphadenopathy, no JVD  CHEST/LUNG: CTAB; No wheezes, rales, or rhonchi  HEART: Regular rate and rhythm; Normal s1 and s2, No murmurs, rubs, or gallops  ABDOMEN: Soft, non-tender, non-distended; normal bowel sounds, no organomegaly  EXTREMITIES:  2+ peripheral pulses b/l, No clubbing, cyanosis, or edema  NEUROLOGY: A&O x 3, no focal deficits  SKIN: No rashes or lesions          LABS:                        12.5   9.68  )-----------( 294      ( 27 May 2024 10:23 )             35.6     05-27    130<L>  |  96<L>  |  26<H>  ----------------------------<  263<H>  4.4   |  21<L>  |  1.36<H>    Ca    9.3      27 May 2024 10:23  Phos  4.2     05-27  Mg     1.90     05-27    TPro  7.4  /  Alb  3.1<L>  /  TBili  0.6  /  DBili  x   /  AST  39  /  ALT  42<H>  /  AlkPhos  98  05-27          Urinalysis Basic - ( 27 May 2024 10:23 )    Color: x / Appearance: x / SG: x / pH: x  Gluc: 263 mg/dL / Ketone: x  / Bili: x / Urobili: x   Blood: x / Protein: x / Nitrite: x   Leuk Esterase: x / RBC: x / WBC x   Sq Epi: x / Non Sq Epi: x / Bacteria: x          RADIOLOGY & ADDITIONAL TESTS:  Results Reviewed:   Imaging Personally Reviewed:  Electrocardiogram Personally Reviewed:    COORDINATION OF CARE:  Care Discussed with Consultants/Other Providers [Y/N]:  Prior or Outpatient Records Reviewed [Y/N]:   Dave Slater MD  Emergency Medicine & Internal Medicine PGY-2    PROGRESS NOTE:    ID #:     Patient is a 83y old  Male who presents with a chief complaint of cough, fever, weakness (27 May 2024 07:55)      SUBJECTIVE / OVERNIGHT EVENTS: No acute overnight events. Patient reports feeling well. He does endorse a mild nonproductive cough but denies fever, chills, cp, sob, hemoptysis, abd pain, nausea, vomiting, diarrhea, LE swelling/pain      MEDICATIONS  (STANDING):  albuterol/ipratropium for Nebulization 3 milliLiter(s) Nebulizer every 6 hours  ascorbic acid 500 milliGRAM(s) Oral daily  aspirin enteric coated 81 milliGRAM(s) Oral daily  cefTRIAXone   IVPB 2000 milliGRAM(s) IV Intermittent every 24 hours  dextrose 10% Bolus 125 milliLiter(s) IV Bolus once  dextrose 5%. 1000 milliLiter(s) (50 mL/Hr) IV Continuous <Continuous>  dextrose 5%. 1000 milliLiter(s) (100 mL/Hr) IV Continuous <Continuous>  dextrose 50% Injectable 25 Gram(s) IV Push once  dextrose 50% Injectable 12.5 Gram(s) IV Push once  glucagon  Injectable 1 milliGRAM(s) IntraMuscular once  guaiFENesin  milliGRAM(s) Oral every 12 hours  heparin   Injectable 5000 Unit(s) SubCutaneous every 12 hours  insulin glargine Injectable (LANTUS) 20 Unit(s) SubCutaneous at bedtime  insulin lispro (ADMELOG) corrective regimen sliding scale   SubCutaneous three times a day before meals  insulin lispro (ADMELOG) corrective regimen sliding scale   SubCutaneous at bedtime  insulin lispro Injectable (ADMELOG) 8 Unit(s) SubCutaneous three times a day before meals  melatonin 6 milliGRAM(s) Oral at bedtime  sodium chloride 0.9%. 1000 milliLiter(s) (100 mL/Hr) IV Continuous <Continuous>    MEDICATIONS  (PRN):  acetaminophen     Tablet .. 650 milliGRAM(s) Oral every 6 hours PRN Temp greater or equal to 38C (100.4F), Mild Pain (1 - 3)  aluminum hydroxide/magnesium hydroxide/simethicone Suspension 30 milliLiter(s) Oral every 4 hours PRN Dyspepsia  benzonatate 100 milliGRAM(s) Oral every 8 hours PRN Cough  dextrose Oral Gel 15 Gram(s) Oral once PRN Blood Glucose LESS THAN 70 milliGRAM(s)/deciliter      CAPILLARY BLOOD GLUCOSE      POCT Blood Glucose.: 150 mg/dL (27 May 2024 21:42)  POCT Blood Glucose.: 207 mg/dL (27 May 2024 17:27)  POCT Blood Glucose.: 226 mg/dL (27 May 2024 12:35)  POCT Blood Glucose.: 159 mg/dL (27 May 2024 08:41)    I&O's Summary    27 May 2024 07:01  -  28 May 2024 07:00  --------------------------------------------------------  IN: 660 mL / OUT: 2200 mL / NET: -1540 mL        PHYSICAL EXAM:  Vital Signs Last 24 Hrs  T(C): 36.7 (28 May 2024 05:31), Max: 36.7 (27 May 2024 10:34)  T(F): 98.1 (28 May 2024 05:31), Max: 98.1 (27 May 2024 10:34)  HR: 90 (28 May 2024 05:31) (80 - 94)  BP: 123/61 (28 May 2024 05:31) (118/64 - 151/93)  BP(mean): --  RR: 17 (28 May 2024 05:31) (16 - 17)  SpO2: 96% (28 May 2024 05:31) (96% - 98%)    Parameters below as of 28 May 2024 05:31  Patient On (Oxygen Delivery Method): room air      GENERAL: NAD, lying in bed comfortably  NECK: no JVD  HEART: S1, S2, Regular rate and rhythm, no murmurs, rubs, or gallops  LUNGS: Unlabored respirations, clear to auscultation bilaterally, no crackles, wheezing, or rhonchi. On 2L NC  ABDOMEN: Soft, nontender, nondistended, +BS  EXTREMITIES: 2+ peripheral pulses bilaterally. No clubbing, cyanosis, or edema  NERVOUS SYSTEM:  A&Ox3, no focal deficits   SKIN: No rashes or lesions        LABS:                        12.5   9.68  )-----------( 294      ( 27 May 2024 10:23 )             35.6     05-27    130<L>  |  96<L>  |  26<H>  ----------------------------<  263<H>  4.4   |  21<L>  |  1.36<H>    Ca    9.3      27 May 2024 10:23  Phos  4.2     05-27  Mg     1.90     05-27    TPro  7.4  /  Alb  3.1<L>  /  TBili  0.6  /  DBili  x   /  AST  39  /  ALT  42<H>  /  AlkPhos  98  05-27          Urinalysis Basic - ( 27 May 2024 10:23 )    Color: x / Appearance: x / SG: x / pH: x  Gluc: 263 mg/dL / Ketone: x  / Bili: x / Urobili: x   Blood: x / Protein: x / Nitrite: x   Leuk Esterase: x / RBC: x / WBC x   Sq Epi: x / Non Sq Epi: x / Bacteria: x          RADIOLOGY & ADDITIONAL TESTS:  Results Reviewed:   Imaging Personally Reviewed:  Electrocardiogram Personally Reviewed:    COORDINATION OF CARE:  Care Discussed with Consultants/Other Providers [Y/N]:  Prior or Outpatient Records Reviewed [Y/N]:

## 2024-05-28 NOTE — GOALS OF CARE CONVERSATION - ADVANCED CARE PLANNING - CONVERSATION DETAILS
Advanced care planning initially started on 5/24 with patient. HCP definition discussed. Pt states his wife and son is his HCP. His status and medical  management plan were discussed. CPR/ intubation was also discussed including the process in detail and the risk. Patient reported being scared about it all. He would like to have this conversation with his son on the line. Team reached out to pts family who wants to further discuss patients code status. Ongoing GOC conversation w/ patient and family

## 2024-05-28 NOTE — PROGRESS NOTE ADULT - PROBLEM SELECTOR PLAN 2
Troponin is up to 326 from 283 from 122. Has consistently denied CP. EKG nonischemic with no ST changes. Not consistent with ACS given lack of CP or EKG findings. Likely due to demand ischemia  - cards following: can stop trending cardiac enzymes  - EKG showed sinus tach w/o T wave or ST changes  - TTE showing LVEF 47%, apical hypokinesis, no clear vegetations  - [facility] pending as below Mild hypoNa to 130, patient asx. C/f SIADH vs hypovolemia. S/p IVF  - urine studies pending  - trial salt tabs

## 2024-05-28 NOTE — PROGRESS NOTE ADULT - SUBJECTIVE AND OBJECTIVE BOX
CARDIOLOGY FOLLOW UP - Dr. Downing  Date of Service: 5/28/2024  CC: no events    Review of Systems:  Constitutional: No fever, weight loss, or fatigue  Respiratory: No cough, wheezing, or hemoptysis, no shortness of breath  Cardiovascular: No chest pain, palpitations, passing out, dizziness, or leg swelling  Gastrointestinal: No abd or epigastric pain. No nausea, vomiting, or hematemesis; no diarrhea or consiptaiton, no melena or hematochezia  Vascular: No edema     TELEMETRY:    PHYSICAL EXAM:  T(C): 36.7 (05-28-24 @ 11:28), Max: 36.7 (05-27-24 @ 19:38)  HR: 84 (05-28-24 @ 11:28) (79 - 94)  BP: 118/66 (05-28-24 @ 11:28) (118/66 - 151/93)  RR: 17 (05-28-24 @ 11:28) (16 - 17)  SpO2: 98% (05-28-24 @ 11:28) (95% - 98%)  Wt(kg): --  I&O's Summary    27 May 2024 07:01  -  28 May 2024 07:00  --------------------------------------------------------  IN: 660 mL / OUT: 2200 mL / NET: -1540 mL        Appearance: Normal	  Cardiovascular: Normal S1 S2,RRR, No JVD, No murmurs  Respiratory: Lungs clear to auscultation	  Gastrointestinal:  Soft, Non-tender, + BS	  Extremities: Normal range of motion, No clubbing, cyanosis or edema  Vascular: Peripheral pulses palpable 2+ bilaterally       Home Medications:  aspirin 81 mg oral tablet: 1 tab(s) orally once a day (23 May 2024 11:08)  Bactrim  mg-160 mg oral tablet: 1 tab(s) orally 2 times a day x 7 days (Filled on 5/20/24) (23 May 2024 11:09)  clopidogrel 75 mg oral tablet: 1 tab(s) orally once a day (23 May 2024 11:08)  Enbrel SureClick 50 mg/mL subcutaneous solution: 50 milligram(s) subcutaneous once a week (23 May 2024 11:07)  NovoLOG FlexPen 100 units/mL injectable solution: 6 unit(s) injectable once a day (in the afternoon) (23 May 2024 11:08)  NovoLOG FlexPen 100 units/mL injectable solution: 16 unit(s) subcutaneous once a day -AM (23 May 2024 11:08)  NovoLOG Mix 70/30 FlexPen subcutaneous suspension: 16 unit(s) subcutaneous once a day (at bedtime) (23 May 2024 11:08)  Vitamin C 500 mg oral tablet: 1 tab(s) orally once a day (23 May 2024 03:00)        MEDICATIONS  (STANDING):  albuterol/ipratropium for Nebulization 3 milliLiter(s) Nebulizer every 6 hours  ascorbic acid 500 milliGRAM(s) Oral daily  aspirin enteric coated 81 milliGRAM(s) Oral daily  cefTRIAXone   IVPB 2000 milliGRAM(s) IV Intermittent every 24 hours  dextrose 10% Bolus 125 milliLiter(s) IV Bolus once  dextrose 5%. 1000 milliLiter(s) (100 mL/Hr) IV Continuous <Continuous>  dextrose 5%. 1000 milliLiter(s) (50 mL/Hr) IV Continuous <Continuous>  dextrose 50% Injectable 25 Gram(s) IV Push once  dextrose 50% Injectable 12.5 Gram(s) IV Push once  glucagon  Injectable 1 milliGRAM(s) IntraMuscular once  guaiFENesin  milliGRAM(s) Oral every 12 hours  heparin   Injectable 5000 Unit(s) SubCutaneous every 12 hours  insulin glargine Injectable (LANTUS) 20 Unit(s) SubCutaneous at bedtime  insulin lispro (ADMELOG) corrective regimen sliding scale   SubCutaneous three times a day before meals  insulin lispro (ADMELOG) corrective regimen sliding scale   SubCutaneous at bedtime  insulin lispro Injectable (ADMELOG) 8 Unit(s) SubCutaneous three times a day before meals  melatonin 6 milliGRAM(s) Oral at bedtime  sodium chloride 0.9%. 1000 milliLiter(s) (100 mL/Hr) IV Continuous <Continuous>        EKG:  RADIOLOGY:  DIAGNOSTIC TESTING:  [ ] Echocardiogram:  [ ] Catherterization:  [ ] Stress Test:  OTHER:     LABS:	 	                          12.5   10.40 )-----------( 319      ( 28 May 2024 05:57 )             37.7     05-28    130<L>  |  97<L>  |  28<H>  ----------------------------<  236<H>  4.7   |  20<L>  |  1.25    Ca    9.6      28 May 2024 05:57  Phos  3.4     05-28  Mg     2.10     05-28    TPro  7.5  /  Alb  3.1<L>  /  TBili  0.6  /  DBili  x   /  AST  41<H>  /  ALT  48<H>  /  AlkPhos  109  05-28          CARDIAC MARKERS:

## 2024-05-28 NOTE — PROGRESS NOTE ADULT - ASSESSMENT
This is an 84 y/o M w/ PMHx RA on enbrel, DM2 on insulin, CAD s/p stent, CKD presenting with fever, cough, weakness,   Tm 100.2, tachy to 105, other VSS. WBC elevated to 16.6, NILES to 1.85, lactate 2.3. U/A w/o pyuria, RVP negative COVID, RSV, Flu.   BCx w/ 4/4 Strep gallolyticus.  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     #Strep gallolyticus bacteremia   #Severe sepsis   #NILES     Overall,  84 y/o M w/ PMHx RA on enbrel, DM2 on insulin, CAD s/p stent, CKD with strep gallolyticus bacteremia. Unclear source, however need to r/o abdominal source and any colorectal malignancy. Would treat with Ceftriaxone 2 g 24, repeat BCx, obtain TTE, will likely need SIMEON as well given high risk of IE with this species of strep. Would obtain CT A/P w/ IV contrast for abdominal source.   TTE poor quality, would pursue SIMEON. CT A/P without acute findings, possible cystitis?     Plan:  1. Ceftriaxone 2 g q24  2. Repeat BCx until negative x 48 hours   3. SIMEON  4. Outpatient GI w/u, colonoscopy?    Thank you for this consult. Inpatient ID team will follow.    Jeison Alvarado M.D.  Attending Physician  Division of Infectious Diseases  Department of Medicine

## 2024-05-28 NOTE — PROGRESS NOTE ADULT - SUBJECTIVE AND OBJECTIVE BOX
Infectious Diseases Follow Up:    Patient is a 83y old  Male who presents with a chief complaint of cough, fever, weakness (28 May 2024 07:06)      Interval History/ROS:  No acute events, pt w/o acute complaints.     Allergies  tetracyclines (Rash)        ANTIMICROBIALS:  cefTRIAXone   IVPB 2000 every 24 hours      Current Abx:     Previous Abx     OTHER MEDS:  MEDICATIONS  (STANDING):  acetaminophen     Tablet .. 650 every 6 hours PRN  albuterol/ipratropium for Nebulization 3 every 6 hours  aluminum hydroxide/magnesium hydroxide/simethicone Suspension 30 every 4 hours PRN  aspirin enteric coated 81 daily  benzonatate 100 every 8 hours PRN  dextrose 50% Injectable 25 once  dextrose 50% Injectable 12.5 once  dextrose Oral Gel 15 once PRN  glucagon  Injectable 1 once  guaiFENesin  every 12 hours  heparin   Injectable 5000 every 12 hours  insulin glargine Injectable (LANTUS) 20 at bedtime  insulin lispro (ADMELOG) corrective regimen sliding scale  three times a day before meals  insulin lispro (ADMELOG) corrective regimen sliding scale  at bedtime  insulin lispro Injectable (ADMELOG) 8 three times a day before meals  melatonin 6 at bedtime      Vital Signs Last 24 Hrs  T(C): 36.7 (28 May 2024 05:31), Max: 36.7 (27 May 2024 19:38)  T(F): 98.1 (28 May 2024 05:31), Max: 98.1 (28 May 2024 05:31)  HR: 79 (28 May 2024 08:05) (79 - 94)  BP: 123/61 (28 May 2024 05:31) (123/61 - 151/93)  BP(mean): --  RR: 17 (28 May 2024 05:31) (16 - 17)  SpO2: 95% (28 May 2024 08:05) (95% - 98%)    Parameters below as of 28 May 2024 07:46  Patient On (Oxygen Delivery Method): room air        PHYSICAL EXAM:  GENERAL: NAD, well-developed  HEAD:  Atraumatic, Normocephalic  EYES: EOMI, conjunctiva and sclera clear  CHEST/LUNG: Clear to auscultation bilaterally; No wheeze  HEART: Regular rate and rhythm; No murmurs, rubs, or gallops  ABDOMEN: Soft, Nontender, Nondistended; Bowel sounds present  PSYCH: AAOx3                          12.5   10.40 )-----------( 319      ( 28 May 2024 05:57 )             37.7       05-28    130<L>  |  97<L>  |  28<H>  ----------------------------<  236<H>  4.7   |  20<L>  |  1.25    Ca    9.6      28 May 2024 05:57  Phos  3.4     05-28  Mg     2.10     05-28    TPro  7.5  /  Alb  3.1<L>  /  TBili  0.6  /  DBili  x   /  AST  41<H>  /  ALT  48<H>  /  AlkPhos  109  05-28      Urinalysis Basic - ( 28 May 2024 05:57 )    Color: x / Appearance: x / SG: x / pH: x  Gluc: 236 mg/dL / Ketone: x  / Bili: x / Urobili: x   Blood: x / Protein: x / Nitrite: x   Leuk Esterase: x / RBC: x / WBC x   Sq Epi: x / Non Sq Epi: x / Bacteria: x        MICROBIOLOGY:  v  .Blood Blood-Peripheral  05-24-24   No growth at 4 days  --  --      Clean Catch Clean Catch (Midstream)  05-22-24   No growth  --  --      .Blood Blood-Peripheral  05-22-24   Growth in aerobic and anaerobic bottles: Streptococcus gallolyticus  See previous culture [id number]  --    Growth in anaerobic bottle: Gram positive cocci in pairs  Growth in aerobic bottle: Gram positive cocci in pairs      .Blood Blood-Peripheral  05-22-24   Growth in aerobic and anaerobic bottles: Streptococcus gallolyticus  Direct identification is available within approximately 3-5  hours either by Blood Panel Multiplexed PCR or Direct  MALDI-TOF. Details: [email or web address]  --  Blood Culture PCR  Streptococcus gallolyticus            RADIOLOGY:  < from: CT Abdomen and Pelvis w/ IV Cont (05.25.24 @ 20:23) >  IMPRESSION:  No bowel obstruction or inflammation.    Mild bladder wall thickening, which may represent cystitis or bladder   outlet obstruction the setting of prostatomegaly. Correlate with   urinalysis.    Small left and trace right pleural effusion with associated passive   atelectasis. Fibrotic changes at the lung bases, better characterized on   the prior CT chest from 5/23/2024.      < from: TTE W or WO Ultrasound Enhancing Agent (05.25.24 @ 14:27) >   1. Technicallydifficult image quality.   2. There is akinesis of the apical wall.   3. Normal right ventricular cavity size and probably normal systolic function.   4. The left atrium is normal in size.   5. The right atrium is normal in size.   6. The inferior vena cava is normal in size measuring 1.09 cm in diameter, (normal <2.1cm) with normal inspiratory collapse (normal >50%) consistent with normal right atrial pressure (~3, range 0-5mmHg).   7. Mid and apical anterior septum and apex are abnormal.   8. Left ventricular systolic function is mildly decreased with an ejection fraction of 47 % by Dyson's method of disks. Regional wall motion abnormalities present.   9. There is no evidence of a left ventricular thrombus.  10. No pericardial effusion seen.  11. No prior echocardiogram is available for comparison.    < end of copied text >

## 2024-05-28 NOTE — PROGRESS NOTE ADULT - PROBLEM SELECTOR PLAN 1
Sepsis with BCx +strep gallolyticus, c/f PNA. HR >90 initially and WBC 16.   CXR Right lower lobe consolidation, may represent atelectasis and/or   effusion. Underlying pneumonia cannot be excluded. Enlarging right apical lung hazy opacity, with associated right tracheal deviation, which is likely due to volume loss. Diffuse chronic fibrotic lung changes.  Lactate 2.3--2.1  - s/p 1.5 L IVF given  - 5/22 BCx +strep gallolyticus  - 5/24 BCx NGTD  - TTE showing LVEF 47%, apical hypokinesis, no clear vegetations  - ID consulted: c/w CTX, get SIMEON, stronglyoides   - S/p Zosyn, c/w ceftriaxone 2g q24h  - No hx asthma/COPD, so hold prednisone 40mg  - Cough suppressants PRN, tylenol PRN  - Follow up pulmonology on discharge  - s. gallolyticus risk for colon ca. last colonoscopy >10 yrs ago, was normal per patient. Repeat colonoscopy OP

## 2024-05-28 NOTE — PROGRESS NOTE ADULT - PROBLEM SELECTOR PROBLEM 3
Type 2 diabetes mellitus with hyperglycemia, with long-term current use of insulin Elevated troponin

## 2024-05-28 NOTE — PROGRESS NOTE ADULT - PROBLEM SELECTOR PLAN 3
Glucose trending upwards (214-396). Takes 16U basal and 6U premeal at home. A1c 7.4  - lantus 20U, premeal 6U, MARTY  - monitor BG for goal 140-180 inpatient Troponin is up to 326 from 283 from 122. Has consistently denied CP. EKG nonischemic with no ST changes. Not consistent with ACS given lack of CP or EKG findings. Likely due to demand ischemia  - cards following: can stop trending cardiac enzymes  - EKG showed sinus tach w/o T wave or ST changes  - TTE showing LVEF 47%, apical hypokinesis, no clear vegetations  - SIMEON pending as below

## 2024-05-28 NOTE — PROGRESS NOTE ADULT - PROBLEM SELECTOR PLAN 5
On mobic - hold given NILES  On enbrel qweekly - hold in setting of infection. Creatinine 1.71, but relatively stable since hospital admission. Likely prerenal in setting of decreased PO intake vs sepsis, downtrending. Cr trending down, now at 1.25  - Hold ACE-I though patient denies taking  - s/p IVF  - UA neg  - monitor BMP daily

## 2024-05-28 NOTE — PROGRESS NOTE ADULT - PROBLEM SELECTOR PLAN 4
Creatinine 1.71, but relatively stable since hospital admission. Likely prerenal in setting of decreased PO intake vs sepsis, downtrending. Cr trending down  - Hold ACE-I though patient denies taking  - s/p 1.5L IVF, additional 500cc bolus  - UA neg  - monitor BMP daily Glucose trending upwards (214-396). Takes 16U basal and 6U premeal at home. A1c 7.4  - lantus 23U, premeal 9U, MARTY  - monitor BG for goal 140-180 inpatient

## 2024-05-28 NOTE — PROGRESS NOTE ADULT - ASSESSMENT
83M with PMHx RA on enbrel, DM2 on insulin, CAD s/p stent, presenting with fever, cough and weakness. Found to be septic with strep galloyticus bacteremia, elevated troponins. Cards c/s, c/f demand ischemia. Pending further infectious management 83M with PMHx RA on enbrel, DM2 on insulin, CAD s/p stent, presenting with fever, cough and weakness. Found to be septic with strep galloyticus bacteremia, elevated troponins. Cards c/s, c/f demand ischemia. Pending further infectious management including SIMEON.

## 2024-05-28 NOTE — PROGRESS NOTE ADULT - ASSESSMENT
A/P  83M with PMHx RA on enbrel, DM2 on insulin, CAD s/p stent, CKD? presenting with fever, cough and weakness. Found to have sepsis 2/2 PNA, NILES.    #Pneumonia  -clinically stable  -continue abx per medicine     #NILES  -in setting of pna, hypovolemia  -ivf    #Elevated troponin, CAD, s/p PCI  -Trop, ckmb elevated in setting of known cad,  of mid LAD, PNA, niles representing demand ischemia  -no ACS, no angina, no acute ischemic ecg abnl   -Echo with mildly decreased lvef 47%, akinesis of apical wall, regional WMA, no sig valvular dz   -continue ASA only, ok to be off of plavix   -cath 2023 with closed mid LAD stent with distal LAD filling with well developed collaterals   -No cp on exam  -cont med tx of cad for now - no need for ischemic eval     #Type 2 diabetes mellitus with hyperglycemia  -tx per med    #H/O rheumatoid arthritis  -tx per med  -On mobic/enbrel qweekly as outpt    35 minutes spent on total encounter; more than 50% of the visit was spent counseling and/or coordinating care by the attending physician.

## 2024-05-29 ENCOUNTER — RESULT REVIEW (OUTPATIENT)
Age: 84
End: 2024-05-29

## 2024-05-29 LAB
ALBUMIN SERPL ELPH-MCNC: 3.2 G/DL — LOW (ref 3.3–5)
ALP SERPL-CCNC: 123 U/L — HIGH (ref 40–120)
ALT FLD-CCNC: 53 U/L — HIGH (ref 4–41)
ANION GAP SERPL CALC-SCNC: 14 MMOL/L — SIGNIFICANT CHANGE UP (ref 7–14)
APTT BLD: 32.6 SEC — SIGNIFICANT CHANGE UP (ref 24.5–35.6)
AST SERPL-CCNC: 36 U/L — SIGNIFICANT CHANGE UP (ref 4–40)
BASOPHILS # BLD AUTO: 0.06 K/UL — SIGNIFICANT CHANGE UP (ref 0–0.2)
BASOPHILS NFR BLD AUTO: 0.6 % — SIGNIFICANT CHANGE UP (ref 0–2)
BILIRUB SERPL-MCNC: 0.4 MG/DL — SIGNIFICANT CHANGE UP (ref 0.2–1.2)
BUN SERPL-MCNC: 29 MG/DL — HIGH (ref 7–23)
CALCIUM SERPL-MCNC: 10 MG/DL — SIGNIFICANT CHANGE UP (ref 8.4–10.5)
CHLORIDE SERPL-SCNC: 97 MMOL/L — LOW (ref 98–107)
CO2 SERPL-SCNC: 21 MMOL/L — LOW (ref 22–31)
CREAT SERPL-MCNC: 1.22 MG/DL — SIGNIFICANT CHANGE UP (ref 0.5–1.3)
CULTURE RESULTS: SIGNIFICANT CHANGE UP
CULTURE RESULTS: SIGNIFICANT CHANGE UP
EGFR: 58 ML/MIN/1.73M2 — LOW
EOSINOPHIL # BLD AUTO: 1.18 K/UL — HIGH (ref 0–0.5)
EOSINOPHIL NFR BLD AUTO: 11 % — HIGH (ref 0–6)
GLUCOSE SERPL-MCNC: 185 MG/DL — HIGH (ref 70–99)
HCT VFR BLD CALC: 39.3 % — SIGNIFICANT CHANGE UP (ref 39–50)
HGB BLD-MCNC: 13.4 G/DL — SIGNIFICANT CHANGE UP (ref 13–17)
IANC: 6.08 K/UL — SIGNIFICANT CHANGE UP (ref 1.8–7.4)
IMM GRANULOCYTES NFR BLD AUTO: 2 % — HIGH (ref 0–0.9)
INR BLD: 1.04 RATIO — SIGNIFICANT CHANGE UP (ref 0.85–1.18)
LYMPHOCYTES # BLD AUTO: 1.97 K/UL — SIGNIFICANT CHANGE UP (ref 1–3.3)
LYMPHOCYTES # BLD AUTO: 18.4 % — SIGNIFICANT CHANGE UP (ref 13–44)
MAGNESIUM SERPL-MCNC: 2.4 MG/DL — SIGNIFICANT CHANGE UP (ref 1.6–2.6)
MCHC RBC-ENTMCNC: 29.8 PG — SIGNIFICANT CHANGE UP (ref 27–34)
MCHC RBC-ENTMCNC: 34.1 GM/DL — SIGNIFICANT CHANGE UP (ref 32–36)
MCV RBC AUTO: 87.5 FL — SIGNIFICANT CHANGE UP (ref 80–100)
MONOCYTES # BLD AUTO: 1.23 K/UL — HIGH (ref 0–0.9)
MONOCYTES NFR BLD AUTO: 11.5 % — SIGNIFICANT CHANGE UP (ref 2–14)
NEUTROPHILS # BLD AUTO: 6.08 K/UL — SIGNIFICANT CHANGE UP (ref 1.8–7.4)
NEUTROPHILS NFR BLD AUTO: 56.5 % — SIGNIFICANT CHANGE UP (ref 43–77)
NRBC # BLD: 0 /100 WBCS — SIGNIFICANT CHANGE UP (ref 0–0)
NRBC # FLD: 0 K/UL — SIGNIFICANT CHANGE UP (ref 0–0)
PHOSPHATE SERPL-MCNC: 2.8 MG/DL — SIGNIFICANT CHANGE UP (ref 2.5–4.5)
PLATELET # BLD AUTO: 360 K/UL — SIGNIFICANT CHANGE UP (ref 150–400)
POTASSIUM SERPL-MCNC: 4.7 MMOL/L — SIGNIFICANT CHANGE UP (ref 3.5–5.3)
POTASSIUM SERPL-SCNC: 4.7 MMOL/L — SIGNIFICANT CHANGE UP (ref 3.5–5.3)
PROT SERPL-MCNC: 7.9 G/DL — SIGNIFICANT CHANGE UP (ref 6–8.3)
PROTHROM AB SERPL-ACNC: 11.6 SEC — SIGNIFICANT CHANGE UP (ref 9.5–13)
RBC # BLD: 4.49 M/UL — SIGNIFICANT CHANGE UP (ref 4.2–5.8)
RBC # FLD: 12.9 % — SIGNIFICANT CHANGE UP (ref 10.3–14.5)
SODIUM SERPL-SCNC: 132 MMOL/L — LOW (ref 135–145)
SPECIMEN SOURCE: SIGNIFICANT CHANGE UP
SPECIMEN SOURCE: SIGNIFICANT CHANGE UP
STRONGYLOIDES AB SER-ACNC: NEGATIVE — SIGNIFICANT CHANGE UP
WBC # BLD: 10.73 K/UL — HIGH (ref 3.8–10.5)
WBC # FLD AUTO: 10.73 K/UL — HIGH (ref 3.8–10.5)

## 2024-05-29 PROCEDURE — 93312 ECHO TRANSESOPHAGEAL: CPT | Mod: 26

## 2024-05-29 PROCEDURE — 93320 DOPPLER ECHO COMPLETE: CPT | Mod: 26,GC

## 2024-05-29 PROCEDURE — 99232 SBSQ HOSP IP/OBS MODERATE 35: CPT | Mod: GC

## 2024-05-29 PROCEDURE — 76376 3D RENDER W/INTRP POSTPROCES: CPT | Mod: 26

## 2024-05-29 PROCEDURE — 93325 DOPPLER ECHO COLOR FLOW MAPG: CPT | Mod: 26,GC

## 2024-05-29 PROCEDURE — 99232 SBSQ HOSP IP/OBS MODERATE 35: CPT

## 2024-05-29 RX ORDER — INSULIN LISPRO 100/ML
VIAL (ML) SUBCUTANEOUS AT BEDTIME
Refills: 0 | Status: DISCONTINUED | OUTPATIENT
Start: 2024-05-29 | End: 2024-05-31

## 2024-05-29 RX ORDER — INSULIN LISPRO 100/ML
VIAL (ML) SUBCUTANEOUS
Refills: 0 | Status: DISCONTINUED | OUTPATIENT
Start: 2024-05-29 | End: 2024-05-31

## 2024-05-29 RX ORDER — INSULIN GLARGINE 100 [IU]/ML
25 INJECTION, SOLUTION SUBCUTANEOUS AT BEDTIME
Refills: 0 | Status: DISCONTINUED | OUTPATIENT
Start: 2024-05-29 | End: 2024-05-31

## 2024-05-29 RX ORDER — INSULIN LISPRO 100/ML
11 VIAL (ML) SUBCUTANEOUS
Refills: 0 | Status: DISCONTINUED | OUTPATIENT
Start: 2024-05-29 | End: 2024-05-31

## 2024-05-29 RX ADMIN — Medication 2: at 00:52

## 2024-05-29 RX ADMIN — Medication 11 UNIT(S): at 16:42

## 2024-05-29 RX ADMIN — Medication 500 MILLIGRAM(S): at 16:38

## 2024-05-29 RX ADMIN — Medication 6 MILLIGRAM(S): at 21:06

## 2024-05-29 RX ADMIN — HEPARIN SODIUM 5000 UNIT(S): 5000 INJECTION INTRAVENOUS; SUBCUTANEOUS at 16:37

## 2024-05-29 RX ADMIN — Medication 600 MILLIGRAM(S): at 16:37

## 2024-05-29 RX ADMIN — Medication 30 MILLILITER(S): at 16:47

## 2024-05-29 RX ADMIN — CEFTRIAXONE 100 MILLIGRAM(S): 500 INJECTION, POWDER, FOR SOLUTION INTRAMUSCULAR; INTRAVENOUS at 16:26

## 2024-05-29 RX ADMIN — Medication 2: at 16:43

## 2024-05-29 RX ADMIN — Medication 30 MILLILITER(S): at 23:46

## 2024-05-29 RX ADMIN — Medication 3 MILLILITER(S): at 08:10

## 2024-05-29 RX ADMIN — Medication 1: at 05:15

## 2024-05-29 RX ADMIN — Medication 81 MILLIGRAM(S): at 16:37

## 2024-05-29 RX ADMIN — Medication 3 MILLILITER(S): at 21:16

## 2024-05-29 RX ADMIN — Medication 600 MILLIGRAM(S): at 04:56

## 2024-05-29 RX ADMIN — Medication 3 MILLILITER(S): at 14:29

## 2024-05-29 RX ADMIN — INSULIN GLARGINE 25 UNIT(S): 100 INJECTION, SOLUTION SUBCUTANEOUS at 22:39

## 2024-05-29 NOTE — PROGRESS NOTE ADULT - SUBJECTIVE AND OBJECTIVE BOX
Dave Slater MD  Emergency Medicine & Internal Medicine PGY-2    PROGRESS NOTE:    ID #:     Patient is a 83y old  Male who presents with a chief complaint of cough, fever, weakness (28 May 2024 11:55)      MAJOR INTERVAL HOSPITAL EVENTS:     SUBJECTIVE / OVERNIGHT EVENTS: No acute overnight events.       MEDICATIONS  (STANDING):  albuterol/ipratropium for Nebulization 3 milliLiter(s) Nebulizer every 6 hours  ascorbic acid 500 milliGRAM(s) Oral daily  aspirin enteric coated 81 milliGRAM(s) Oral daily  cefTRIAXone   IVPB 2000 milliGRAM(s) IV Intermittent every 24 hours  dextrose 10% Bolus 125 milliLiter(s) IV Bolus once  dextrose 5%. 1000 milliLiter(s) (100 mL/Hr) IV Continuous <Continuous>  dextrose 5%. 1000 milliLiter(s) (50 mL/Hr) IV Continuous <Continuous>  dextrose 50% Injectable 25 Gram(s) IV Push once  dextrose 50% Injectable 12.5 Gram(s) IV Push once  glucagon  Injectable 1 milliGRAM(s) IntraMuscular once  guaiFENesin  milliGRAM(s) Oral every 12 hours  heparin   Injectable 5000 Unit(s) SubCutaneous every 12 hours  insulin glargine Injectable (LANTUS) 23 Unit(s) SubCutaneous at bedtime  insulin lispro (ADMELOG) corrective regimen sliding scale   SubCutaneous every 6 hours  insulin lispro Injectable (ADMELOG) 9 Unit(s) SubCutaneous three times a day before meals  melatonin 6 milliGRAM(s) Oral at bedtime  sodium chloride 0.9%. 1000 milliLiter(s) (100 mL/Hr) IV Continuous <Continuous>    MEDICATIONS  (PRN):  acetaminophen     Tablet .. 650 milliGRAM(s) Oral every 6 hours PRN Temp greater or equal to 38C (100.4F), Mild Pain (1 - 3)  aluminum hydroxide/magnesium hydroxide/simethicone Suspension 30 milliLiter(s) Oral every 4 hours PRN Dyspepsia  benzonatate 100 milliGRAM(s) Oral every 8 hours PRN Cough  dextrose Oral Gel 15 Gram(s) Oral once PRN Blood Glucose LESS THAN 70 milliGRAM(s)/deciliter      CAPILLARY BLOOD GLUCOSE      POCT Blood Glucose.: 179 mg/dL (29 May 2024 05:05)  POCT Blood Glucose.: 244 mg/dL (29 May 2024 00:43)  POCT Blood Glucose.: 235 mg/dL (28 May 2024 22:38)  POCT Blood Glucose.: 255 mg/dL (28 May 2024 16:40)  POCT Blood Glucose.: 234 mg/dL (28 May 2024 12:24)  POCT Blood Glucose.: 231 mg/dL (28 May 2024 08:26)    I&O's Summary      PHYSICAL EXAM:  Vital Signs Last 24 Hrs  T(C): 36.4 (29 May 2024 04:55), Max: 36.8 (28 May 2024 20:24)  T(F): 97.5 (29 May 2024 04:55), Max: 98.3 (28 May 2024 20:24)  HR: 81 (29 May 2024 04:55) (79 - 90)  BP: 130/65 (29 May 2024 04:55) (112/78 - 132/65)  BP(mean): --  RR: 18 (29 May 2024 04:55) (16 - 18)  SpO2: 98% (29 May 2024 04:55) (95% - 100%)    Parameters below as of 29 May 2024 04:55  Patient On (Oxygen Delivery Method): room air        GENERAL: No acute distress, well-developed  HEAD:  Atraumatic, Normocephalic  EYES: EOMI, PERRLA, conjunctiva and sclera clear  NECK: Supple, no lymphadenopathy, no JVD  CHEST/LUNG: CTAB; No wheezes, rales, or rhonchi  HEART: Regular rate and rhythm; Normal s1 and s2, No murmurs, rubs, or gallops  ABDOMEN: Soft, non-tender, non-distended; normal bowel sounds, no organomegaly  EXTREMITIES:  2+ peripheral pulses b/l, No clubbing, cyanosis, or edema  NEUROLOGY: A&O x 3, no focal deficits  SKIN: No rashes or lesions          LABS:                        13.4   10.73 )-----------( 360      ( 29 May 2024 05:08 )             39.3     05-29    132<L>  |  97<L>  |  x   ----------------------------<  x   4.7   |  21<L>  |  x     Ca    10.0      29 May 2024 05:08  Phos  3.4     05-28  Mg     2.10     05-28    TPro  x   /  Alb  x   /  TBili  0.4  /  DBili  x   /  AST  x   /  ALT  x   /  AlkPhos  x   05-29    PT/INR - ( 29 May 2024 05:08 )   PT: 11.6 sec;   INR: 1.04 ratio         PTT - ( 29 May 2024 05:08 )  PTT:32.6 sec      Urinalysis Basic - ( 28 May 2024 05:57 )    Color: x / Appearance: x / SG: x / pH: x  Gluc: 236 mg/dL / Ketone: x  / Bili: x / Urobili: x   Blood: x / Protein: x / Nitrite: x   Leuk Esterase: x / RBC: x / WBC x   Sq Epi: x / Non Sq Epi: x / Bacteria: x          RADIOLOGY & ADDITIONAL TESTS:  Results Reviewed:   Imaging Personally Reviewed:  Electrocardiogram Personally Reviewed:    COORDINATION OF CARE:  Care Discussed with Consultants/Other Providers [Y/N]:  Prior or Outpatient Records Reviewed [Y/N]:   Dave Slater MD  Emergency Medicine & Internal Medicine PGY-2    PROGRESS NOTE:    ID #:     Patient is a 83y old  Male who presents with a chief complaint of cough, fever, weakness (28 May 2024 11:55)    SUBJECTIVE / OVERNIGHT EVENTS: No acute overnight events. Patient reports feeling well and denies complaints including fever, chills, cough, cp, sob, abd pain, nausea, vomiting, diarrhea      MEDICATIONS  (STANDING):  albuterol/ipratropium for Nebulization 3 milliLiter(s) Nebulizer every 6 hours  ascorbic acid 500 milliGRAM(s) Oral daily  aspirin enteric coated 81 milliGRAM(s) Oral daily  cefTRIAXone   IVPB 2000 milliGRAM(s) IV Intermittent every 24 hours  dextrose 10% Bolus 125 milliLiter(s) IV Bolus once  dextrose 5%. 1000 milliLiter(s) (100 mL/Hr) IV Continuous <Continuous>  dextrose 5%. 1000 milliLiter(s) (50 mL/Hr) IV Continuous <Continuous>  dextrose 50% Injectable 25 Gram(s) IV Push once  dextrose 50% Injectable 12.5 Gram(s) IV Push once  glucagon  Injectable 1 milliGRAM(s) IntraMuscular once  guaiFENesin  milliGRAM(s) Oral every 12 hours  heparin   Injectable 5000 Unit(s) SubCutaneous every 12 hours  insulin glargine Injectable (LANTUS) 23 Unit(s) SubCutaneous at bedtime  insulin lispro (ADMELOG) corrective regimen sliding scale   SubCutaneous every 6 hours  insulin lispro Injectable (ADMELOG) 9 Unit(s) SubCutaneous three times a day before meals  melatonin 6 milliGRAM(s) Oral at bedtime  sodium chloride 0.9%. 1000 milliLiter(s) (100 mL/Hr) IV Continuous <Continuous>    MEDICATIONS  (PRN):  acetaminophen     Tablet .. 650 milliGRAM(s) Oral every 6 hours PRN Temp greater or equal to 38C (100.4F), Mild Pain (1 - 3)  aluminum hydroxide/magnesium hydroxide/simethicone Suspension 30 milliLiter(s) Oral every 4 hours PRN Dyspepsia  benzonatate 100 milliGRAM(s) Oral every 8 hours PRN Cough  dextrose Oral Gel 15 Gram(s) Oral once PRN Blood Glucose LESS THAN 70 milliGRAM(s)/deciliter      CAPILLARY BLOOD GLUCOSE      POCT Blood Glucose.: 179 mg/dL (29 May 2024 05:05)  POCT Blood Glucose.: 244 mg/dL (29 May 2024 00:43)  POCT Blood Glucose.: 235 mg/dL (28 May 2024 22:38)  POCT Blood Glucose.: 255 mg/dL (28 May 2024 16:40)  POCT Blood Glucose.: 234 mg/dL (28 May 2024 12:24)  POCT Blood Glucose.: 231 mg/dL (28 May 2024 08:26)    I&O's Summary      PHYSICAL EXAM:  Vital Signs Last 24 Hrs  T(C): 36.4 (29 May 2024 04:55), Max: 36.8 (28 May 2024 20:24)  T(F): 97.5 (29 May 2024 04:55), Max: 98.3 (28 May 2024 20:24)  HR: 81 (29 May 2024 04:55) (79 - 90)  BP: 130/65 (29 May 2024 04:55) (112/78 - 132/65)  BP(mean): --  RR: 18 (29 May 2024 04:55) (16 - 18)  SpO2: 98% (29 May 2024 04:55) (95% - 100%)    Parameters below as of 29 May 2024 04:55  Patient On (Oxygen Delivery Method): room air      GENERAL: NAD, lying in bed comfortably  NECK: no JVD  HEART: S1, S2, Regular rate and rhythm, no murmurs, rubs, or gallops  LUNGS: Unlabored respirations, clear to auscultation bilaterally, no crackles, wheezing, or rhonchi. On 2L NC  ABDOMEN: Soft, nontender, nondistended, +BS  EXTREMITIES: 2+ peripheral pulses bilaterally. No clubbing, cyanosis, or edema  NERVOUS SYSTEM:  A&Ox3, no focal deficits   SKIN: No rashes or lesions        LABS:                        13.4   10.73 )-----------( 360      ( 29 May 2024 05:08 )             39.3     05-29    132<L>  |  97<L>  |  x   ----------------------------<  x   4.7   |  21<L>  |  x     Ca    10.0      29 May 2024 05:08  Phos  3.4     05-28  Mg     2.10     05-28    TPro  x   /  Alb  x   /  TBili  0.4  /  DBili  x   /  AST  x   /  ALT  x   /  AlkPhos  x   05-29    PT/INR - ( 29 May 2024 05:08 )   PT: 11.6 sec;   INR: 1.04 ratio         PTT - ( 29 May 2024 05:08 )  PTT:32.6 sec      Urinalysis Basic - ( 28 May 2024 05:57 )    Color: x / Appearance: x / SG: x / pH: x  Gluc: 236 mg/dL / Ketone: x  / Bili: x / Urobili: x   Blood: x / Protein: x / Nitrite: x   Leuk Esterase: x / RBC: x / WBC x   Sq Epi: x / Non Sq Epi: x / Bacteria: x          RADIOLOGY & ADDITIONAL TESTS:  Results Reviewed:   Imaging Personally Reviewed:  Electrocardiogram Personally Reviewed:    COORDINATION OF CARE:  Care Discussed with Consultants/Other Providers [Y/N]:  Prior or Outpatient Records Reviewed [Y/N]:

## 2024-05-29 NOTE — PROGRESS NOTE ADULT - SUBJECTIVE AND OBJECTIVE BOX
Interval History/ROS: Strep Gallolyticus bacteremia.   S/p SIMEON today to r/o endocarditis.  Denied any fever, chills, SOB, abdominal pain, diarrhea.     Allergies  tetracyclines (Rash)    ANTIMICROBIALS:    cefTRIAXone   IVPB 2000 every 24 hours    OTHER MEDS: MEDICATIONS  (STANDING):  acetaminophen     Tablet .. 650 every 6 hours PRN  albuterol/ipratropium for Nebulization 3 every 6 hours  aluminum hydroxide/magnesium hydroxide/simethicone Suspension 30 every 4 hours PRN  aspirin enteric coated 81 daily  benzonatate 100 every 8 hours PRN  dextrose 50% Injectable 25 once  dextrose 50% Injectable 12.5 once  dextrose Oral Gel 15 once PRN  glucagon  Injectable 1 once  guaiFENesin  every 12 hours  heparin   Injectable 5000 every 12 hours  insulin glargine Injectable (LANTUS) 23 at bedtime  insulin lispro (ADMELOG) corrective regimen sliding scale  every 6 hours  insulin lispro Injectable (ADMELOG) 9 three times a day before meals  melatonin 6 at bedtime    Vital Signs Last 24 Hrs  T(F): 97 (05-29-24 @ 12:30), Max: 100.2 (05-22-24 @ 17:19)    Vital Signs Last 24 Hrs  HR: 76 (05-29-24 @ 14:00) (72 - 90)  BP: 119/53 (05-29-24 @ 14:00) (96/49 - 132/65)  RR: 18 (05-29-24 @ 14:00)  SpO2: 99% (05-29-24 @ 14:00) (95% - 100%)  Wt(kg): --    EXAM:    GA: lethargic post SIMEON  CV: nl S1/S2  Lungs: CTAB, no distress  Abd: soft, nontender  Ext: no edema  Skin: Intact  IV: no phlebitis    Labs:                        13.4   10.73 )-----------( 360      ( 29 May 2024 05:08 )             39.3     05-29    132<L>  |  97<L>  |  29<H>  ----------------------------<  185<H>  4.7   |  21<L>  |  1.22    Ca    10.0      29 May 2024 05:08  Phos  2.8     05-29  Mg     2.40     05-29    TPro  7.9  /  Alb  3.2<L>  /  TBili  0.4  /  DBili  x   /  AST  36  /  ALT  53<H>  /  AlkPhos  123<H>  05-29    WBC Trend:  WBC Count: 10.73 (05-29-24 @ 05:08)  WBC Count: 10.40 (05-28-24 @ 05:57)  WBC Count: 9.68 (05-27-24 @ 10:23)  WBC Count: 10.28 (05-26-24 @ 06:28)    Creatine Trend:  Creatinine: 1.22 (05-29)  Creatinine: 1.25 (05-28)  Creatinine: 1.36 (05-27)  Creatinine: 1.37 (05-26)    Liver Biochemical Testing Trend:  Alanine Aminotransferase (ALT/SGPT): 53 *H* (05-29)  Alanine Aminotransferase (ALT/SGPT): 48 *H* (05-28)  Alanine Aminotransferase (ALT/SGPT): 42 *H* (05-27)  Alanine Aminotransferase (ALT/SGPT): 35 (05-26)  Alanine Aminotransferase (ALT/SGPT): 25 (05-25)  Aspartate Aminotransferase (AST/SGOT): 36 (05-29-24 @ 05:08)  Aspartate Aminotransferase (AST/SGOT): 41 (05-28-24 @ 05:57)  Aspartate Aminotransferase (AST/SGOT): 39 (05-27-24 @ 10:23)  Aspartate Aminotransferase (AST/SGOT): 47 (05-26-24 @ 06:28)  Aspartate Aminotransferase (AST/SGOT): 52 (05-25-24 @ 09:26)  Bilirubin Total: 0.4 (05-29)  Bilirubin Total: 0.6 (05-28)  Bilirubin Total: 0.6 (05-27)  Bilirubin Total: 0.6 (05-26)  Bilirubin Total: 1.0 (05-25)    Trend LDH    Urinalysis Basic - ( 29 May 2024 05:08 )    Color: x / Appearance: x / SG: x / pH: x  Gluc: 185 mg/dL / Ketone: x  / Bili: x / Urobili: x   Blood: x / Protein: x / Nitrite: x   Leuk Esterase: x / RBC: x / WBC x   Sq Epi: x / Non Sq Epi: x / Bacteria: x    MICROBIOLOGY:    MRSA PCR Result.: Neyda (05-23-24 @ 10:37)    Culture - Blood (collected 24 May 2024 06:30)  Source: .Blood Blood-Peripheral  Final Report:    No growth at 5 days    Culture - Blood (collected 24 May 2024 06:30)  Source: .Blood Blood-Venous  Final Report:    No growth at 5 days    Culture - Urine (collected 22 May 2024 21:43)  Source: Clean Catch Clean Catch (Midstream)  Final Report:    No growth    Culture - Blood (collected 22 May 2024 19:30)  Source: .Blood Blood-Peripheral  Final Report:    Growth in aerobic and anaerobic bottles: Streptococcus gallolyticus    See previous culture [id number]    Culture - Blood (collected 22 May 2024 19:15)  Source: .Blood Blood-Peripheral  Final Report:    Growth in aerobic and anaerobic bottles: Streptococcus gallolyticus    Direct identification is available within approximately 3-5    hours either by Blood Panel Multiplexed PCR or Direct    MALDI-TOF. Details: [email or web address]  Organism: Blood Culture PCR  Streptococcus gallolyticus  Organism: Streptococcus gallolyticus    Sensitivities:      -  Vancomycin: S 0.5      -  Ceftriaxone: S <=0.25      Method Type: YONATAN      -  Penicillin: S 0.06  Organism: Blood Culture PCR    Sensitivities:      Method Type: PCR      -  Streptococcus sp. (Not Grp A, B or S pneumoniae): Detec    HIV-1/2 Combo Result: Nonreact (05-25-24 @ 05:42)    Legionella Antigen, Urine: Negative (05-23 @ 18:37)    Procalcitonin: 0.40 (05-22)    Troponin T, High Sensitivity Result: 406 (05-25)  Troponin T, High Sensitivity Result: 393 (05-25)  Troponin T, High Sensitivity Result: 326 (05-24)  Troponin T, High Sensitivity Result: 283 (05-24)  Troponin T, High Sensitivity Result: 122 (05-23)  Troponin T, High Sensitivity Result: 83 (05-22)  Troponin T, High Sensitivity Result: 71 (05-22)    RADIOLOGY:  imaging below personally reviewed                     Interval History/ROS: Strep Gallolyticus bacteremia.   S/p SIMEON today to r/o endocarditis.  Denied any fever, chills, SOB, abdominal pain, diarrhea.     Allergies  tetracyclines (Rash)    ANTIMICROBIALS:    cefTRIAXone   IVPB 2000 every 24 hours    OTHER MEDS: MEDICATIONS  (STANDING):  acetaminophen     Tablet .. 650 every 6 hours PRN  albuterol/ipratropium for Nebulization 3 every 6 hours  aluminum hydroxide/magnesium hydroxide/simethicone Suspension 30 every 4 hours PRN  aspirin enteric coated 81 daily  benzonatate 100 every 8 hours PRN  dextrose 50% Injectable 25 once  dextrose 50% Injectable 12.5 once  dextrose Oral Gel 15 once PRN  glucagon  Injectable 1 once  guaiFENesin  every 12 hours  heparin   Injectable 5000 every 12 hours  insulin glargine Injectable (LANTUS) 23 at bedtime  insulin lispro (ADMELOG) corrective regimen sliding scale  every 6 hours  insulin lispro Injectable (ADMELOG) 9 three times a day before meals  melatonin 6 at bedtime    Vital Signs Last 24 Hrs  T(F): 97 (05-29-24 @ 12:30), Max: 100.2 (05-22-24 @ 17:19)    Vital Signs Last 24 Hrs  HR: 76 (05-29-24 @ 14:00) (72 - 90)  BP: 119/53 (05-29-24 @ 14:00) (96/49 - 132/65)  RR: 18 (05-29-24 @ 14:00)  SpO2: 99% (05-29-24 @ 14:00) (95% - 100%)  Wt(kg): --    EXAM:    GA: lethargic post SIMEON  CV: nl S1/S2  Lungs: CTAB, no distress  Abd: soft, nontender  Ext: no edema  Skin: Intact  IV: no phlebitis    Labs:                        13.4   10.73 )-----------( 360      ( 29 May 2024 05:08 )             39.3     05-29    132<L>  |  97<L>  |  29<H>  ----------------------------<  185<H>  4.7   |  21<L>  |  1.22    Ca    10.0      29 May 2024 05:08  Phos  2.8     05-29  Mg     2.40     05-29    TPro  7.9  /  Alb  3.2<L>  /  TBili  0.4  /  DBili  x   /  AST  36  /  ALT  53<H>  /  AlkPhos  123<H>  05-29    WBC Trend:  WBC Count: 10.73 (05-29-24 @ 05:08)  WBC Count: 10.40 (05-28-24 @ 05:57)  WBC Count: 9.68 (05-27-24 @ 10:23)  WBC Count: 10.28 (05-26-24 @ 06:28)    Creatine Trend:  Creatinine: 1.22 (05-29)  Creatinine: 1.25 (05-28)  Creatinine: 1.36 (05-27)  Creatinine: 1.37 (05-26)    Liver Biochemical Testing Trend:  Alanine Aminotransferase (ALT/SGPT): 53 *H* (05-29)  Alanine Aminotransferase (ALT/SGPT): 48 *H* (05-28)  Alanine Aminotransferase (ALT/SGPT): 42 *H* (05-27)  Alanine Aminotransferase (ALT/SGPT): 35 (05-26)  Alanine Aminotransferase (ALT/SGPT): 25 (05-25)  Aspartate Aminotransferase (AST/SGOT): 36 (05-29-24 @ 05:08)  Aspartate Aminotransferase (AST/SGOT): 41 (05-28-24 @ 05:57)  Aspartate Aminotransferase (AST/SGOT): 39 (05-27-24 @ 10:23)  Aspartate Aminotransferase (AST/SGOT): 47 (05-26-24 @ 06:28)  Aspartate Aminotransferase (AST/SGOT): 52 (05-25-24 @ 09:26)  Bilirubin Total: 0.4 (05-29)  Bilirubin Total: 0.6 (05-28)  Bilirubin Total: 0.6 (05-27)  Bilirubin Total: 0.6 (05-26)  Bilirubin Total: 1.0 (05-25)    Trend LDH    Urinalysis Basic - ( 29 May 2024 05:08 )    Color: x / Appearance: x / SG: x / pH: x  Gluc: 185 mg/dL / Ketone: x  / Bili: x / Urobili: x   Blood: x / Protein: x / Nitrite: x   Leuk Esterase: x / RBC: x / WBC x   Sq Epi: x / Non Sq Epi: x / Bacteria: x    MICROBIOLOGY:    MRSA PCR Result.: Neyda (05-23-24 @ 10:37)    Culture - Blood (collected 24 May 2024 06:30)  Source: .Blood Blood-Peripheral  Final Report:    No growth at 5 days    Culture - Blood (collected 24 May 2024 06:30)  Source: .Blood Blood-Venous  Final Report:    No growth at 5 days    Culture - Urine (collected 22 May 2024 21:43)  Source: Clean Catch Clean Catch (Midstream)  Final Report:    No growth    Culture - Blood (collected 22 May 2024 19:30)  Source: .Blood Blood-Peripheral  Final Report:    Growth in aerobic and anaerobic bottles: Streptococcus gallolyticus    See previous culture [id number]    Culture - Blood (collected 22 May 2024 19:15)  Source: .Blood Blood-Peripheral  Final Report:    Growth in aerobic and anaerobic bottles: Streptococcus gallolyticus    Direct identification is available within approximately 3-5    hours either by Blood Panel Multiplexed PCR or Direct    MALDI-TOF. Details: [email or web address]  Organism: Blood Culture PCR  Streptococcus gallolyticus  Organism: Streptococcus gallolyticus    Sensitivities:      -  Vancomycin: S 0.5      -  Ceftriaxone: S <=0.25      Method Type: YONATAN      -  Penicillin: S 0.06  Organism: Blood Culture PCR    Sensitivities:      Method Type: PCR      -  Streptococcus sp. (Not Grp A, B or S pneumoniae): Detec    HIV-1/2 Combo Result: Nonreact (05-25-24 @ 05:42)    Legionella Antigen, Urine: Negative (05-23 @ 18:37)    Procalcitonin: 0.40 (05-22)    Troponin T, High Sensitivity Result: 406 (05-25)  Troponin T, High Sensitivity Result: 393 (05-25)  Troponin T, High Sensitivity Result: 326 (05-24)  Troponin T, High Sensitivity Result: 283 (05-24)  Troponin T, High Sensitivity Result: 122 (05-23)  Troponin T, High Sensitivity Result: 83 (05-22)  Troponin T, High Sensitivity Result: 71 (05-22)    RADIOLOGY:  imaging below personally reviewed      < from: CT Abdomen and Pelvis w/ IV Cont (05.25.24 @ 20:23) >  IMPRESSION:  No bowel obstruction or inflammation.    Mild bladder wall thickening, which may represent cystitis or bladder   outlet obstruction the setting of prostatomegaly. Correlate with   urinalysis.    Small left and trace right pleural effusion with associated passive   atelectasis. Fibrotic changes at the lung bases, better characterized on   the prior CT chest from 5/23/2024.            --- End of Report ---            < end of copied text >  < from: CT Chest No Cont (05.23.24 @ 03:13) >  IMPRESSION:    UIP pattern of interstitial lung disease.    Rightward tracheal deviation, which is likely related to right lung   volume loss, and unchanged since 2019. No mediastinal mass.    --- End of Report ---      < end of copied text >                 Interval History/ROS: Strep Gallolyticus bacteremia.   S/p SIMEON today -  no vegetations   Denied any fever, chills, SOB, abdominal pain, diarrhea.     Allergies  tetracyclines (Rash)    ANTIMICROBIALS:    cefTRIAXone   IVPB 2000 every 24 hours    OTHER MEDS: MEDICATIONS  (STANDING):  acetaminophen     Tablet .. 650 every 6 hours PRN  albuterol/ipratropium for Nebulization 3 every 6 hours  aluminum hydroxide/magnesium hydroxide/simethicone Suspension 30 every 4 hours PRN  aspirin enteric coated 81 daily  benzonatate 100 every 8 hours PRN  dextrose 50% Injectable 25 once  dextrose 50% Injectable 12.5 once  dextrose Oral Gel 15 once PRN  glucagon  Injectable 1 once  guaiFENesin  every 12 hours  heparin   Injectable 5000 every 12 hours  insulin glargine Injectable (LANTUS) 23 at bedtime  insulin lispro (ADMELOG) corrective regimen sliding scale  every 6 hours  insulin lispro Injectable (ADMELOG) 9 three times a day before meals  melatonin 6 at bedtime    Vital Signs Last 24 Hrs  T(F): 97 (05-29-24 @ 12:30), Max: 100.2 (05-22-24 @ 17:19)    Vital Signs Last 24 Hrs  HR: 76 (05-29-24 @ 14:00) (72 - 90)  BP: 119/53 (05-29-24 @ 14:00) (96/49 - 132/65)  RR: 18 (05-29-24 @ 14:00)  SpO2: 99% (05-29-24 @ 14:00) (95% - 100%)  Wt(kg): --    EXAM:    GA: lethargic post SIMEON  CV: nl S1/S2  Lungs: CTAB, no distress  Abd: soft, nontender  Ext: no edema  Skin: Intact  IV: no phlebitis    Labs:                        13.4   10.73 )-----------( 360      ( 29 May 2024 05:08 )             39.3     05-29    132<L>  |  97<L>  |  29<H>  ----------------------------<  185<H>  4.7   |  21<L>  |  1.22    Ca    10.0      29 May 2024 05:08  Phos  2.8     05-29  Mg     2.40     05-29    TPro  7.9  /  Alb  3.2<L>  /  TBili  0.4  /  DBili  x   /  AST  36  /  ALT  53<H>  /  AlkPhos  123<H>  05-29    WBC Trend:  WBC Count: 10.73 (05-29-24 @ 05:08)  WBC Count: 10.40 (05-28-24 @ 05:57)  WBC Count: 9.68 (05-27-24 @ 10:23)  WBC Count: 10.28 (05-26-24 @ 06:28)    Creatine Trend:  Creatinine: 1.22 (05-29)  Creatinine: 1.25 (05-28)  Creatinine: 1.36 (05-27)  Creatinine: 1.37 (05-26)    Liver Biochemical Testing Trend:  Alanine Aminotransferase (ALT/SGPT): 53 *H* (05-29)  Alanine Aminotransferase (ALT/SGPT): 48 *H* (05-28)  Alanine Aminotransferase (ALT/SGPT): 42 *H* (05-27)  Alanine Aminotransferase (ALT/SGPT): 35 (05-26)  Alanine Aminotransferase (ALT/SGPT): 25 (05-25)  Aspartate Aminotransferase (AST/SGOT): 36 (05-29-24 @ 05:08)  Aspartate Aminotransferase (AST/SGOT): 41 (05-28-24 @ 05:57)  Aspartate Aminotransferase (AST/SGOT): 39 (05-27-24 @ 10:23)  Aspartate Aminotransferase (AST/SGOT): 47 (05-26-24 @ 06:28)  Aspartate Aminotransferase (AST/SGOT): 52 (05-25-24 @ 09:26)  Bilirubin Total: 0.4 (05-29)  Bilirubin Total: 0.6 (05-28)  Bilirubin Total: 0.6 (05-27)  Bilirubin Total: 0.6 (05-26)  Bilirubin Total: 1.0 (05-25)    Trend LDH    Urinalysis Basic - ( 29 May 2024 05:08 )    Color: x / Appearance: x / SG: x / pH: x  Gluc: 185 mg/dL / Ketone: x  / Bili: x / Urobili: x   Blood: x / Protein: x / Nitrite: x   Leuk Esterase: x / RBC: x / WBC x   Sq Epi: x / Non Sq Epi: x / Bacteria: x    MICROBIOLOGY:    MRSA PCR Result.: Neyda (05-23-24 @ 10:37)    Culture - Blood (collected 24 May 2024 06:30)  Source: .Blood Blood-Peripheral  Final Report:    No growth at 5 days    Culture - Blood (collected 24 May 2024 06:30)  Source: .Blood Blood-Venous  Final Report:    No growth at 5 days    Culture - Urine (collected 22 May 2024 21:43)  Source: Clean Catch Clean Catch (Midstream)  Final Report:    No growth    Culture - Blood (collected 22 May 2024 19:30)  Source: .Blood Blood-Peripheral  Final Report:    Growth in aerobic and anaerobic bottles: Streptococcus gallolyticus    See previous culture [id number]    Culture - Blood (collected 22 May 2024 19:15)  Source: .Blood Blood-Peripheral  Final Report:    Growth in aerobic and anaerobic bottles: Streptococcus gallolyticus    Direct identification is available within approximately 3-5    hours either by Blood Panel Multiplexed PCR or Direct    MALDI-TOF. Details: [email or web address]  Organism: Blood Culture PCR  Streptococcus gallolyticus  Organism: Streptococcus gallolyticus    Sensitivities:      -  Vancomycin: S 0.5      -  Ceftriaxone: S <=0.25      Method Type: YONATAN      -  Penicillin: S 0.06  Organism: Blood Culture PCR    Sensitivities:      Method Type: PCR      -  Streptococcus sp. (Not Grp A, B or S pneumoniae): Detec    HIV-1/2 Combo Result: Nonreact (05-25-24 @ 05:42)    Legionella Antigen, Urine: Negative (05-23 @ 18:37)    Procalcitonin: 0.40 (05-22)    Troponin T, High Sensitivity Result: 406 (05-25)  Troponin T, High Sensitivity Result: 393 (05-25)  Troponin T, High Sensitivity Result: 326 (05-24)  Troponin T, High Sensitivity Result: 283 (05-24)  Troponin T, High Sensitivity Result: 122 (05-23)  Troponin T, High Sensitivity Result: 83 (05-22)  Troponin T, High Sensitivity Result: 71 (05-22)    RADIOLOGY:  imaging below personally reviewed      < from: CT Abdomen and Pelvis w/ IV Cont (05.25.24 @ 20:23) >  IMPRESSION:  No bowel obstruction or inflammation.    Mild bladder wall thickening, which may represent cystitis or bladder   outlet obstruction the setting of prostatomegaly. Correlate with   urinalysis.    Small left and trace right pleural effusion with associated passive   atelectasis. Fibrotic changes at the lung bases, better characterized on   the prior CT chest from 5/23/2024.            --- End of Report ---            < end of copied text >  < from: CT Chest No Cont (05.23.24 @ 03:13) >  IMPRESSION:    UIP pattern of interstitial lung disease.    Rightward tracheal deviation, which is likely related to right lung   volume loss, and unchanged since 2019. No mediastinal mass.    --- End of Report ---      < end of copied text >

## 2024-05-29 NOTE — PROGRESS NOTE ADULT - ATTENDING COMMENTS
83M with PMHx RA on enbrel, DM2 on insulin, CAD s/p stent, CKD? a/w sepsis 2/2 PNA c/b strep gallolyticus bacteremia , NILES and elevated HsT (in the absence of acute ischemic changes and chest pain) suspected in the setting of demand ischemia. Now s/p SIMEON today 5/29 personally reviewed  and w/ no evidence of vegetation.      -Blood cx + for strep gallolyticus on 5/22. Repeat blood cx on 5/24 NTD. C/w ceftriaxone.  CT chest w. findings c/w UIP and CT A/P w/o any nidus of infection identified. Given the type of strep and its association with endocarditis pt now s/p SIMEON w/ normal LV fxn and no evidence of vegetation/thrombus. Strep gallolyticus also known to be associated w/ GI pathology/ malignancy.  Pt will need outpt eval w/ GI. ID following and d/w Dr. Alvarado this am---> plan will be for two week course of IV ceftriaxone given SIMEON negative. Currently holding enbrel for now in setting of infection  -Monitor resp status. Pt currently not requiring O2.   -Pt elevation from Hst w/ known hx of CAD likely in the setting of demand ischemia. Pt is w/o chest pain and EKG w/o acute ischemic changes. Now s/p TTE personally reviewed and notable for EF 47% and apical abnormalities, otherwise was noted to be a technical difficulty study. However, on SIMEON these abnormalities were not noted and pt w/ normal heart fxn. Cardiology following and recs appreciated. Will c/w medical management. C/w ASA . Pt reports he stopped taking his plavix about 15 days PTA. Per cardiogy ok to be off plavix.  -Pt initially on prednisone on admission. Mild wheezing heard on exam at that time now improved.  Pt denies any hx of COPD/asthma and states he smoked minimally for a short period of time in his 20s. Prednisone dcd and pt stable off steroids. CT chest w/ underlying lung disease that may be c/w UIP. Pt will need further w/u and evaluation w/ pulm especially for PFTs to determine if any further medications would be indicated. C/w inhalers for now  -Trend renal output and creatine levels. Cr improved. Continue to encourage PO hydration.    -Noted mild hyponatremia. However, This is likely in the setting of patients hyperglycemia. Will continue to optimize DM control as stated above    Dispo- pending IV abx arrangements. PT rec rehab. However pt wants home PT. Will f/u SW/LAINEY     Pt seen and examined on the am of 5/28 83M with PMHx RA on enbrel, DM2 on insulin, CAD s/p stent, CKD? a/w sepsis 2/2 PNA c/b strep gallolyticus bacteremia , NILES and elevated HsT (in the absence of acute ischemic changes and chest pain) suspected in the setting of demand ischemia. Now s/p SIMEON today 5/29 personally reviewed  and w/ no evidence of vegetation.      -Blood cx + for strep gallolyticus on 5/22. Repeat blood cx on 5/24 NTD. C/w ceftriaxone.  CT chest w. findings c/w UIP and CT A/P w/o any nidus of infection identified. Given the type of strep and its association with endocarditis pt now s/p SIMEON w/ normal LV fxn and no evidence of vegetation/thrombus. Strep gallolyticus also known to be associated w/ GI pathology/ malignancy.  Pt will need outpt eval w/ GI. ID following and d/w Dr. Alvarado this am---> plan will be for two week course of IV ceftriaxone given SIMEON negative. Currently holding enbrel for now in setting of infection  -Monitor resp status. Pt currently not requiring O2.   -Pt elevation from Hst w/ known hx of CAD likely in the setting of demand ischemia. Pt is w/o chest pain and EKG w/o acute ischemic changes. Now s/p TTE personally reviewed and notable for EF 47% and apical abnormalities, otherwise was noted to be a technical difficulty study. However, on SIMEON these abnormalities were not noted and pt w/ normal heart fxn. Cardiology following and recs appreciated. Will c/w medical management. C/w ASA . Pt reports he stopped taking his plavix about 15 days PTA. Per cardiogy ok to be off plavix.  -Pt initially on prednisone on admission. Mild wheezing heard on exam at that time now improved.  Pt denies any hx of COPD/asthma and states he smoked minimally for a short period of time in his 20s. Prednisone dcd and pt stable off steroids. CT chest w/ underlying lung disease that may be c/w UIP. Pt will need further w/u and evaluation w/ pulm especially for PFTs to determine if any further medications would be indicated. C/w inhalers for now  -Trend renal output and creatine levels. Cr improved. Continue to encourage PO hydration.    -Noted mild hyponatremia. However, This is likely in the setting of patients hyperglycemia. Will continue to optimize DM control as stated above    Dispo- pending IV abx arrangements. PT rec rehab. However pt wants home PT. Will f/u SW/LAINEY     Pt seen and examined on the am of 5/29

## 2024-05-29 NOTE — CHART NOTE - NSCHARTNOTEFT_GEN_A_CORE
Discussed GOC with patient and patient's granddaughter. They state they are continuing to discuss with the patient's wife who is HCP but currently request full code status. Willing to revisit the discussion at a later time

## 2024-05-29 NOTE — PROGRESS NOTE ADULT - SUBJECTIVE AND OBJECTIVE BOX
CARDIOLOGY FOLLOW UP - Dr. Downing  DATE OF SERVICE: 5/29/24     CC no acute events       REVIEW OF SYSTEMS:  CONSTITUTIONAL: No fever, weight loss, or fatigue  RESPIRATORY: No cough, wheezing, chills or hemoptysis; No Shortness of Breath  CARDIOVASCULAR: No chest pain, palpitations, passing out, dizziness, or leg swelling  GASTROINTESTINAL: No abdominal or epigastric pain. No nausea, vomiting, or hematemesis; No diarrhea or constipation. No melena or hematochezia.  VASCULAR: No edema     PHYSICAL EXAM:  T(C): 36.4 (05-29-24 @ 04:55), Max: 36.8 (05-28-24 @ 20:24)  HR: 75 (05-29-24 @ 08:12) (75 - 90)  BP: 130/65 (05-29-24 @ 04:55) (112/78 - 132/65)  RR: 18 (05-29-24 @ 04:55) (16 - 18)  SpO2: 95% (05-29-24 @ 08:12) (95% - 100%)  Wt(kg): --  I&O's Summary      Appearance: Normal	  Cardiovascular: Normal S1 S2,RRR, No JVD, No murmurs  Respiratory: Lungs clear to auscultation	  Gastrointestinal:  Soft, Non-tender, + BS	  Extremities: Normal range of motion, No clubbing, cyanosis or edema      Home Medications:  aspirin 81 mg oral tablet: 1 tab(s) orally once a day (23 May 2024 11:08)  Bactrim  mg-160 mg oral tablet: 1 tab(s) orally 2 times a day x 7 days (Filled on 5/20/24) (23 May 2024 11:09)  clopidogrel 75 mg oral tablet: 1 tab(s) orally once a day (23 May 2024 11:08)  Enbrel SureClick 50 mg/mL subcutaneous solution: 50 milligram(s) subcutaneous once a week (23 May 2024 11:07)  NovoLOG FlexPen 100 units/mL injectable solution: 6 unit(s) injectable once a day (in the afternoon) (23 May 2024 11:08)  NovoLOG FlexPen 100 units/mL injectable solution: 16 unit(s) subcutaneous once a day -AM (23 May 2024 11:08)  NovoLOG Mix 70/30 FlexPen subcutaneous suspension: 16 unit(s) subcutaneous once a day (at bedtime) (23 May 2024 11:08)  Vitamin C 500 mg oral tablet: 1 tab(s) orally once a day (23 May 2024 03:00)      MEDICATIONS  (STANDING):  albuterol/ipratropium for Nebulization 3 milliLiter(s) Nebulizer every 6 hours  ascorbic acid 500 milliGRAM(s) Oral daily  aspirin enteric coated 81 milliGRAM(s) Oral daily  cefTRIAXone   IVPB 2000 milliGRAM(s) IV Intermittent every 24 hours  dextrose 10% Bolus 125 milliLiter(s) IV Bolus once  dextrose 5%. 1000 milliLiter(s) (50 mL/Hr) IV Continuous <Continuous>  dextrose 5%. 1000 milliLiter(s) (100 mL/Hr) IV Continuous <Continuous>  dextrose 50% Injectable 25 Gram(s) IV Push once  dextrose 50% Injectable 12.5 Gram(s) IV Push once  glucagon  Injectable 1 milliGRAM(s) IntraMuscular once  guaiFENesin  milliGRAM(s) Oral every 12 hours  heparin   Injectable 5000 Unit(s) SubCutaneous every 12 hours  insulin glargine Injectable (LANTUS) 23 Unit(s) SubCutaneous at bedtime  insulin lispro (ADMELOG) corrective regimen sliding scale   SubCutaneous every 6 hours  insulin lispro Injectable (ADMELOG) 9 Unit(s) SubCutaneous three times a day before meals  melatonin 6 milliGRAM(s) Oral at bedtime  sodium chloride 0.9%. 1000 milliLiter(s) (100 mL/Hr) IV Continuous <Continuous>      TELEMETRY:  nsr 	    ECG:  	  RADIOLOGY:   < from: CT Abdomen and Pelvis w/ IV Cont (05.25.24 @ 20:23) >  IMPRESSION:  No bowel obstruction or inflammation.    Mild bladder wall thickening, which may represent cystitis or bladder   outlet obstruction the setting of prostatomegaly. Correlate with   urinalysis.    Small left and trace right pleural effusion with associated passive   atelectasis. Fibrotic changes at the lung bases, better characterized on   the prior CT chest from 5/23/2024.    < end of copied text >    DIAGNOSTIC TESTING:  [ ] Echocardiogram:  [ ]  Catheterization:  [ ] Stress Test:    OTHER: 	    LABS:	 	    Troponin T, High Sensitivity Result: 406 ng/L (05-25 @ 05:42)  CKMB Units: 13.3 ng/mL (05-25 @ 05:42)  Creatine Kinase, Serum: 313 U/L [30 - 200] (05-25 @ 05:42)  Troponin T, High Sensitivity Result: 393 ng/L (05-25 @ 05:42)  Troponin T, High Sensitivity Result: 326 ng/L (05-24 @ 15:12)  Troponin T, High Sensitivity Result: 283 ng/L (05-24 @ 06:30)  Troponin T, High Sensitivity Result: 122 ng/L (05-23 @ 06:00)  Creatine Kinase, Serum: 218 U/L [30 - 200] (05-23 @ 06:00)  CKMB Units: 11.7 ng/mL (05-23 @ 06:00)  Troponin T, High Sensitivity Result: 83 ng/L (05-22 @ 21:40)  Creatine Kinase, Serum: 153 U/L [30 - 200] (05-22 @ 21:40)  CKMB Units: 7.3 ng/mL (05-22 @ 21:40)  Troponin T, High Sensitivity Result: 71 ng/L (05-22 @ 19:15)                          13.4   10.73 )-----------( 360      ( 29 May 2024 05:08 )             39.3     05-29    132<L>  |  97<L>  |  29<H>  ----------------------------<  185<H>  4.7   |  21<L>  |  1.22    Ca    10.0      29 May 2024 05:08  Phos  2.8     05-29  Mg     2.40     05-29    TPro  7.9  /  Alb  3.2<L>  /  TBili  0.4  /  DBili  x   /  AST  36  /  ALT  53<H>  /  AlkPhos  123<H>  05-29    PT/INR - ( 29 May 2024 05:08 )   PT: 11.6 sec;   INR: 1.04 ratio         PTT - ( 29 May 2024 05:08 )  PTT:32.6 sec

## 2024-05-29 NOTE — PROGRESS NOTE ADULT - PROBLEM SELECTOR PLAN 7
Hospital Bundle  Fluids: IVF  Electrolytes: Replete K > 4, Mg > 2, Phos > 3  Nutrition: Diet regular  PPX  ---VTE: HSQ  ---GI: diet  ---Resp: IS  Access: PIV  Code Status: FULL  Dispo: pending medical management. PT recs BAN

## 2024-05-29 NOTE — PROGRESS NOTE ADULT - ATTENDING COMMENTS
This is an 82 y/o M w/ PMHx RA on enbrel, DM2 on insulin, CAD s/p stent, CKD presenting with fever, cough, weakness,   Tm 100.2, tachy to 105, other VSS. WBC elevated to 16.6, NILES to 1.85, lactate 2.3. U/A w/o pyuria, RVP negative COVID, RSV, Flu.   BCx w/ 4/4 Strep gallolyticus.  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     #Strep gallolyticus bacteremia   #Severe sepsis   #NILES     Overall,  82 y/o M w/ PMHx RA on enbrel, DM2 on insulin, CAD s/p stent, CKD with strep gallolyticus bacteremia. Unclear source, however need to r/o abdominal source and any colorectal malignancy. Would treat with Ceftriaxone 2 g 24, repeat BCx, obtain TTE, will likely need SIMEON as well given high risk of IE with this species of strep. Would obtain CT A/P w/ IV contrast for abdominal source.   TTE poor quality, would pursue SIMEON. CT A/P without acute findings, possible cystitis?   SIMEON w/o vegetations.   Would plan for 2 week course of Ceftriaxone 2 g q24.     Plan:  1. Ceftriaxone 2 g q 24 , plan to complete 2 weeks course of Ceftriaxone (through 6/7) - will need a midline   2. Check CBC/CMP next week - Fax to [phone]  3. Please have patient call [phone] for ID outpatient followup   3. Outpatient GI evaluation for colonoscopy as Strep Gallolyticus bacteremia associated with Colon Cancer      Thank you for this consult. Inpatient ID team will follow.    Jeison Alvarado M.D.  Attending Physician  Division of Infectious Diseases  Department of Medicine

## 2024-05-29 NOTE — ASU PATIENT PROFILE, ADULT - FALL HARM RISK - HARM RISK INTERVENTIONS

## 2024-05-29 NOTE — PROGRESS NOTE ADULT - PROBLEM SELECTOR PLAN 3
Glucose trending upwards (214-396). Takes 16U basal and 6U premeal at home. A1c 7.4  - lantus 23U, premeal 9U, MARTY  - monitor BG for goal 140-180 inpatient

## 2024-05-29 NOTE — PROGRESS NOTE ADULT - PROBLEM SELECTOR PLAN 6
- Pt is s/p stent ~20 years ago, with hx of distal LAD occlusion on [facility] 2023  - Patient self-discontinued plavix ~1month ago and unclear why. Pt is continuing on aspirin 81 mg.  - Patient needs f/u with his cardiologist Dr. Downing.

## 2024-05-29 NOTE — PROGRESS NOTE ADULT - ASSESSMENT
A/P  83M with PMHx RA on enbrel, DM2 on insulin, CAD s/p stent, CKD? presenting with fever, cough and weakness. Found to have sepsis 2/2 PNA, NILES.    #Pneumonia  #strep gallolyticus bacteremia.  -management per ID - awaiting SIMEON     #NILES  -in setting of pna, hypovolemia  -ivf    #Elevated troponin, CAD, s/p PCI  -Trop, ckmb elevated in setting of known cad,  of mid LAD, PNA, niles representing demand ischemia  -no ACS, no angina, no acute ischemic ecg abnl   -Echo with mildly decreased lvef 47%, akinesis of apical wall, regional WMA, no sig valvular dz   -continue ASA only, ok to be off of plavix   -cath 2023 with closed mid LAD stent with distal LAD filling with well developed collaterals   -No cp on exam  -cont med tx of cad for now - no need for ischemic eval     #Type 2 diabetes mellitus with hyperglycemia  -tx per med    #H/O rheumatoid arthritis  -tx per med

## 2024-05-29 NOTE — PROGRESS NOTE ADULT - PROBLEM SELECTOR PLAN 4
Creatinine 1.71, but relatively stable since hospital admission. Likely prerenal in setting of decreased PO intake vs sepsis, downtrending. Cr trending down, now at 1.25  - Hold ACE-I though patient denies taking  - s/p IVF  - UA neg  - monitor BMP daily

## 2024-05-29 NOTE — PROGRESS NOTE ADULT - ASSESSMENT
83M with PMHx RA on enbrel, DM2 on insulin, CAD s/p stent, CKD? presented on 5/23 with fever, cough and weakness.    On presentation    Febrile 100.2   Tachycardic   Leukocytosis 16-->18-->15    Workup:   Blood Cx 5/22: Strep Gallolyticus (4/4 bottles)   Blood Cx 5/24: NGTD    CT chest: UIP pattern of interstitial lung disease. Rightward tracheal deviation, which is likely related to right lung volume loss, and unchanged since 2019. No mediastinal mass.  RSV/Flu/Covid neg  Legionella urine Ag neg   MRSA PCR neg   UA with no cell count neg     Antimicrobials:   Zosyn 5/22--> 5/24  Azithromycin 5/22  Ceftriaxone 2 g IV daily 5/25-->    #Sepsis, fever, tachycardia, leukocytosis - resolved   #High grade Strep Gallolyticus bacteremia source likely GI in the absence of other sources   #Acute hypoxic respiratory failure, ILD finding on chest imaging - now resolved     Recommendations:   -Continue Ceftriaxone 2 g IV daily, plan to complete 2 weeks course of Ceftriaxone (through 6/7) - will need a midline   -Check CBC/CMP one time next week - Fax to   -Outpatient GI evaluation for colonoscopy as Strep Gallolyticus bacteremia associated with Colon Cancer      Humphrey Pandey MD, PGY5  ID fellow  [employer] Preferred  After 5pm/weekends call [phone]   83M with PMHx RA on enbrel, DM2 on insulin, CAD s/p stent, CKD? presented on 5/23 with fever, cough and weakness.    On presentation    Febrile 100.2   Tachycardic   Leukocytosis 16-->18-->15    Workup:   Blood Cx 5/22: Strep Gallolyticus (4/4 bottles)   Blood Cx 5/24: NGTD    CT chest: UIP pattern of interstitial lung disease. Rightward tracheal deviation, which is likely related to right lung volume loss, and unchanged since 2019. No mediastinal mass.  RSV/Flu/Covid neg  Legionella urine Ag neg   MRSA PCR neg   UA with no cell count neg   TTE: no vegetations   SIMEON with no evidence of vegetations   Strongyloides antibodies neg     Antimicrobials:   Zosyn 5/22--> 5/24  Azithromycin 5/22  Ceftriaxone 2 g IV daily 5/25-->    #Sepsis, fever, tachycardia, leukocytosis - resolved   #High grade Strep Gallolyticus bacteremia source likely GI in the absence of other sources   #Acute hypoxic respiratory failure, ILD finding on chest imaging - now resolved     Recommendations:   -Continue Ceftriaxone 2 g IV daily, plan to complete 2 weeks course of Ceftriaxone (through 6/7) - will need a midline   -Check CBC/CMP next week - Fax to [phone]  -Outpatient GI evaluation for colonoscopy as Strep Gallolyticus bacteremia associated with Colon Cancer      Humphrey Pandey MD, PGY5  ID fellow  [employer] Teams Preferred  After 5pm/weekends call [phone]

## 2024-05-29 NOTE — PROGRESS NOTE ADULT - ASSESSMENT
83M with PMHx RA on enbrel, DM2 on insulin, CAD s/p stent, presenting with fever, cough and weakness. Found to be septic with strep galloyticus bacteremia, elevated troponins. Cards c/s, c/f demand ischemia. Pending further infectious management including SIMEON.

## 2024-05-29 NOTE — ASU PATIENT PROFILE, ADULT - PAIN SCALE PREFERRED, PROFILE
[Fever] : no fever [Chills] : no chills [Night Sweats] : no night sweats [Earache] : no earache numerical 0-10 [Hearing Loss] : no hearing loss [Nasal Discharge] : nasal discharge [Sore Throat] : sore throat [Postnasal Drip] : postnasal drip [Shortness Of Breath] : no shortness of breath [Wheezing] : no wheezing [Cough] : no cough [Negative] : Neurological

## 2024-05-30 PROCEDURE — 99232 SBSQ HOSP IP/OBS MODERATE 35: CPT | Mod: GC

## 2024-05-30 RX ADMIN — INSULIN GLARGINE 25 UNIT(S): 100 INJECTION, SOLUTION SUBCUTANEOUS at 21:54

## 2024-05-30 RX ADMIN — Medication 6 MILLIGRAM(S): at 21:53

## 2024-05-30 RX ADMIN — Medication 3 MILLILITER(S): at 03:50

## 2024-05-30 RX ADMIN — CEFTRIAXONE 100 MILLIGRAM(S): 500 INJECTION, POWDER, FOR SOLUTION INTRAMUSCULAR; INTRAVENOUS at 11:27

## 2024-05-30 RX ADMIN — Medication 2: at 12:48

## 2024-05-30 RX ADMIN — Medication 81 MILLIGRAM(S): at 11:20

## 2024-05-30 RX ADMIN — Medication 11 UNIT(S): at 08:44

## 2024-05-30 RX ADMIN — HEPARIN SODIUM 5000 UNIT(S): 5000 INJECTION INTRAVENOUS; SUBCUTANEOUS at 17:13

## 2024-05-30 RX ADMIN — Medication 30 MILLILITER(S): at 21:58

## 2024-05-30 RX ADMIN — HEPARIN SODIUM 5000 UNIT(S): 5000 INJECTION INTRAVENOUS; SUBCUTANEOUS at 05:06

## 2024-05-30 RX ADMIN — Medication 500 MILLIGRAM(S): at 11:20

## 2024-05-30 RX ADMIN — Medication 11 UNIT(S): at 12:49

## 2024-05-30 RX ADMIN — Medication 1: at 19:15

## 2024-05-30 RX ADMIN — Medication 3 MILLILITER(S): at 08:09

## 2024-05-30 RX ADMIN — Medication 3 MILLILITER(S): at 20:49

## 2024-05-30 RX ADMIN — Medication 11 UNIT(S): at 19:15

## 2024-05-30 RX ADMIN — Medication 3 MILLILITER(S): at 14:56

## 2024-05-30 RX ADMIN — Medication 600 MILLIGRAM(S): at 17:13

## 2024-05-30 RX ADMIN — Medication 600 MILLIGRAM(S): at 05:06

## 2024-05-30 NOTE — DIETITIAN INITIAL EVALUATION ADULT - PERTINENT LABORATORY DATA
05-29    132<L>  |  97<L>  |  29<H>  ----------------------------<  185<H>  4.7   |  21<L>  |  1.22    Ca    10.0      29 May 2024 05:08  Phos  2.8     05-29  Mg     2.40     05-29    TPro  7.9  /  Alb  3.2<L>  /  TBili  0.4  /  DBili  x   /  AST  36  /  ALT  53<H>  /  AlkPhos  123<H>  05-29    CAPILLARY BLOOD GLUCOSE  POCT Blood Glucose.: 205 mg/dL (30 May 2024 12:19)  POCT Blood Glucose.: 131 mg/dL (30 May 2024 08:11)  POCT Blood Glucose.: 105 mg/dL (29 May 2024 22:27)  POCT Blood Glucose.: 204 mg/dL (29 May 2024 16:40)    A1C with Estimated Average Glucose Result: 7.4 % (05-24-24 @ 06:30)  A1C with Estimated Average Glucose Result: 7.2 % (05-23-24 @ 06:00)  A1C with Estimated Average Glucose Result: 7.1 % (05-22-24 @ 19:15)

## 2024-05-30 NOTE — PROGRESS NOTE ADULT - SUBJECTIVE AND OBJECTIVE BOX
CARDIOLOGY FOLLOW UP - Dr. Downing  DATE OF SERVICE: 5/30/24    CC no cp or sob      REVIEW OF SYSTEMS:  CONSTITUTIONAL: No fever, weight loss, or fatigue  RESPIRATORY: No cough, wheezing, chills or hemoptysis; No Shortness of Breath  CARDIOVASCULAR: No chest pain, palpitations, passing out, dizziness, or leg swelling  GASTROINTESTINAL: No abdominal or epigastric pain. No nausea, vomiting, or hematemesis; No diarrhea or constipation. No melena or hematochezia.  VASCULAR: No edema     PHYSICAL EXAM:  T(C): 36.3 (05-30-24 @ 11:12), Max: 36.9 (05-29-24 @ 20:16)  HR: 85 (05-30-24 @ 11:12) (81 - 88)  BP: 119/67 (05-30-24 @ 11:12) (115/64 - 128/60)  RR: 17 (05-30-24 @ 11:12) (16 - 18)  SpO2: 99% (05-30-24 @ 11:12) (97% - 100%)  Wt(kg): --  I&O's Summary      Appearance: Normal	  Cardiovascular: Normal S1 S2,RRR, No JVD, No murmurs  Respiratory: Lungs clear to auscultation	  Gastrointestinal:  Soft, Non-tender, + BS	  Extremities: Normal range of motion, No clubbing, cyanosis or edema      Home Medications:  aspirin 81 mg oral tablet: 1 tab(s) orally once a day (23 May 2024 11:08)  Bactrim  mg-160 mg oral tablet: 1 tab(s) orally 2 times a day x 7 days (Filled on 5/20/24) (23 May 2024 11:09)  clopidogrel 75 mg oral tablet: 1 tab(s) orally once a day (23 May 2024 11:08)  Enbrel SureClick 50 mg/mL subcutaneous solution: 50 milligram(s) subcutaneous once a week (23 May 2024 11:07)  NovoLOG FlexPen 100 units/mL injectable solution: 6 unit(s) injectable once a day (in the afternoon) (23 May 2024 11:08)  NovoLOG FlexPen 100 units/mL injectable solution: 16 unit(s) subcutaneous once a day -AM (23 May 2024 11:08)  NovoLOG Mix 70/30 FlexPen subcutaneous suspension: 16 unit(s) subcutaneous once a day (at bedtime) (23 May 2024 11:08)  Vitamin C 500 mg oral tablet: 1 tab(s) orally once a day (23 May 2024 03:00)      MEDICATIONS  (STANDING):  albuterol/ipratropium for Nebulization 3 milliLiter(s) Nebulizer every 6 hours  ascorbic acid 500 milliGRAM(s) Oral daily  aspirin enteric coated 81 milliGRAM(s) Oral daily  cefTRIAXone   IVPB 2000 milliGRAM(s) IV Intermittent every 24 hours  dextrose 10% Bolus 125 milliLiter(s) IV Bolus once  dextrose 5%. 1000 milliLiter(s) (50 mL/Hr) IV Continuous <Continuous>  dextrose 5%. 1000 milliLiter(s) (100 mL/Hr) IV Continuous <Continuous>  dextrose 50% Injectable 25 Gram(s) IV Push once  dextrose 50% Injectable 12.5 Gram(s) IV Push once  glucagon  Injectable 1 milliGRAM(s) IntraMuscular once  guaiFENesin  milliGRAM(s) Oral every 12 hours  heparin   Injectable 5000 Unit(s) SubCutaneous every 12 hours  insulin glargine Injectable (LANTUS) 25 Unit(s) SubCutaneous at bedtime  insulin lispro (ADMELOG) corrective regimen sliding scale   SubCutaneous at bedtime  insulin lispro (ADMELOG) corrective regimen sliding scale   SubCutaneous three times a day before meals  insulin lispro Injectable (ADMELOG) 11 Unit(s) SubCutaneous three times a day before meals  melatonin 6 milliGRAM(s) Oral at bedtime  sodium chloride 0.9%. 1000 milliLiter(s) (100 mL/Hr) IV Continuous <Continuous>      TELEMETRY: 	    ECG:  	  RADIOLOGY:   DIAGNOSTIC TESTING:  [ ] Echocardiogram:  < from: SIMEON W or WO Ultrasound Enhancing Agent (05.29.24 @ 11:30) >     CONCLUSIONS:      1. Left ventricular systolic function is normal. There are no regional wall motion abnormalities seen.   2. Normal right ventricular cavity size and normal systolic function.   3. Structurally normal mitral valve with normal leaflet excursion. No vegetations seen in association with the mitral valve. There is calcification of the mitral valve annulus. There is mild mitral regurgitation.   4. The aortic valve appears trileaflet with normal systolic excursion. There is calcification of the aortic valve leaflets. No vegetations seen in association with the aortic valve. There is mild to moderate aortic regurgitation. Vena contracta width ~ 0.35 cm.   5. No atheroma in the visualized portions of the proximal ascending aorta. Mild non-mobile atheroma in the visualized portions of the transverse aortic arch. Mild non-mobile atheroma in the visualized portions of the descending aorta.   6. The leftatrium is normal in size. There is no evidence of left atrial or left atrial appendage thrombus. The left atrial appendage emptying velocity is normal.   7. Agitated saline injection was negative for intracardiac shunt.   8. No echocardiographic evidence of vegetations.      < end of copied text >    [ ]  Catheterization:  [ ] Stress Test:    OTHER: 	    LABS:	 	    Troponin T, High Sensitivity Result: 406 ng/L (05-25 @ 05:42)  CKMB Units: 13.3 ng/mL (05-25 @ 05:42)  Creatine Kinase, Serum: 313 U/L [30 - 200] (05-25 @ 05:42)  Troponin T, High Sensitivity Result: 393 ng/L (05-25 @ 05:42)  Troponin T, High Sensitivity Result: 326 ng/L (05-24 @ 15:12)  Troponin T, High Sensitivity Result: 283 ng/L (05-24 @ 06:30)                          13.4   10.73 )-----------( 360      ( 29 May 2024 05:08 )             39.3     05-29    132<L>  |  97<L>  |  29<H>  ----------------------------<  185<H>  4.7   |  21<L>  |  1.22    Ca    10.0      29 May 2024 05:08  Phos  2.8     05-29  Mg     2.40     05-29    TPro  7.9  /  Alb  3.2<L>  /  TBili  0.4  /  DBili  x   /  AST  36  /  ALT  53<H>  /  AlkPhos  123<H>  05-29    PT/INR - ( 29 May 2024 05:08 )   PT: 11.6 sec;   INR: 1.04 ratio         PTT - ( 29 May 2024 05:08 )  PTT:32.6 sec

## 2024-05-30 NOTE — DIETITIAN INITIAL EVALUATION ADULT - OTHER INFO
Patient reported decreased intake due to dislike of hospital meal Discussed food preferences. Encouraged menu selection with . RN flowsheets indicate intake is poor to fair with overall intake ranging 51-75% and occasionally %

## 2024-05-30 NOTE — DIETITIAN INITIAL EVALUATION ADULT - PERTINENT MEDS FT
MEDICATIONS  (STANDING):  albuterol/ipratropium for Nebulization 3 milliLiter(s) Nebulizer every 6 hours  ascorbic acid 500 milliGRAM(s) Oral daily  aspirin enteric coated 81 milliGRAM(s) Oral daily  cefTRIAXone   IVPB 2000 milliGRAM(s) IV Intermittent every 24 hours  dextrose 10% Bolus 125 milliLiter(s) IV Bolus once  dextrose 5%. 1000 milliLiter(s) (100 mL/Hr) IV Continuous <Continuous>  dextrose 5%. 1000 milliLiter(s) (50 mL/Hr) IV Continuous <Continuous>  dextrose 50% Injectable 25 Gram(s) IV Push once  dextrose 50% Injectable 12.5 Gram(s) IV Push once  glucagon  Injectable 1 milliGRAM(s) IntraMuscular once  guaiFENesin  milliGRAM(s) Oral every 12 hours  heparin   Injectable 5000 Unit(s) SubCutaneous every 12 hours  insulin glargine Injectable (LANTUS) 25 Unit(s) SubCutaneous at bedtime  insulin lispro (ADMELOG) corrective regimen sliding scale   SubCutaneous three times a day before meals  insulin lispro (ADMELOG) corrective regimen sliding scale   SubCutaneous at bedtime  insulin lispro Injectable (ADMELOG) 11 Unit(s) SubCutaneous three times a day before meals  melatonin 6 milliGRAM(s) Oral at bedtime  sodium chloride 0.9%. 1000 milliLiter(s) (100 mL/Hr) IV Continuous <Continuous>    MEDICATIONS  (PRN):  acetaminophen     Tablet .. 650 milliGRAM(s) Oral every 6 hours PRN Temp greater or equal to 38C (100.4F), Mild Pain (1 - 3)  aluminum hydroxide/magnesium hydroxide/simethicone Suspension 30 milliLiter(s) Oral every 4 hours PRN Dyspepsia  benzonatate 100 milliGRAM(s) Oral every 8 hours PRN Cough  dextrose Oral Gel 15 Gram(s) Oral once PRN Blood Glucose LESS THAN 70 milliGRAM(s)/deciliter

## 2024-05-30 NOTE — PROGRESS NOTE ADULT - PROBLEM SELECTOR PLAN 3
Glucose trending upwards (214-396). Takes 16U basal and 6U premeal at home. A1c 7.4  - lantus 23U, premeal 9U, MARTY  - monitor BG for goal 140-180 inpatient Glucose trending upwards (214-396). Takes 16U basal and 6U premeal at home. A1c 7.4  - lantus 25U, premeal 11U, MARTY  - monitor BG for goal 140-180 inpatient

## 2024-05-30 NOTE — PROGRESS NOTE ADULT - SUBJECTIVE AND OBJECTIVE BOX
ANESTHESIA POSTOP CHECK    83y Male POSTOP DAY 1 S/P SIMEON    Vital Signs Last 24 Hrs  T(C): 36.6 (30 May 2024 05:03), Max: 36.9 (29 May 2024 20:16)  T(F): 97.9 (30 May 2024 05:03), Max: 98.5 (29 May 2024 20:16)  HR: 81 (30 May 2024 05:03) (72 - 89)  BP: 115/64 (30 May 2024 05:03) (96/49 - 128/60)  BP(mean): --  RR: 18 (30 May 2024 05:03) (16 - 18)  SpO2: 100% (30 May 2024 05:03) (95% - 100%)    Parameters below as of 30 May 2024 05:03  Patient On (Oxygen Delivery Method): room air      I&O's Summary      [ ] NO APPARENT ANESTHESIA COMPLICATIONS      Comments:  ANESTHESIA POSTOP CHECK    83y Male POSTOP DAY 1 S/P SIMEON    Vital Signs Last 24 Hrs  T(C): 36.6 (30 May 2024 05:03), Max: 36.9 (29 May 2024 20:16)  T(F): 97.9 (30 May 2024 05:03), Max: 98.5 (29 May 2024 20:16)  HR: 81 (30 May 2024 05:03) (72 - 89)  BP: 115/64 (30 May 2024 05:03) (96/49 - 128/60)  BP(mean): --  RR: 18 (30 May 2024 05:03) (16 - 18)  SpO2: 100% (30 May 2024 05:03) (95% - 100%)    Parameters below as of 30 May 2024 05:03  Patient On (Oxygen Delivery Method): room air      I&O's Summary      [x] NO APPARENT ANESTHESIA COMPLICATIONS      Comments:

## 2024-05-30 NOTE — PROGRESS NOTE ADULT - ASSESSMENT
A/P  83M with PMHx RA on enbrel, DM2 on insulin, CAD s/p stent, CKD? presenting with fever, cough and weakness. Found to have sepsis 2/2 PNA, NILES.    #Pneumonia  #strep gallolyticus bacteremia  -sp sage No echocardiographic evidence of vegetations.nl LV sys fx, no wma ; There is no evidence of left atrial or left atrial appendage thrombus.   -management per ID     #NILES  -in setting of pna, hypovolemia  -ivf    #Elevated troponin, CAD, s/p PCI  -Trop, ckmb elevated in setting of known cad,  of mid LAD, PNA, niles representing demand ischemia  -no ACS, no angina, no acute ischemic ecg abnl   -Echo with mildly decreased lvef 47%, akinesis of apical wall, regional WMA, no sig valvular dz   -continue ASA only, ok to be off of plavix   -cath 2023 with closed mid LAD stent with distal LAD filling with well developed collaterals   -No cp on exam  -cont med tx of cad for now - no need for ischemic eval     #Type 2 diabetes mellitus with hyperglycemia  -tx per med    #H/O rheumatoid arthritis  -tx per med

## 2024-05-30 NOTE — PROGRESS NOTE ADULT - PROBLEM SELECTOR PLAN 6
- Pt is s/p stent ~20 years ago, with hx of distal LAD occlusion on [facility] 2023  - Patient self-discontinued plavix ~1month ago and unclear why. Pt is continuing on aspirin 81 mg.  - Patient needs f/u with his cardiologist Dr. Downing.

## 2024-05-30 NOTE — DIETITIAN INITIAL EVALUATION ADULT - ADD RECOMMEND
Recommend daily multivitamin supplement  Monitor weights, labs, BM's, skin integrity, p.o. intake.   Please monitor % PO intake on flowsheets  Honor food preferences as able within therapeutic diet order.

## 2024-05-30 NOTE — PROGRESS NOTE ADULT - ATTENDING COMMENTS
83M with PMHx RA on enbrel, DM2 on insulin, CAD s/p stent, CKD? a/w sepsis 2/2 PNA c/b strep gallolyticus bacteremia , NILES and elevated HsT (in the absence of acute ischemic changes and chest pain) suspected in the setting of demand ischemia. Now s/p SIMEON 5/29 personally reviewed  and w/ no evidence of vegetation.      -Blood cx + for strep gallolyticus on 5/22. Repeat blood cx on 5/24 NTD. C/w ceftriaxone.  CT chest w. findings c/w UIP and CT A/P w/o any nidus of infection identified. Given the type of strep and its association with endocarditis pt now s/p SIMEON w/ normal LV fxn and no evidence of vegetation/thrombus. Strep gallolyticus also known to be associated w/ GI pathology/ malignancy.  Pt will need outpt eval w/ GI. ID recommendations appreciated. Plan will be for two week course of IV ceftriaxone.   -Pt elevation from Hst w/ known hx of CAD likely in the setting of demand ischemia. Pt is w/o chest pain and EKG w/o acute ischemic changes. Now s/p TTE personally reviewed and notable for EF 47% and apical abnormalities, otherwise was noted to be a technical difficulty study. However, on SIMEON these abnormalities were not noted and pt w/ normal heart fxn. Cardiology following and recs appreciated. Will c/w medical management. C/w ASA . Pt reports he stopped taking his plavix about 15 days PTA. Per cardiogy ok to be off plavix.  -Pt initially on prednisone on admission. Mild wheezing heard on exam at that time now improved.  Pt denies any hx of COPD/asthma and states he smoked minimally for a short period of time in his 20s. Prednisone dcd and pt stable off steroids. CT chest w/ underlying lung disease that may be c/w UIP. Pt will need further w/u and evaluation w/ pulm especially for PFTs to determine if any further medications would be indicated. C/w inhalers for now  -Trend renal output and creatine levels. NILES improved. Continue to encourage PO hydration.    -Noted mild hyponatremia. However, This is likely in the setting of patients hyperglycemia. Will continue to optimize DM control as stated above    Dispo- pending IV abx arrangements. Arranging for midline. PT rec rehab. However pt wants home PT. Anticipate dc today/tomorrow. Will f/u SW/CM

## 2024-05-30 NOTE — PROGRESS NOTE ADULT - SUBJECTIVE AND OBJECTIVE BOX
Dave Slater MD  Emergency Medicine & Internal Medicine PGY-2    PROGRESS NOTE:    ID #:     Patient is a 83y old  Male who presents with a chief complaint of cough, fever, weakness (29 May 2024 14:23)      MAJOR INTERVAL HOSPITAL EVENTS:     SUBJECTIVE / OVERNIGHT EVENTS: No acute overnight events.       MEDICATIONS  (STANDING):  albuterol/ipratropium for Nebulization 3 milliLiter(s) Nebulizer every 6 hours  ascorbic acid 500 milliGRAM(s) Oral daily  aspirin enteric coated 81 milliGRAM(s) Oral daily  cefTRIAXone   IVPB 2000 milliGRAM(s) IV Intermittent every 24 hours  dextrose 10% Bolus 125 milliLiter(s) IV Bolus once  dextrose 5%. 1000 milliLiter(s) (100 mL/Hr) IV Continuous <Continuous>  dextrose 5%. 1000 milliLiter(s) (50 mL/Hr) IV Continuous <Continuous>  dextrose 50% Injectable 25 Gram(s) IV Push once  dextrose 50% Injectable 12.5 Gram(s) IV Push once  glucagon  Injectable 1 milliGRAM(s) IntraMuscular once  guaiFENesin  milliGRAM(s) Oral every 12 hours  heparin   Injectable 5000 Unit(s) SubCutaneous every 12 hours  insulin glargine Injectable (LANTUS) 25 Unit(s) SubCutaneous at bedtime  insulin lispro (ADMELOG) corrective regimen sliding scale   SubCutaneous three times a day before meals  insulin lispro (ADMELOG) corrective regimen sliding scale   SubCutaneous at bedtime  insulin lispro Injectable (ADMELOG) 11 Unit(s) SubCutaneous three times a day before meals  melatonin 6 milliGRAM(s) Oral at bedtime  sodium chloride 0.9%. 1000 milliLiter(s) (100 mL/Hr) IV Continuous <Continuous>    MEDICATIONS  (PRN):  acetaminophen     Tablet .. 650 milliGRAM(s) Oral every 6 hours PRN Temp greater or equal to 38C (100.4F), Mild Pain (1 - 3)  aluminum hydroxide/magnesium hydroxide/simethicone Suspension 30 milliLiter(s) Oral every 4 hours PRN Dyspepsia  benzonatate 100 milliGRAM(s) Oral every 8 hours PRN Cough  dextrose Oral Gel 15 Gram(s) Oral once PRN Blood Glucose LESS THAN 70 milliGRAM(s)/deciliter      CAPILLARY BLOOD GLUCOSE      POCT Blood Glucose.: 105 mg/dL (29 May 2024 22:27)  POCT Blood Glucose.: 204 mg/dL (29 May 2024 16:40)  POCT Blood Glucose.: 232 mg/dL (29 May 2024 11:17)  POCT Blood Glucose.: 249 mg/dL (29 May 2024 08:24)    I&O's Summary      PHYSICAL EXAM:  Vital Signs Last 24 Hrs  T(C): 36.6 (30 May 2024 05:03), Max: 36.9 (29 May 2024 20:16)  T(F): 97.9 (30 May 2024 05:03), Max: 98.5 (29 May 2024 20:16)  HR: 81 (30 May 2024 05:03) (72 - 89)  BP: 115/64 (30 May 2024 05:03) (96/49 - 128/60)  BP(mean): --  RR: 18 (30 May 2024 05:03) (16 - 18)  SpO2: 100% (30 May 2024 05:03) (95% - 100%)    Parameters below as of 30 May 2024 05:03  Patient On (Oxygen Delivery Method): room air        GENERAL: No acute distress, well-developed  HEAD:  Atraumatic, Normocephalic  EYES: EOMI, PERRLA, conjunctiva and sclera clear  NECK: Supple, no lymphadenopathy, no JVD  CHEST/LUNG: CTAB; No wheezes, rales, or rhonchi  HEART: Regular rate and rhythm; Normal s1 and s2, No murmurs, rubs, or gallops  ABDOMEN: Soft, non-tender, non-distended; normal bowel sounds, no organomegaly  EXTREMITIES:  2+ peripheral pulses b/l, No clubbing, cyanosis, or edema  NEUROLOGY: A&O x 3, no focal deficits  SKIN: No rashes or lesions          LABS:                        13.4   10.73 )-----------( 360      ( 29 May 2024 05:08 )             39.3     05-29    132<L>  |  97<L>  |  29<H>  ----------------------------<  185<H>  4.7   |  21<L>  |  1.22    Ca    10.0      29 May 2024 05:08  Phos  2.8     05-29  Mg     2.40     05-29    TPro  7.9  /  Alb  3.2<L>  /  TBili  0.4  /  DBili  x   /  AST  36  /  ALT  53<H>  /  AlkPhos  123<H>  05-29    PT/INR - ( 29 May 2024 05:08 )   PT: 11.6 sec;   INR: 1.04 ratio         PTT - ( 29 May 2024 05:08 )  PTT:32.6 sec      Urinalysis Basic - ( 29 May 2024 05:08 )    Color: x / Appearance: x / SG: x / pH: x  Gluc: 185 mg/dL / Ketone: x  / Bili: x / Urobili: x   Blood: x / Protein: x / Nitrite: x   Leuk Esterase: x / RBC: x / WBC x   Sq Epi: x / Non Sq Epi: x / Bacteria: x          RADIOLOGY & ADDITIONAL TESTS:  Results Reviewed:   Imaging Personally Reviewed:  Electrocardiogram Personally Reviewed:    COORDINATION OF CARE:  Care Discussed with Consultants/Other Providers [Y/N]:  Prior or Outpatient Records Reviewed [Y/N]:

## 2024-05-30 NOTE — DIETITIAN INITIAL EVALUATION ADULT - EDUCATION DIETARY MODIFICATIONS
Discussed recommendations for diabetic diet modifications, portion control for carbohydrates, increasing nutrient density of meals with protein rich food sources, encouraged continuing oral supplement to meet nutritional needs./(1) partially meets; needs review/practice/verbalization

## 2024-05-30 NOTE — DIETITIAN INITIAL EVALUATION ADULT - ORAL INTAKE PTA/DIET HISTORY
Per chart patient is 83M with PMHx RA on enbrel, DM2 on insulin, CAD s/p stent, presenting with fever, cough and weakness. Found to be septic with strep galloyticus bacteremia, elevated troponins. Cards c/s, c/f demand ischemia. Pending further infectious management including SIMEON    Patient reported generally does not eat a lot, reported having 2 meals generally consisting of small amount of rice, chicken and vegetables and a snack. Reported he does not eat a lot however endorsed having 1 Ensure per day (stated provides 1g sugar and 30g pro, pt unable to specify name however likely referring to Ensure Max Pro, provides 150kcal). Pt prefers no beef or pork. reported preference for soft well cooked foods. Pt is missing some teeth.  Denied concerns for N/V/D or abdominal pain. C/o of weightloss. Stated most recently known weight is 140 lbs 10 days ago. Per Pamela HIE: 2/27/23- 138 lbs3/30/23- 130 lbs 8/29/23- 138 lbs reflects weight change of -1.4lbs/1% over 9 months based on current weight of 136.6 lbs/62 kg as per chart.

## 2024-05-30 NOTE — ADVANCED PRACTICE NURSE CONSULT - ASSESSMENT
Patient is aware and alert. Midline insertion along with risks, benefits, possible complications and infection prevention explained to patient who verbalized understanding. All questions addressed and patient gave verbal consent to place midline. Left arm cleansed with CHG. Using sterile technique under ultra sound guidance, placed PowerGlide Pro Midline 20G /10cm into Left Basilic vein. Brisk blood return and flushed with 20Mls of normal saline. Minimal blood loss and patient tolerated procedure well. CHG dressing placed. All sharps accounted for. Report given to district RN and ordering provider. LOT#: [id number] , REF#: [id number]

## 2024-05-31 ENCOUNTER — TRANSCRIPTION ENCOUNTER (OUTPATIENT)
Age: 84
End: 2024-05-31

## 2024-05-31 VITALS
RESPIRATION RATE: 18 BRPM | OXYGEN SATURATION: 98 % | TEMPERATURE: 98 F | SYSTOLIC BLOOD PRESSURE: 138 MMHG | HEART RATE: 105 BPM | DIASTOLIC BLOOD PRESSURE: 79 MMHG

## 2024-05-31 DIAGNOSIS — J84.9 INTERSTITIAL PULMONARY DISEASE, UNSPECIFIED: ICD-10-CM

## 2024-05-31 LAB
ALBUMIN SERPL ELPH-MCNC: 3.2 G/DL — LOW (ref 3.3–5)
ALP SERPL-CCNC: 112 U/L — SIGNIFICANT CHANGE UP (ref 40–120)
ALT FLD-CCNC: 48 U/L — HIGH (ref 4–41)
ANION GAP SERPL CALC-SCNC: 14 MMOL/L — SIGNIFICANT CHANGE UP (ref 7–14)
AST SERPL-CCNC: 33 U/L — SIGNIFICANT CHANGE UP (ref 4–40)
BASOPHILS # BLD AUTO: 0.06 K/UL — SIGNIFICANT CHANGE UP (ref 0–0.2)
BASOPHILS NFR BLD AUTO: 0.8 % — SIGNIFICANT CHANGE UP (ref 0–2)
BILIRUB SERPL-MCNC: 0.4 MG/DL — SIGNIFICANT CHANGE UP (ref 0.2–1.2)
BUN SERPL-MCNC: 24 MG/DL — HIGH (ref 7–23)
CALCIUM SERPL-MCNC: 9.6 MG/DL — SIGNIFICANT CHANGE UP (ref 8.4–10.5)
CHLORIDE SERPL-SCNC: 96 MMOL/L — LOW (ref 98–107)
CO2 SERPL-SCNC: 22 MMOL/L — SIGNIFICANT CHANGE UP (ref 22–31)
CREAT SERPL-MCNC: 1.19 MG/DL — SIGNIFICANT CHANGE UP (ref 0.5–1.3)
EGFR: 60 ML/MIN/1.73M2 — SIGNIFICANT CHANGE UP
EOSINOPHIL # BLD AUTO: 0.61 K/UL — HIGH (ref 0–0.5)
EOSINOPHIL NFR BLD AUTO: 8.2 % — HIGH (ref 0–6)
GLUCOSE SERPL-MCNC: 155 MG/DL — HIGH (ref 70–99)
HCT VFR BLD CALC: 40.3 % — SIGNIFICANT CHANGE UP (ref 39–50)
HGB BLD-MCNC: 13.6 G/DL — SIGNIFICANT CHANGE UP (ref 13–17)
IANC: 3.33 K/UL — SIGNIFICANT CHANGE UP (ref 1.8–7.4)
IMM GRANULOCYTES NFR BLD AUTO: 1.2 % — HIGH (ref 0–0.9)
LYMPHOCYTES # BLD AUTO: 2.43 K/UL — SIGNIFICANT CHANGE UP (ref 1–3.3)
LYMPHOCYTES # BLD AUTO: 32.5 % — SIGNIFICANT CHANGE UP (ref 13–44)
MAGNESIUM SERPL-MCNC: 2.3 MG/DL — SIGNIFICANT CHANGE UP (ref 1.6–2.6)
MCHC RBC-ENTMCNC: 30 PG — SIGNIFICANT CHANGE UP (ref 27–34)
MCHC RBC-ENTMCNC: 33.7 GM/DL — SIGNIFICANT CHANGE UP (ref 32–36)
MCV RBC AUTO: 88.8 FL — SIGNIFICANT CHANGE UP (ref 80–100)
MONOCYTES # BLD AUTO: 0.95 K/UL — HIGH (ref 0–0.9)
MONOCYTES NFR BLD AUTO: 12.7 % — SIGNIFICANT CHANGE UP (ref 2–14)
NEUTROPHILS # BLD AUTO: 3.33 K/UL — SIGNIFICANT CHANGE UP (ref 1.8–7.4)
NEUTROPHILS NFR BLD AUTO: 44.6 % — SIGNIFICANT CHANGE UP (ref 43–77)
NRBC # BLD: 0 /100 WBCS — SIGNIFICANT CHANGE UP (ref 0–0)
NRBC # FLD: 0 K/UL — SIGNIFICANT CHANGE UP (ref 0–0)
PHOSPHATE SERPL-MCNC: 3.6 MG/DL — SIGNIFICANT CHANGE UP (ref 2.5–4.5)
PLATELET # BLD AUTO: 396 K/UL — SIGNIFICANT CHANGE UP (ref 150–400)
POTASSIUM SERPL-MCNC: 4.5 MMOL/L — SIGNIFICANT CHANGE UP (ref 3.5–5.3)
POTASSIUM SERPL-SCNC: 4.5 MMOL/L — SIGNIFICANT CHANGE UP (ref 3.5–5.3)
PROT SERPL-MCNC: 7.8 G/DL — SIGNIFICANT CHANGE UP (ref 6–8.3)
RBC # BLD: 4.54 M/UL — SIGNIFICANT CHANGE UP (ref 4.2–5.8)
RBC # FLD: 13.2 % — SIGNIFICANT CHANGE UP (ref 10.3–14.5)
SODIUM SERPL-SCNC: 132 MMOL/L — LOW (ref 135–145)
WBC # BLD: 7.47 K/UL — SIGNIFICANT CHANGE UP (ref 3.8–10.5)
WBC # FLD AUTO: 7.47 K/UL — SIGNIFICANT CHANGE UP (ref 3.8–10.5)

## 2024-05-31 PROCEDURE — 99239 HOSP IP/OBS DSCHRG MGMT >30: CPT

## 2024-05-31 RX ORDER — INSULIN ASPART 100 [IU]/ML
16 INJECTION, SUSPENSION SUBCUTANEOUS
Qty: 0 | Refills: 0 | DISCHARGE

## 2024-05-31 RX ORDER — INSULIN ASPART 100 [IU]/ML
5 INJECTION, SOLUTION SUBCUTANEOUS
Qty: 1 | Refills: 0
Start: 2024-05-31 | End: 2024-06-29

## 2024-05-31 RX ORDER — INSULIN ASPART 100 [IU]/ML
15 INJECTION, SOLUTION SUBCUTANEOUS
Qty: 1 | Refills: 0
Start: 2024-05-31 | End: 2024-06-29

## 2024-05-31 RX ORDER — INSULIN ASPART 100 [IU]/ML
6 INJECTION, SOLUTION SUBCUTANEOUS
Qty: 0 | Refills: 0 | DISCHARGE

## 2024-05-31 RX ORDER — INSULIN ASPART 100 [IU]/ML
15 INJECTION, SUSPENSION SUBCUTANEOUS
Qty: 1 | Refills: 0
Start: 2024-05-31 | End: 2024-06-29

## 2024-05-31 RX ORDER — INSULIN ASPART 100 [IU]/ML
16 INJECTION, SOLUTION SUBCUTANEOUS
Qty: 0 | Refills: 0 | DISCHARGE

## 2024-05-31 RX ORDER — INSULIN GLARGINE 100 [IU]/ML
22 INJECTION, SOLUTION SUBCUTANEOUS
Qty: 1 | Refills: 1
Start: 2024-05-31 | End: 2024-07-29

## 2024-05-31 RX ORDER — INSULIN GLARGINE 100 [IU]/ML
0 INJECTION, SOLUTION SUBCUTANEOUS
Refills: 0
Start: 2024-05-31

## 2024-05-31 RX ORDER — CLOPIDOGREL BISULFATE 75 MG/1
1 TABLET, FILM COATED ORAL
Qty: 0 | Refills: 0 | DISCHARGE

## 2024-05-31 RX ORDER — INSULIN LISPRO 100/ML
8 VIAL (ML) SUBCUTANEOUS
Qty: 1 | Refills: 1
Start: 2024-05-31 | End: 2024-07-29

## 2024-05-31 RX ADMIN — Medication 3 MILLILITER(S): at 03:43

## 2024-05-31 RX ADMIN — Medication 81 MILLIGRAM(S): at 11:13

## 2024-05-31 RX ADMIN — Medication 3 MILLILITER(S): at 16:13

## 2024-05-31 RX ADMIN — Medication 1: at 08:27

## 2024-05-31 RX ADMIN — Medication 11 UNIT(S): at 08:26

## 2024-05-31 RX ADMIN — Medication 500 MILLIGRAM(S): at 11:13

## 2024-05-31 RX ADMIN — Medication 600 MILLIGRAM(S): at 17:16

## 2024-05-31 RX ADMIN — CEFTRIAXONE 100 MILLIGRAM(S): 500 INJECTION, POWDER, FOR SOLUTION INTRAMUSCULAR; INTRAVENOUS at 11:13

## 2024-05-31 RX ADMIN — Medication 3 MILLILITER(S): at 09:45

## 2024-05-31 RX ADMIN — Medication 11 UNIT(S): at 12:43

## 2024-05-31 RX ADMIN — Medication 600 MILLIGRAM(S): at 06:34

## 2024-05-31 NOTE — PROGRESS NOTE ADULT - ATTENDING COMMENTS
83M with PMHx RA on enbrel, DM2 on insulin, CAD s/p stent, a/w sepsis 2/2 PNA c/b strep gallolyticus bacteremia , NILES and elevated HsT (in the absence of acute ischemic changes and chest pain) suspected in the setting of demand ischemia. On IV ceftriaxone. Now s/p SIMEON 5/29 personally reviewed and w/ no evidence of vegetation.      -Blood cx + for strep gallolyticus on 5/22. Repeat blood cx on 5/24 NTD. C/w ceftriaxone.  CT chest w/ findings c/w UIP and CT A/P w/o any nidus of infection identified. Given the type of strep and its association with endocarditis pt now s/p SIMEON w/ normal LV fxn and no evidence of vegetation/thrombus. Strep gallolyticus also known to be associated w/ GI pathology/ malignancy.  Pt will need outpt eval w/ GI. ID recommendations appreciated. Plan will be for two week course of IV ceftriaxone from negative culture. End date 6/7.  -Pt elevation from Hst w/ known hx of CAD likely in the setting of demand ischemia. Pt is w/o chest pain and EKG w/o acute ischemic changes. Now s/p TTE personally reviewed and notable for EF 47% and apical abnormalities, otherwise was noted to be a technical difficulty study. However, on SIMEON these abnormalities were not noted and pt w/ normal heart fxn. Cardiology following and recs appreciated. Will c/w medical management. C/w ASA . Pt reports he stopped taking his plavix about 15 days PTA. Per cardiology ok to be off plavix. Outpt follow up with cardiology.  -Pt initially on prednisone on admission. Mild wheezing heard on exam at that time now improved.  Pt denies any hx of COPD/asthma and states he smoked minimally for a short period of time in his 20s. Prednisone dcd and pt remained stable off steroids. CT chest w/ underlying lung disease that may be c/w UIP. Pt will need further w/u and evaluation w/ pulm especially for PFTs to determine if any further medications would be indicated. C/w inhalers for now  - NILES improved/ resolved. Trend renal output and creatine levels. Continue to encourage PO hydration. Previous notes questioned possible underlying hx of CKD , however given most recent creatine levels pt does not appear to have CKD and cr may continue to decrease further.  -Noted mild hyponatremia. However, This is likely in the setting of patients hyperglycemia. Will continue to optimize DM control as stated above. FS now at goal.     Discharge planning for today w/ home PT. D/w SW/CM. Further details outlined in discharge documentation. Spent 39 min in discharge planning and coordination

## 2024-05-31 NOTE — DISCHARGE NOTE NURSING/CASE MANAGEMENT/SOCIAL WORK - NSSCCONTNUM_GEN_ALL_CORE
Alert-The patient is alert, awake and responds to voice. The patient is oriented to time, place, and person. The triage nurse is able to obtain subjective information. Each agency will call you with appointment time for 6/1/24 visits

## 2024-05-31 NOTE — PROGRESS NOTE ADULT - SUBJECTIVE AND OBJECTIVE BOX
Dave Slater MD  Emergency Medicine & Internal Medicine PGY-2    PROGRESS NOTE:    ID #:     Patient is a 83y old  Male who presents with a chief complaint of Pneumonia due to infectious organism     (30 May 2024 15:19)      MAJOR INTERVAL HOSPITAL EVENTS:     SUBJECTIVE / OVERNIGHT EVENTS: No acute overnight events.       MEDICATIONS  (STANDING):  albuterol/ipratropium for Nebulization 3 milliLiter(s) Nebulizer every 6 hours  ascorbic acid 500 milliGRAM(s) Oral daily  aspirin enteric coated 81 milliGRAM(s) Oral daily  cefTRIAXone   IVPB 2000 milliGRAM(s) IV Intermittent every 24 hours  dextrose 10% Bolus 125 milliLiter(s) IV Bolus once  dextrose 5%. 1000 milliLiter(s) (50 mL/Hr) IV Continuous <Continuous>  dextrose 5%. 1000 milliLiter(s) (100 mL/Hr) IV Continuous <Continuous>  dextrose 50% Injectable 25 Gram(s) IV Push once  dextrose 50% Injectable 12.5 Gram(s) IV Push once  glucagon  Injectable 1 milliGRAM(s) IntraMuscular once  guaiFENesin  milliGRAM(s) Oral every 12 hours  heparin   Injectable 5000 Unit(s) SubCutaneous every 12 hours  insulin glargine Injectable (LANTUS) 25 Unit(s) SubCutaneous at bedtime  insulin lispro (ADMELOG) corrective regimen sliding scale   SubCutaneous three times a day before meals  insulin lispro (ADMELOG) corrective regimen sliding scale   SubCutaneous at bedtime  insulin lispro Injectable (ADMELOG) 11 Unit(s) SubCutaneous three times a day before meals  melatonin 6 milliGRAM(s) Oral at bedtime  sodium chloride 0.9%. 1000 milliLiter(s) (100 mL/Hr) IV Continuous <Continuous>    MEDICATIONS  (PRN):  acetaminophen     Tablet .. 650 milliGRAM(s) Oral every 6 hours PRN Temp greater or equal to 38C (100.4F), Mild Pain (1 - 3)  aluminum hydroxide/magnesium hydroxide/simethicone Suspension 30 milliLiter(s) Oral every 4 hours PRN Dyspepsia  benzonatate 100 milliGRAM(s) Oral every 8 hours PRN Cough  dextrose Oral Gel 15 Gram(s) Oral once PRN Blood Glucose LESS THAN 70 milliGRAM(s)/deciliter      CAPILLARY BLOOD GLUCOSE      POCT Blood Glucose.: 79 mg/dL (31 May 2024 03:12)  POCT Blood Glucose.: 194 mg/dL (30 May 2024 21:52)  POCT Blood Glucose.: 183 mg/dL (30 May 2024 18:36)  POCT Blood Glucose.: 118 mg/dL (30 May 2024 17:05)  POCT Blood Glucose.: 205 mg/dL (30 May 2024 12:19)  POCT Blood Glucose.: 131 mg/dL (30 May 2024 08:11)    I&O's Summary    30 May 2024 07:01  -  31 May 2024 07:00  --------------------------------------------------------  IN: 0 mL / OUT: 800 mL / NET: -800 mL        PHYSICAL EXAM:  Vital Signs Last 24 Hrs  T(C): 36.6 (31 May 2024 06:30), Max: 36.8 (30 May 2024 20:30)  T(F): 97.8 (31 May 2024 06:30), Max: 98.3 (30 May 2024 20:30)  HR: 87 (31 May 2024 06:30) (78 - 93)  BP: 110/67 (31 May 2024 06:30) (110/67 - 128/67)  BP(mean): 77 (31 May 2024 06:30) (77 - 77)  RR: 16 (31 May 2024 06:30) (16 - 17)  SpO2: 97% (31 May 2024 06:30) (96% - 99%)    Parameters below as of 31 May 2024 06:30  Patient On (Oxygen Delivery Method): room air        GENERAL: No acute distress, well-developed  HEAD:  Atraumatic, Normocephalic  EYES: EOMI, PERRLA, conjunctiva and sclera clear  NECK: Supple, no lymphadenopathy, no JVD  CHEST/LUNG: CTAB; No wheezes, rales, or rhonchi  HEART: Regular rate and rhythm; Normal s1 and s2, No murmurs, rubs, or gallops  ABDOMEN: Soft, non-tender, non-distended; normal bowel sounds, no organomegaly  EXTREMITIES:  2+ peripheral pulses b/l, No clubbing, cyanosis, or edema  NEUROLOGY: A&O x 3, no focal deficits  SKIN: No rashes or lesions          LABS:                      RADIOLOGY & ADDITIONAL TESTS:  Results Reviewed:   Imaging Personally Reviewed:  Electrocardiogram Personally Reviewed:    COORDINATION OF CARE:  Care Discussed with Consultants/Other Providers [Y/N]:  Prior or Outpatient Records Reviewed [Y/N]:   Dave Slater MD  Emergency Medicine & Internal Medicine PGY-2    PROGRESS NOTE:    ID #:     Patient is a 83y old  Male who presents with a chief complaint of Pneumonia due to infectious organism     (30 May 2024 15:19)    SUBJECTIVE / OVERNIGHT EVENTS: No acute overnight events. Patient reports feeling well. Denies acute complaints including fever, chills. tremors, shaking, cp, sob, abd pain, nausea, vomiting.      MEDICATIONS  (STANDING):  albuterol/ipratropium for Nebulization 3 milliLiter(s) Nebulizer every 6 hours  ascorbic acid 500 milliGRAM(s) Oral daily  aspirin enteric coated 81 milliGRAM(s) Oral daily  cefTRIAXone   IVPB 2000 milliGRAM(s) IV Intermittent every 24 hours  dextrose 10% Bolus 125 milliLiter(s) IV Bolus once  dextrose 5%. 1000 milliLiter(s) (50 mL/Hr) IV Continuous <Continuous>  dextrose 5%. 1000 milliLiter(s) (100 mL/Hr) IV Continuous <Continuous>  dextrose 50% Injectable 25 Gram(s) IV Push once  dextrose 50% Injectable 12.5 Gram(s) IV Push once  glucagon  Injectable 1 milliGRAM(s) IntraMuscular once  guaiFENesin  milliGRAM(s) Oral every 12 hours  heparin   Injectable 5000 Unit(s) SubCutaneous every 12 hours  insulin glargine Injectable (LANTUS) 25 Unit(s) SubCutaneous at bedtime  insulin lispro (ADMELOG) corrective regimen sliding scale   SubCutaneous three times a day before meals  insulin lispro (ADMELOG) corrective regimen sliding scale   SubCutaneous at bedtime  insulin lispro Injectable (ADMELOG) 11 Unit(s) SubCutaneous three times a day before meals  melatonin 6 milliGRAM(s) Oral at bedtime  sodium chloride 0.9%. 1000 milliLiter(s) (100 mL/Hr) IV Continuous <Continuous>    MEDICATIONS  (PRN):  acetaminophen     Tablet .. 650 milliGRAM(s) Oral every 6 hours PRN Temp greater or equal to 38C (100.4F), Mild Pain (1 - 3)  aluminum hydroxide/magnesium hydroxide/simethicone Suspension 30 milliLiter(s) Oral every 4 hours PRN Dyspepsia  benzonatate 100 milliGRAM(s) Oral every 8 hours PRN Cough  dextrose Oral Gel 15 Gram(s) Oral once PRN Blood Glucose LESS THAN 70 milliGRAM(s)/deciliter      CAPILLARY BLOOD GLUCOSE      POCT Blood Glucose.: 79 mg/dL (31 May 2024 03:12)  POCT Blood Glucose.: 194 mg/dL (30 May 2024 21:52)  POCT Blood Glucose.: 183 mg/dL (30 May 2024 18:36)  POCT Blood Glucose.: 118 mg/dL (30 May 2024 17:05)  POCT Blood Glucose.: 205 mg/dL (30 May 2024 12:19)  POCT Blood Glucose.: 131 mg/dL (30 May 2024 08:11)    I&O's Summary    30 May 2024 07:01  -  31 May 2024 07:00  --------------------------------------------------------  IN: 0 mL / OUT: 800 mL / NET: -800 mL        PHYSICAL EXAM:  Vital Signs Last 24 Hrs  T(C): 36.6 (31 May 2024 06:30), Max: 36.8 (30 May 2024 20:30)  T(F): 97.8 (31 May 2024 06:30), Max: 98.3 (30 May 2024 20:30)  HR: 87 (31 May 2024 06:30) (78 - 93)  BP: 110/67 (31 May 2024 06:30) (110/67 - 128/67)  BP(mean): 77 (31 May 2024 06:30) (77 - 77)  RR: 16 (31 May 2024 06:30) (16 - 17)  SpO2: 97% (31 May 2024 06:30) (96% - 99%)    Parameters below as of 31 May 2024 06:30  Patient On (Oxygen Delivery Method): room air        GENERAL: NAD, sitting in chair comfortably  NECK: no JVD  HEART: S1, S2, Regular rate and rhythm, no murmurs, rubs, or gallops  LUNGS: Unlabored respirations, clear to auscultation bilaterally, no crackles, wheezing, or rhonchi. On 2L NC  ABDOMEN: Soft, nontender, nondistended, +BS  EXTREMITIES: 2+ peripheral pulses bilaterally. No clubbing, cyanosis, or edema  NERVOUS SYSTEM:  A&Ox3, no focal deficits   SKIN: No rashes or lesions      LABS:                      RADIOLOGY & ADDITIONAL TESTS:  Results Reviewed:   Imaging Personally Reviewed:  Electrocardiogram Personally Reviewed:    COORDINATION OF CARE:  Care Discussed with Consultants/Other Providers [Y/N]:  Prior or Outpatient Records Reviewed [Y/N]:

## 2024-05-31 NOTE — DISCHARGE NOTE NURSING/CASE MANAGEMENT/SOCIAL WORK - PATIENT PORTAL LINK FT
You can access the FollowMyHealth Patient Portal offered by [facility] by registering at the following website: [email or web address]. By joining [employer]’s FollowMyHealth portal, you will also be able to view your health information using other applications (apps) compatible with our system.

## 2024-05-31 NOTE — DISCHARGE NOTE NURSING/CASE MANAGEMENT/SOCIAL WORK - NSDCFUADDAPPT_GEN_ALL_CORE_FT
[facility] - Primary Care  Primary Care  [address] [place] [address]  Phone: [phone]  July 3rd 2024 2:30PM

## 2024-05-31 NOTE — PROGRESS NOTE ADULT - PROBLEM SELECTOR PLAN 2
Troponin is up to 326 from 283 from 122. Has consistently denied CP. EKG nonischemic with no ST changes. Not consistent with ACS given lack of CP or EKG findings. Likely due to demand ischemia  - cards following: can stop trending cardiac enzymes  - EKG showed sinus tach w/o T wave or ST changes  - TTE showing LVEF 47%, apical hypokinesis, no clear vegetations  - SIMEON pending as below Troponin is up to 326 from 283 from 122. Has consistently denied CP. EKG nonischemic with no ST changes. Not consistent with ACS given lack of CP or EKG findings. Likely due to demand ischemia  - cards following: can stop trending cardiac enzymes  - EKG showed sinus tach w/o T wave or ST changes  - TTE showing LVEF 47%, apical hypokinesis, no clear vegetations  - SIMEON w/o vegetations

## 2024-05-31 NOTE — DISCHARGE NOTE NURSING/CASE MANAGEMENT/SOCIAL WORK - NSSCNAMETXT_GEN_ALL_CORE
[facility] ([facility])  [phone]  [facility]

## 2024-05-31 NOTE — PROGRESS NOTE ADULT - ASSESSMENT
83M with PMHx RA on enbrel, DM2 on insulin, CAD s/p stent, presenting with fever, cough and weakness. Found to be septic with strep galloyticus bacteremia, elevated troponins. Cards c/s, c/f demand ischemia. Pending further infectious management including SIMEON. 83M with PMHx RA on enbrel, DM2 on insulin, CAD s/p stent, presenting with fever, cough and weakness. Found to be septic with strep galloyticus bacteremia, elevated troponins. Cards c/s, c/f demand ischemia. SIMEON w/o vegetations. Pending Dc with IV CTX until 6/7 per ID.

## 2024-05-31 NOTE — PROGRESS NOTE ADULT - PROBLEM SELECTOR PLAN 1
Sepsis with BCx +strep gallolyticus, c/f PNA. HR >90 initially and WBC 16.   CXR Right lower lobe consolidation, may represent atelectasis and/or   effusion. Underlying pneumonia cannot be excluded. Enlarging right apical lung hazy opacity, with associated right tracheal deviation, which is likely due to volume loss. Diffuse chronic fibrotic lung changes.  Lactate 2.3--2.1  - s/p 1.5 L IVF given  - 5/22 BCx +strep gallolyticus  - 5/24 BCx NGTD  - TTE showing LVEF 47%, apical hypokinesis, no clear vegetations  - ID consulted: c/w CTX, get SIMEON, stronglyoides   - S/p Zosyn, c/w ceftriaxone 2g q24h  - No hx asthma/COPD, so hold prednisone 40mg  - Cough suppressants PRN, tylenol PRN  - Follow up pulmonology on discharge  - s. gallolyticus risk for colon ca. last colonoscopy >10 yrs ago, was normal per patient. Repeat colonoscopy OP Sepsis with BCx +strep gallolyticus, c/f PNA. HR >90 initially and WBC 16.   CXR Right lower lobe consolidation, may represent atelectasis and/or   effusion. Underlying pneumonia cannot be excluded. Enlarging right apical lung hazy opacity, with associated right tracheal deviation, which is likely due to volume loss. Diffuse chronic fibrotic lung changes.  Lactate 2.3--2.1  - s/p 1.5 L IVF given  - 5/22 BCx +strep gallolyticus  - 5/24 BCx NGTD  - TTE showing LVEF 47%, apical hypokinesis, no clear vegetations  - SIMEON w/o vegetations   - ID consulted: c/w CTX until 6/7. midline placed 5/30  - S/p Zosyn, c/w ceftriaxone 2g q24h  - No hx asthma/COPD, so hold prednisone 40mg  - Cough suppressants PRN, tylenol PRN  - Follow up pulmonology on discharge  - s. gallolyticus risk for colon ca. last colonoscopy >10 yrs ago, was normal per patient. Repeat colonoscopy OP Undermining Type: Entire Wound

## 2024-05-31 NOTE — PROGRESS NOTE ADULT - PROBLEM SELECTOR PLAN 3
Glucose trending upwards (214-396). Takes 16U basal and 6U premeal at home. A1c 7.4  - lantus 25U, premeal 11U, MARTY  - monitor BG for goal 140-180 inpatient

## 2024-05-31 NOTE — PROGRESS NOTE ADULT - TIME BILLING
Patient seen and examined.  Agree with above ACP note.  cv stable  no chest pain, decomp HF  abx per med for pna  trop, ck elevated, likely peaked secondary to demand ischemia in setting of known CAD,  of mid LAD, PNA, syed  can stop trending trop/ck  cont asa  cont med tx of CAD  no indication for ischemic eval   med f/u
Patient seen and examined.  Agree with above ACP note.  cv stable  no chest pain, decomp HF  abx per med for pna  trop, ck elevated, likely peaked secondary to demand ischemia in setting of known CAD,  of mid LAD, PNA, syed  can stop trending trop/ck  cont asa  f/u echo   cont med tx of CAD  no indication for ischemic eval   med f/u
Agree with above ACP note.  cv stable  SIMEON today  cont med tx of CAD  med f/u
Agree with above ACP note.  cv stable  cont current tx per med
Agree with above ACP note.  cv stable  cont current tx per med
Agree with above ACP note.  cv stable  no chest pain, decomp HF  abx per med for pna  trop, ck elevated, likely peaked secondary to demand ischemia in setting of known CAD,  of mid LAD, PNA, syed  can stop trending trop/ck  cont asa  cont med tx of CAD  no indication for ischemic eval   med f/u
50 minutes of total time dedicated to this patient visit today including preparing to see the patient (eg. review of tests), obtaining and/or reviewing separately obtained history, obtaining/reviewing vitals, performing a medically appropriate examination and/or evaluation, counseling and educating the patient/family/caregiver, reviewing previous notes and test results, and procedures, communicating with other health professionals (when not separately reported), and documenting clinical information in the electronic health record).
50 minutes of total time dedicated to this patient visit today including preparing to see the patient (eg. review of tests), obtaining and/or reviewing separately obtained history, obtaining/reviewing vitals, performing a medically appropriate examination and/or evaluation, counseling and educating the patient/family/caregiver, reviewing previous notes and test results, and procedures, communicating with other health professionals (when not separately reported), and documenting clinical information in the electronic health record).

## 2024-05-31 NOTE — PROGRESS NOTE ADULT - REASON FOR ADMISSION
cough, fever, weakness

## 2024-05-31 NOTE — PROGRESS NOTE ADULT - PROVIDER SPECIALTY LIST ADULT
Cardiology
Internal Medicine
Cardiology
Cardiology
Internal Medicine
Anesthesia
Cardiology
Infectious Disease
Internal Medicine
Cardiology
Infectious Disease
Internal Medicine

## 2024-05-31 NOTE — PROGRESS NOTE ADULT - SUBJECTIVE AND OBJECTIVE BOX
CARDIOLOGY FOLLOW UP - Dr. Downing  DATE OF SERVICE: 5/31/24     CC no cp or sob       REVIEW OF SYSTEMS:  CONSTITUTIONAL: No fever, weight loss, or fatigue  RESPIRATORY: No cough, wheezing, chills or hemoptysis; No Shortness of Breath  CARDIOVASCULAR: No chest pain, palpitations, passing out, dizziness, or leg swelling  GASTROINTESTINAL: No abdominal or epigastric pain. No nausea, vomiting, or hematemesis; No diarrhea or constipation. No melena or hematochezia.  VASCULAR: No edema     PHYSICAL EXAM:  T(C): 36.6 (05-31-24 @ 06:30), Max: 36.8 (05-30-24 @ 20:30)  HR: 87 (05-31-24 @ 06:30) (78 - 93)  BP: 110/67 (05-31-24 @ 06:30) (110/67 - 128/67)  RR: 16 (05-31-24 @ 06:30) (16 - 17)  SpO2: 97% (05-31-24 @ 06:30) (96% - 99%)  Wt(kg): --  I&O's Summary    30 May 2024 07:01  -  31 May 2024 07:00  --------------------------------------------------------  IN: 0 mL / OUT: 800 mL / NET: -800 mL        Appearance: Normal	  Cardiovascular: Normal S1 S2,RRR, No JVD, No murmurs  Respiratory: Lungs clear to auscultation	  Gastrointestinal:  Soft, Non-tender, + BS	  Extremities: Normal range of motion, No clubbing, cyanosis or edema      Home Medications:  aspirin 81 mg oral tablet: 1 tab(s) orally once a day (23 May 2024 11:08)  Bactrim  mg-160 mg oral tablet: 1 tab(s) orally 2 times a day x 7 days (Filled on 5/20/24) (23 May 2024 11:09)  clopidogrel 75 mg oral tablet: 1 tab(s) orally once a day (23 May 2024 11:08)  Enbrel SureClick 50 mg/mL subcutaneous solution: 50 milligram(s) subcutaneous once a week (23 May 2024 11:07)  NovoLOG FlexPen 100 units/mL injectable solution: 6 unit(s) injectable once a day (in the afternoon) (23 May 2024 11:08)  NovoLOG FlexPen 100 units/mL injectable solution: 16 unit(s) subcutaneous once a day -AM (23 May 2024 11:08)  NovoLOG Mix 70/30 FlexPen subcutaneous suspension: 16 unit(s) subcutaneous once a day (at bedtime) (23 May 2024 11:08)  Vitamin C 500 mg oral tablet: 1 tab(s) orally once a day (23 May 2024 03:00)      MEDICATIONS  (STANDING):  albuterol/ipratropium for Nebulization 3 milliLiter(s) Nebulizer every 6 hours  ascorbic acid 500 milliGRAM(s) Oral daily  aspirin enteric coated 81 milliGRAM(s) Oral daily  cefTRIAXone   IVPB 2000 milliGRAM(s) IV Intermittent every 24 hours  dextrose 10% Bolus 125 milliLiter(s) IV Bolus once  dextrose 5%. 1000 milliLiter(s) (100 mL/Hr) IV Continuous <Continuous>  dextrose 5%. 1000 milliLiter(s) (50 mL/Hr) IV Continuous <Continuous>  dextrose 50% Injectable 25 Gram(s) IV Push once  dextrose 50% Injectable 12.5 Gram(s) IV Push once  glucagon  Injectable 1 milliGRAM(s) IntraMuscular once  guaiFENesin  milliGRAM(s) Oral every 12 hours  heparin   Injectable 5000 Unit(s) SubCutaneous every 12 hours  insulin glargine Injectable (LANTUS) 25 Unit(s) SubCutaneous at bedtime  insulin lispro (ADMELOG) corrective regimen sliding scale   SubCutaneous three times a day before meals  insulin lispro (ADMELOG) corrective regimen sliding scale   SubCutaneous at bedtime  insulin lispro Injectable (ADMELOG) 11 Unit(s) SubCutaneous three times a day before meals  melatonin 6 milliGRAM(s) Oral at bedtime  sodium chloride 0.9%. 1000 milliLiter(s) (100 mL/Hr) IV Continuous <Continuous>      TELEMETRY: 	    ECG:  	  RADIOLOGY:   DIAGNOSTIC TESTING:  [ ] Echocardiogram:  [ ]  Catheterization:  [ ] Stress Test:    OTHER: 	    LABS:	 	    Troponin T, High Sensitivity Result: 406 ng/L (05-25 @ 05:42)  CKMB Units: 13.3 ng/mL (05-25 @ 05:42)  Creatine Kinase, Serum: 313 U/L [30 - 200] (05-25 @ 05:42)  Troponin T, High Sensitivity Result: 393 ng/L (05-25 @ 05:42)  Troponin T, High Sensitivity Result: 326 ng/L (05-24 @ 15:12)                          13.6   7.47  )-----------( 396      ( 31 May 2024 06:15 )             40.3     05-31    132<L>  |  96<L>  |  24<H>  ----------------------------<  155<H>  4.5   |  22  |  1.19    Ca    9.6      31 May 2024 06:15  Phos  3.6     05-31  Mg     2.30     05-31    TPro  7.8  /  Alb  3.2<L>  /  TBili  0.4  /  DBili  x   /  AST  33  /  ALT  48<H>  /  AlkPhos  112  05-31

## 2024-05-31 NOTE — PROGRESS NOTE ADULT - PROBLEM SELECTOR PROBLEM 7
Need for prophylactic measure no loss of consciousness, no gait abnormality, no headache, no sensory deficits, and no weakness.

## 2024-05-31 NOTE — PROGRESS NOTE ADULT - ASSESSMENT
A/P  83M with PMHx RA on enbrel, DM2 on insulin, CAD s/p stent, CKD? presenting with fever, cough and weakness. Found to have sepsis 2/2 PNA, NILES.    #Pneumonia  #strep gallolyticus bacteremia  -sp sage No echocardiographic evidence of vegetations.nl LV sys fx, no wma ; There is no evidence of left atrial or left atrial appendage thrombus.   -management per ID     #NILES  -in setting of pna, hypovolemia  -med fu     #Elevated troponin, CAD, s/p PCI  -Trop, ckmb elevated in setting of known cad,  of mid LAD, PNA, niles representing demand ischemia  -no ACS, no angina, no acute ischemic ecg abnl   -Echo with mildly decreased lvef 47%, akinesis of apical wall, regional WMA, no sig valvular dz   -continue ASA only, ok to be off of plavix   -cath 2023 with closed mid LAD stent with distal LAD filling with well developed collaterals   -No cp on exam  -cont med tx of cad for now - no need for ischemic eval     #Type 2 diabetes mellitus with hyperglycemia  -tx per med    #H/O rheumatoid arthritis  -tx per med

## 2024-05-31 NOTE — PROGRESS NOTE ADULT - PROBLEM SELECTOR PLAN 6
- Pt is s/p stent ~20 years ago, with hx of distal LAD occlusion on [facility] 2023  - Patient self-discontinued plavix ~1month ago and unclear why. Pt is continuing on aspirin 81 mg.  - Patient needs f/u with his cardiologist Dr. Downing.

## 2024-05-31 NOTE — DISCHARGE NOTE NURSING/CASE MANAGEMENT/SOCIAL WORK - NSDCVIVACCINE_GEN_ALL_CORE_FT
Tdap; 06-Feb-2017 21:51; Hollie Almaraz (RN); [employer]; [id number]; IntraMuscular; Deltoid Left.; 0.5 milliLiter(s); VIS (VIS Published: 09-May-2013, VIS Presented: 06-Feb-2017);

## 2024-06-06 ENCOUNTER — NON-APPOINTMENT (OUTPATIENT)
Age: 84
End: 2024-06-06

## 2024-07-15 NOTE — ED ADULT NURSE NOTE - NS ED NURSE PATIENT LEFT UNIT TIME
----- Message from Cameron Stringer MD sent at 7/12/2024  8:34 PM CDT -----  Regarding: Labs  Please contact this patient and have her present to the [facility] lab for additional lab work recommended by Maternal-Fetal Medicine.  In addition find out if she would like to repeat her carrier testing as there was lab issues with her initial specimen.  SP   03:53

## 2024-07-19 ENCOUNTER — APPOINTMENT (OUTPATIENT)
Dept: PULMONOLOGY | Facility: CLINIC | Age: 84
End: 2024-07-19

## 2024-08-01 ENCOUNTER — APPOINTMENT (OUTPATIENT)
Dept: INTERNAL MEDICINE | Facility: CLINIC | Age: 84
End: 2024-08-01

## 2024-09-04 ENCOUNTER — INPATIENT (INPATIENT)
Facility: HOSPITAL | Age: 84
LOS: 7 days | Discharge: NURSING, REHAB OR LONG-TERM CARE | DRG: 179 | End: 2024-09-12
Attending: INTERNAL MEDICINE | Admitting: INTERNAL MEDICINE
Payer: MEDICARE

## 2024-09-04 VITALS
HEIGHT: 66 IN | TEMPERATURE: 98 F | RESPIRATION RATE: 20 BRPM | WEIGHT: 136.03 LBS | HEART RATE: 103 BPM | DIASTOLIC BLOOD PRESSURE: 73 MMHG | SYSTOLIC BLOOD PRESSURE: 158 MMHG | OXYGEN SATURATION: 97 %

## 2024-09-04 DIAGNOSIS — U07.1 COVID-19: ICD-10-CM

## 2024-09-04 LAB
ADD ON TEST-SPECIMEN IN LAB: SIGNIFICANT CHANGE UP
ALBUMIN SERPL ELPH-MCNC: 3.5 G/DL — SIGNIFICANT CHANGE UP (ref 3.3–5)
ALP SERPL-CCNC: 94 U/L — SIGNIFICANT CHANGE UP (ref 40–120)
ALT FLD-CCNC: 19 U/L — SIGNIFICANT CHANGE UP (ref 10–45)
ANION GAP SERPL CALC-SCNC: 13 MMOL/L — SIGNIFICANT CHANGE UP (ref 5–17)
APPEARANCE UR: CLEAR — SIGNIFICANT CHANGE UP
APTT BLD: 34 SEC — SIGNIFICANT CHANGE UP (ref 24.5–35.6)
AST SERPL-CCNC: 69 U/L — HIGH (ref 10–40)
BACTERIA # UR AUTO: NEGATIVE /HPF — SIGNIFICANT CHANGE UP
BASOPHILS # BLD AUTO: 0 K/UL — SIGNIFICANT CHANGE UP (ref 0–0.2)
BASOPHILS NFR BLD AUTO: 0 % — SIGNIFICANT CHANGE UP (ref 0–2)
BILIRUB SERPL-MCNC: 0.4 MG/DL — SIGNIFICANT CHANGE UP (ref 0.2–1.2)
BILIRUB UR-MCNC: NEGATIVE — SIGNIFICANT CHANGE UP
BUN SERPL-MCNC: 21 MG/DL — SIGNIFICANT CHANGE UP (ref 7–23)
CALCIUM SERPL-MCNC: 9.3 MG/DL — SIGNIFICANT CHANGE UP (ref 8.4–10.5)
CAST: 0 /LPF — SIGNIFICANT CHANGE UP (ref 0–4)
CHLORIDE SERPL-SCNC: 96 MMOL/L — SIGNIFICANT CHANGE UP (ref 96–108)
CO2 SERPL-SCNC: 23 MMOL/L — SIGNIFICANT CHANGE UP (ref 22–31)
COLOR SPEC: YELLOW — SIGNIFICANT CHANGE UP
CREAT SERPL-MCNC: 1.3 MG/DL — SIGNIFICANT CHANGE UP (ref 0.5–1.3)
DIFF PNL FLD: ABNORMAL
EGFR: 54 ML/MIN/1.73M2 — LOW
EOSINOPHIL # BLD AUTO: 0 K/UL — SIGNIFICANT CHANGE UP (ref 0–0.5)
EOSINOPHIL NFR BLD AUTO: 0 % — SIGNIFICANT CHANGE UP (ref 0–6)
FLUAV AG NPH QL: SIGNIFICANT CHANGE UP
FLUBV AG NPH QL: SIGNIFICANT CHANGE UP
GAS PNL BLDV: SIGNIFICANT CHANGE UP
GLUCOSE BLDC GLUCOMTR-MCNC: 171 MG/DL — HIGH (ref 70–99)
GLUCOSE BLDC GLUCOMTR-MCNC: 230 MG/DL — HIGH (ref 70–99)
GLUCOSE SERPL-MCNC: 227 MG/DL — HIGH (ref 70–99)
GLUCOSE UR QL: NEGATIVE MG/DL — SIGNIFICANT CHANGE UP
HCT VFR BLD CALC: 37.1 % — LOW (ref 39–50)
HGB BLD-MCNC: 12 G/DL — LOW (ref 13–17)
INR BLD: 1.2 RATIO — HIGH (ref 0.85–1.18)
KETONES UR-MCNC: NEGATIVE MG/DL — SIGNIFICANT CHANGE UP
LEUKOCYTE ESTERASE UR-ACNC: NEGATIVE — SIGNIFICANT CHANGE UP
LYMPHOCYTES # BLD AUTO: 0.81 K/UL — LOW (ref 1–3.3)
LYMPHOCYTES # BLD AUTO: 9.7 % — LOW (ref 13–44)
MANUAL SMEAR VERIFICATION: SIGNIFICANT CHANGE UP
MCHC RBC-ENTMCNC: 28 PG — SIGNIFICANT CHANGE UP (ref 27–34)
MCHC RBC-ENTMCNC: 32.3 GM/DL — SIGNIFICANT CHANGE UP (ref 32–36)
MCV RBC AUTO: 86.5 FL — SIGNIFICANT CHANGE UP (ref 80–100)
MONOCYTES # BLD AUTO: 1.25 K/UL — HIGH (ref 0–0.9)
MONOCYTES NFR BLD AUTO: 15 % — HIGH (ref 2–14)
NEUTROPHILS # BLD AUTO: 6.26 K/UL — SIGNIFICANT CHANGE UP (ref 1.8–7.4)
NEUTROPHILS NFR BLD AUTO: 62 % — SIGNIFICANT CHANGE UP (ref 43–77)
NEUTS BAND # BLD: 13.3 % — HIGH (ref 0–8)
NITRITE UR-MCNC: NEGATIVE — SIGNIFICANT CHANGE UP
PH UR: 7 — SIGNIFICANT CHANGE UP (ref 5–8)
PLAT MORPH BLD: NORMAL — SIGNIFICANT CHANGE UP
PLATELET # BLD AUTO: 247 K/UL — SIGNIFICANT CHANGE UP (ref 150–400)
POTASSIUM SERPL-MCNC: 4.3 MMOL/L — SIGNIFICANT CHANGE UP (ref 3.5–5.3)
POTASSIUM SERPL-SCNC: 4.3 MMOL/L — SIGNIFICANT CHANGE UP (ref 3.5–5.3)
PROT SERPL-MCNC: 8.8 G/DL — HIGH (ref 6–8.3)
PROT UR-MCNC: 30 MG/DL
PROTHROM AB SERPL-ACNC: 12.5 SEC — SIGNIFICANT CHANGE UP (ref 9.5–13)
RBC # BLD: 4.29 M/UL — SIGNIFICANT CHANGE UP (ref 4.2–5.8)
RBC # FLD: 14.6 % — HIGH (ref 10.3–14.5)
RBC BLD AUTO: SIGNIFICANT CHANGE UP
RBC CASTS # UR COMP ASSIST: 1 /HPF — SIGNIFICANT CHANGE UP (ref 0–4)
REVIEW: SIGNIFICANT CHANGE UP
RSV RNA NPH QL NAA+NON-PROBE: SIGNIFICANT CHANGE UP
SARS-COV-2 RNA SPEC QL NAA+PROBE: DETECTED
SODIUM SERPL-SCNC: 132 MMOL/L — LOW (ref 135–145)
SP GR SPEC: 1.03 — SIGNIFICANT CHANGE UP (ref 1–1.03)
SQUAMOUS # UR AUTO: 0 /HPF — SIGNIFICANT CHANGE UP (ref 0–5)
TROPONIN T, HIGH SENSITIVITY RESULT: 678 NG/L — HIGH (ref 0–51)
UROBILINOGEN FLD QL: 0.2 MG/DL — SIGNIFICANT CHANGE UP (ref 0.2–1)
WBC # BLD: 8.32 K/UL — SIGNIFICANT CHANGE UP (ref 3.8–10.5)
WBC # FLD AUTO: 8.32 K/UL — SIGNIFICANT CHANGE UP (ref 3.8–10.5)
WBC UR QL: 0 /HPF — SIGNIFICANT CHANGE UP (ref 0–5)

## 2024-09-04 PROCEDURE — 72125 CT NECK SPINE W/O DYE: CPT | Mod: 26,MC

## 2024-09-04 PROCEDURE — 74177 CT ABD & PELVIS W/CONTRAST: CPT | Mod: 26,MC

## 2024-09-04 PROCEDURE — 70450 CT HEAD/BRAIN W/O DYE: CPT | Mod: 26,MC

## 2024-09-04 PROCEDURE — 71260 CT THORAX DX C+: CPT | Mod: 26,MC

## 2024-09-04 PROCEDURE — 70486 CT MAXILLOFACIAL W/O DYE: CPT | Mod: 26,MC

## 2024-09-04 PROCEDURE — 76377 3D RENDER W/INTRP POSTPROCES: CPT | Mod: 26

## 2024-09-04 PROCEDURE — 99285 EMERGENCY DEPT VISIT HI MDM: CPT | Mod: FS

## 2024-09-04 PROCEDURE — 73030 X-RAY EXAM OF SHOULDER: CPT | Mod: 26,LT

## 2024-09-04 RX ORDER — SODIUM CHLORIDE 9 MG/ML
1000 INJECTION INTRAMUSCULAR; INTRAVENOUS; SUBCUTANEOUS ONCE
Refills: 0 | Status: COMPLETED | OUTPATIENT
Start: 2024-09-04 | End: 2024-09-04

## 2024-09-04 RX ORDER — PANTOPRAZOLE SODIUM 40 MG
40 TABLET, DELAYED RELEASE (ENTERIC COATED) ORAL
Refills: 0 | Status: DISCONTINUED | OUTPATIENT
Start: 2024-09-04 | End: 2024-09-12

## 2024-09-04 RX ORDER — LATANOPROST 50 UG/ML
1 SOLUTION OPHTHALMIC AT BEDTIME
Refills: 0 | Status: DISCONTINUED | OUTPATIENT
Start: 2024-09-04 | End: 2024-09-12

## 2024-09-04 RX ORDER — EZETIMIBE 10 MG/1
10 TABLET ORAL DAILY
Refills: 0 | Status: DISCONTINUED | OUTPATIENT
Start: 2024-09-04 | End: 2024-09-12

## 2024-09-04 RX ORDER — EZETIMIBE AND SIMVASTATIN 10; 10 MG/1; MG/1
1 TABLET ORAL
Refills: 0 | DISCHARGE

## 2024-09-04 RX ORDER — LISINOPRIL 10 MG/1
5 TABLET ORAL DAILY
Refills: 0 | Status: DISCONTINUED | OUTPATIENT
Start: 2024-09-04 | End: 2024-09-12

## 2024-09-04 RX ORDER — RAMIPRIL 10 MG
1 CAPSULE ORAL
Refills: 0 | DISCHARGE

## 2024-09-04 RX ORDER — ENOXAPARIN SODIUM 100 MG/ML
40 INJECTION SUBCUTANEOUS EVERY 24 HOURS
Refills: 0 | Status: DISCONTINUED | OUTPATIENT
Start: 2024-09-04 | End: 2024-09-12

## 2024-09-04 RX ORDER — AZITHROMYCIN 500 MG/1
500 TABLET, FILM COATED ORAL ONCE
Refills: 0 | Status: COMPLETED | OUTPATIENT
Start: 2024-09-04 | End: 2024-09-04

## 2024-09-04 RX ORDER — BIMATOPROST 0.3 MG/ML
1 SOLUTION/ DROPS OPHTHALMIC
Refills: 0 | DISCHARGE

## 2024-09-04 RX ORDER — INSULIN ASPART 100 [IU]/ML
13 INJECTION, SOLUTION INTRAVENOUS; SUBCUTANEOUS
Refills: 0 | DISCHARGE

## 2024-09-04 RX ORDER — DEXTROSE 15 G/33 G
25 GEL IN PACKET (GRAM) ORAL ONCE
Refills: 0 | Status: DISCONTINUED | OUTPATIENT
Start: 2024-09-04 | End: 2024-09-12

## 2024-09-04 RX ORDER — TAMSULOSIN HYDROCHLORIDE 0.4 MG/1
0.4 CAPSULE ORAL AT BEDTIME
Refills: 0 | Status: DISCONTINUED | OUTPATIENT
Start: 2024-09-04 | End: 2024-09-12

## 2024-09-04 RX ORDER — TAMSULOSIN HYDROCHLORIDE 0.4 MG/1
1 CAPSULE ORAL
Refills: 0 | DISCHARGE

## 2024-09-04 RX ORDER — INSULIN GLARGINE 100 [IU]/ML
10 INJECTION, SOLUTION SUBCUTANEOUS AT BEDTIME
Refills: 0 | Status: DISCONTINUED | OUTPATIENT
Start: 2024-09-04 | End: 2024-09-12

## 2024-09-04 RX ORDER — ASPIRIN 81 MG
324 TABLET, DELAYED RELEASE (ENTERIC COATED) ORAL ONCE
Refills: 0 | Status: COMPLETED | OUTPATIENT
Start: 2024-09-04 | End: 2024-09-04

## 2024-09-04 RX ORDER — ASPIRIN 81 MG
81 TABLET, DELAYED RELEASE (ENTERIC COATED) ORAL DAILY
Refills: 0 | Status: DISCONTINUED | OUTPATIENT
Start: 2024-09-04 | End: 2024-09-12

## 2024-09-04 RX ORDER — DEXTROSE 15 G/33 G
15 GEL IN PACKET (GRAM) ORAL ONCE
Refills: 0 | Status: DISCONTINUED | OUTPATIENT
Start: 2024-09-04 | End: 2024-09-12

## 2024-09-04 RX ORDER — DEXTROSE 15 G/33 G
12.5 GEL IN PACKET (GRAM) ORAL ONCE
Refills: 0 | Status: DISCONTINUED | OUTPATIENT
Start: 2024-09-04 | End: 2024-09-12

## 2024-09-04 RX ORDER — ETANERCEPT 50 MG/ML
50 SOLUTION SUBCUTANEOUS
Refills: 0 | DISCHARGE

## 2024-09-04 RX ORDER — FOLIC ACID/MULTIVIT,IRON,MINER 0.4MG-18MG
1 TABLET,CHEWABLE ORAL
Refills: 0 | DISCHARGE

## 2024-09-04 RX ORDER — GLUCAGON INJECTION, SOLUTION 1 MG/.2ML
1 INJECTION, SOLUTION SUBCUTANEOUS ONCE
Refills: 0 | Status: DISCONTINUED | OUTPATIENT
Start: 2024-09-04 | End: 2024-09-12

## 2024-09-04 RX ORDER — INSULIN ASPART 100 [IU]/ML
21 INJECTION, SUSPENSION SUBCUTANEOUS
Refills: 0 | DISCHARGE

## 2024-09-04 RX ORDER — PSYLLIUM HUSK 0.4 G
1 CAPSULE ORAL
Refills: 0 | DISCHARGE

## 2024-09-04 RX ORDER — ACETAMINOPHEN 325 MG/1
1000 TABLET ORAL ONCE
Refills: 0 | Status: COMPLETED | OUTPATIENT
Start: 2024-09-04 | End: 2024-09-04

## 2024-09-04 RX ADMIN — Medication 100 MILLIGRAM(S): at 10:40

## 2024-09-04 RX ADMIN — Medication 3 MILLIGRAM(S): at 23:54

## 2024-09-04 RX ADMIN — INSULIN GLARGINE 10 UNIT(S): 100 INJECTION, SOLUTION SUBCUTANEOUS at 22:10

## 2024-09-04 RX ADMIN — TAMSULOSIN HYDROCHLORIDE 0.4 MILLIGRAM(S): 0.4 CAPSULE ORAL at 22:11

## 2024-09-04 RX ADMIN — LATANOPROST 1 DROP(S): 50 SOLUTION OPHTHALMIC at 22:43

## 2024-09-04 RX ADMIN — Medication 324 MILLIGRAM(S): at 15:54

## 2024-09-04 RX ADMIN — SODIUM CHLORIDE 1000 MILLILITER(S): 9 INJECTION INTRAMUSCULAR; INTRAVENOUS; SUBCUTANEOUS at 10:39

## 2024-09-04 RX ADMIN — Medication 10 MILLIGRAM(S): at 22:11

## 2024-09-04 RX ADMIN — ENOXAPARIN SODIUM 40 MILLIGRAM(S): 100 INJECTION SUBCUTANEOUS at 22:10

## 2024-09-04 RX ADMIN — AZITHROMYCIN 255 MILLIGRAM(S): 500 TABLET, FILM COATED ORAL at 14:08

## 2024-09-04 RX ADMIN — ACETAMINOPHEN 400 MILLIGRAM(S): 325 TABLET ORAL at 10:39

## 2024-09-04 NOTE — PATIENT PROFILE ADULT - FALL HARM RISK - RISK INTERVENTIONS

## 2024-09-04 NOTE — ED PROVIDER NOTE - CARE PLAN
1 Principal Discharge DX:	COVID  Secondary Diagnosis:	Fall  Secondary Diagnosis:	Elevated troponin

## 2024-09-04 NOTE — CONSULT NOTE ADULT - ASSESSMENT
A/P    82 Y/o Male with pmhx BPH, CAD sp PCI, CKD, DM, HTN, and RA BIBA sp fall this morning.     #SP Fall  -pt denies cardiac prodrome surrounding fall  -ECG shows NSR HR 82 with TWI in V1-V6, pt denies CP, no concern for ACS; likely new TWI d/t demand ischemia in setting of Covid infection  -CT Head 9/4/24 shows no acute calvarial fracture or intracranial hemorrhage  -SIMEON from 5/29/24 shows normal LV systolic function, no regional wall motion abnormalities, normal RV systolic function, mild to moderate aortic regurgitation, mild to moderate pulmonic regurgitation.  -Recheck ECHO  -infectious w/u per med    #Covid  -stable on RA  -CT Chest 9/4/24 shows resolution of left pleural effusion. Right pleural effusion is   unchanged.  No evidence of acute traumatic abnormality in the chest, abdomen, or   pelvis. Pulmonary changes consistent with interstitial lung disease, similar   to findings present on prior examination  -med f/u    #HTN  -Trend BP  -not on BP meds outpt    #CAD s/p PCI  -SIMEON as above  -continue ASA, has been off of plavix   -cath 2023 with closed mid LAD stent with distal LAD filling with well developed collaterals   -No cp on exam  -cont med tx of cad for now - no need for ischemic eval     #CKD  -stable, trend renal function    #H/O rheumatoid arthritis  -tx per med    dvt ppx   A/P    82 Y/o Male with pmhx BPH, CAD sp PCI, CKD, DM, HTN, and RA BIBA sp fall this morning.     #SP Fall  -pt denies cardiac prodrome surrounding fall  -HS Trop elevated; no CP on exam; trend trop, add CKMB  -ECG shows NSR HR 82 with TWI in V1-V6, pt denies CP, no concern for ACS; likely new TWI d/t demand ischemia in setting of Covid infection  -CT Head 9/4/24 shows no acute calvarial fracture or intracranial hemorrhage  -SIMEON from 5/29/24 shows normal LV systolic function, no regional wall motion abnormalities, normal RV systolic function, mild to moderate aortic regurgitation, mild to moderate pulmonic regurgitation.  -Recheck ECHO  -infectious w/u per med    #Covid  -stable on RA  -CT Chest 9/4/24 shows resolution of left pleural effusion. Right pleural effusion is   unchanged.  No evidence of acute traumatic abnormality in the chest, abdomen, or   pelvis. Pulmonary changes consistent with interstitial lung disease, similar   to findings present on prior examination  -med f/u    #HTN  -Trend BP  -not on BP meds outpt    #CAD s/p PCI  -SIMEON as above  -HS Trop elevated; no CP on exam; no need to trend trop  -cath 2023 with closed mid LAD stent with distal LAD filling with well developed collaterals   -would defer further ischemic w/u for now in setting of acute Covid infection and no active chest pain; continue medical management  -continue ASA, has been off of plavix     #CKD  -stable, trend renal function    #H/O rheumatoid arthritis  -tx per med    dvt ppx   A/P    82 Y/o Male with pmhx BPH, CAD sp PCI, CKD, DM, HTN, and RA BIBA sp fall this morning.     #SP Fall  -pt denies cardiac prodrome surrounding fall  -HS Trop elevated; mild ST elevation noted in V2; no CP on exam; no need to trend trop  -ECG shows NSR HR 82 with TWI in V1-V6, pt denies CP, no concern for ACS; likely new TWI d/t demand ischemia in setting of Covid infection  -CT Head 9/4/24 shows no acute calvarial fracture or intracranial hemorrhage  -SIMEON from 5/29/24 shows normal LV systolic function, no regional wall motion abnormalities, normal RV systolic function, mild to moderate aortic regurgitation, mild to moderate pulmonic regurgitation.  -Recheck ECHO  -infectious w/u per med    #Covid  -stable on RA  -CT Chest 9/4/24 shows resolution of left pleural effusion. Right pleural effusion is   unchanged.  No evidence of acute traumatic abnormality in the chest, abdomen, or   pelvis. Pulmonary changes consistent with interstitial lung disease, similar   to findings present on prior examination  -med f/u    #HTN  -Trend BP  -not on BP meds outpt    #CAD s/p PCI  -SIMEON as above  -HS Trop elevated; mild ST elevation noted in V2; no CP on exam; no need to trend trop  -cath 2023 with closed mid LAD stent with distal LAD filling with well developed collaterals   -would defer further ischemic w/u for now in setting of acute Covid infection and no active chest pain; continue medical management  -continue ASA, has been off of plavix     #CKD  -stable, trend renal function    #H/O rheumatoid arthritis  -tx per med    dvt ppx

## 2024-09-04 NOTE — ED PROVIDER NOTE - WR ORDER STATUS 1
DATE OF CONSULTATION:  06/15/2018    REFERRING Physician:  Jovi Patel MD    HISTORY:  The patient is a 75-year-old female, who is a resident in extended care facility.  The patient has never been admitted to this institution, such that previous medical records are not available for review.  Apparently, the patient has a history of;  1. Essential hypertension.  2. Chronic lower extremity ulcers.  3. Underlying chronic obstructive pulmonary disease.   The patient is a resident in extended care facility and apparently was found to have blood in her underwear.  The patient was transferred to the emergency room for evaluation.  The patient is not very cooperative and does not want to be here in the hospital.  She was found to be afebrile, temp of 98.0, heart rate is 113, respiratory rate is 18, blood pressure 114/45, saturation 94%.  The patient's H and H were low at 9.2 and 27.  The patient was found to be hypotensive.  A rectal exam was positive for francis blood.  A limited pelvic exam showed no evidence of menometrorrhagia.  The patient was found to be tachycardic, hypotensive, and admitted to the intensive care unit.     The patient's blood pressure systolic was 82.  The patient was given IV fluids.  She was receiving a blood transfusion.     The patient's chief complaint this morning is that of pain in her left lower extremity under her dressing.  She states that she gets pain medications for it at the nursing home.    PAST SURGICAL HISTORY:  Remarkable for a hysterectomy.    SOCIAL HISTORY:  Apparently, the patient is an ex-smoker.  The patient lives in a nursing home.    MEDICATIONS:    1. Losartan.  2. Tramadol.  3. Zolpidem.  4. Metoprolol.  5. Melatonin.  6. Tylenol.  7. Colace with senna.    FAMILY HISTORY:  Unknown.    REVIEW OF SYSTEMS:  A 12-point review of systems is unobtainable.    PHYSICAL EXAMINATION:    GENERAL:  This is a chronically ill elderly female, resting comfortably in bed.  Head of  the bed elevated at 30 degrees.     VITAL SIGNS:  The patient has resting tachycardia.  Heart rate is 120, respirations 20 and unlabored, blood pressure is 112/65.  Lowest blood pressure documented was 72/58 around 5:45 this morning.  Saturations on room air 98%.     HEENT:  Normocephalic and atraumatic.  Mucous membranes are moist.     NECK:  Neck veins are flat.     LUNGS:  Clear to auscultation.  No rales, rubs, or rhonchi.     HEART:  Rate is tachycardia.  No murmur or gallop.  S1 and S2.     BREASTS:  Not examined.     ABDOMEN:  Soft, benign, protuberant.     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  There is significant xeroderma and accentuation of the skin folds.  The patient has bilateral dressings in the talocrural region, which were not removed.    LABORATORY DATA:  Glucose is 107.  BUN and creatinine are 9/0.67.  Sodium 130, potassium 4.3, chloride 98, CO2 is 23.     H and H are 7.2/22, white count 33823 and repeat 9800, platelet count 204,000.  INR is 1.0.    IMPRESSION:    1. Acute blood loss anemia, which appears to be superimposed on chronic, normocytic normochromic.  2. Hypotension, secondary to vascular volume depletion, improving with IV crystalloids.  3. Hyponatremia.  4. History of hypertension, currently hypotensive.  5. History of chronic obstructive pulmonary disease.  No evidence of bronchospasm.  6. Insomnia.  7. Chronic stasis changes to lower extremities.    PLAN:    1. Proton pump inhibitors.  2. Continue IV hydration.  3. The patient may be transferred to the telemetry unit.   Discussed with the patient's nurse.  Total time, greater than 35 minutes.        ______________________________  Navdeep Alicia MD  [id number]      D:  06/15/2018 11:15:18  T:  06/15/2018 13:07:14   MI/olivia   Job:  [id number]          Performed Resulted

## 2024-09-04 NOTE — CONSULT NOTE ADULT - TIME BILLING
Patient seen and examined, agree with the above assessment and plan by ACP  84 Y/o Male with pmhx BPH, CAD sp PCI, CKD, DM, HTN, and RA BIBA sp fall this morning.     #SP Fall  -pt denies cardiac prodrome surrounding fall  -HS Trop elevated; mild ST elevation noted in V2; no CP on exam; no need to trend trop  -ECG shows NSR HR 82 with TWI in V1-V6, pt denies CP, no concern for ACS; likely new TWI d/t demand ischemia in setting of Covid infection  -CT Head 9/4/24 shows no acute calvarial fracture or intracranial hemorrhage  -SIMEON from 5/29/24 shows normal LV systolic function, no regional wall motion abnormalities, normal RV systolic function, mild to moderate aortic regurgitation, mild to moderate pulmonic regurgitation.  -Recheck ECHO  -infectious w/u per med    #Covid  -stable on RA  -CT Chest 9/4/24 shows resolution of left pleural effusion. Right pleural effusion is   unchanged.  No evidence of acute traumatic abnormality in the chest, abdomen, or   pelvis. Pulmonary changes consistent with interstitial lung disease, similar   to findings present on prior examination  -med f/u    #HTN  -Trend BP  -not on BP meds outpt    #CAD s/p PCI  -SIMEON as above  -HS Trop elevated likely due to demand ischemia; mild ST elevation noted in V2; no CP on exam; no need to trend trop  -cath 2023 with closed mid LAD stent with distal LAD filling with well developed collaterals   -would defer further ischemic w/u for now in setting of acute Covid infection and no active chest pain; continue medical management  -continue ASA, has been off of plavix     #CKD  -stable, trend renal function    #H/O rheumatoid arthritis  -tx per med    dvt ppx

## 2024-09-04 NOTE — ED ADULT NURSE NOTE - NS ED NURSE RECORD ANOTHER HT AND WT
Reports abdominal pain/cramping loose stools. Taking Lomotil 4 times per day. Immediate release morphine 15 mg every 4 hours which is not helping. Has an appointment tomorrow.  Asking for advice.    Writer discussed with DARON Ventura. Confirm that she is taking immodium 8 tabs per day. Add Pepto Bismol. May need to start Bentyl.    Patient states she his taking 6 immodium. Will increase to 8. Add Pepto. She has Bentyl from GI [facility]. Will add as needed. Keep tomorrows appointment.    Yes

## 2024-09-04 NOTE — CONSULT NOTE ADULT - SUBJECTIVE AND OBJECTIVE BOX
CARDIOLOGY CONSULT - Dr. Downing         HPI: 84 Y/o Male with pmhx BPH, CAD, DM, htn and RA BIBA sp fall this morning.  As per son, patient fell early this morning and didn't remember what happened. As per patient, he thinks he tripped while ambulating with his walker but isnt sure. Patient denies LOC. Thinks it was around 3-4AM. Patient able to call son, who came from [place] and was able to call ems. Patient has been having sore throat, cough and chills for the past week and is currently taking antitbiotic. Patient lives at home with his wife who has parkinsons. Patient denies abd pain, nvd, tingling, numbness, and dysuria        PAST MEDICAL & SURGICAL HISTORY:  DM (diabetes mellitus)      RA (rheumatoid arthritis)      CAD (coronary artery disease)      HTN (hypertension)      HLD (hyperlipidemia)      Spinal stenosis      BPH (benign prostatic hyperplasia)      No significant past surgical history              PREVIOUS DIAGNOSTIC TESTING:    [X] Echocardiogram:    < from: SIMEON W or WO Ultrasound Enhancing Agent (05.29.24 @ 11:30) >  CONCLUSIONS:      1. Left ventricular systolic function is normal. There are no regional wall motion abnormalities seen.   2. Normal right ventricular cavity size and normal systolic function.   3. Structurally normal mitral valve with normal leaflet excursion. No vegetations seen in association with the mitral valve. There is calcification of the mitral valve annulus. There is mild mitral regurgitation.   4. The aortic valve appears trileaflet with normal systolic excursion. There is calcification of the aortic valve leaflets. No vegetations seen in association with the aortic valve. There is mild to moderate aortic regurgitation. Vena contracta width ~ 0.35 cm.   5. No atheroma in the visualized portions of the proximal ascending aorta. Mild non-mobile atheroma in the visualized portions of the transverse aortic arch. Mild non-mobile atheroma in the visualized portions of the descending aorta.   6. The leftatrium is normal in size. There is no evidence of left atrial or left atrial appendage thrombus. The left atrial appendage emptying velocity is normal.   7. Agitated saline injection was negative for intracardiac shunt.   8. No echocardiographic evidence of vegetations.    ____________________________________________________________________  SIMEON Procedure:  After discussion of the risks and benefits of the SIMEON, an informed consent was obtained. Local oropharyngeal anesthetic was provided with viscous lidocaine. Study was performed with anesthesia (please see anesthesia record).  The adult Anna SIMEON probe was introduced and passed into the esophagus. The SIMEON probe was passed without difficulty. Images were obtained with the patient in a leftlateral decubitus position. Image quality was good. The patient's vital signs; including heart rate, blood pressure, and oxygen saturation; remained stable throughout the procedure. The patient tolerated the procedure well and without complications.     ________________________________________________________________________________________  FINDINGS:     Left Ventricle:  Left ventricular systolic function is normal. There are no regional wall motion abnormalities seen.     Right Ventricle:  The right ventricular cavity is normal in size and normal systolic function.     Left Atrium:  The left atrium is normal in size. There is no evidence of left atrial or left atrial appendage thrombus. The left atrial appendage emptying velocity is normal.     Right Atrium:  The right atrium is normal in size.     Interatrial Septum:  Agitated saline injection was negative for intracardiac shunt.     Aortic Valve:  The aortic valve appears trileaflet with normal systolic excursion. There is calcification of the aortic valve leaflets. No vegetations seen in association with the aortic valve. There is mild to moderate aortic regurgitation. Vena contracta width ~ 0.35 cm.     Mitral Valve:  Structurally normal mitral valve with normal leaflet excursion. No vegetations seen in association with the mitral valve. There is calcification of the mitral valve annulus. There is mild mitral regurgitation.     Tricuspid Valve:  Structurally normal tricuspid valve with normal leaflet excursion. There is trace tricuspid regurgitation. No vegetations seen in association with the tricuspid valve.     Pulmonic Valve:  Structurally normal pulmonic valve with normal leaflet excursion. No vegetations seen in association with the pulmonic valve. There is mild tomoderate pulmonic regurgitation.     Aorta:  The aortic root at the sinuses of Valsalva is normal in size, measuring 3.10 cm (indexed 1.78 cm/m²). The aortic diameter at the sinotubular junction is normal in size, measuring 2.5 cm. The ascending aorta diameter is normal in size, measuring 3.10 cm (indexed 1.78 cm/m²). There is no atheroma in the visualized portions of the proximal ascending aorta. There is mild non-mobile atheroma in the visualized portions of the transverse aortic arch. There ismild non-mobile atheroma in the visualized portions of the descending aorta.     Pericardium:  No pericardial effusion seen.  ____________________________________________________________________    < end of copied text >  [X]  Catheterization:  < from: Cardiac Catheterization (02.27.23 @ 16:09) >  Diagnostic Conclusions:     History of LAD PCI. Recent abnormal nuclear stress testing with  ischemia.    Cath with left dominant system with luminal LCX and RCA disease.    Mid LAD is totally occluded at site of previous stent with collaterals  filling the distal LAD retrograde to distal edge of stent.  Will continue medical treatment of CAD, LAD  in light of lack of  angina, dyspnea,any exertional symptoms, advanced age,  and decreased GFR.    Patient and son both agree and prefer medical treatment.    If develops any exertional symptoms or angina, he can return for  attempt of PCI to mid LAD .      < end of copied text >    [ ] Stress Test:  	    MEDICATIONS:  Home Medications:  aspirin 81 mg oral tablet: 1 tab(s) orally once a day (23 May 2024 11:08)  Enbrel SureClick 50 mg/mL subcutaneous solution: 50 milligram(s) subcutaneous once a week (23 May 2024 11:07)  Vitamin C 500 mg oral tablet: 1 tab(s) orally once a day (23 May 2024 03:00)      MEDICATIONS  (STANDING):      FAMILY HISTORY:  No pertinent family history in first degree relatives        SOCIAL HISTORY:    [X] Non-smoker  [ ] Smoker  [ ] Alcohol    Allergies    tetracyclines (Rash)    Intolerances    	    REVIEW OF SYSTEMS:  CONSTITUTIONAL: No fever, weight loss, or fatigue  EYES: No eye pain, visual disturbances, or discharge  ENMT:  No difficulty hearing, tinnitus, vertigo; No sinus or throat pain; +voice hoarsenss  NECK: No pain or stiffness  RESPIRATORY: +cough; No wheezing, chills or hemoptysis; No Shortness of Breath  CARDIOVASCULAR: No chest pain, palpitations, passing out, dizziness, or leg swelling  GASTROINTESTINAL: No abdominal or epigastric pain. No nausea, vomiting, or hematemesis; No diarrhea or constipation. No melena or hematochezia.  GENITOURINARY: No dysuria, frequency, hematuria, or incontinence  NEUROLOGICAL: No headaches, memory loss, loss of strength, numbness, or tremors  SKIN: No itching, burning, rashes, or lesions   	    [X] All others negative	  [ ] Unable to obtain    PHYSICAL EXAM:  T(C): 36.8 (09-04-24 @ 10:02), Max: 36.8 (09-04-24 @ 10:02)  HR: 103 (09-04-24 @ 10:02) (103 - 103)  BP: 158/73 (09-04-24 @ 10:02) (158/73 - 158/73)  RR: 20 (09-04-24 @ 10:02) (20 - 20)  SpO2: 97% (09-04-24 @ 10:02) (97% - 97%)  Wt(kg): --  I&O's Summary      Appearance: Normal	  Psychiatry: A & O x 3, Mood & affect appropriate  HEENT:   Normal oral mucosa, PERRL, EOMI	  Lymphatic: No lymphadenopathy  Cardiovascular: Normal S1 S2,RRR, No JVD, No murmurs  Respiratory: Crackles at bases b/l  Gastrointestinal:  Soft, Non-tender, + BS	  Skin: No rashes, No ecchymoses, No cyanosis	  Neurologic: Non-focal  Extremities: Normal range of motion, No clubbing, cyanosis or edema  Vascular: Peripheral pulses palpable 2+ bilaterally    TELEMETRY: 	   ECG:  	 NSR HR 82 with TWI in V1-V6  RADIOLOGY:  < from: CT Head No Cont (09.04.24 @ 11:54) >  IMPRESSION:    CT HEAD:  1. No significant change.  2. No evidence of acute calvarial fracture or intracranial hemorrhage.  3. Additional findings described in detail above.    CT MAXILLOFACIAL:  1. No definitive evidence of acute fracture or paranasal sinus air-fluid   level.  2. Soft tissue edema with probable 2.5 cm subcutaneous hematoma lateral   to the left mandible. Cellulitis may have an overlapping imaging   appearance. Correlate with clinical findings.  3. Additional findings described in detail above.    CT CERVICAL SPINE:  1. No definitive evidence of acute fracture or prevertebral soft tissue   swelling. Straightening of lordosis may reflect muscle spasm.  2. Additional findings, including those degenerative, described in detail   above.    < end of copied text >    < from: CT Chest w/ IV Cont (09.04.24 @ 11:52) >  FINDINGS:  CHEST:  LUNGS AND LARGE AIRWAYS: Patent central airways. Basilar predominant   fibrotic changes with honeycombing at the lung bases. Scarring and volume   loss in the right pulmonary apex. Findings are similar to prior.  PLEURA: Left pleural effusion has resolved. Right pleural effusion is   unchanged.  VESSELS: Within normal limits.  HEART: Heart size is normal. No pericardial effusion.  MEDIASTINUM AND OTIS: Patulous esophagus. No lymphadenopathy.  CHEST WALL AND LOWER NECK: Within normal limits.    ABDOMEN AND PELVIS:  LIVER: Within calcified granuloma in the right hepatic lobe.  BILE DUCTS: Normal caliber.  GALLBLADDER: Within normal limits.  SPLEEN: Within normal limits.  PANCREAS: Within normal limits.  ADRENALS: Within normal limits.  KIDNEYS/URETERS: Left renal cyst measures 1.6 cm. Subcentimeter lesions   too small to characterize. Right greater than left renal cortical   scarring. Punctate nonobstructing right renal stones. No hydronephrosis.    BLADDER: Within normal limits.  REPRODUCTIVE ORGANS: Prostate mildly enlarged.    BOWEL: No bowel obstruction. Appendix is normal.  PERITONEUM/RETROPERITONEUM: Within normal limits.  VESSELS: Atherosclerotic calcifications.  LYMPH NODES: No lymphadenopathy.  ABDOMINAL WALL: Fat-containing umbilical hernia.  BONES: Degenerative changes. Sclerotic lesion in the left femoral neck,   unchanged from prior    IMPRESSION:  *  No evidence of acute traumatic abnormality in the chest, abdomen, or   pelvis.  *  Pulmonary changes consistent with interstitial lung disease, similar   to findings present on prior examination.  *  Resolution of left pleural effusion.    < end of copied text >      OTHER: 	  	  LABS:	 	    CARDIAC MARKERS:                                  12.0   8.32  )-----------( 247      ( 04 Sep 2024 10:56 )             37.1     09-04    132<L>  |  96  |  21  ----------------------------<  227<H>  4.3   |  23  |  1.30    Ca    9.3      04 Sep 2024 10:56    TPro  8.8<H>  /  Alb  3.5  /  TBili  0.4  /  DBili  x   /  AST  69<H>  /  ALT  19  /  AlkPhos  94  09-04    PT/INR - ( 04 Sep 2024 10:56 )   PT: 12.5 sec;   INR: 1.20 ratio         PTT - ( 04 Sep 2024 10:56 )  PTT:34.0 sec  proBNP:   Lipid Profile:   HgA1c:   TSH:        CARDIOLOGY CONSULT - Dr. Downing         HPI: 82 Y/o Male with pmhx BPH, CAD, DM, htn and RA BIBA sp fall this morning.  As per son, patient fell early this morning and didn't remember what happened. As per patient, he thinks he tripped while ambulating with his walker but isnt sure. Patient denies LOC. Thinks it was around 3-4AM. Patient able to call son, who came from [place] and was able to call ems. Patient has been having sore throat, cough and chills for the past week and is currently taking antitbiotic. Patient lives at home with his wife who has parkinsons. Patient denies abd pain, nvd, tingling, numbness, and dysuria        PAST MEDICAL & SURGICAL HISTORY:  DM (diabetes mellitus)      RA (rheumatoid arthritis)      CAD (coronary artery disease)      HTN (hypertension)      HLD (hyperlipidemia)      Spinal stenosis      BPH (benign prostatic hyperplasia)      No significant past surgical history              PREVIOUS DIAGNOSTIC TESTING:    [X] Echocardiogram:    < from: SIMEON W or WO Ultrasound Enhancing Agent (05.29.24 @ 11:30) >  CONCLUSIONS:      1. Left ventricular systolic function is normal. There are no regional wall motion abnormalities seen.   2. Normal right ventricular cavity size and normal systolic function.   3. Structurally normal mitral valve with normal leaflet excursion. No vegetations seen in association with the mitral valve. There is calcification of the mitral valve annulus. There is mild mitral regurgitation.   4. The aortic valve appears trileaflet with normal systolic excursion. There is calcification of the aortic valve leaflets. No vegetations seen in association with the aortic valve. There is mild to moderate aortic regurgitation. Vena contracta width ~ 0.35 cm.   5. No atheroma in the visualized portions of the proximal ascending aorta. Mild non-mobile atheroma in the visualized portions of the transverse aortic arch. Mild non-mobile atheroma in the visualized portions of the descending aorta.   6. The leftatrium is normal in size. There is no evidence of left atrial or left atrial appendage thrombus. The left atrial appendage emptying velocity is normal.   7. Agitated saline injection was negative for intracardiac shunt.   8. No echocardiographic evidence of vegetations.    ____________________________________________________________________  SIMEON Procedure:  After discussion of the risks and benefits of the SIMEON, an informed consent was obtained. Local oropharyngeal anesthetic was provided with viscous lidocaine. Study was performed with anesthesia (please see anesthesia record).  The adult Anna SIMEON probe was introduced and passed into the esophagus. The SIMEON probe was passed without difficulty. Images were obtained with the patient in a leftlateral decubitus position. Image quality was good. The patient's vital signs; including heart rate, blood pressure, and oxygen saturation; remained stable throughout the procedure. The patient tolerated the procedure well and without complications.     ________________________________________________________________________________________  FINDINGS:     Left Ventricle:  Left ventricular systolic function is normal. There are no regional wall motion abnormalities seen.     Right Ventricle:  The right ventricular cavity is normal in size and normal systolic function.     Left Atrium:  The left atrium is normal in size. There is no evidence of left atrial or left atrial appendage thrombus. The left atrial appendage emptying velocity is normal.     Right Atrium:  The right atrium is normal in size.     Interatrial Septum:  Agitated saline injection was negative for intracardiac shunt.     Aortic Valve:  The aortic valve appears trileaflet with normal systolic excursion. There is calcification of the aortic valve leaflets. No vegetations seen in association with the aortic valve. There is mild to moderate aortic regurgitation. Vena contracta width ~ 0.35 cm.     Mitral Valve:  Structurally normal mitral valve with normal leaflet excursion. No vegetations seen in association with the mitral valve. There is calcification of the mitral valve annulus. There is mild mitral regurgitation.     Tricuspid Valve:  Structurally normal tricuspid valve with normal leaflet excursion. There is trace tricuspid regurgitation. No vegetations seen in association with the tricuspid valve.     Pulmonic Valve:  Structurally normal pulmonic valve with normal leaflet excursion. No vegetations seen in association with the pulmonic valve. There is mild tomoderate pulmonic regurgitation.     Aorta:  The aortic root at the sinuses of Valsalva is normal in size, measuring 3.10 cm (indexed 1.78 cm/m²). The aortic diameter at the sinotubular junction is normal in size, measuring 2.5 cm. The ascending aorta diameter is normal in size, measuring 3.10 cm (indexed 1.78 cm/m²). There is no atheroma in the visualized portions of the proximal ascending aorta. There is mild non-mobile atheroma in the visualized portions of the transverse aortic arch. There ismild non-mobile atheroma in the visualized portions of the descending aorta.     Pericardium:  No pericardial effusion seen.  ____________________________________________________________________    < end of copied text >  [X]  Catheterization:  < from: Cardiac Catheterization (02.27.23 @ 16:09) >  Diagnostic Conclusions:     History of LAD PCI. Recent abnormal nuclear stress testing with  ischemia.    Cath with left dominant system with luminal LCX and RCA disease.    Mid LAD is totally occluded at site of previous stent with collaterals  filling the distal LAD retrograde to distal edge of stent.  Will continue medical treatment of CAD, LAD  in light of lack of  angina, dyspnea,any exertional symptoms, advanced age,  and decreased GFR.    Patient and son both agree and prefer medical treatment.    If develops any exertional symptoms or angina, he can return for  attempt of PCI to mid LAD .      < end of copied text >    [ ] Stress Test:  	    MEDICATIONS:  Home Medications:  aspirin 81 mg oral tablet: 1 tab(s) orally once a day (23 May 2024 11:08)  Enbrel SureClick 50 mg/mL subcutaneous solution: 50 milligram(s) subcutaneous once a week (23 May 2024 11:07)  Vitamin C 500 mg oral tablet: 1 tab(s) orally once a day (23 May 2024 03:00)      MEDICATIONS  (STANDING):      FAMILY HISTORY:  No pertinent family history in first degree relatives        SOCIAL HISTORY:    [X] Non-smoker  [ ] Smoker  [ ] Alcohol    Allergies    tetracyclines (Rash)    Intolerances    	    REVIEW OF SYSTEMS:  CONSTITUTIONAL: No fever, weight loss, or fatigue  EYES: No eye pain, visual disturbances, or discharge  ENMT:  No difficulty hearing, tinnitus, vertigo; No sinus or throat pain; +voice hoarsenss  NECK: No pain or stiffness  RESPIRATORY: +cough; No wheezing, chills or hemoptysis; No Shortness of Breath  CARDIOVASCULAR: No chest pain, palpitations, passing out, dizziness, or leg swelling  GASTROINTESTINAL: No abdominal or epigastric pain. No nausea, vomiting, or hematemesis; No diarrhea or constipation. No melena or hematochezia.  GENITOURINARY: No dysuria, frequency, hematuria, or incontinence  NEUROLOGICAL: No headaches, memory loss, loss of strength, numbness, or tremors  SKIN: No itching, burning, rashes, or lesions   	    [X] All others negative	  [ ] Unable to obtain    PHYSICAL EXAM:  T(C): 36.8 (09-04-24 @ 10:02), Max: 36.8 (09-04-24 @ 10:02)  HR: 103 (09-04-24 @ 10:02) (103 - 103)  BP: 158/73 (09-04-24 @ 10:02) (158/73 - 158/73)  RR: 20 (09-04-24 @ 10:02) (20 - 20)  SpO2: 97% (09-04-24 @ 10:02) (97% - 97%)  Wt(kg): --  I&O's Summary      Appearance: Normal	  Psychiatry: A & O x 3, Mood & affect appropriate  HEENT:   Normal oral mucosa, PERRL, EOMI	  Lymphatic: No lymphadenopathy  Cardiovascular: Normal S1 S2,RRR, No JVD, No murmurs  Respiratory: Crackles at bases b/l  Gastrointestinal:  Soft, Non-tender, + BS	  Skin: No rashes, No ecchymoses, No cyanosis	  Neurologic: Non-focal  Extremities: Normal range of motion, No clubbing, cyanosis or edema  Vascular: Peripheral pulses palpable 2+ bilaterally    TELEMETRY: 	   ECG:  	 NSR HR 82 with TWI in V1-V6, mild ST elevation in V2  RADIOLOGY:  < from: CT Head No Cont (09.04.24 @ 11:54) >  IMPRESSION:    CT HEAD:  1. No significant change.  2. No evidence of acute calvarial fracture or intracranial hemorrhage.  3. Additional findings described in detail above.    CT MAXILLOFACIAL:  1. No definitive evidence of acute fracture or paranasal sinus air-fluid   level.  2. Soft tissue edema with probable 2.5 cm subcutaneous hematoma lateral   to the left mandible. Cellulitis may have an overlapping imaging   appearance. Correlate with clinical findings.  3. Additional findings described in detail above.    CT CERVICAL SPINE:  1. No definitive evidence of acute fracture or prevertebral soft tissue   swelling. Straightening of lordosis may reflect muscle spasm.  2. Additional findings, including those degenerative, described in detail   above.    < end of copied text >    < from: CT Chest w/ IV Cont (09.04.24 @ 11:52) >  FINDINGS:  CHEST:  LUNGS AND LARGE AIRWAYS: Patent central airways. Basilar predominant   fibrotic changes with honeycombing at the lung bases. Scarring and volume   loss in the right pulmonary apex. Findings are similar to prior.  PLEURA: Left pleural effusion has resolved. Right pleural effusion is   unchanged.  VESSELS: Within normal limits.  HEART: Heart size is normal. No pericardial effusion.  MEDIASTINUM AND OTIS: Patulous esophagus. No lymphadenopathy.  CHEST WALL AND LOWER NECK: Within normal limits.    ABDOMEN AND PELVIS:  LIVER: Within calcified granuloma in the right hepatic lobe.  BILE DUCTS: Normal caliber.  GALLBLADDER: Within normal limits.  SPLEEN: Within normal limits.  PANCREAS: Within normal limits.  ADRENALS: Within normal limits.  KIDNEYS/URETERS: Left renal cyst measures 1.6 cm. Subcentimeter lesions   too small to characterize. Right greater than left renal cortical   scarring. Punctate nonobstructing right renal stones. No hydronephrosis.    BLADDER: Within normal limits.  REPRODUCTIVE ORGANS: Prostate mildly enlarged.    BOWEL: No bowel obstruction. Appendix is normal.  PERITONEUM/RETROPERITONEUM: Within normal limits.  VESSELS: Atherosclerotic calcifications.  LYMPH NODES: No lymphadenopathy.  ABDOMINAL WALL: Fat-containing umbilical hernia.  BONES: Degenerative changes. Sclerotic lesion in the left femoral neck,   unchanged from prior    IMPRESSION:  *  No evidence of acute traumatic abnormality in the chest, abdomen, or   pelvis.  *  Pulmonary changes consistent with interstitial lung disease, similar   to findings present on prior examination.  *  Resolution of left pleural effusion.    < end of copied text >      OTHER: 	  	  LABS:	 	    CARDIAC MARKERS:                                  12.0   8.32  )-----------( 247      ( 04 Sep 2024 10:56 )             37.1     09-04    132<L>  |  96  |  21  ----------------------------<  227<H>  4.3   |  23  |  1.30    Ca    9.3      04 Sep 2024 10:56    TPro  8.8<H>  /  Alb  3.5  /  TBili  0.4  /  DBili  x   /  AST  69<H>  /  ALT  19  /  AlkPhos  94  09-04    PT/INR - ( 04 Sep 2024 10:56 )   PT: 12.5 sec;   INR: 1.20 ratio         PTT - ( 04 Sep 2024 10:56 )  PTT:34.0 sec  proBNP:   Lipid Profile:   HgA1c:   TSH:

## 2024-09-04 NOTE — PATIENT PROFILE ADULT - FUNCTIONAL ASSESSMENT - DAILY ACTIVITY 2.
Render Risk Assessment In Note?: no Detail Level: Simple Additional Notes: Pt will continue 10mg Prednisone QD x 2 weeks then decrease to 5mg QD x 2 weeks. She is starting Dupixent injection today; use reviewed 3 = A little assistance

## 2024-09-04 NOTE — ED ADULT NURSE NOTE - NS ED NOTE  TALK SOMEONE YN
Patient is a 40-year-old male who is HIV-positive and has been diagnosed with a high-grade B-cell, CD10+ lymphoma admitted for cycle 1 DA-R-EPOCH.  Patient was initially seen at  [facility] with fever. He was found to have left axillary lymphadenopathy. He had not been fully compliant with his antiretroviral therapy until December of 2017 when he restarted his medication. His CD4 count is actually normal.  CT of chest showed left axillary lymphadenopathy with a node measuring up to 5 cm. Minimal bibasilar and right middle lobe atelectasis was seen with a tiny left pleural effusion. Mild cardiomegaly was noted. CT of abdomen and pelvis showed no hepatosplenomegaly or mass and no significant adenopathy.. Gastric wall thickening was seen of unclear etiology.  Left axillary lymph node biopsy showed a high-grade CD10 positive B cell lymphoma expressing CD20 and BCL 6. 65% of nuclei showed a myc rearrangement. BCL-2 and BCL 6 were not rearranged. Ki-67 was about 90%.. LDH was 618, but I think it was tested using a higher range. CBC, creat were normal. Total protein and globulins were increased. He defervesced on antibiotics and was discharged home. Patient is a 40-year-old male who is HIV-positive and has been diagnosed with a high-grade B-cell, CD10+ lymphoma admitted for cycle 1 DA-R-EPOCH.  Patient was initially seen at  [facility] with fever. He was found to have left axillary lymphadenopathy. He had not been fully compliant with his antiretroviral therapy until December of 2017 when he restarted his medication. His CD4 count in December 2017 was in 800 range.  CT of chest showed left axillary lymphadenopathy with a node measuring up to 5 cm. Minimal bibasilar and right middle lobe atelectasis was seen with a tiny left pleural effusion. Mild cardiomegaly was noted. CT of abdomen and pelvis showed no hepatosplenomegaly or mass and no significant adenopathy.. Gastric wall thickening was seen of unclear etiology.  Left axillary lymph node biopsy showed a high-grade CD10 positive B cell lymphoma expressing CD20 and BCL 6. 65% of nuclei showed a myc rearrangement. BCL-2 and BCL 6 were not rearranged. Ki-67 was about 90%.. LDH was 618, but I think it was tested using a higher range. CBC, creat were normal. Total protein and globulins were increased. He defervesced on antibiotics and was discharged home. No

## 2024-09-04 NOTE — H&P ADULT - ASSESSMENT
84 Y/o Male with pmhx BPH, CAD, DM, htn and RA BIBA sp fall this morning.  As per son, patient fell early this morning and didn't remember what happened. As per patient, he thinks he tripped while ambulating with his walker but isnt sure. Patient denies LOC. Thinks it was around 3-4AM. Patient able to call son, who came from [place] and was able to call ems. Patient has been having sore throat, cough and chills for the past week and is currently taking antitbiotic. Patient lives at home with his wife who has parkinsons. Patient denies abd pain, nvd, tingling, numbness, and dysuria    Syncope  - telemonitor  - cards fu appreciated  - CT head neg  - will monitor   - check ECHO  - -cath 2023 with closed mid LAD stent with distal LAD filling with well developed collaterals   -would defer further ischemic w/u for now in setting of acute Covid infection and no active chest pain; continue medical management  -continue ASA, has been off of plavix     COVID 19  - symptomatic care  - stable on RA    CKD  - renal fu   - monitor cr    HLD  - cw statin    RA  - cw outpt meds on dc    DVT prophylaxis

## 2024-09-04 NOTE — ED PROVIDER NOTE - OBJECTIVE STATEMENT
84 YO M with pmhx BPH, CAD, DM, htn and RA BIBA sp fall this morning. As per son, patient fell early this morning and didn't remember what happened. As per patient, he thinks he tripped while ambulating with his walker but isnt sure. Thinks it was around 3-4AM. Patient able to call son, who came from [place] and was able to call ems. Patient has been having sore throat, cough and chills for the past week and is currently taking antitbiotic. Patient lives at home with his wife who has parkinsons. Patient denies abd pain, nvd, tingling, numbness, and dysuria

## 2024-09-04 NOTE — ED PROVIDER NOTE - DISPOSITION TYPE
Patient received at bed 3. Patient A&Ox4. Patient reports that Patient received at bed 3. Patient A&Ox4. Patient reports that starting this AM she was having generalized abdominal pain- with nausea and vomiting. Patient denies diarrhea and blood in the stool. Patient denies chest pain and sob. Patient denies headache and dizziness. Patient came in with elevated sugar- patient reports that she is usually compliant with medications. Patient in no acute distress. ADMIT

## 2024-09-04 NOTE — H&P ADULT - NSHPLABSRESULTS_GEN_ALL_CORE
Lab Results:  CBC  CBC Full  -  ( 04 Sep 2024 10:56 )  WBC Count : 8.32 K/uL  RBC Count : 4.29 M/uL  Hemoglobin : 12.0 g/dL  Hematocrit : 37.1 %  Platelet Count - Automated : 247 K/uL  Mean Cell Volume : 86.5 fl  Mean Cell Hemoglobin : 28.0 pg  Mean Cell Hemoglobin Concentration : 32.3 gm/dL  Auto Neutrophil # : 6.26 K/uL  Auto Lymphocyte # : 0.81 K/uL  Auto Monocyte # : 1.25 K/uL  Auto Eosinophil # : 0.00 K/uL  Auto Basophil # : 0.00 K/uL  Auto Neutrophil % : 62.0 %  Auto Lymphocyte % : 9.7 %  Auto Monocyte % : 15.0 %  Auto Eosinophil % : 0.0 %  Auto Basophil % : 0.0 %    .		Differential:	[] Automated		[] Manual  Chemistry                        12.0   8.32  )-----------( 247      ( 04 Sep 2024 10:56 )             37.1     09-04    132<L>  |  96  |  21  ----------------------------<  227<H>  4.3   |  23  |  1.30    Ca    9.3      04 Sep 2024 10:56    TPro  8.8<H>  /  Alb  3.5  /  TBili  0.4  /  DBili  x   /  AST  69<H>  /  ALT  19  /  AlkPhos  94  09-04    LIVER FUNCTIONS - ( 04 Sep 2024 10:56 )  Alb: 3.5 g/dL / Pro: 8.8 g/dL / ALK PHOS: 94 U/L / ALT: 19 U/L / AST: 69 U/L / GGT: x           PT/INR - ( 04 Sep 2024 10:56 )   PT: 12.5 sec;   INR: 1.20 ratio         PTT - ( 04 Sep 2024 10:56 )  PTT:34.0 sec  Urinalysis Basic - ( 04 Sep 2024 14:08 )    Color: Yellow / Appearance: Clear / S.026 / pH: x  Gluc: x / Ketone: Negative mg/dL  / Bili: Negative / Urobili: 0.2 mg/dL   Blood: x / Protein: 30 mg/dL / Nitrite: Negative   Leuk Esterase: Negative / RBC: 1 /HPF / WBC 0 /HPF   Sq Epi: x / Non Sq Epi: 0 /HPF / Bacteria: Negative /HPF            MICROBIOLOGY/CULTURES:      RADIOLOGY RESULTS: reviewed

## 2024-09-04 NOTE — ED ADULT NURSE NOTE - OBJECTIVE STATEMENT
Patient BIBEMS from home s/p fall on plavix. Patient c/o L shoulder pain. Patient also states he has had sore throat and fever since yesterday. Patient was febrile in ED to 102. Fall was unwitnessed, patient states he believes he tripped over the carpet while walking to bathroom with walker. Patient has bruising to L chin. Full ROM. Patient had to call son to come help him off the floor. Patient lives home with his wife who has parkinsons. Patient reports recently starting abx for sore throat but unsure which one. Patient denies n/v, sob, chest pain, n/v. Patient A&Ox4. No signs of acute distress at this time. Plan of care in progress.

## 2024-09-04 NOTE — ED PROVIDER NOTE - MUSCULOSKELETAL [+], MLM
Patient identified from Nurse Navigator census list. Patient is eligible for Nurse Navigator Case Management in collaboration between [facility] and insurance provider. Writer contacted patient and left message for patient to return call. First outreach attempt.         JOINT PAIN/NECK PAIN/LIMITED RANGE OF MOTION

## 2024-09-04 NOTE — ED PROVIDER NOTE - CLINICAL SUMMARY MEDICAL DECISION MAKING FREE TEXT BOX
attending jorge Tuttle [facility] with fall/syncope. febrile. needs sepsis work up. abx. trauma eval. attending jorge - 83m with fall/syncope. febrile. needs sepsis work up. abx. trauma eval.  I read ekg as nsr rate 82, st elevations in the anterior leads, qtc 425, narrow qrs, t inversions laterally, normal axis. attending jorge - 83m with fall/syncope. febrile. needs sepsis work up. abx. trauma eval.  I read ekg as nsr rate 82, st elevations in v2, no reciprocal changes, qtc 425, narrow qrs, t inversions laterally, normal axis.

## 2024-09-04 NOTE — ED PROVIDER NOTE - NSICDXNOPASTSURGICALHX_GEN_ALL_ED
Lot # (Optional): [id number] Include J-Code In Bill: No Administered By (Optional): Argenis ALBARRAN Detail Level: None Expiration Date (Optional): 08/2021 [place] Amount: 45 mg J-Code:  Consent: The risks of pain and injection site reactions were reviewed with the patient prior to the injection. <-- Click to add NO significant Past Surgical History

## 2024-09-04 NOTE — H&P ADULT - HISTORY OF PRESENT ILLNESS
82 Y/o Male with pmhx BPH, CAD, DM, htn and RA BIBA sp fall this morning.  As per son, patient fell early this morning and didn't remember what happened. As per patient, he thinks he tripped while ambulating with his walker but isnt sure. Patient denies LOC. Thinks it was around 3-4AM. Patient able to call son, who came from [place] and was able to call ems. Patient has been having sore throat, cough and chills for the past week and is currently taking antitbiotic. Patient lives at home with his wife who has parkinsons. Patient denies abd pain, nvd, tingling, numbness, and dysuria

## 2024-09-04 NOTE — H&P ADULT - NSHPPHYSICALEXAM_GEN_ALL_CORE
General: WN/WD NAD  PERRLA  Neurology: A&Ox3, nonfocal, HANSON x 4  Respiratory: CTA B/L  CV: RRR, S1S2, no murmurs, rubs or gallops  Abdominal: Soft, NT, ND +BS, Last BM  Extremities: No edema, + peripheral pulses  Skin Normal

## 2024-09-04 NOTE — ED PROVIDER NOTE - HOW PATIENT ADDRESSED, PROFILE
Discharge instructions printed out and provided to patient.  Patient verbalized instructions explained to her with all questions answered.  Patient instructed to schedule her 6 week f/u appointment with Dr Shankar either by calling the office or making it through the "[employer] talon. Patient knows when to call MD or 911.     1720- Patient discharged via wheelchair to front lobby.  Baby in car seat.        Hugn

## 2024-09-05 LAB
A1C WITH ESTIMATED AVERAGE GLUCOSE RESULT: 8.2 % — HIGH (ref 4–5.6)
CULTURE RESULTS: SIGNIFICANT CHANGE UP
ESTIMATED AVERAGE GLUCOSE: 189 MG/DL — HIGH (ref 68–114)
GLUCOSE BLDC GLUCOMTR-MCNC: 148 MG/DL — HIGH (ref 70–99)
GLUCOSE BLDC GLUCOMTR-MCNC: 190 MG/DL — HIGH (ref 70–99)
GLUCOSE BLDC GLUCOMTR-MCNC: 194 MG/DL — HIGH (ref 70–99)
GLUCOSE BLDC GLUCOMTR-MCNC: 304 MG/DL — HIGH (ref 70–99)
SPECIMEN SOURCE: SIGNIFICANT CHANGE UP

## 2024-09-05 RX ORDER — REMDESIVIR 5 MG/ML
100 INJECTION INTRAVENOUS EVERY 24 HOURS
Refills: 0 | Status: DISCONTINUED | OUTPATIENT
Start: 2024-09-05 | End: 2024-09-05

## 2024-09-05 RX ORDER — REMDESIVIR 5 MG/ML
INJECTION INTRAVENOUS
Refills: 0 | Status: COMPLETED | OUTPATIENT
Start: 2024-09-05 | End: 2024-09-10

## 2024-09-05 RX ORDER — REMDESIVIR 5 MG/ML
200 INJECTION INTRAVENOUS EVERY 24 HOURS
Refills: 0 | Status: COMPLETED | OUTPATIENT
Start: 2024-09-05 | End: 2024-09-06

## 2024-09-05 RX ADMIN — LISINOPRIL 5 MILLIGRAM(S): 10 TABLET ORAL at 05:00

## 2024-09-05 RX ADMIN — INSULIN GLARGINE 10 UNIT(S): 100 INJECTION, SOLUTION SUBCUTANEOUS at 22:29

## 2024-09-05 RX ADMIN — Medication 3 UNIT(S): at 17:45

## 2024-09-05 RX ADMIN — Medication 1: at 08:51

## 2024-09-05 RX ADMIN — EZETIMIBE 10 MILLIGRAM(S): 10 TABLET ORAL at 12:12

## 2024-09-05 RX ADMIN — Medication 81 MILLIGRAM(S): at 12:12

## 2024-09-05 RX ADMIN — Medication 40 MILLIGRAM(S): at 05:00

## 2024-09-05 RX ADMIN — ENOXAPARIN SODIUM 40 MILLIGRAM(S): 100 INJECTION SUBCUTANEOUS at 22:31

## 2024-09-05 RX ADMIN — Medication 4: at 12:12

## 2024-09-05 RX ADMIN — Medication 10 MILLIGRAM(S): at 22:29

## 2024-09-05 RX ADMIN — LATANOPROST 1 DROP(S): 50 SOLUTION OPHTHALMIC at 22:29

## 2024-09-05 NOTE — PROGRESS NOTE ADULT - SUBJECTIVE AND OBJECTIVE BOX
Patient is a 83y old  Male who presents with a chief complaint of Fall (05 Sep 2024 17:20)    Date of servie : 24 @ 17:58  INTERVAL HPI/OVERNIGHT EVENTS:  T(C): 36.9 (24 @ 04:43), Max: 36.9 (24 @ 18:02)  HR: 91 (24 @ 12:01) (70 - 107)  BP: 108/61 (24 @ 12:01) (104/66 - 135/68)  RR: 18 (24 @ 12:01) (18 - 18)  SpO2: 97% (24 @ 12:01) (97% - 98%)  Wt(kg): --  I&O's Summary    04 Sep 2024 07:01  -  05 Sep 2024 07:00  --------------------------------------------------------  IN: 300 mL / OUT: 925 mL / NET: -625 mL        LABS:                        12.0   8.32  )-----------( 247      ( 04 Sep 2024 10:56 )             37.1         132<L>  |  96  |  21  ----------------------------<  227<H>  4.3   |  23  |  1.30    Ca    9.3      04 Sep 2024 10:56    TPro  8.8<H>  /  Alb  3.5  /  TBili  0.4  /  DBili  x   /  AST  69<H>  /  ALT  19  /  AlkPhos  94  09-04    PT/INR - ( 04 Sep 2024 10:56 )   PT: 12.5 sec;   INR: 1.20 ratio         PTT - ( 04 Sep 2024 10:56 )  PTT:34.0 sec  Urinalysis Basic - ( 04 Sep 2024 14:08 )    Color: Yellow / Appearance: Clear / S.026 / pH: x  Gluc: x / Ketone: Negative mg/dL  / Bili: Negative / Urobili: 0.2 mg/dL   Blood: x / Protein: 30 mg/dL / Nitrite: Negative   Leuk Esterase: Negative / RBC: 1 /HPF / WBC 0 /HPF   Sq Epi: x / Non Sq Epi: 0 /HPF / Bacteria: Negative /HPF      CAPILLARY BLOOD GLUCOSE      POCT Blood Glucose.: 148 mg/dL (05 Sep 2024 17:26)  POCT Blood Glucose.: 304 mg/dL (05 Sep 2024 11:45)  POCT Blood Glucose.: 190 mg/dL (05 Sep 2024 07:54)  POCT Blood Glucose.: 230 mg/dL (04 Sep 2024 21:58)  POCT Blood Glucose.: 171 mg/dL (04 Sep 2024 19:00)        Urinalysis Basic - ( 04 Sep 2024 14:08 )    Color: Yellow / Appearance: Clear / S.026 / pH: x  Gluc: x / Ketone: Negative mg/dL  / Bili: Negative / Urobili: 0.2 mg/dL   Blood: x / Protein: 30 mg/dL / Nitrite: Negative   Leuk Esterase: Negative / RBC: 1 /HPF / WBC 0 /HPF   Sq Epi: x / Non Sq Epi: 0 /HPF / Bacteria: Negative /HPF        MEDICATIONS  (STANDING):  aspirin  chewable 81 milliGRAM(s) Oral daily  atorvastatin 10 milliGRAM(s) Oral at bedtime  dextrose 5%. 1000 milliLiter(s) (100 mL/Hr) IV Continuous <Continuous>  dextrose 5%. 1000 milliLiter(s) (50 mL/Hr) IV Continuous <Continuous>  dextrose 50% Injectable 25 Gram(s) IV Push once  dextrose 50% Injectable 12.5 Gram(s) IV Push once  dextrose 50% Injectable 25 Gram(s) IV Push once  enoxaparin Injectable 40 milliGRAM(s) SubCutaneous every 24 hours  ezetimibe 10 milliGRAM(s) Oral daily  glucagon  Injectable 1 milliGRAM(s) IntraMuscular once  insulin glargine Injectable (LANTUS) 10 Unit(s) SubCutaneous at bedtime  insulin lispro (ADMELOG) corrective regimen sliding scale   SubCutaneous three times a day before meals  insulin lispro Injectable (ADMELOG) 3 Unit(s) SubCutaneous three times a day before meals  latanoprost 0.005% Ophthalmic Solution 1 Drop(s) Both EYES at bedtime  lisinopril 5 milliGRAM(s) Oral daily  pantoprazole    Tablet 40 milliGRAM(s) Oral before breakfast  tamsulosin 0.4 milliGRAM(s) Oral at bedtime    MEDICATIONS  (PRN):  dextrose Oral Gel 15 Gram(s) Oral once PRN Blood Glucose LESS THAN 70 milliGRAM(s)/deciliter          PHYSICAL EXAM:  GENERAL: NAD, well-groomed, well-developed  HEAD:  Atraumatic, Normocephalic  CHEST/LUNG: Clear to percussion bilaterally; No rales, rhonchi, wheezing, or rubs  HEART: Regular rate and rhythm; No murmurs, rubs, or gallops  ABDOMEN: Soft, Nontender, Nondistended; Bowel sounds present  EXTREMITIES:  2+ Peripheral Pulses, No clubbing, cyanosis, or edema  LYMPH: No lymphadenopathy noted  SKIN: No rashes or lesions    Care Discussed with Consultants/Other Providers [ ] YES  [ ] NO

## 2024-09-05 NOTE — CONSULT NOTE ADULT - ASSESSMENT
82 Y/o Male with pmhx BPH, CAD, DM, htn and RA BIBA sp fall this morning.  As per son, patient fell early this morning and didn't remember what happened. As per patient, he thinks he tripped while ambulating with his walker but isnt sure. Patient denies LOC. Thinks it was around 3-4AM. Patient able to call son, who came from [place] and was able to call ems. Patient has been having sore throat, cough and chills for the past week and is currently taking antitbiotic. Patient lives at home with his wife who has parkinsons. Patient denies abd pain, nvd, tingling, numbness, and dysuria (04 Sep 2024 18:11)    now he seems OK: alert and awake and able to answer questions:  he says he has no underlying lung disease:  now has cough : no chest pain : never had pe or dvt     he has underlying ILD : has RA:    covid infection: /FALL  RA/ILD  CAD:  DM  BPH    covid infection: /FALL  -he is immunocompromised : start remdesivir: :  -on room air : no need for dexa:     difficulty in swallowing   -? barium swallow:     RA/ ILD  -he has ILD:  : he has ra:  likely related to it     CAD:  -cont current meds:     DM  - monitro and control     BPH  -watch urine output :    alfredo collins and son:

## 2024-09-05 NOTE — CONSULT NOTE ADULT - SUBJECTIVE AND OBJECTIVE BOX
24 @ 17:21    Patient is a 83y old  Male who presents with a chief complaint of Fall (05 Sep 2024 14:54)      HPI:   82 Y/o Male with pmhx BPH, CAD, DM, htn and RA BIBA sp fall this morning.  As per son, patient fell early this morning and didn't remember what happened. As per patient, he thinks he tripped while ambulating with his walker but isnt sure. Patient denies LOC. Thinks it was around 3-4AM. Patient able to call son, who came from [place] and was able to call ems. Patient has been having sore throat, cough and chills for the past week and is currently taking antitbiotic. Patient lives at home with his wife who has parkinsons. Patient denies abd pain, nvd, tingling, numbness, and dysuria (04 Sep 2024 18:11)    now he seems OK: alert and awake and able to answer questions:  he says he has no underlying lung disease:  now has cough : no chest pain : never had pe or dvt     he has underlying ILD : has RA:  has difficult in swallowing:     dw son  ?FOLLOWING PRESENT  [x ] Hx of PE/DVT, [x ] Hx COPD, [ x] Hx of Asthma, [ x Hx of Hospitalization, [x ]  Hx of BiPAP/CPAP use, x ] Hx of SIMÓN    Allergies    tetracyclines (Rash)    Intolerances        PAST MEDICAL & SURGICAL HISTORY:  DM (diabetes mellitus)      RA (rheumatoid arthritis)      CAD (coronary artery disease)      HTN (hypertension)      HLD (hyperlipidemia)      Spinal stenosis      BPH (benign prostatic hyperplasia)      No significant past surgical history          FAMILY HISTORY:  No pertinent family history in first degree relatives        Social History: [ x ] TOBACCO                  [x  ] ETOH                                 [x  ] IVDA/DRUGS    REVIEW OF SYSTEMS      General:	s/p fall    Skin/Breast:c  	  Ophthalmologic:x  	  ENMT:	x    Respiratory and Thorax:  s/p fall , cough  	  Cardiovascular:	x    Gastrointestinal:	x    Genitourinary:	x    Musculoskeletal:	x    Neurological:	x    Psychiatric:	x    Hematology/Lymphatics:	x    Endocrine:	x    Allergic/Immunologic:	x    MEDICATIONS  (STANDING):  aspirin  chewable 81 milliGRAM(s) Oral daily  atorvastatin 10 milliGRAM(s) Oral at bedtime  dextrose 5%. 1000 milliLiter(s) (100 mL/Hr) IV Continuous <Continuous>  dextrose 5%. 1000 milliLiter(s) (50 mL/Hr) IV Continuous <Continuous>  dextrose 50% Injectable 12.5 Gram(s) IV Push once  dextrose 50% Injectable 25 Gram(s) IV Push once  dextrose 50% Injectable 25 Gram(s) IV Push once  enoxaparin Injectable 40 milliGRAM(s) SubCutaneous every 24 hours  ezetimibe 10 milliGRAM(s) Oral daily  glucagon  Injectable 1 milliGRAM(s) IntraMuscular once  insulin glargine Injectable (LANTUS) 10 Unit(s) SubCutaneous at bedtime  insulin lispro (ADMELOG) corrective regimen sliding scale   SubCutaneous three times a day before meals  insulin lispro Injectable (ADMELOG) 3 Unit(s) SubCutaneous three times a day before meals  latanoprost 0.005% Ophthalmic Solution 1 Drop(s) Both EYES at bedtime  lisinopril 5 milliGRAM(s) Oral daily  pantoprazole    Tablet 40 milliGRAM(s) Oral before breakfast  tamsulosin 0.4 milliGRAM(s) Oral at bedtime    MEDICATIONS  (PRN):  dextrose Oral Gel 15 Gram(s) Oral once PRN Blood Glucose LESS THAN 70 milliGRAM(s)/deciliter       Vital Signs Last 24 Hrs  T(C): 36.9 (05 Sep 2024 04:43), Max: 36.9 (04 Sep 2024 18:02)  T(F): 98.5 (05 Sep 2024 04:43), Max: 98.5 (04 Sep 2024 18:02)  HR: 91 (05 Sep 2024 12:01) (70 - 107)  BP: 108/61 (05 Sep 2024 12:01) (104/66 - 135/68)  BP(mean): --  RR: 18 (05 Sep 2024 12:01) (18 - 18)  SpO2: 97% (05 Sep 2024 12:01) (97% - 98%)    Parameters below as of 05 Sep 2024 12:01  Patient On (Oxygen Delivery Method): room air    Orthostatic VS          I&O's Summary    04 Sep 2024 07:01  -  05 Sep 2024 07:00  --------------------------------------------------------  IN: 300 mL / OUT: 925 mL / NET: -625 mL        Physical Exam:   GENERAL: NAD, well-groomed, well-developed  HEENT: LEW/   Atraumatic, Normocephalic  ENMT: No tonsillar erythema, exudates, or enlargement; Moist mucous membranes, Good dentition, No lesions  NECK: Supple, No JVD, Normal thyroid  CHEST/LUNG: Clear to auscultation bilaterally- no wheezing  GI: : Soft, Nontender, Nondistended; Bowel sounds present  NERVOUS SYSTEM:  Alert & Oriented X3  EXTREMITIES:  2+ Peripheral Pulses, No clubbing, cyanosis, or edema  LYMPH: No lymphadenopathy noted  SKIN: No rashes or lesions  ENDOCRINOLOGY: No Thyromegaly  PSYCH: Appropriate    Labs:  1.1<45<4>>30<<7.385>>1.1<<3><<4><<5<<309>>                            12.0   8.32  )-----------( 247      ( 04 Sep 2024 10:56 )             37.1     09-04    132<L>  |  96  |  21  ----------------------------<  227<H>  4.3   |  23  |  1.30    Ca    9.3      04 Sep 2024 10:56    TPro  8.8<H>  /  Alb  3.5  /  TBili  0.4  /  DBili  x   /  AST  69<H>  /  ALT  19  /  AlkPhos  94  09-04    CAPILLARY BLOOD GLUCOSE      POCT Blood Glucose.: 304 mg/dL (05 Sep 2024 11:45)  POCT Blood Glucose.: 190 mg/dL (05 Sep 2024 07:54)  POCT Blood Glucose.: 230 mg/dL (04 Sep 2024 21:58)  POCT Blood Glucose.: 171 mg/dL (04 Sep 2024 19:00)    LIVER FUNCTIONS - ( 04 Sep 2024 10:56 )  Alb: 3.5 g/dL / Pro: 8.8 g/dL / ALK PHOS: 94 U/L / ALT: 19 U/L / AST: 69 U/L / GGT: x           PT/INR - ( 04 Sep 2024 10:56 )   PT: 12.5 sec;   INR: 1.20 ratio         PTT - ( 04 Sep 2024 10:56 )  PTT:34.0 sec  Urinalysis Basic - ( 04 Sep 2024 14:08 )    Color: Yellow / Appearance: Clear / S.026 / pH: x  Gluc: x / Ketone: Negative mg/dL  / Bili: Negative / Urobili: 0.2 mg/dL   Blood: x / Protein: 30 mg/dL / Nitrite: Negative   Leuk Esterase: Negative / RBC: 1 /HPF / WBC 0 /HPF   Sq Epi: x / Non Sq Epi: 0 /HPF / Bacteria: Negative /HPF      Culture - Urine (collected 04 Sep 2024 14:08)  Source: Clean Catch Clean Catch (Midstream)  Final Report (05 Sep 2024 12:41):    No growth      D DImer      Studies  Chest X-RAY  CT SCAN Chest   CT Abdomen  Venous Dopplers: LE:   Others    rad< from: CT Head No Cont (24 @ 11:54) >  endplate-uncovertebral spurring, most pronounced at this level as well.  ALIGNMENT: Straightening of lordosis. No subluxation. No perched or   dislocated facets. Multilevel facet arthrosis, most pronounced and   moderate-severe at the right C4-C5 level.  INTERVERTEBRAL DISC SPACES: Although suboptimally evaluated on this exam,   there is evidence of multilevel cervical disc bulges and/or protrusions,   contributing to multilevel central canal and neural foramen stenosis.    VISUALIZED LUNGS: No suspicious left upper lung mass. Partially imaged   opacification right upper lung.    MISCELLANEOUS: No gross intrathecal-paraspinal mass or retropharyngeal   fluid collection, however suboptimally evaluated on this exam. Vascular   calcifications, including at bilateral carotid bifurcations. Evidence of   borderline basilar invagination.    IMPRESSION:    CT HEAD:  1. No significant change.  2. No evidence of acute calvarial fracture or intracranial hemorrhage.  3. Additional findings described in detail above.    CT MAXILLOFACIAL:  1. No definitive evidence of acute fracture or paranasal sinus air-fluid   level.  2. Soft tissue edema with probable 2.5 cm subcutaneous hematoma lateral   to the left mandible. Cellulitis may have an overlapping imaging   appearance. Correlate with clinical findings.  3. Additional findings described in detail above.    CT CERVICAL SPINE:  1. No definitive evidence of acute fracture or prevertebral soft tissue   swelling. Straightening of lordosis may reflect muscle spasm.  2. Additional findings, including those degenerative, described in detail   above.    --- End of Report ---            CORBIN DIAMENT M.D., ATTENDING RADIOLOGIST  This document has been electronically signed. Sep  4 2024 12:24PM    < end of copied text >  < from: CT Chest w/ IV Cont (24 @ 11:52) >  LYMPH NODES: No lymphadenopathy.  ABDOMINAL WALL: Fat-containing umbilical hernia.  BONES: Degenerative changes. Sclerotic lesion in the left femoral neck,   unchanged from prior    IMPRESSION:  *  No evidence of acute traumatic abnormality in the chest, abdomen, or   pelvis.  *  Pulmonary changes consistent with interstitial lung disease, similar   to findings present on prior examination.  *  Resolution of left pleural effusion.    < end of copied text >  < from: CT Chest No Cont (24 @ 03:13) >  the right pleural effusion. Left lower lobe calcified granuloma.    UPPER ABDOMEN: Tiny hiatal hernia. Calcified hepatic granuloma.    BONES/SOFT TISSUES: Degenerative changes of the spine.    IMPRESSION:    UIP pattern of interstitial lung disease.    Rightward tracheal deviation, which is likely related to right lung   volume loss, and unchanged since 2019. No mediastinal mass.    --- End of Report ---          ANTOINE WANG MD; Resident Radiologist  This document has been electronically signed.  BECKI CRANDALL M.D., ATTENDING RADIOLOGIST  This document has been electronically signed. May 23 2024 10:57AM    < end of copied text >

## 2024-09-05 NOTE — PROGRESS NOTE ADULT - TIME BILLING
Agree with above ACP note.  no chest pain  elevated troponin secondary to demand in setting of covid, known cad  cont antiplat  covid tx per med/pulmonary  check echo Agree with above ACP note.  no chest pain  elevated troponin secondary to demand in setting of covid, known cad  cont antiplat  covid tx per med/pulmonary  echo with severe lv dysfxn, some inc wall motion abnl for previous echo   previous echo with same apical/ant wall hypo, current echo with suggestions of some new anterolateral and inf mid hypo suggesting poss stress induced cmp component on top of chronic lv dysfxn in lad territory   cont med tx  add toprol xl  vol status ok  cont covid tx

## 2024-09-05 NOTE — PHYSICAL THERAPY INITIAL EVALUATION ADULT - PERTINENT HX OF CURRENT PROBLEM, REHAB EVAL
82 Y/o Male with pmhx BPH, CAD, DM, htn and RA BIBA sp fall this morning.  As per son, patient fell early this morning and didn't remember what happened. As per patient, he thinks he tripped while ambulating with his walker but isnt sure. Patient denies LOC. Thinks it was around 3-4AM. Patient able to call son, who came from [place] and was able to call ems. Patient has been having sore throat, cough and chills for the past week and is currently taking antitbiotic. Patient lives at home with his wife who has parkinsons. Patient denies abd pain, nvd, tingling, numbness, and dysuria.  Xray Left shoulder: No evidence of acute fracture or dislocation. Glenohumeral joint space is maintained. 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 Reticular opacities are seen at the left lung base.  CT Chest, abd and pelvis: No evidence of acute traumatic abnormality in the chest, abdomen, or   pelvis. Pulmonary changes consistent with interstitial lung disease, similar to findings present on prior examination. Resolution of left pleural effusion.  CT HEAD: No significant change. No evidence of acute calvarial fracture or intracranial hemorrhage.  CT MAXILLOFACIAL: No definitive evidence of acute fracture or paranasal sinus air-fluid level. Soft tissue edema with probable 2.5 cm subcutaneous hematoma lateral to the left mandible. Cellulitis may have an overlapping imaging appearance. Correlate with clinical findings.   CT CERVICAL SPINE: No definitive evidence of acute fracture or prevertebral soft tissue swelling. Straightening of lordosis may reflect muscle spasm.

## 2024-09-05 NOTE — PHYSICAL THERAPY INITIAL EVALUATION ADULT - ADDITIONAL COMMENTS
Pt lives in a private home with his wife, 4 steps to enter, no steps inside. Pt was mostly independent with all functional mobility and ADLs prior to admission using a RW. Pt also has HHA 8 hours a day 7 days a week. Pt's wife has Parkinsons and is unable to assist.

## 2024-09-05 NOTE — PROGRESS NOTE ADULT - SUBJECTIVE AND OBJECTIVE BOX
CARDIOLOGY FOLLOW UP - Dr. Downing  DATE OF SERVICE: 9/5/24    CC no CP or SOB      REVIEW OF SYSTEMS:  CONSTITUTIONAL: No fever, weight loss, or fatigue  RESPIRATORY: +cough, No wheezing, chills or hemoptysis; No Shortness of Breath  CARDIOVASCULAR: No chest pain, palpitations, passing out, dizziness  GASTROINTESTINAL: No abdominal or epigastric pain. No nausea, vomiting, or hematemesis; No diarrhea or constipation. No melena or hematochezia.      PHYSICAL EXAM:  T(C): 36.9 (09-05-24 @ 04:43), Max: 36.9 (09-04-24 @ 18:02)  HR: 91 (09-05-24 @ 12:01) (70 - 107)  BP: 108/61 (09-05-24 @ 12:01) (100/57 - 135/68)  RR: 18 (09-05-24 @ 12:01) (17 - 18)  SpO2: 97% (09-05-24 @ 12:01) (97% - 98%)  Wt(kg): --  I&O's Summary    04 Sep 2024 07:01  -  05 Sep 2024 07:00  --------------------------------------------------------  IN: 300 mL / OUT: 925 mL / NET: -625 mL        Appearance: Normal	  Cardiovascular: Normal S1 S2,RRR, No JVD, No murmurs  Respiratory: Crackles b/l at bases	  Gastrointestinal:  Soft, Non-tender, + BS	  Extremities: Normal range of motion, No clubbing, cyanosis or edema      Home Medications:  aspirin 81 mg oral tablet: 1 tab(s) orally once a day (04 Sep 2024 17:17)  cholecalciferol 25 mcg (1000 intl units) oral tablet: 1 tab(s) orally once a day (04 Sep 2024 17:17)  Enbrel Prefilled Syringe 50 mg/mL subcutaneous solution: 50 milligram(s) subcutaneously every other week (04 Sep 2024 17:17)  ezetimibe-simvastatin 10 mg-20 mg oral tablet: 1 tab(s) orally once a day (04 Sep 2024 17:17)  Flomax 0.4 mg oral capsule: 1 cap(s) orally once a day (at bedtime) (04 Sep 2024 17:17)  Lumigan 0.01% ophthalmic solution: 1 drop(s) in each affected eye once a day (in the evening) (04 Sep 2024 17:17)  NovoLOG FlexPen 100 units/mL injectable solution: 13 unit(s) injectable once a day (04 Sep 2024 17:17)  NovoLOG Mix 70/30 FlexPen subcutaneous suspension: 21 unit(s) subcutaneous once a day (at bedtime) (04 Sep 2024 17:17)  pantoprazole 40 mg oral delayed release tablet: 1 tab(s) orally once a day (04 Sep 2024 17:17)  PreserVision AREDS 2 oral capsule: 1 cap(s) orally once a day (04 Sep 2024 17:17)  ramipril 2.5 mg oral capsule: 1 cap(s) orally once a day (04 Sep 2024 17:17)  Vitamin C 500 mg oral tablet: 1 tab(s) orally once a day (04 Sep 2024 17:17)      MEDICATIONS  (STANDING):  aspirin  chewable 81 milliGRAM(s) Oral daily  atorvastatin 10 milliGRAM(s) Oral at bedtime  dextrose 5%. 1000 milliLiter(s) (100 mL/Hr) IV Continuous <Continuous>  dextrose 5%. 1000 milliLiter(s) (50 mL/Hr) IV Continuous <Continuous>  dextrose 50% Injectable 25 Gram(s) IV Push once  dextrose 50% Injectable 12.5 Gram(s) IV Push once  dextrose 50% Injectable 25 Gram(s) IV Push once  enoxaparin Injectable 40 milliGRAM(s) SubCutaneous every 24 hours  ezetimibe 10 milliGRAM(s) Oral daily  glucagon  Injectable 1 milliGRAM(s) IntraMuscular once  insulin glargine Injectable (LANTUS) 10 Unit(s) SubCutaneous at bedtime  insulin lispro (ADMELOG) corrective regimen sliding scale   SubCutaneous three times a day before meals  insulin lispro Injectable (ADMELOG) 3 Unit(s) SubCutaneous three times a day before meals  latanoprost 0.005% Ophthalmic Solution 1 Drop(s) Both EYES at bedtime  lisinopril 5 milliGRAM(s) Oral daily  pantoprazole    Tablet 40 milliGRAM(s) Oral before breakfast  tamsulosin 0.4 milliGRAM(s) Oral at bedtime      TELEMETRY: 	  NSR  ECG:  	  RADIOLOGY:   DIAGNOSTIC TESTING:  [ ] Echocardiogram:  [ ]  Catheterization:  [ ] Stress Test:    OTHER: 	    LABS:	 	    Troponin T, High Sensitivity Result: 678 ng/L [0 - 51] (09-04 @ 16:13)  Troponin T, High Sensitivity Result: 533 ng/L [0 - 51] (09-04 @ 10:56)                          12.0   8.32  )-----------( 247      ( 04 Sep 2024 10:56 )             37.1     09-04    132<L>  |  96  |  21  ----------------------------<  227<H>  4.3   |  23  |  1.30    Ca    9.3      04 Sep 2024 10:56    TPro  8.8<H>  /  Alb  3.5  /  TBili  0.4  /  DBili  x   /  AST  69<H>  /  ALT  19  /  AlkPhos  94  09-04    PT/INR - ( 04 Sep 2024 10:56 )   PT: 12.5 sec;   INR: 1.20 ratio         PTT - ( 04 Sep 2024 10:56 )  PTT:34.0 sec

## 2024-09-05 NOTE — PHYSICAL THERAPY INITIAL EVALUATION ADULT - NSPTDISCHREC_GEN_A_CORE
If DC home, would require assistance with ALL functional mobility, home PT, transport home. Pt owns RW. DME: transport wheelchair./[facility]

## 2024-09-06 ENCOUNTER — RESULT REVIEW (OUTPATIENT)
Age: 84
End: 2024-09-06

## 2024-09-06 LAB
ALBUMIN SERPL ELPH-MCNC: 2.7 G/DL — LOW (ref 3.3–5)
ALP SERPL-CCNC: 71 U/L — SIGNIFICANT CHANGE UP (ref 40–120)
ALT FLD-CCNC: 18 U/L — SIGNIFICANT CHANGE UP (ref 10–45)
ANION GAP SERPL CALC-SCNC: 12 MMOL/L — SIGNIFICANT CHANGE UP (ref 5–17)
AST SERPL-CCNC: 51 U/L — HIGH (ref 10–40)
BILIRUB SERPL-MCNC: 0.2 MG/DL — SIGNIFICANT CHANGE UP (ref 0.2–1.2)
BUN SERPL-MCNC: 18 MG/DL — SIGNIFICANT CHANGE UP (ref 7–23)
CALCIUM SERPL-MCNC: 8.6 MG/DL — SIGNIFICANT CHANGE UP (ref 8.4–10.5)
CHLORIDE SERPL-SCNC: 98 MMOL/L — SIGNIFICANT CHANGE UP (ref 96–108)
CO2 SERPL-SCNC: 22 MMOL/L — SIGNIFICANT CHANGE UP (ref 22–31)
CREAT SERPL-MCNC: 1.49 MG/DL — HIGH (ref 0.5–1.3)
EGFR: 46 ML/MIN/1.73M2 — LOW
GLUCOSE BLDC GLUCOMTR-MCNC: 132 MG/DL — HIGH (ref 70–99)
GLUCOSE BLDC GLUCOMTR-MCNC: 150 MG/DL — HIGH (ref 70–99)
GLUCOSE BLDC GLUCOMTR-MCNC: 202 MG/DL — HIGH (ref 70–99)
GLUCOSE BLDC GLUCOMTR-MCNC: 216 MG/DL — HIGH (ref 70–99)
GLUCOSE SERPL-MCNC: 167 MG/DL — HIGH (ref 70–99)
POTASSIUM SERPL-MCNC: 3.5 MMOL/L — SIGNIFICANT CHANGE UP (ref 3.5–5.3)
POTASSIUM SERPL-SCNC: 3.5 MMOL/L — SIGNIFICANT CHANGE UP (ref 3.5–5.3)
PROT SERPL-MCNC: 7.1 G/DL — SIGNIFICANT CHANGE UP (ref 6–8.3)
SODIUM SERPL-SCNC: 132 MMOL/L — LOW (ref 135–145)

## 2024-09-06 PROCEDURE — 93325 DOPPLER ECHO COLOR FLOW MAPG: CPT | Mod: 26

## 2024-09-06 PROCEDURE — 99222 1ST HOSP IP/OBS MODERATE 55: CPT | Mod: GC

## 2024-09-06 PROCEDURE — 93308 TTE F-UP OR LMTD: CPT | Mod: 26

## 2024-09-06 RX ORDER — REMDESIVIR 5 MG/ML
100 INJECTION INTRAVENOUS EVERY 24 HOURS
Refills: 0 | Status: COMPLETED | OUTPATIENT
Start: 2024-09-07 | End: 2024-09-09

## 2024-09-06 RX ORDER — METOPROLOL TARTRATE 100 MG/1
25 TABLET ORAL DAILY
Refills: 0 | Status: DISCONTINUED | OUTPATIENT
Start: 2024-09-06 | End: 2024-09-12

## 2024-09-06 RX ADMIN — ENOXAPARIN SODIUM 40 MILLIGRAM(S): 100 INJECTION SUBCUTANEOUS at 21:26

## 2024-09-06 RX ADMIN — Medication 10 MILLIGRAM(S): at 21:26

## 2024-09-06 RX ADMIN — Medication 3 UNIT(S): at 17:19

## 2024-09-06 RX ADMIN — EZETIMIBE 10 MILLIGRAM(S): 10 TABLET ORAL at 12:20

## 2024-09-06 RX ADMIN — LATANOPROST 1 DROP(S): 50 SOLUTION OPHTHALMIC at 21:27

## 2024-09-06 RX ADMIN — Medication 2: at 12:19

## 2024-09-06 RX ADMIN — INSULIN GLARGINE 10 UNIT(S): 100 INJECTION, SOLUTION SUBCUTANEOUS at 21:25

## 2024-09-06 RX ADMIN — Medication 81 MILLIGRAM(S): at 12:20

## 2024-09-06 RX ADMIN — LISINOPRIL 5 MILLIGRAM(S): 10 TABLET ORAL at 05:47

## 2024-09-06 RX ADMIN — REMDESIVIR 200 MILLIGRAM(S): 5 INJECTION INTRAVENOUS at 00:48

## 2024-09-06 RX ADMIN — Medication 3 UNIT(S): at 12:20

## 2024-09-06 RX ADMIN — METOPROLOL TARTRATE 25 MILLIGRAM(S): 100 TABLET ORAL at 17:18

## 2024-09-06 RX ADMIN — Medication 3 UNIT(S): at 08:02

## 2024-09-06 NOTE — CONSULT NOTE ADULT - SUBJECTIVE AND OBJECTIVE BOX
Chief Complaint:  Patient is a 83y old  Male who presents with a chief complaint of Fall (06 Sep 2024 16:06)      HPI:  82 Y/o Male with pmhx BPH, CAD, DM, htn and RA BIBA sp fall this morning.  As per son, patient fell early this morning and didn't remember what happened. As per patient, he thinks he tripped while ambulating with his walker but isnt sure. Patient denies LOC. Thinks it was around 3-4AM. Patient able to call son, who came from [place] and was able to call ems. Patient has been having sore throat, cough and chills for the past week and is currently taking antitbiotic. Patient lives at home with his wife who has parkinsons. Patient denies abd pain, nvd, tingling, numbness, and dysuria.   GI consulted for weeks of dysphagia, feeling like food is becoming stuck in his throat when he eats. No choking, not spitting up food. Tolerating soft diet at home, on consistent carb diet here.     Allergies:  tetracyclines (Rash)      Home Medications:    Hospital Medications:  aspirin  chewable 81 milliGRAM(s) Oral daily  atorvastatin 10 milliGRAM(s) Oral at bedtime  dextrose 5%. 1000 milliLiter(s) IV Continuous <Continuous>  dextrose 5%. 1000 milliLiter(s) IV Continuous <Continuous>  dextrose 50% Injectable 25 Gram(s) IV Push once  dextrose 50% Injectable 12.5 Gram(s) IV Push once  dextrose 50% Injectable 25 Gram(s) IV Push once  dextrose Oral Gel 15 Gram(s) Oral once PRN  enoxaparin Injectable 40 milliGRAM(s) SubCutaneous every 24 hours  ezetimibe 10 milliGRAM(s) Oral daily  glucagon  Injectable 1 milliGRAM(s) IntraMuscular once  insulin glargine Injectable (LANTUS) 10 Unit(s) SubCutaneous at bedtime  insulin lispro (ADMELOG) corrective regimen sliding scale   SubCutaneous three times a day before meals  insulin lispro Injectable (ADMELOG) 3 Unit(s) SubCutaneous three times a day before meals  latanoprost 0.005% Ophthalmic Solution 1 Drop(s) Both EYES at bedtime  lisinopril 5 milliGRAM(s) Oral daily  metoprolol succinate ER 25 milliGRAM(s) Oral daily  pantoprazole    Tablet 40 milliGRAM(s) Oral before breakfast  remdesivir  IVPB   IV Intermittent   tamsulosin 0.4 milliGRAM(s) Oral at bedtime      PMHX/PSHX:  DM (diabetes mellitus)    RA (rheumatoid arthritis)    CAD (coronary artery disease)    HTN (hypertension)    HLD (hyperlipidemia)    Spinal stenosis    BPH (benign prostatic hyperplasia)    No significant past surgical history        Family history:  No pertinent family history in first degree relatives    No pertinent family history in first degree relatives        Denies family history of colon cancer/polyps, stomach cancer/polyps, pancreatic cancer/masses, liver cancer/disease, ovarian cancer and endometrial cancer.    Social History:   Tob: Denies  EtOH: Denies  Illicit Drugs: Denies    ROS:     General:  No wt loss, fevers, chills, night sweats, fatigue  Eyes:  Good vision, no reported pain  ENT:  No sore throat, pain, runny nose, dysphagia  CV:  No pain, palpitations, hypo/hypertension  Pulm:  No dyspnea, cough, tachypnea, wheezing  GI:  see HPI  :  No pain, bleeding, incontinence, nocturia  Muscle:  No pain, weakness  Neuro:  No weakness, tingling, memory problems  Psych:  No fatigue, insomnia, mood problems, depression  Endocrine:  No polyuria, polydipsia, cold/heat intolerance  Heme:  No petechiae, ecchymosis, easy bruisability  Skin:  No rash, tattoos, scars, edema    PHYSICAL EXAM:     GENERAL:  No acute distress  HEENT:  NCAT, no scleral icterus   CHEST:  no respiratory distress  HEART:  Regular rate and rhythm  ABDOMEN:  Soft, non-tender, non-distended, normoactive bowel sounds,  no masses, no hepato-splenomegaly, no signs of chronic liver disease  EXTREMITIES: No edema  SKIN:  No rash/erythema/ecchymoses/petechiae/wounds/abscess/warm/dry  NEURO:  Alert and oriented x 3, no asterixis    Vital Signs:  Vital Signs Last 24 Hrs  T(C): 36.4 (06 Sep 2024 12:09), Max: 37.8 (05 Sep 2024 20:21)  T(F): 97.6 (06 Sep 2024 12:09), Max: 100.1 (05 Sep 2024 20:21)  HR: 96 (06 Sep 2024 12:09) (76 - 96)  BP: 123/68 (06 Sep 2024 12:09) (112/61 - 129/70)  BP(mean): --  RR: 18 (06 Sep 2024 12:09) (18 - 18)  SpO2: 96% (06 Sep 2024 12:09) (95% - 97%)    Parameters below as of 06 Sep 2024 12:09  Patient On (Oxygen Delivery Method): room air      Daily     Daily Weight in k.6 (06 Sep 2024 07:19)    LABS:          132<L>  |  98  |  18  ----------------------------<  167<H>  3.5   |  22  |  1.49<H>    Ca    8.6      06 Sep 2024 05:59    TPro  7.1  /  Alb  2.7<L>  /  TBili  0.2  /  DBili  x   /  AST  51<H>  /  ALT  18  /  AlkPhos  71  09-06    LIVER FUNCTIONS - ( 06 Sep 2024 05:59 )  Alb: 2.7 g/dL / Pro: 7.1 g/dL / ALK PHOS: 71 U/L / ALT: 18 U/L / AST: 51 U/L / GGT: x             Urinalysis Basic - ( 06 Sep 2024 05:59 )    Color: x / Appearance: x / SG: x / pH: x  Gluc: 167 mg/dL / Ketone: x  / Bili: x / Urobili: x   Blood: x / Protein: x / Nitrite: x   Leuk Esterase: x / RBC: x / WBC x   Sq Epi: x / Non Sq Epi: x / Bacteria: x                              12.0   8.32  )-----------( 247      ( 04 Sep 2024 10:56 )             37.1       Imaging:           Chief Complaint:  Patient is a 83y old  Male who presents with a chief complaint of Fall (06 Sep 2024 16:06)      HPI:  82 Y/o Male with pmhx BPH, CAD, DM, htn and RA BIBA sp fall this morning.  As per son, patient fell early this morning and didn't remember what happened. As per patient, he thinks he tripped while ambulating with his walker but isnt sure. Patient denies LOC. Thinks it was around 3-4AM. Patient able to call son, who came from [place] and was able to call ems. Patient has been having sore throat, cough and chills for the past week and is currently taking antibiotic Patient lives at home with his wife who has parkinsons. Patient denies abd pain, nvd, tingling, numbness, and dysuria.   GI consulted for years of dysphagia, feeling like food is becoming stuck in his throat/ chest when he eats. No choking, not spitting up food. Only with solid food. Tolerating soft diet at home, on consistent carb diet here. No weight loss. No smoking history. On PPI at home, though denies history of gerd. Never had a workup for this, including swallowing study or     Allergies:  tetracyclines (Rash)      Home Medications:    Hospital Medications:  aspirin  chewable 81 milliGRAM(s) Oral daily  atorvastatin 10 milliGRAM(s) Oral at bedtime  dextrose 5%. 1000 milliLiter(s) IV Continuous <Continuous>  dextrose 5%. 1000 milliLiter(s) IV Continuous <Continuous>  dextrose 50% Injectable 25 Gram(s) IV Push once  dextrose 50% Injectable 12.5 Gram(s) IV Push once  dextrose 50% Injectable 25 Gram(s) IV Push once  dextrose Oral Gel 15 Gram(s) Oral once PRN  enoxaparin Injectable 40 milliGRAM(s) SubCutaneous every 24 hours  ezetimibe 10 milliGRAM(s) Oral daily  glucagon  Injectable 1 milliGRAM(s) IntraMuscular once  insulin glargine Injectable (LANTUS) 10 Unit(s) SubCutaneous at bedtime  insulin lispro (ADMELOG) corrective regimen sliding scale   SubCutaneous three times a day before meals  insulin lispro Injectable (ADMELOG) 3 Unit(s) SubCutaneous three times a day before meals  latanoprost 0.005% Ophthalmic Solution 1 Drop(s) Both EYES at bedtime  lisinopril 5 milliGRAM(s) Oral daily  metoprolol succinate ER 25 milliGRAM(s) Oral daily  pantoprazole    Tablet 40 milliGRAM(s) Oral before breakfast  remdesivir  IVPB   IV Intermittent   tamsulosin 0.4 milliGRAM(s) Oral at bedtime      PMHX/PSHX:  DM (diabetes mellitus)    RA (rheumatoid arthritis)    CAD (coronary artery disease)    HTN (hypertension)    HLD (hyperlipidemia)    Spinal stenosis    BPH (benign prostatic hyperplasia)    No significant past surgical history        Family history:  No pertinent family history in first degree relatives    No pertinent family history in first degree relatives        Denies family history of colon cancer/polyps, stomach cancer/polyps, pancreatic cancer/masses, liver cancer/disease, ovarian cancer and endometrial cancer.    Social History:   Tob: Denies  EtOH: Denies  Illicit Drugs: Denies    ROS:     General:  No wt loss, fevers, chills, night sweats, fatigue  Eyes:  Good vision, no reported pain  ENT:  No sore throat, pain, runny nose, dysphagia  CV:  No pain, palpitations, hypo/hypertension  Pulm:  No dyspnea, cough, tachypnea, wheezing  GI:  see HPI  :  No pain, bleeding, incontinence, nocturia  Muscle:  No pain, weakness  Neuro:  No weakness, tingling, memory problems  Psych:  No fatigue, insomnia, mood problems, depression  Endocrine:  No polyuria, polydipsia, cold/heat intolerance  Heme:  No petechiae, ecchymosis, easy bruisability  Skin:  No rash, tattoos, scars, edema    PHYSICAL EXAM:     GENERAL:  No acute distress  HEENT:  NCAT, no scleral icterus   CHEST:  no respiratory distress  HEART:  Regular rate and rhythm  ABDOMEN:  Soft, non-tender, non-distended, normoactive bowel sounds,  no masses, no hepato-splenomegaly, no signs of chronic liver disease  EXTREMITIES: No edema  SKIN:  No rash/erythema/ecchymoses/petechiae/wounds/abscess/warm/dry  NEURO:  Alert and oriented x 3, no asterixis    Vital Signs:  Vital Signs Last 24 Hrs  T(C): 36.4 (06 Sep 2024 12:09), Max: 37.8 (05 Sep 2024 20:21)  T(F): 97.6 (06 Sep 2024 12:09), Max: 100.1 (05 Sep 2024 20:21)  HR: 96 (06 Sep 2024 12:09) (76 - 96)  BP: 123/68 (06 Sep 2024 12:09) (112/61 - 129/70)  BP(mean): --  RR: 18 (06 Sep 2024 12:09) (18 - 18)  SpO2: 96% (06 Sep 2024 12:09) (95% - 97%)    Parameters below as of 06 Sep 2024 12:09  Patient On (Oxygen Delivery Method): room air      Daily     Daily Weight in k.6 (06 Sep 2024 07:19)    LABS:          132<L>  |  98  |  18  ----------------------------<  167<H>  3.5   |  22  |  1.49<H>    Ca    8.6      06 Sep 2024 05:59    TPro  7.1  /  Alb  2.7<L>  /  TBili  0.2  /  DBili  x   /  AST  51<H>  /  ALT  18  /  AlkPhos  71  09-06    LIVER FUNCTIONS - ( 06 Sep 2024 05:59 )  Alb: 2.7 g/dL / Pro: 7.1 g/dL / ALK PHOS: 71 U/L / ALT: 18 U/L / AST: 51 U/L / GGT: x             Urinalysis Basic - ( 06 Sep 2024 05:59 )    Color: x / Appearance: x / SG: x / pH: x  Gluc: 167 mg/dL / Ketone: x  / Bili: x / Urobili: x   Blood: x / Protein: x / Nitrite: x   Leuk Esterase: x / RBC: x / WBC x   Sq Epi: x / Non Sq Epi: x / Bacteria: x                              12.0   8.32  )-----------( 247      ( 04 Sep 2024 10:56 )             37.1       Imaging:           Chief Complaint:  Patient is a 83y old  Male who presents with a chief complaint of Fall (06 Sep 2024 16:06)      HPI:  84 Y/o Male with pmhx BPH, CAD, DM, htn and RA BIBA sp fall this morning.  As per son, patient fell early this morning and didn't remember what happened. As per patient, he thinks he tripped while ambulating with his walker but isnt sure. Patient denies LOC. Thinks it was around 3-4AM. Patient able to call son, who came from [place] and was able to call ems. Patient has been having sore throat, cough and chills for the past week and is currently taking antibiotic Patient lives at home with his wife who has parkinsons. Patient denies abd pain, nvd, tingling, numbness, and dysuria.   GI consulted for years of dysphagia, feeling like food is becoming stuck in his throat/ chest when he eats. No choking, not spitting up food. Only with solid food. Tolerating soft diet at home, on consistent carb diet here and still tolerating it and getting food down. No weight loss. No smoking history. On PPI at home, though denies history of gerd. Never had a workup for this, including swallowing study or     Allergies:  tetracyclines (Rash)      Home Medications:    Hospital Medications:  aspirin  chewable 81 milliGRAM(s) Oral daily  atorvastatin 10 milliGRAM(s) Oral at bedtime  dextrose 5%. 1000 milliLiter(s) IV Continuous <Continuous>  dextrose 5%. 1000 milliLiter(s) IV Continuous <Continuous>  dextrose 50% Injectable 25 Gram(s) IV Push once  dextrose 50% Injectable 12.5 Gram(s) IV Push once  dextrose 50% Injectable 25 Gram(s) IV Push once  dextrose Oral Gel 15 Gram(s) Oral once PRN  enoxaparin Injectable 40 milliGRAM(s) SubCutaneous every 24 hours  ezetimibe 10 milliGRAM(s) Oral daily  glucagon  Injectable 1 milliGRAM(s) IntraMuscular once  insulin glargine Injectable (LANTUS) 10 Unit(s) SubCutaneous at bedtime  insulin lispro (ADMELOG) corrective regimen sliding scale   SubCutaneous three times a day before meals  insulin lispro Injectable (ADMELOG) 3 Unit(s) SubCutaneous three times a day before meals  latanoprost 0.005% Ophthalmic Solution 1 Drop(s) Both EYES at bedtime  lisinopril 5 milliGRAM(s) Oral daily  metoprolol succinate ER 25 milliGRAM(s) Oral daily  pantoprazole    Tablet 40 milliGRAM(s) Oral before breakfast  remdesivir  IVPB   IV Intermittent   tamsulosin 0.4 milliGRAM(s) Oral at bedtime      PMHX/PSHX:  DM (diabetes mellitus)    RA (rheumatoid arthritis)    CAD (coronary artery disease)    HTN (hypertension)    HLD (hyperlipidemia)    Spinal stenosis    BPH (benign prostatic hyperplasia)    No significant past surgical history        Family history:  No pertinent family history in first degree relatives    No pertinent family history in first degree relatives        Denies family history of colon cancer/polyps, stomach cancer/polyps, pancreatic cancer/masses, liver cancer/disease, ovarian cancer and endometrial cancer.    Social History:   Tob: Denies  EtOH: Denies  Illicit Drugs: Denies    ROS:     General:  No wt loss, fevers, chills, night sweats, fatigue  Eyes:  Good vision, no reported pain  ENT:  No sore throat, pain, runny nose, dysphagia  CV:  No pain, palpitations, hypo/hypertension  Pulm:  No dyspnea, cough, tachypnea, wheezing  GI:  see HPI  :  No pain, bleeding, incontinence, nocturia  Muscle:  No pain, weakness  Neuro:  No weakness, tingling, memory problems  Psych:  No fatigue, insomnia, mood problems, depression  Endocrine:  No polyuria, polydipsia, cold/heat intolerance  Heme:  No petechiae, ecchymosis, easy bruisability  Skin:  No rash, tattoos, scars, edema    PHYSICAL EXAM:     GENERAL:  No acute distress  HEENT:  NCAT, no scleral icterus   CHEST:  no respiratory distress  HEART:  Regular rate and rhythm  ABDOMEN:  Soft, non-tender, non-distended, normoactive bowel sounds,  no masses, no hepato-splenomegaly, no signs of chronic liver disease  EXTREMITIES: No edema  SKIN:  No rash/erythema/ecchymoses/petechiae/wounds/abscess/warm/dry  NEURO:  Alert and oriented x 3, no asterixis    Vital Signs:  Vital Signs Last 24 Hrs  T(C): 36.4 (06 Sep 2024 12:09), Max: 37.8 (05 Sep 2024 20:21)  T(F): 97.6 (06 Sep 2024 12:09), Max: 100.1 (05 Sep 2024 20:21)  HR: 96 (06 Sep 2024 12:09) (76 - 96)  BP: 123/68 (06 Sep 2024 12:09) (112/61 - 129/70)  BP(mean): --  RR: 18 (06 Sep 2024 12:09) (18 - 18)  SpO2: 96% (06 Sep 2024 12:09) (95% - 97%)    Parameters below as of 06 Sep 2024 12:09  Patient On (Oxygen Delivery Method): room air      Daily     Daily Weight in k.6 (06 Sep 2024 07:19)    LABS:          132<L>  |  98  |  18  ----------------------------<  167<H>  3.5   |  22  |  1.49<H>    Ca    8.6      06 Sep 2024 05:59    TPro  7.1  /  Alb  2.7<L>  /  TBili  0.2  /  DBili  x   /  AST  51<H>  /  ALT  18  /  AlkPhos  71  09-06    LIVER FUNCTIONS - ( 06 Sep 2024 05:59 )  Alb: 2.7 g/dL / Pro: 7.1 g/dL / ALK PHOS: 71 U/L / ALT: 18 U/L / AST: 51 U/L / GGT: x             Urinalysis Basic - ( 06 Sep 2024 05:59 )    Color: x / Appearance: x / SG: x / pH: x  Gluc: 167 mg/dL / Ketone: x  / Bili: x / Urobili: x   Blood: x / Protein: x / Nitrite: x   Leuk Esterase: x / RBC: x / WBC x   Sq Epi: x / Non Sq Epi: x / Bacteria: x                              12.0   8.32  )-----------( 247      ( 04 Sep 2024 10:56 )             37.1       Imaging:

## 2024-09-06 NOTE — PROGRESS NOTE ADULT - SUBJECTIVE AND OBJECTIVE BOX
CARDIOLOGY FOLLOW UP - Dr. Downing  DATE OF SERVICE: 9/6/24    CC: no CP or SOB; +cough still      REVIEW OF SYSTEMS:  CONSTITUTIONAL: No fever, weight loss, or fatigue  RESPIRATORY: +cough, No wheezing, chills or hemoptysis; No Shortness of Breath  CARDIOVASCULAR: No chest pain, palpitations, passing out, dizziness  GASTROINTESTINAL: No abdominal or epigastric pain. No nausea, vomiting, or hematemesis; No diarrhea or constipation. No melena or hematochezia.      PHYSICAL EXAM:  T(C): 36.4 (09-06-24 @ 12:09), Max: 37.8 (09-05-24 @ 20:21)  HR: 96 (09-06-24 @ 12:09) (76 - 96)  BP: 123/68 (09-06-24 @ 12:09) (112/61 - 129/70)  RR: 18 (09-06-24 @ 12:09) (18 - 18)  SpO2: 96% (09-06-24 @ 12:09) (95% - 97%)  Wt(kg): --  I&O's Summary    05 Sep 2024 07:01  -  06 Sep 2024 07:00  --------------------------------------------------------  IN: 360 mL / OUT: 225 mL / NET: 135 mL    06 Sep 2024 07:01  -  06 Sep 2024 14:54  --------------------------------------------------------  IN: 0 mL / OUT: 200 mL / NET: -200 mL        Appearance: Normal	  Cardiovascular: Normal S1 S2,RRR, No JVD, No murmurs  Respiratory: Lungs clear to auscultation	  Gastrointestinal:  Soft, Non-tender, + BS	  Extremities: Normal range of motion, No clubbing, cyanosis or edema      Home Medications:  aspirin 81 mg oral tablet: 1 tab(s) orally once a day (04 Sep 2024 17:17)  cholecalciferol 25 mcg (1000 intl units) oral tablet: 1 tab(s) orally once a day (04 Sep 2024 17:17)  Enbrel Prefilled Syringe 50 mg/mL subcutaneous solution: 50 milligram(s) subcutaneously every other week (04 Sep 2024 17:17)  ezetimibe-simvastatin 10 mg-20 mg oral tablet: 1 tab(s) orally once a day (04 Sep 2024 17:17)  Flomax 0.4 mg oral capsule: 1 cap(s) orally once a day (at bedtime) (04 Sep 2024 17:17)  Lumigan 0.01% ophthalmic solution: 1 drop(s) in each affected eye once a day (in the evening) (04 Sep 2024 17:17)  NovoLOG FlexPen 100 units/mL injectable solution: 13 unit(s) injectable once a day (04 Sep 2024 17:17)  NovoLOG Mix 70/30 FlexPen subcutaneous suspension: 21 unit(s) subcutaneous once a day (at bedtime) (04 Sep 2024 17:17)  pantoprazole 40 mg oral delayed release tablet: 1 tab(s) orally once a day (04 Sep 2024 17:17)  PreserVision AREDS 2 oral capsule: 1 cap(s) orally once a day (04 Sep 2024 17:17)  ramipril 2.5 mg oral capsule: 1 cap(s) orally once a day (04 Sep 2024 17:17)  Vitamin C 500 mg oral tablet: 1 tab(s) orally once a day (04 Sep 2024 17:17)      MEDICATIONS  (STANDING):  aspirin  chewable 81 milliGRAM(s) Oral daily  atorvastatin 10 milliGRAM(s) Oral at bedtime  dextrose 5%. 1000 milliLiter(s) (100 mL/Hr) IV Continuous <Continuous>  dextrose 5%. 1000 milliLiter(s) (50 mL/Hr) IV Continuous <Continuous>  dextrose 50% Injectable 12.5 Gram(s) IV Push once  dextrose 50% Injectable 25 Gram(s) IV Push once  dextrose 50% Injectable 25 Gram(s) IV Push once  enoxaparin Injectable 40 milliGRAM(s) SubCutaneous every 24 hours  ezetimibe 10 milliGRAM(s) Oral daily  glucagon  Injectable 1 milliGRAM(s) IntraMuscular once  insulin glargine Injectable (LANTUS) 10 Unit(s) SubCutaneous at bedtime  insulin lispro (ADMELOG) corrective regimen sliding scale   SubCutaneous three times a day before meals  insulin lispro Injectable (ADMELOG) 3 Unit(s) SubCutaneous three times a day before meals  latanoprost 0.005% Ophthalmic Solution 1 Drop(s) Both EYES at bedtime  lisinopril 5 milliGRAM(s) Oral daily  pantoprazole    Tablet 40 milliGRAM(s) Oral before breakfast  remdesivir  IVPB   IV Intermittent   tamsulosin 0.4 milliGRAM(s) Oral at bedtime      TELEMETRY: 	    ECG:  	  RADIOLOGY:   DIAGNOSTIC TESTING:  [ ] Echocardiogram:  [ ]  Catheterization:  [ ] Stress Test:    OTHER: 	    LABS:	 	    Troponin T, High Sensitivity Result: 678 ng/L [0 - 51] (09-04 @ 16:13)  Troponin T, High Sensitivity Result: 533 ng/L [0 - 51] (09-04 @ 10:56)      09-06    132<L>  |  98  |  18  ----------------------------<  167<H>  3.5   |  22  |  1.49<H>    Ca    8.6      06 Sep 2024 05:59    TPro  7.1  /  Alb  2.7<L>  /  TBili  0.2  /  DBili  x   /  AST  51<H>  /  ALT  18  /  AlkPhos  71  09-06             CARDIOLOGY FOLLOW UP - Dr. Downing  DATE OF SERVICE: 9/6/24    CC: no CP or SOB; +cough still; pt c/o weakness       REVIEW OF SYSTEMS:  CONSTITUTIONAL: No fever, weight loss, or fatigue  RESPIRATORY: +cough, No wheezing, chills or hemoptysis; No Shortness of Breath  CARDIOVASCULAR: No chest pain, palpitations, passing out, dizziness  GASTROINTESTINAL: No abdominal or epigastric pain. No nausea, vomiting, or hematemesis; No diarrhea or constipation. No melena or hematochezia.      PHYSICAL EXAM:  T(C): 36.4 (09-06-24 @ 12:09), Max: 37.8 (09-05-24 @ 20:21)  HR: 96 (09-06-24 @ 12:09) (76 - 96)  BP: 123/68 (09-06-24 @ 12:09) (112/61 - 129/70)  RR: 18 (09-06-24 @ 12:09) (18 - 18)  SpO2: 96% (09-06-24 @ 12:09) (95% - 97%)  Wt(kg): --  I&O's Summary    05 Sep 2024 07:01  -  06 Sep 2024 07:00  --------------------------------------------------------  IN: 360 mL / OUT: 225 mL / NET: 135 mL    06 Sep 2024 07:01  -  06 Sep 2024 14:54  --------------------------------------------------------  IN: 0 mL / OUT: 200 mL / NET: -200 mL        Appearance: Normal	  Cardiovascular: Normal S1 S2,RRR, No JVD, No murmurs  Respiratory: Crackles b/l at bases   Gastrointestinal:  Soft, Non-tender, + BS	  Extremities: Normal range of motion, No clubbing, cyanosis or edema      Home Medications:  aspirin 81 mg oral tablet: 1 tab(s) orally once a day (04 Sep 2024 17:17)  cholecalciferol 25 mcg (1000 intl units) oral tablet: 1 tab(s) orally once a day (04 Sep 2024 17:17)  Enbrel Prefilled Syringe 50 mg/mL subcutaneous solution: 50 milligram(s) subcutaneously every other week (04 Sep 2024 17:17)  ezetimibe-simvastatin 10 mg-20 mg oral tablet: 1 tab(s) orally once a day (04 Sep 2024 17:17)  Flomax 0.4 mg oral capsule: 1 cap(s) orally once a day (at bedtime) (04 Sep 2024 17:17)  Lumigan 0.01% ophthalmic solution: 1 drop(s) in each affected eye once a day (in the evening) (04 Sep 2024 17:17)  NovoLOG FlexPen 100 units/mL injectable solution: 13 unit(s) injectable once a day (04 Sep 2024 17:17)  NovoLOG Mix 70/30 FlexPen subcutaneous suspension: 21 unit(s) subcutaneous once a day (at bedtime) (04 Sep 2024 17:17)  pantoprazole 40 mg oral delayed release tablet: 1 tab(s) orally once a day (04 Sep 2024 17:17)  PreserVision AREDS 2 oral capsule: 1 cap(s) orally once a day (04 Sep 2024 17:17)  ramipril 2.5 mg oral capsule: 1 cap(s) orally once a day (04 Sep 2024 17:17)  Vitamin C 500 mg oral tablet: 1 tab(s) orally once a day (04 Sep 2024 17:17)      MEDICATIONS  (STANDING):  aspirin  chewable 81 milliGRAM(s) Oral daily  atorvastatin 10 milliGRAM(s) Oral at bedtime  dextrose 5%. 1000 milliLiter(s) (100 mL/Hr) IV Continuous <Continuous>  dextrose 5%. 1000 milliLiter(s) (50 mL/Hr) IV Continuous <Continuous>  dextrose 50% Injectable 12.5 Gram(s) IV Push once  dextrose 50% Injectable 25 Gram(s) IV Push once  dextrose 50% Injectable 25 Gram(s) IV Push once  enoxaparin Injectable 40 milliGRAM(s) SubCutaneous every 24 hours  ezetimibe 10 milliGRAM(s) Oral daily  glucagon  Injectable 1 milliGRAM(s) IntraMuscular once  insulin glargine Injectable (LANTUS) 10 Unit(s) SubCutaneous at bedtime  insulin lispro (ADMELOG) corrective regimen sliding scale   SubCutaneous three times a day before meals  insulin lispro Injectable (ADMELOG) 3 Unit(s) SubCutaneous three times a day before meals  latanoprost 0.005% Ophthalmic Solution 1 Drop(s) Both EYES at bedtime  lisinopril 5 milliGRAM(s) Oral daily  pantoprazole    Tablet 40 milliGRAM(s) Oral before breakfast  remdesivir  IVPB   IV Intermittent   tamsulosin 0.4 milliGRAM(s) Oral at bedtime      TELEMETRY: 	    ECG:  	  RADIOLOGY:   DIAGNOSTIC TESTING:  [ ] Echocardiogram:  [ ]  Catheterization:  [ ] Stress Test:    OTHER: 	    LABS:	 	    Troponin T, High Sensitivity Result: 678 ng/L [0 - 51] (09-04 @ 16:13)  Troponin T, High Sensitivity Result: 533 ng/L [0 - 51] (09-04 @ 10:56)      09-06    132<L>  |  98  |  18  ----------------------------<  167<H>  3.5   |  22  |  1.49<H>    Ca    8.6      06 Sep 2024 05:59    TPro  7.1  /  Alb  2.7<L>  /  TBili  0.2  /  DBili  x   /  AST  51<H>  /  ALT  18  /  AlkPhos  71  09-06             CARDIOLOGY FOLLOW UP - Dr. Downing  DATE OF SERVICE: 9/6/24    CC: no CP or SOB; +cough still; pt c/o weakness       REVIEW OF SYSTEMS:  CONSTITUTIONAL: No fever, weight loss, or fatigue  RESPIRATORY: +cough, No wheezing, chills or hemoptysis; No Shortness of Breath  CARDIOVASCULAR: No chest pain, palpitations, passing out, dizziness  GASTROINTESTINAL: No abdominal or epigastric pain. No nausea, vomiting, or hematemesis; No diarrhea or constipation. No melena or hematochezia.      PHYSICAL EXAM:  T(C): 36.4 (09-06-24 @ 12:09), Max: 37.8 (09-05-24 @ 20:21)  HR: 96 (09-06-24 @ 12:09) (76 - 96)  BP: 123/68 (09-06-24 @ 12:09) (112/61 - 129/70)  RR: 18 (09-06-24 @ 12:09) (18 - 18)  SpO2: 96% (09-06-24 @ 12:09) (95% - 97%)  Wt(kg): --  I&O's Summary    05 Sep 2024 07:01  -  06 Sep 2024 07:00  --------------------------------------------------------  IN: 360 mL / OUT: 225 mL / NET: 135 mL    06 Sep 2024 07:01  -  06 Sep 2024 14:54  --------------------------------------------------------  IN: 0 mL / OUT: 200 mL / NET: -200 mL        Appearance: Normal	  Cardiovascular: Normal S1 S2,RRR, No JVD, No murmurs  Respiratory: Crackles b/l at bases   Gastrointestinal:  Soft, Non-tender, + BS	  Extremities: Normal range of motion, No clubbing, cyanosis or edema      Home Medications:  aspirin 81 mg oral tablet: 1 tab(s) orally once a day (04 Sep 2024 17:17)  cholecalciferol 25 mcg (1000 intl units) oral tablet: 1 tab(s) orally once a day (04 Sep 2024 17:17)  Enbrel Prefilled Syringe 50 mg/mL subcutaneous solution: 50 milligram(s) subcutaneously every other week (04 Sep 2024 17:17)  ezetimibe-simvastatin 10 mg-20 mg oral tablet: 1 tab(s) orally once a day (04 Sep 2024 17:17)  Flomax 0.4 mg oral capsule: 1 cap(s) orally once a day (at bedtime) (04 Sep 2024 17:17)  Lumigan 0.01% ophthalmic solution: 1 drop(s) in each affected eye once a day (in the evening) (04 Sep 2024 17:17)  NovoLOG FlexPen 100 units/mL injectable solution: 13 unit(s) injectable once a day (04 Sep 2024 17:17)  NovoLOG Mix 70/30 FlexPen subcutaneous suspension: 21 unit(s) subcutaneous once a day (at bedtime) (04 Sep 2024 17:17)  pantoprazole 40 mg oral delayed release tablet: 1 tab(s) orally once a day (04 Sep 2024 17:17)  PreserVision AREDS 2 oral capsule: 1 cap(s) orally once a day (04 Sep 2024 17:17)  ramipril 2.5 mg oral capsule: 1 cap(s) orally once a day (04 Sep 2024 17:17)  Vitamin C 500 mg oral tablet: 1 tab(s) orally once a day (04 Sep 2024 17:17)      MEDICATIONS  (STANDING):  aspirin  chewable 81 milliGRAM(s) Oral daily  atorvastatin 10 milliGRAM(s) Oral at bedtime  dextrose 5%. 1000 milliLiter(s) (100 mL/Hr) IV Continuous <Continuous>  dextrose 5%. 1000 milliLiter(s) (50 mL/Hr) IV Continuous <Continuous>  dextrose 50% Injectable 12.5 Gram(s) IV Push once  dextrose 50% Injectable 25 Gram(s) IV Push once  dextrose 50% Injectable 25 Gram(s) IV Push once  enoxaparin Injectable 40 milliGRAM(s) SubCutaneous every 24 hours  ezetimibe 10 milliGRAM(s) Oral daily  glucagon  Injectable 1 milliGRAM(s) IntraMuscular once  insulin glargine Injectable (LANTUS) 10 Unit(s) SubCutaneous at bedtime  insulin lispro (ADMELOG) corrective regimen sliding scale   SubCutaneous three times a day before meals  insulin lispro Injectable (ADMELOG) 3 Unit(s) SubCutaneous three times a day before meals  latanoprost 0.005% Ophthalmic Solution 1 Drop(s) Both EYES at bedtime  lisinopril 5 milliGRAM(s) Oral daily  pantoprazole    Tablet 40 milliGRAM(s) Oral before breakfast  remdesivir  IVPB   IV Intermittent   tamsulosin 0.4 milliGRAM(s) Oral at bedtime      TELEMETRY: 	  NSR  ECG:  	  RADIOLOGY:   DIAGNOSTIC TESTING:  [ ] Echocardiogram:  < from: TTE W or WO Ultrasound Enhancing Agent (09.06.24 @ 11:08) >  CONCLUSIONS:    1. Technically difficult image quality.   2. Left ventricular systolic function is moderately decreased with an ejection fraction visually estimated at 35 to 40 %. Regional wall motion abnormalities present.   3. The entire apex is akinetic. The mid anteroseptal segment, mid inferoseptal segment, mid anterolateral segment, mid anterior segment, and mid inferior segment are hypokinetic. All remaining scored segments are normal. This pattern of regional wall motion abnormality may be consistent with stress induced cardiomyopathy or multivessel disease   4. Normal right ventricular cavity size and normal right ventricular systolic function.   5. Compared to the transesophageal echocardiogram performed on 5/29/2024, there are new regional wall motion abnormalities and a decline in LVEF. Findings were discussed with Mandy Carlin NP on 9/6/2024 at 1210.    ________________________________________________________________________________________  FINDINGS:     Left Ventricle:  The left ventricular cavity is normal in size. Left ventricular wall thickness is normal. Left ventricular systolic function is moderately decreased with an ejection fraction visually estimated at 35 to 40%. There are regional wall motion abnormalities present. No left ventricular hypertrophy. Unable to assess left ventricular diastolic function due to insufficient data. There is no evidence of a left ventricular thrombus. The entire apex is akinetic. The mid anteroseptal segment, mid inferoseptal segment, mid anterolateral segment, mid anterior segment, and mid inferior segment are hypokinetic. All remaining scored segments are normal. This pattern of regional wall motion abnormality may be consistent with stress induced cardiomyopathy or multivessel disease.  LV Wall Scoring: The entire apex is akinetic. The mid anteroseptal segment, mid  inferoseptal segment, mid anterolateral segment, mid anterior segment, and mid  inferior segment are hypokinetic. All remaining scored segments are normal.         Right Ventricle:  The right ventricle is not well visualized. The right ventricular cavity is normal in size and right ventricular systolic function is normal. Tricuspid annular plane systolic excursion (TAPSE) is 2.1 cm (normal >=1.7 cm). Tricuspid annular tissue Doppler S' is 15.0 cm/s (normal >10 cm/s).     Left Atrium:  The left atrium is normal in size with an indexed volume of 28.41 ml/m².     Right Atrium:  The right atrium is normal in size with an indexed volume of 12.33 ml/m².     Tricuspid Valve:  Structurally normal tricuspid valve with normal leaflet excursion. There is no evidence of tricuspid stenosis. There is trace tricuspid regurgitation. There is insufficient tricuspid regurgitation detected to calculate pulmonary artery systolic pressure.     Pericardium:  No pericardial effusion seen.     Systemic Veins:  The inferior vena cava is normal in size measuring 1.01 cm in diameter, (normal <2.1cm) with normal inspiratory collapse (normal >50%) consistent with normal rightatrial pressure (~3, range 0-5mmHg).  ____________________________________________________________________    < end of copied text >    [ ]  Catheterization:  [ ] Stress Test:    OTHER: 	    LABS:	 	    Troponin T, High Sensitivity Result: 678 ng/L [0 - 51] (09-04 @ 16:13)  Troponin T, High Sensitivity Result: 533 ng/L [0 - 51] (09-04 @ 10:56)      09-06    132<L>  |  98  |  18  ----------------------------<  167<H>  3.5   |  22  |  1.49<H>    Ca    8.6      06 Sep 2024 05:59    TPro  7.1  /  Alb  2.7<L>  /  TBili  0.2  /  DBili  x   /  AST  51<H>  /  ALT  18  /  AlkPhos  71  09-06

## 2024-09-06 NOTE — PROGRESS NOTE ADULT - TIME BILLING
Patient seen and examined.  Agree with above ACP note.  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

## 2024-09-06 NOTE — CONSULT NOTE ADULT - ASSESSMENT
82 Y/o Male with pmhx BPH, CAD, DM, htn and RA BIBA sp fall this morning. Found to have covid. GI consulted for dysphagia for several weeks.     Recommendations:   -please obtain speech/swallow and MBS   -patient on soft diet at home, please change patient's diet   82 Y/o Male with pmhx BPH, CAD, DM, htn and RA BIBA sp fall this morning. Found to have covid. GI consulted for dysphagia for several weeks.     Impression:  1. Chronic dysphagia to solids - ddx includes esophageal strictures, rings, esophageal tumors, Zenker's diverticulum, less likely primary esophageal dysmotility  2. Active COVID infection    Recommendations:   -please obtain x-ray esophagram  -Eventually will consider EGD once acute issues i.e. COVID has been stabilized  -patient on soft diet at home, please change patient's diet   -Aspiration precautions

## 2024-09-06 NOTE — PROGRESS NOTE ADULT - SUBJECTIVE AND OBJECTIVE BOX
Date of Service: 09-06-24 @ 16:06    Patient is a 83y old  Male who presents with a chief complaint of Fall (06 Sep 2024 15:28)      Any change in ROS: seems OK:     MEDICATIONS  (STANDING):  aspirin  chewable 81 milliGRAM(s) Oral daily  atorvastatin 10 milliGRAM(s) Oral at bedtime  dextrose 5%. 1000 milliLiter(s) (100 mL/Hr) IV Continuous <Continuous>  dextrose 5%. 1000 milliLiter(s) (50 mL/Hr) IV Continuous <Continuous>  dextrose 50% Injectable 12.5 Gram(s) IV Push once  dextrose 50% Injectable 25 Gram(s) IV Push once  dextrose 50% Injectable 25 Gram(s) IV Push once  enoxaparin Injectable 40 milliGRAM(s) SubCutaneous every 24 hours  ezetimibe 10 milliGRAM(s) Oral daily  glucagon  Injectable 1 milliGRAM(s) IntraMuscular once  insulin glargine Injectable (LANTUS) 10 Unit(s) SubCutaneous at bedtime  insulin lispro (ADMELOG) corrective regimen sliding scale   SubCutaneous three times a day before meals  insulin lispro Injectable (ADMELOG) 3 Unit(s) SubCutaneous three times a day before meals  latanoprost 0.005% Ophthalmic Solution 1 Drop(s) Both EYES at bedtime  lisinopril 5 milliGRAM(s) Oral daily  pantoprazole    Tablet 40 milliGRAM(s) Oral before breakfast  remdesivir  IVPB   IV Intermittent   tamsulosin 0.4 milliGRAM(s) Oral at bedtime    MEDICATIONS  (PRN):  dextrose Oral Gel 15 Gram(s) Oral once PRN Blood Glucose LESS THAN 70 milliGRAM(s)/deciliter    Vital Signs Last 24 Hrs  T(C): 36.4 (06 Sep 2024 12:09), Max: 37.8 (05 Sep 2024 20:21)  T(F): 97.6 (06 Sep 2024 12:09), Max: 100.1 (05 Sep 2024 20:21)  HR: 96 (06 Sep 2024 12:09) (76 - 96)  BP: 123/68 (06 Sep 2024 12:09) (112/61 - 129/70)  BP(mean): --  RR: 18 (06 Sep 2024 12:09) (18 - 18)  SpO2: 96% (06 Sep 2024 12:09) (95% - 97%)    Parameters below as of 06 Sep 2024 12:09  Patient On (Oxygen Delivery Method): room air        I&O's Summary    05 Sep 2024 07:01  -  06 Sep 2024 07:00  --------------------------------------------------------  IN: 360 mL / OUT: 225 mL / NET: 135 mL    06 Sep 2024 07:01  -  06 Sep 2024 16:06  --------------------------------------------------------  IN: 0 mL / OUT: 800 mL / NET: -800 mL          Physical Exam:   GENERAL: NAD, well-groomed, well-developed  HEENT: LEW/   Atraumatic, Normocephalic  ENMT: No tonsillar erythema, exudates, or enlargement; Moist mucous membranes, Good dentition, No lesions  NECK: Supple, No JVD, Normal thyroid  CHEST/LUNG: Clear to auscultaion  CVS: Regular rate and rhythm; No murmurs, rubs, or gallops  GI: : Soft, Nontender, Nondistended; Bowel sounds present  NERVOUS SYSTEM:  Alert & Oriented X3  EXTREMITIES: - edema  LYMPH: No lymphadenopathy noted  SKIN: No rashes or lesions  ENDOCRINOLOGY: No Thyromegaly  PSYCH: Appropriate    Labs:  27                            12.0   8.32  )-----------( 247      ( 04 Sep 2024 10:56 )             37.1     09-06    132<L>  |  98  |  18  ----------------------------<  167<H>  3.5   |  22  |  1.49<H>  09-04    132<L>  |  96  |  21  ----------------------------<  227<H>  4.3   |  23  |  1.30    Ca    8.6      06 Sep 2024 05:59    TPro  7.1  /  Alb  2.7<L>  /  TBili  0.2  /  DBili  x   /  AST  51<H>  /  ALT  18  /  AlkPhos  71  09-06  TPro  8.8<H>  /  Alb  3.5  /  TBili  0.4  /  DBili  x   /  AST  69<H>  /  ALT  19  /  AlkPhos  94  09-04    CAPILLARY BLOOD GLUCOSE      POCT Blood Glucose.: 202 mg/dL (06 Sep 2024 11:49)  POCT Blood Glucose.: 150 mg/dL (06 Sep 2024 07:56)  POCT Blood Glucose.: 194 mg/dL (05 Sep 2024 22:06)  POCT Blood Glucose.: 148 mg/dL (05 Sep 2024 17:26)      LIVER FUNCTIONS - ( 06 Sep 2024 05:59 )  Alb: 2.7 g/dL / Pro: 7.1 g/dL / ALK PHOS: 71 U/L / ALT: 18 U/L / AST: 51 U/L / GGT: x             Urinalysis Basic - ( 06 Sep 2024 05:59 )    Color: x / Appearance: x / SG: x / pH: x  Gluc: 167 mg/dL / Ketone: x  / Bili: x / Urobili: x   Blood: x / Protein: x / Nitrite: x   Leuk Esterase: x / RBC: x / WBC x   Sq Epi: x / Non Sq Epi: x / Bacteria: x            RECENT CULTURES:  09-04 @ 14:08 Clean Catch Clean Catch (Midstream)            rad< from: CT Head No Cont (09.04.24 @ 11:54) >  hyperdense mass involving the subcutaneous fat lateral to the left   mandible, with thickening of the subjacent platysma musculature. Severe   right temporomandibular arthrosis.    CT CERVICAL SPINE:    VERTEBRAE: No definitive evidence of acute fracture or prevertebral soft   tissue swelling, as on the prior. 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 Multilevel lower cervical   disc space narrowing, most pronounced at C5-C6, with multilevel   endplate-uncovertebral spurring, most pronounced at this level as well.  ALIGNMENT: Straightening of lordosis. No subluxation. No perched or   dislocated facets. Multilevel facet arthrosis, most pronounced and   moderate-severe at the right C4-C5 level.  INTERVERTEBRAL DISC SPACES: Although suboptimally evaluated on this exam,   there is evidence of multilevel cervical disc bulges and/or protrusions,   contributing to multilevel central canal and neural foramen stenosis.    VISUALIZED LUNGS: No suspicious left upper lung mass. Partially imaged   opacification right upper lung.    MISCELLANEOUS: No gross intrathecal-paraspinal mass or retropharyngeal   fluid collection, however suboptimally evaluated on this exam. Vascular   calcifications, including at bilateral carotid bifurcations. Evidence of   borderline basilar invagination.    IMPRESSION:    CT HEAD:  1. No significant change.  2. No evidence of acute calvarial fracture or intracranial hemorrhage.  3. Additional findings described in detail above.    CT MAXILLOFACIAL:  1. No definitive evidence of acute fracture or paranasal sinus air-fluid   level.  2. Soft tissue edema with probable 2.5 cm subcutaneous hematoma lateral   to the left mandible. Cellulitis may have an overlapping imaging   appearance. Correlate with clinical findings.  3. Additional findings described in detail above.    CT CERVICAL SPINE:  1. No definitive evidence of acute fracture or prevertebral soft tissue   swelling. Straightening of lordosis may reflect muscle spasm.  2. Additional findings, including those degenerative, described in detail   above.    --- End of Report ---            CORBIN FONG M.D., ATTENDING RADIOLOGIST  This document has been electronically signed. Sep  4 2024 12:24PM    < end of copied text >  < from: CT Chest w/ IV Cont (09.04.24 @ 11:52) >  LYMPH NODES: No lymphadenopathy.  ABDOMINAL WALL: Fat-containing umbilical hernia.  BONES: Degenerative changes. Sclerotic lesion in the left femoral neck,   unchanged from prior    IMPRESSION:  *  No evidence of acute traumatic abnormality in the chest, abdomen, or   pelvis.  *  Pulmonary changes consistent with interstitial lung disease, similar   to findings present on prior examination.  *  Resolution of left pleural effusion.    < end of copied text >      No growth    09-04 @ 10:35 .Blood Blood-Peripheral                No growth at 24 hours    09-04 @ 10:30 .Blood Blood-Peripheral                No growth at 24 hours          RESPIRATORY CULTURES:          Studies  Chest X-RAY  CT SCAN Chest   Venous Dopplers: LE:   CT Abdomen  Others

## 2024-09-06 NOTE — PROGRESS NOTE ADULT - SUBJECTIVE AND OBJECTIVE BOX
Patient is a 83y old  Male who presents with a chief complaint of Fall (06 Sep 2024 14:54)    Date of servie : 09-06-24 @ 15:31  INTERVAL HPI/OVERNIGHT EVENTS:  T(C): 36.4 (09-06-24 @ 12:09), Max: 37.8 (09-05-24 @ 20:21)  HR: 96 (09-06-24 @ 12:09) (76 - 96)  BP: 123/68 (09-06-24 @ 12:09) (112/61 - 129/70)  RR: 18 (09-06-24 @ 12:09) (18 - 18)  SpO2: 96% (09-06-24 @ 12:09) (95% - 97%)  Wt(kg): --  I&O's Summary    05 Sep 2024 07:01  -  06 Sep 2024 07:00  --------------------------------------------------------  IN: 360 mL / OUT: 225 mL / NET: 135 mL    06 Sep 2024 07:01  -  06 Sep 2024 15:31  --------------------------------------------------------  IN: 0 mL / OUT: 800 mL / NET: -800 mL        LABS:    09-06    132<L>  |  98  |  18  ----------------------------<  167<H>  3.5   |  22  |  1.49<H>    Ca    8.6      06 Sep 2024 05:59    TPro  7.1  /  Alb  2.7<L>  /  TBili  0.2  /  DBili  x   /  AST  51<H>  /  ALT  18  /  AlkPhos  71  09-06      Urinalysis Basic - ( 06 Sep 2024 05:59 )    Color: x / Appearance: x / SG: x / pH: x  Gluc: 167 mg/dL / Ketone: x  / Bili: x / Urobili: x   Blood: x / Protein: x / Nitrite: x   Leuk Esterase: x / RBC: x / WBC x   Sq Epi: x / Non Sq Epi: x / Bacteria: x      CAPILLARY BLOOD GLUCOSE      POCT Blood Glucose.: 202 mg/dL (06 Sep 2024 11:49)  POCT Blood Glucose.: 150 mg/dL (06 Sep 2024 07:56)  POCT Blood Glucose.: 194 mg/dL (05 Sep 2024 22:06)  POCT Blood Glucose.: 148 mg/dL (05 Sep 2024 17:26)        Urinalysis Basic - ( 06 Sep 2024 05:59 )    Color: x / Appearance: x / SG: x / pH: x  Gluc: 167 mg/dL / Ketone: x  / Bili: x / Urobili: x   Blood: x / Protein: x / Nitrite: x   Leuk Esterase: x / RBC: x / WBC x   Sq Epi: x / Non Sq Epi: x / Bacteria: x        MEDICATIONS  (STANDING):  aspirin  chewable 81 milliGRAM(s) Oral daily  atorvastatin 10 milliGRAM(s) Oral at bedtime  dextrose 5%. 1000 milliLiter(s) (50 mL/Hr) IV Continuous <Continuous>  dextrose 5%. 1000 milliLiter(s) (100 mL/Hr) IV Continuous <Continuous>  dextrose 50% Injectable 25 Gram(s) IV Push once  dextrose 50% Injectable 25 Gram(s) IV Push once  dextrose 50% Injectable 12.5 Gram(s) IV Push once  enoxaparin Injectable 40 milliGRAM(s) SubCutaneous every 24 hours  ezetimibe 10 milliGRAM(s) Oral daily  glucagon  Injectable 1 milliGRAM(s) IntraMuscular once  insulin glargine Injectable (LANTUS) 10 Unit(s) SubCutaneous at bedtime  insulin lispro (ADMELOG) corrective regimen sliding scale   SubCutaneous three times a day before meals  insulin lispro Injectable (ADMELOG) 3 Unit(s) SubCutaneous three times a day before meals  latanoprost 0.005% Ophthalmic Solution 1 Drop(s) Both EYES at bedtime  lisinopril 5 milliGRAM(s) Oral daily  pantoprazole    Tablet 40 milliGRAM(s) Oral before breakfast  remdesivir  IVPB   IV Intermittent   tamsulosin 0.4 milliGRAM(s) Oral at bedtime    MEDICATIONS  (PRN):  dextrose Oral Gel 15 Gram(s) Oral once PRN Blood Glucose LESS THAN 70 milliGRAM(s)/deciliter          PHYSICAL EXAM:  GENERAL: NAD, well-groomed, well-developed  HEAD:  Atraumatic, Normocephalic  CHEST/LUNG: Clear to percussion bilaterally; No rales, rhonchi, wheezing, or rubs  HEART: Regular rate and rhythm; No murmurs, rubs, or gallops  ABDOMEN: Soft, Nontender, Nondistended; Bowel sounds present  EXTREMITIES:  2+ Peripheral Pulses, No clubbing, cyanosis, or edema  LYMPH: No lymphadenopathy noted  SKIN: No rashes or lesions    Care Discussed with Consultants/Other Providers [ ] YES  [ ] NO

## 2024-09-06 NOTE — CONSULT NOTE ADULT - ATTENDING COMMENTS
Agree with above. Pt with chronic dysphagia to solid food for years, denies regurgitation and is not impacted. He is still able to eat/drink despite feeling sensation of dysphagia. He is admitted for acute COVID infection. Will eventually need EGD once acute medical issues have resolved, in the meantime consider x-ray esophagram to non-invasively evaluate esophagus.

## 2024-09-07 LAB
ALBUMIN SERPL ELPH-MCNC: 2.8 G/DL — LOW (ref 3.3–5)
ALP SERPL-CCNC: 72 U/L — SIGNIFICANT CHANGE UP (ref 40–120)
ALT FLD-CCNC: 20 U/L — SIGNIFICANT CHANGE UP (ref 10–45)
ANION GAP SERPL CALC-SCNC: 13 MMOL/L — SIGNIFICANT CHANGE UP (ref 5–17)
AST SERPL-CCNC: 40 U/L — SIGNIFICANT CHANGE UP (ref 10–40)
BILIRUB SERPL-MCNC: 0.3 MG/DL — SIGNIFICANT CHANGE UP (ref 0.2–1.2)
BUN SERPL-MCNC: 21 MG/DL — SIGNIFICANT CHANGE UP (ref 7–23)
CALCIUM SERPL-MCNC: 9.2 MG/DL — SIGNIFICANT CHANGE UP (ref 8.4–10.5)
CHLORIDE SERPL-SCNC: 100 MMOL/L — SIGNIFICANT CHANGE UP (ref 96–108)
CO2 SERPL-SCNC: 21 MMOL/L — LOW (ref 22–31)
CREAT SERPL-MCNC: 1.38 MG/DL — HIGH (ref 0.5–1.3)
EGFR: 50 ML/MIN/1.73M2 — LOW
GLUCOSE BLDC GLUCOMTR-MCNC: 129 MG/DL — HIGH (ref 70–99)
GLUCOSE BLDC GLUCOMTR-MCNC: 140 MG/DL — HIGH (ref 70–99)
GLUCOSE BLDC GLUCOMTR-MCNC: 201 MG/DL — HIGH (ref 70–99)
GLUCOSE BLDC GLUCOMTR-MCNC: 226 MG/DL — HIGH (ref 70–99)
GLUCOSE SERPL-MCNC: 148 MG/DL — HIGH (ref 70–99)
POTASSIUM SERPL-MCNC: 3.8 MMOL/L — SIGNIFICANT CHANGE UP (ref 3.5–5.3)
POTASSIUM SERPL-SCNC: 3.8 MMOL/L — SIGNIFICANT CHANGE UP (ref 3.5–5.3)
PROT SERPL-MCNC: 7.5 G/DL — SIGNIFICANT CHANGE UP (ref 6–8.3)
SODIUM SERPL-SCNC: 134 MMOL/L — LOW (ref 135–145)

## 2024-09-07 RX ORDER — SENNA 187 MG
2 TABLET ORAL AT BEDTIME
Refills: 0 | Status: DISCONTINUED | OUTPATIENT
Start: 2024-09-07 | End: 2024-09-12

## 2024-09-07 RX ORDER — POLYETHYLENE GLYCOL 3350 17 G/17G
17 POWDER, FOR SOLUTION ORAL
Refills: 0 | Status: DISCONTINUED | OUTPATIENT
Start: 2024-09-07 | End: 2024-09-12

## 2024-09-07 RX ADMIN — Medication 81 MILLIGRAM(S): at 11:58

## 2024-09-07 RX ADMIN — LATANOPROST 1 DROP(S): 50 SOLUTION OPHTHALMIC at 21:53

## 2024-09-07 RX ADMIN — Medication 3 MILLIGRAM(S): at 21:52

## 2024-09-07 RX ADMIN — Medication 3 UNIT(S): at 17:40

## 2024-09-07 RX ADMIN — Medication 3 UNIT(S): at 11:57

## 2024-09-07 RX ADMIN — INSULIN GLARGINE 10 UNIT(S): 100 INJECTION, SOLUTION SUBCUTANEOUS at 21:51

## 2024-09-07 RX ADMIN — ENOXAPARIN SODIUM 40 MILLIGRAM(S): 100 INJECTION SUBCUTANEOUS at 21:51

## 2024-09-07 RX ADMIN — Medication 3 UNIT(S): at 08:03

## 2024-09-07 RX ADMIN — Medication 2: at 17:39

## 2024-09-07 RX ADMIN — REMDESIVIR 200 MILLIGRAM(S): 5 INJECTION INTRAVENOUS at 23:55

## 2024-09-07 RX ADMIN — Medication 40 MILLIGRAM(S): at 06:04

## 2024-09-07 RX ADMIN — EZETIMIBE 10 MILLIGRAM(S): 10 TABLET ORAL at 11:58

## 2024-09-07 RX ADMIN — REMDESIVIR 200 MILLIGRAM(S): 5 INJECTION INTRAVENOUS at 01:05

## 2024-09-07 NOTE — PROGRESS NOTE ADULT - SUBJECTIVE AND OBJECTIVE BOX
Patient is a 83y old  Male who presents with a chief complaint of Fall (07 Sep 2024 14:48)    Date of servie : 09-07-24 @ 16:06  INTERVAL HPI/OVERNIGHT EVENTS:  T(C): 36.7 (09-07-24 @ 11:31), Max: 37.4 (09-06-24 @ 17:13)  HR: 68 (09-07-24 @ 11:31) (68 - 94)  BP: 115/64 (09-07-24 @ 11:31) (113/62 - 131/70)  RR: 18 (09-07-24 @ 11:31) (18 - 18)  SpO2: 96% (09-07-24 @ 11:31) (94% - 99%)  Wt(kg): --  I&O's Summary    06 Sep 2024 07:01  -  07 Sep 2024 07:00  --------------------------------------------------------  IN: 240 mL / OUT: 1865 mL / NET: -1625 mL    07 Sep 2024 07:01  -  07 Sep 2024 16:06  --------------------------------------------------------  IN: 360 mL / OUT: 300 mL / NET: 60 mL        LABS:    09-07    134<L>  |  100  |  21  ----------------------------<  148<H>  3.8   |  21<L>  |  1.38<H>    Ca    9.2      07 Sep 2024 06:12    TPro  7.5  /  Alb  2.8<L>  /  TBili  0.3  /  DBili  x   /  AST  40  /  ALT  20  /  AlkPhos  72  09-07      Urinalysis Basic - ( 07 Sep 2024 06:12 )    Color: x / Appearance: x / SG: x / pH: x  Gluc: 148 mg/dL / Ketone: x  / Bili: x / Urobili: x   Blood: x / Protein: x / Nitrite: x   Leuk Esterase: x / RBC: x / WBC x   Sq Epi: x / Non Sq Epi: x / Bacteria: x      CAPILLARY BLOOD GLUCOSE      POCT Blood Glucose.: 140 mg/dL (07 Sep 2024 11:47)  POCT Blood Glucose.: 129 mg/dL (07 Sep 2024 07:51)  POCT Blood Glucose.: 216 mg/dL (06 Sep 2024 20:58)  POCT Blood Glucose.: 132 mg/dL (06 Sep 2024 17:03)        Urinalysis Basic - ( 07 Sep 2024 06:12 )    Color: x / Appearance: x / SG: x / pH: x  Gluc: 148 mg/dL / Ketone: x  / Bili: x / Urobili: x   Blood: x / Protein: x / Nitrite: x   Leuk Esterase: x / RBC: x / WBC x   Sq Epi: x / Non Sq Epi: x / Bacteria: x        MEDICATIONS  (STANDING):  aspirin  chewable 81 milliGRAM(s) Oral daily  atorvastatin 10 milliGRAM(s) Oral at bedtime  dextrose 5%. 1000 milliLiter(s) (100 mL/Hr) IV Continuous <Continuous>  dextrose 5%. 1000 milliLiter(s) (50 mL/Hr) IV Continuous <Continuous>  dextrose 50% Injectable 25 Gram(s) IV Push once  dextrose 50% Injectable 25 Gram(s) IV Push once  dextrose 50% Injectable 12.5 Gram(s) IV Push once  enoxaparin Injectable 40 milliGRAM(s) SubCutaneous every 24 hours  ezetimibe 10 milliGRAM(s) Oral daily  glucagon  Injectable 1 milliGRAM(s) IntraMuscular once  insulin glargine Injectable (LANTUS) 10 Unit(s) SubCutaneous at bedtime  insulin lispro (ADMELOG) corrective regimen sliding scale   SubCutaneous three times a day before meals  insulin lispro Injectable (ADMELOG) 3 Unit(s) SubCutaneous three times a day before meals  latanoprost 0.005% Ophthalmic Solution 1 Drop(s) Both EYES at bedtime  lisinopril 5 milliGRAM(s) Oral daily  metoprolol succinate ER 25 milliGRAM(s) Oral daily  pantoprazole    Tablet 40 milliGRAM(s) Oral before breakfast  remdesivir  IVPB   IV Intermittent   remdesivir  IVPB 100 milliGRAM(s) IV Intermittent every 24 hours  senna 2 Tablet(s) Oral at bedtime  tamsulosin 0.4 milliGRAM(s) Oral at bedtime    MEDICATIONS  (PRN):  bisacodyl 5 milliGRAM(s) Oral every 12 hours PRN Constipation  dextrose Oral Gel 15 Gram(s) Oral once PRN Blood Glucose LESS THAN 70 milliGRAM(s)/deciliter  polyethylene glycol 3350 17 Gram(s) Oral two times a day PRN Constipation          PHYSICAL EXAM:  GENERAL: NAD, well-groomed, well-developed  HEAD:  Atraumatic, Normocephalic  CHEST/LUNG: Clear to percussion bilaterally; No rales, rhonchi, wheezing, or rubs  HEART: Regular rate and rhythm; No murmurs, rubs, or gallops  ABDOMEN: Soft, Nontender, Nondistended; Bowel sounds present  EXTREMITIES:  2+ Peripheral Pulses, No clubbing, cyanosis, or edema  LYMPH: No lymphadenopathy noted  SKIN: No rashes or lesions    Care Discussed with Consultants/Other Providers [ ] YES  [ ] NO

## 2024-09-07 NOTE — PROGRESS NOTE ADULT - SUBJECTIVE AND OBJECTIVE BOX
CARDIOLOGY FOLLOW UP NOTE - DR. VILLEGAS    Patient Name: SHANNA OSMAN    Date of Service: 09-07-24 @ 11:13    no chest pain, inc dyspnea      Subjective:    cv: denies chest pain, dyspnea, palpitations, dizziness  pulmonary: denies cough  GI: denies abdominal pain, nausea, vomiting  vascular/legs: no edema   skin: no rash  ROS: otherwise negative   overnight events:      PHYSICAL EXAM:  T(C): 36.8 (09-07-24 @ 06:02), Max: 37.4 (09-06-24 @ 17:13)  HR: 80 (09-07-24 @ 06:02) (79 - 96)  BP: 113/65 (09-07-24 @ 06:02) (113/62 - 131/70)  RR: 18 (09-07-24 @ 06:02) (18 - 18)  SpO2: 99% (09-07-24 @ 06:02) (94% - 99%)  Wt(kg): --  I&O's Summary    06 Sep 2024 07:01  -  07 Sep 2024 07:00  --------------------------------------------------------  IN: 240 mL / OUT: 1865 mL / NET: -1625 mL    07 Sep 2024 07:01  -  07 Sep 2024 11:13  --------------------------------------------------------  IN: 120 mL / OUT: 0 mL / NET: 120 mL      Daily     Daily     Appearance: Normal	  Cardiovascular: Normal S1 S2,RRR, No JVD, No murmurs  Respiratory: Lungs clear to auscultation	  Gastrointestinal:  Soft, Non-tender, + BS	  Extremities: Normal range of motion, No clubbing, cyanosis or edema      Home Medications:  aspirin 81 mg oral tablet: 1 tab(s) orally once a day (04 Sep 2024 17:17)  cholecalciferol 25 mcg (1000 intl units) oral tablet: 1 tab(s) orally once a day (04 Sep 2024 17:17)  Enbrel Prefilled Syringe 50 mg/mL subcutaneous solution: 50 milligram(s) subcutaneously every other week (04 Sep 2024 17:17)  ezetimibe-simvastatin 10 mg-20 mg oral tablet: 1 tab(s) orally once a day (04 Sep 2024 17:17)  Flomax 0.4 mg oral capsule: 1 cap(s) orally once a day (at bedtime) (04 Sep 2024 17:17)  Lumigan 0.01% ophthalmic solution: 1 drop(s) in each affected eye once a day (in the evening) (04 Sep 2024 17:17)  NovoLOG FlexPen 100 units/mL injectable solution: 13 unit(s) injectable once a day (04 Sep 2024 17:17)  NovoLOG Mix 70/30 FlexPen subcutaneous suspension: 21 unit(s) subcutaneous once a day (at bedtime) (04 Sep 2024 17:17)  pantoprazole 40 mg oral delayed release tablet: 1 tab(s) orally once a day (04 Sep 2024 17:17)  PreserVision AREDS 2 oral capsule: 1 cap(s) orally once a day (04 Sep 2024 17:17)  ramipril 2.5 mg oral capsule: 1 cap(s) orally once a day (04 Sep 2024 17:17)  Vitamin C 500 mg oral tablet: 1 tab(s) orally once a day (04 Sep 2024 17:17)      MEDICATIONS  (STANDING):  aspirin  chewable 81 milliGRAM(s) Oral daily  atorvastatin 10 milliGRAM(s) Oral at bedtime  dextrose 5%. 1000 milliLiter(s) (100 mL/Hr) IV Continuous <Continuous>  dextrose 5%. 1000 milliLiter(s) (50 mL/Hr) IV Continuous <Continuous>  dextrose 50% Injectable 12.5 Gram(s) IV Push once  dextrose 50% Injectable 25 Gram(s) IV Push once  dextrose 50% Injectable 25 Gram(s) IV Push once  enoxaparin Injectable 40 milliGRAM(s) SubCutaneous every 24 hours  ezetimibe 10 milliGRAM(s) Oral daily  glucagon  Injectable 1 milliGRAM(s) IntraMuscular once  insulin glargine Injectable (LANTUS) 10 Unit(s) SubCutaneous at bedtime  insulin lispro (ADMELOG) corrective regimen sliding scale   SubCutaneous three times a day before meals  insulin lispro Injectable (ADMELOG) 3 Unit(s) SubCutaneous three times a day before meals  latanoprost 0.005% Ophthalmic Solution 1 Drop(s) Both EYES at bedtime  lisinopril 5 milliGRAM(s) Oral daily  metoprolol succinate ER 25 milliGRAM(s) Oral daily  pantoprazole    Tablet 40 milliGRAM(s) Oral before breakfast  remdesivir  IVPB   IV Intermittent   remdesivir  IVPB 100 milliGRAM(s) IV Intermittent every 24 hours  senna 2 Tablet(s) Oral at bedtime  tamsulosin 0.4 milliGRAM(s) Oral at bedtime      TELEMETRY: 	    ECG:  	  RADIOLOGY:   DIAGNOSTIC TESTING:  [ ] Echocardiogram:  [ ] Catheterization:  [ ] Stress Test:    OTHER: 	    LABS:	 	    CARDIAC MARKERS:        Troponin T, High Sensitivity Result: 678 ng/L (09-04 @ 16:13)  Troponin T, High Sensitivity Result: 533 ng/L (09-04 @ 10:56)            09-07    134<L>  |  100  |  21  ----------------------------<  148<H>  3.8   |  21<L>  |  1.38<H>    Ca    9.2      07 Sep 2024 06:12    TPro  7.5  /  Alb  2.8<L>  /  TBili  0.3  /  DBili  x   /  AST  40  /  ALT  20  /  AlkPhos  72  09-07    proBNP:     Lipid Profile:   HgA1c:     Creatinine: 1.38 mg/dL (09-07-24 @ 06:12)  Creatinine: 1.49 mg/dL (09-06-24 @ 05:59)

## 2024-09-07 NOTE — PROGRESS NOTE ADULT - SUBJECTIVE AND OBJECTIVE BOX
Date of Service: 09-07-24 @ 14:48    Patient is a 83y old  Male who presents with a chief complaint of Fall (07 Sep 2024 11:13)      Any change in ROS: seems to be doing ok : still having difficulty in swallowing:     MEDICATIONS  (STANDING):  aspirin  chewable 81 milliGRAM(s) Oral daily  atorvastatin 10 milliGRAM(s) Oral at bedtime  dextrose 5%. 1000 milliLiter(s) (50 mL/Hr) IV Continuous <Continuous>  dextrose 5%. 1000 milliLiter(s) (100 mL/Hr) IV Continuous <Continuous>  dextrose 50% Injectable 12.5 Gram(s) IV Push once  dextrose 50% Injectable 25 Gram(s) IV Push once  dextrose 50% Injectable 25 Gram(s) IV Push once  enoxaparin Injectable 40 milliGRAM(s) SubCutaneous every 24 hours  ezetimibe 10 milliGRAM(s) Oral daily  glucagon  Injectable 1 milliGRAM(s) IntraMuscular once  insulin glargine Injectable (LANTUS) 10 Unit(s) SubCutaneous at bedtime  insulin lispro (ADMELOG) corrective regimen sliding scale   SubCutaneous three times a day before meals  insulin lispro Injectable (ADMELOG) 3 Unit(s) SubCutaneous three times a day before meals  latanoprost 0.005% Ophthalmic Solution 1 Drop(s) Both EYES at bedtime  lisinopril 5 milliGRAM(s) Oral daily  metoprolol succinate ER 25 milliGRAM(s) Oral daily  pantoprazole    Tablet 40 milliGRAM(s) Oral before breakfast  remdesivir  IVPB   IV Intermittent   remdesivir  IVPB 100 milliGRAM(s) IV Intermittent every 24 hours  senna 2 Tablet(s) Oral at bedtime  tamsulosin 0.4 milliGRAM(s) Oral at bedtime    MEDICATIONS  (PRN):  bisacodyl 5 milliGRAM(s) Oral every 12 hours PRN Constipation  dextrose Oral Gel 15 Gram(s) Oral once PRN Blood Glucose LESS THAN 70 milliGRAM(s)/deciliter  polyethylene glycol 3350 17 Gram(s) Oral two times a day PRN Constipation    Vital Signs Last 24 Hrs  T(C): 36.7 (07 Sep 2024 11:31), Max: 37.4 (06 Sep 2024 17:13)  T(F): 98 (07 Sep 2024 11:31), Max: 99.3 (06 Sep 2024 17:13)  HR: 68 (07 Sep 2024 11:31) (68 - 94)  BP: 115/64 (07 Sep 2024 11:31) (113/62 - 131/70)  BP(mean): --  RR: 18 (07 Sep 2024 11:31) (18 - 18)  SpO2: 96% (07 Sep 2024 11:31) (94% - 99%)    Parameters below as of 07 Sep 2024 11:31  Patient On (Oxygen Delivery Method): room air        I&O's Summary    06 Sep 2024 07:01  -  07 Sep 2024 07:00  --------------------------------------------------------  IN: 240 mL / OUT: 1865 mL / NET: -1625 mL    07 Sep 2024 07:01  -  07 Sep 2024 14:48  --------------------------------------------------------  IN: 360 mL / OUT: 300 mL / NET: 60 mL          Physical Exam:   GENERAL: NAD, well-groomed, well-developed  HEENT: LEW/   Atraumatic, Normocephalic  ENMT: No tonsillar erythema, exudates, or enlargement; Moist mucous membranes, Good dentition, No lesions  NECK: Supple, No JVD, Normal thyroid  CHEST/LUNG: Clear to auscultaion  CVS: Regular rate and rhythm; No murmurs, rubs, or gallops  GI: : Soft, Nontender, Nondistended; Bowel sounds present  NERVOUS SYSTEM:  Alert & Oriented X3  EXTREMITIES:  - edema  LYMPH: No lymphadenopathy noted  SKIN: No rashes or lesions  ENDOCRINOLOGY: No Thyromegaly  PSYCH: Appropriate    Labs:  27                            12.0   8.32  )-----------( 247      ( 04 Sep 2024 10:56 )             37.1     09-07    134<L>  |  100  |  21  ----------------------------<  148<H>  3.8   |  21<L>  |  1.38<H>  09-06    132<L>  |  98  |  18  ----------------------------<  167<H>  3.5   |  22  |  1.49<H>  09-04    132<L>  |  96  |  21  ----------------------------<  227<H>  4.3   |  23  |  1.30    Ca    9.2      07 Sep 2024 06:12  Ca    8.6      06 Sep 2024 05:59    TPro  7.5  /  Alb  2.8<L>  /  TBili  0.3  /  DBili  x   /  AST  40  /  ALT  20  /  AlkPhos  72  09-07  TPro  7.1  /  Alb  2.7<L>  /  TBili  0.2  /  DBili  x   /  AST  51<H>  /  ALT  18  /  AlkPhos  71  09-06  TPro  8.8<H>  /  Alb  3.5  /  TBili  0.4  /  DBili  x   /  AST  69<H>  /  ALT  19  /  AlkPhos  94  09-04    CAPILLARY BLOOD GLUCOSE      POCT Blood Glucose.: 140 mg/dL (07 Sep 2024 11:47)  POCT Blood Glucose.: 129 mg/dL (07 Sep 2024 07:51)  POCT Blood Glucose.: 216 mg/dL (06 Sep 2024 20:58)  POCT Blood Glucose.: 132 mg/dL (06 Sep 2024 17:03)      LIVER FUNCTIONS - ( 07 Sep 2024 06:12 )  Alb: 2.8 g/dL / Pro: 7.5 g/dL / ALK PHOS: 72 U/L / ALT: 20 U/L / AST: 40 U/L / GGT: x             Urinalysis Basic - ( 07 Sep 2024 06:12 )    Color: x / Appearance: x / SG: x / pH: x  Gluc: 148 mg/dL / Ketone: x  / Bili: x / Urobili: x   Blood: x / Protein: x / Nitrite: x   Leuk Esterase: x / RBC: x / WBC x   Sq Epi: x / Non Sq Epi: x / Bacteria: x            RECENT CULTURES:  09-04 @ 14:08 Clean Catch Clean Catch (Midstream)                No growth    09-04 @ 10:35 .Blood Blood-Peripheral       rad< from: CT Head No Cont (09.04.24 @ 11:54) >    SINONASAL CAVITIES: Scattered mucosal thickening. No air fluid levels or   opacification. Narrowing of bilateral ostiomeatal complexes. S-shaped   deviation of the nasal septum, without perforation. Vomer and   perpendicular plate appear intact. Hypertrophy of bilateral inferior   nasal turbinates. Bilateral donell bullosa.  TYMPANOMASTOID CAVITIES:  Stable partial opacification with sclerosis   right mastoid tip, possibly the sequela of a remote prior inflammatory   process. Visualized temporal bones are intact.    ORBITAL CONTENTS: Bilateral lens replacement surgery. Globes, intraconal   regions, extraocular muscles and optic nerves otherwise grossly  unremarkable, without evidence of traumatic injury.  REMAINING VISUALIZED BONES: Intact.  MISCELLANEOUS:  Edema with an approximate 2.5 x 1.0 x 0.8 cm lentiform   hyperdense mass involving the subcutaneous fat lateral to the left   mandible, with thickening of the subjacent platysma musculature. Severe   right temporomandibular arthrosis.    CT CERVICAL SPINE:    VERTEBRAE: No definitive evidence of acute fracture or prevertebral soft   tissue swelling, as on the prior. 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 Multilevel lower cervical   disc space narrowing, most pronounced at C5-C6, with multilevel   endplate-uncovertebral spurring, most pronounced at this level as well.  ALIGNMENT: Straightening of lordosis. No subluxation. No perched or   dislocated facets. Multilevel facet arthrosis, most pronounced and   moderate-severe at the right C4-C5 level.  INTERVERTEBRAL DISC SPACES: Although suboptimally evaluated on this exam,   there is evidence of multilevel cervical disc bulges and/or protrusions,   contributing to multilevel central canal and neural foramen stenosis.    VISUALIZED LUNGS: No suspicious left upper lung mass. Partially imaged   opacification right upper lung.    MISCELLANEOUS: No gross intrathecal-paraspinal mass or retropharyngeal   fluid collection, however suboptimally evaluated on this exam. Vascular   calcifications, including at bilateral carotid bifurcations. Evidence of   borderline basilar invagination.    IMPRESSION:    CT HEAD:  1. No significant change.  2. No evidence of acute calvarial fracture or intracranial hemorrhage.  3. Additional findings described in detail above.    CT MAXILLOFACIAL:  1. No definitive evidence of acute fracture or paranasal sinus air-fluid   level.  2. Soft tissue edema with probable 2.5 cm subcutaneous hematoma lateral   to the left mandible. Cellulitis may have an overlapping imaging   appearance. Correlate with clinical findings.  3. Additional findings described in detail above.    CT CERVICAL SPINE:  1. No definitive evidence of acute fracture or prevertebral soft tissue   swelling. Straightening of lordosis may reflect muscle spasm.  2. Additional findings, including those degenerative, described in detail   above.    --- End of Report ---            CORBIN FONG M.D., ATTENDING RADIOLOGIST  This document has been electronically signed. Sep  4 2024 12:24PM    < end of copied text >  < from: CT Chest w/ IV Cont (09.04.24 @ 11:52) >  BLADDER: Within normal limits.  REPRODUCTIVE ORGANS: Prostate mildly enlarged.    BOWEL: No bowel obstruction. Appendix is normal.  PERITONEUM/RETROPERITONEUM: Within normal limits.  VESSELS: Atherosclerotic calcifications.  LYMPH NODES: No lymphadenopathy.  ABDOMINAL WALL: Fat-containing umbilical hernia.  BONES: Degenerative changes. Sclerotic lesion in the left femoral neck,   unchanged from prior    IMPRESSION:  *  No evidence of acute traumatic abnormality in the chest, abdomen, or   pelvis.  *  Pulmonary changes consistent with interstitial lung disease, similar   to findings present on prior examination.  *  Resolution of left pleural effusion.    --- End of Report ---            MARGIE AMANDA MD; AttendingRadiologist  This document has been electronically signed. Sep  4 2024  1:11PM    < end of copied text >           No growth at 48 Hours    09-04 @ 10:30 .Blood Blood-Peripheral                No growth at 48 Hours          RESPIRATORY CULTURES:          Studies  Chest X-RAY  CT SCAN Chest   Venous Dopplers: LE:   CT Abdomen  Others

## 2024-09-08 LAB
ANION GAP SERPL CALC-SCNC: 13 MMOL/L — SIGNIFICANT CHANGE UP (ref 5–17)
BUN SERPL-MCNC: 22 MG/DL — SIGNIFICANT CHANGE UP (ref 7–23)
CALCIUM SERPL-MCNC: 9.4 MG/DL — SIGNIFICANT CHANGE UP (ref 8.4–10.5)
CHLORIDE SERPL-SCNC: 98 MMOL/L — SIGNIFICANT CHANGE UP (ref 96–108)
CO2 SERPL-SCNC: 23 MMOL/L — SIGNIFICANT CHANGE UP (ref 22–31)
CREAT SERPL-MCNC: 1.51 MG/DL — HIGH (ref 0.5–1.3)
D DIMER BLD IA.RAPID-MCNC: 408 NG/ML DDU — HIGH
EGFR: 45 ML/MIN/1.73M2 — LOW
GLUCOSE BLDC GLUCOMTR-MCNC: 122 MG/DL — HIGH (ref 70–99)
GLUCOSE BLDC GLUCOMTR-MCNC: 182 MG/DL — HIGH (ref 70–99)
GLUCOSE BLDC GLUCOMTR-MCNC: 258 MG/DL — HIGH (ref 70–99)
GLUCOSE BLDC GLUCOMTR-MCNC: 277 MG/DL — HIGH (ref 70–99)
GLUCOSE SERPL-MCNC: 148 MG/DL — HIGH (ref 70–99)
POTASSIUM SERPL-MCNC: 4.1 MMOL/L — SIGNIFICANT CHANGE UP (ref 3.5–5.3)
POTASSIUM SERPL-SCNC: 4.1 MMOL/L — SIGNIFICANT CHANGE UP (ref 3.5–5.3)
SODIUM SERPL-SCNC: 134 MMOL/L — LOW (ref 135–145)

## 2024-09-08 RX ORDER — ACETAMINOPHEN 325 MG/1
650 TABLET ORAL EVERY 6 HOURS
Refills: 0 | Status: DISCONTINUED | OUTPATIENT
Start: 2024-09-08 | End: 2024-09-12

## 2024-09-08 RX ADMIN — LATANOPROST 1 DROP(S): 50 SOLUTION OPHTHALMIC at 21:37

## 2024-09-08 RX ADMIN — Medication 40 MILLIGRAM(S): at 05:59

## 2024-09-08 RX ADMIN — INSULIN GLARGINE 10 UNIT(S): 100 INJECTION, SOLUTION SUBCUTANEOUS at 21:37

## 2024-09-08 RX ADMIN — ACETAMINOPHEN 650 MILLIGRAM(S): 325 TABLET ORAL at 21:38

## 2024-09-08 RX ADMIN — Medication 81 MILLIGRAM(S): at 11:56

## 2024-09-08 RX ADMIN — Medication 3: at 11:57

## 2024-09-08 RX ADMIN — EZETIMIBE 10 MILLIGRAM(S): 10 TABLET ORAL at 11:56

## 2024-09-08 RX ADMIN — Medication 3 UNIT(S): at 11:57

## 2024-09-08 RX ADMIN — ACETAMINOPHEN 650 MILLIGRAM(S): 325 TABLET ORAL at 01:27

## 2024-09-08 RX ADMIN — REMDESIVIR 200 MILLIGRAM(S): 5 INJECTION INTRAVENOUS at 23:39

## 2024-09-08 RX ADMIN — Medication 1: at 17:41

## 2024-09-08 RX ADMIN — Medication 3 UNIT(S): at 17:41

## 2024-09-08 NOTE — PROGRESS NOTE ADULT - SUBJECTIVE AND OBJECTIVE BOX
Patient is a 83y old  Male who presents with a chief complaint of Fall (08 Sep 2024 14:07)    Date of servie : 09-08-24 @ 16:09  INTERVAL HPI/OVERNIGHT EVENTS:  T(C): 36.8 (09-08-24 @ 11:26), Max: 36.8 (09-07-24 @ 21:11)  HR: 76 (09-08-24 @ 11:26) (65 - 77)  BP: 132/73 (09-08-24 @ 11:26) (120/62 - 132/73)  RR: 18 (09-08-24 @ 11:26) (18 - 18)  SpO2: 100% (09-08-24 @ 11:26) (96% - 100%)  Wt(kg): --  I&O's Summary    07 Sep 2024 07:01  -  08 Sep 2024 07:00  --------------------------------------------------------  IN: 580 mL / OUT: 1750 mL / NET: -1170 mL    08 Sep 2024 07:01  -  08 Sep 2024 16:09  --------------------------------------------------------  IN: 240 mL / OUT: 600 mL / NET: -360 mL        LABS:    09-08    134<L>  |  98  |  22  ----------------------------<  148<H>  4.1   |  23  |  1.51<H>    Ca    9.4      08 Sep 2024 06:20    TPro  7.5  /  Alb  2.8<L>  /  TBili  0.3  /  DBili  x   /  AST  40  /  ALT  20  /  AlkPhos  72  09-07      Urinalysis Basic - ( 08 Sep 2024 06:20 )    Color: x / Appearance: x / SG: x / pH: x  Gluc: 148 mg/dL / Ketone: x  / Bili: x / Urobili: x   Blood: x / Protein: x / Nitrite: x   Leuk Esterase: x / RBC: x / WBC x   Sq Epi: x / Non Sq Epi: x / Bacteria: x      CAPILLARY BLOOD GLUCOSE      POCT Blood Glucose.: 258 mg/dL (08 Sep 2024 11:50)  POCT Blood Glucose.: 122 mg/dL (08 Sep 2024 08:04)  POCT Blood Glucose.: 226 mg/dL (07 Sep 2024 21:07)  POCT Blood Glucose.: 201 mg/dL (07 Sep 2024 17:28)        Urinalysis Basic - ( 08 Sep 2024 06:20 )    Color: x / Appearance: x / SG: x / pH: x  Gluc: 148 mg/dL / Ketone: x  / Bili: x / Urobili: x   Blood: x / Protein: x / Nitrite: x   Leuk Esterase: x / RBC: x / WBC x   Sq Epi: x / Non Sq Epi: x / Bacteria: x        MEDICATIONS  (STANDING):  aspirin  chewable 81 milliGRAM(s) Oral daily  atorvastatin 10 milliGRAM(s) Oral at bedtime  dextrose 5%. 1000 milliLiter(s) (50 mL/Hr) IV Continuous <Continuous>  dextrose 5%. 1000 milliLiter(s) (100 mL/Hr) IV Continuous <Continuous>  dextrose 50% Injectable 25 Gram(s) IV Push once  dextrose 50% Injectable 25 Gram(s) IV Push once  dextrose 50% Injectable 12.5 Gram(s) IV Push once  enoxaparin Injectable 40 milliGRAM(s) SubCutaneous every 24 hours  ezetimibe 10 milliGRAM(s) Oral daily  glucagon  Injectable 1 milliGRAM(s) IntraMuscular once  insulin glargine Injectable (LANTUS) 10 Unit(s) SubCutaneous at bedtime  insulin lispro (ADMELOG) corrective regimen sliding scale   SubCutaneous three times a day before meals  insulin lispro Injectable (ADMELOG) 3 Unit(s) SubCutaneous three times a day before meals  latanoprost 0.005% Ophthalmic Solution 1 Drop(s) Both EYES at bedtime  lisinopril 5 milliGRAM(s) Oral daily  metoprolol succinate ER 25 milliGRAM(s) Oral daily  pantoprazole    Tablet 40 milliGRAM(s) Oral before breakfast  remdesivir  IVPB   IV Intermittent   remdesivir  IVPB 100 milliGRAM(s) IV Intermittent every 24 hours  senna 2 Tablet(s) Oral at bedtime  tamsulosin 0.4 milliGRAM(s) Oral at bedtime    MEDICATIONS  (PRN):  acetaminophen     Tablet .. 650 milliGRAM(s) Oral every 6 hours PRN Temp greater or equal to 38C (100.4F), Moderate Pain (4 - 6)  bisacodyl 5 milliGRAM(s) Oral every 12 hours PRN Constipation  dextrose Oral Gel 15 Gram(s) Oral once PRN Blood Glucose LESS THAN 70 milliGRAM(s)/deciliter  melatonin 3 milliGRAM(s) Oral at bedtime PRN Insomnia  polyethylene glycol 3350 17 Gram(s) Oral two times a day PRN Constipation          PHYSICAL EXAM:  GENERAL: NAD, well-groomed, well-developed  HEAD:  Atraumatic, Normocephalic  CHEST/LUNG: Clear to percussion bilaterally; No rales, rhonchi, wheezing, or rubs  HEART: Regular rate and rhythm; No murmurs, rubs, or gallops  ABDOMEN: Soft, Nontender, Nondistended; Bowel sounds present  EXTREMITIES:  2+ Peripheral Pulses, No clubbing, cyanosis, or edema  LYMPH: No lymphadenopathy noted  SKIN: No rashes or lesions    Care Discussed with Consultants/Other Providers [ ] YES  [ ] NO

## 2024-09-08 NOTE — PROGRESS NOTE ADULT - SUBJECTIVE AND OBJECTIVE BOX
CARDIOLOGY FOLLOW UP NOTE - DR. VILLEGAS    Patient Name: SHANNA OSMAN    Date of Service: 24 @ 14:07    Patient seen and examined    Subjective:    cv: denies chest pain, dyspnea, palpitations, dizziness  pulmonary: denies cough  GI: denies abdominal pain, nausea, vomiting  vascular/legs: no edema   skin: no rash  ROS: otherwise negative   overnight events:      PHYSICAL EXAM:  T(C): 36.8 (24 @ 11:26), Max: 36.8 (24 @ 21:11)  HR: 76 (24 @ 11:26) (65 - 77)  BP: 132/73 (24 @ 11:26) (120/62 - 132/73)  RR: 18 (24 @ 11:26) (18 - 18)  SpO2: 100% (24 @ 11:26) (96% - 100%)  Wt(kg): --  I&O's Summary    07 Sep 2024 07:  -  08 Sep 2024 07:00  --------------------------------------------------------  IN: 580 mL / OUT: 1750 mL / NET: -1170 mL    08 Sep 2024 07:01  -  08 Sep 2024 14:07  --------------------------------------------------------  IN: 120 mL / OUT: 0 mL / NET: 120 mL      Daily     Daily Weight in k.9 (08 Sep 2024 08:17)    Appearance: Normal	  Cardiovascular: Normal S1 S2,RRR, No JVD, No murmurs  Respiratory: Lungs clear to auscultation	  Gastrointestinal:  Soft, Non-tender, + BS	  Extremities: Normal range of motion, No clubbing, cyanosis or edema      Home Medications:  aspirin 81 mg oral tablet: 1 tab(s) orally once a day (04 Sep 2024 17:17)  cholecalciferol 25 mcg (1000 intl units) oral tablet: 1 tab(s) orally once a day (04 Sep 2024 17:17)  Enbrel Prefilled Syringe 50 mg/mL subcutaneous solution: 50 milligram(s) subcutaneously every other week (04 Sep 2024 17:17)  ezetimibe-simvastatin 10 mg-20 mg oral tablet: 1 tab(s) orally once a day (04 Sep 2024 17:17)  Flomax 0.4 mg oral capsule: 1 cap(s) orally once a day (at bedtime) (04 Sep 2024 17:17)  Lumigan 0.01% ophthalmic solution: 1 drop(s) in each affected eye once a day (in the evening) (04 Sep 2024 17:17)  NovoLOG FlexPen 100 units/mL injectable solution: 13 unit(s) injectable once a day (04 Sep 2024 17:17)  NovoLOG Mix 70/30 FlexPen subcutaneous suspension: 21 unit(s) subcutaneous once a day (at bedtime) (04 Sep 2024 17:17)  pantoprazole 40 mg oral delayed release tablet: 1 tab(s) orally once a day (04 Sep 2024 17:17)  PreserVision AREDS 2 oral capsule: 1 cap(s) orally once a day (04 Sep 2024 17:17)  ramipril 2.5 mg oral capsule: 1 cap(s) orally once a day (04 Sep 2024 17:17)  Vitamin C 500 mg oral tablet: 1 tab(s) orally once a day (04 Sep 2024 17:17)      MEDICATIONS  (STANDING):  aspirin  chewable 81 milliGRAM(s) Oral daily  atorvastatin 10 milliGRAM(s) Oral at bedtime  dextrose 5%. 1000 milliLiter(s) (100 mL/Hr) IV Continuous <Continuous>  dextrose 5%. 1000 milliLiter(s) (50 mL/Hr) IV Continuous <Continuous>  dextrose 50% Injectable 12.5 Gram(s) IV Push once  dextrose 50% Injectable 25 Gram(s) IV Push once  dextrose 50% Injectable 25 Gram(s) IV Push once  enoxaparin Injectable 40 milliGRAM(s) SubCutaneous every 24 hours  ezetimibe 10 milliGRAM(s) Oral daily  glucagon  Injectable 1 milliGRAM(s) IntraMuscular once  insulin glargine Injectable (LANTUS) 10 Unit(s) SubCutaneous at bedtime  insulin lispro (ADMELOG) corrective regimen sliding scale   SubCutaneous three times a day before meals  insulin lispro Injectable (ADMELOG) 3 Unit(s) SubCutaneous three times a day before meals  latanoprost 0.005% Ophthalmic Solution 1 Drop(s) Both EYES at bedtime  lisinopril 5 milliGRAM(s) Oral daily  metoprolol succinate ER 25 milliGRAM(s) Oral daily  pantoprazole    Tablet 40 milliGRAM(s) Oral before breakfast  remdesivir  IVPB 100 milliGRAM(s) IV Intermittent every 24 hours  remdesivir  IVPB   IV Intermittent   senna 2 Tablet(s) Oral at bedtime  tamsulosin 0.4 milliGRAM(s) Oral at bedtime      TELEMETRY: 	    ECG:  	  RADIOLOGY:   DIAGNOSTIC TESTING:  [ ] Echocardiogram:  [ ] Catheterization:  [ ] Stress Test:    OTHER: 	    LABS:	 	    CARDIAC MARKERS:        Troponin T, High Sensitivity Result: 678 ng/L ( @ 16:13)  Troponin T, High Sensitivity Result: 533 ng/L ( @ 10:56)                134<L>  |  98  |  22  ----------------------------<  148<H>  4.1   |  23  |  1.51<H>    Ca    9.4      08 Sep 2024 06:20    TPro  7.5  /  Alb  2.8<L>  /  TBili  0.3  /  DBili  x   /  AST  40  /  ALT  20  /  AlkPhos  72      proBNP:     Lipid Profile:   HgA1c:     Creatinine: 1.51 mg/dL (24 @ 06:20)  Creatinine: 1.38 mg/dL (24 @ 06:12)  Creatinine: 1.49 mg/dL (24 @ 05:59)

## 2024-09-08 NOTE — SWALLOW BEDSIDE ASSESSMENT ADULT - COMMENTS
GI consulted for years of dysphagia, feeling like food is becoming stuck in his throat/ chest when he eats. No choking, not spitting up food. Only with solid food. Tolerating soft diet at home, on consistent carb diet here and still tolerating it and getting food down. No weight loss. No smoking history. On PPI at home, though denies history of gerd. Never had a workup for this, including swallowing study.  Recommendations:   -please obtain x-ray esophagram  -Eventually will consider EGD once acute issues i.e. COVID has been stabilized  -patient on soft diet at home, please change patient's diet   -Aspiration precautions    CT HEAD: 1. No significant change. 2. No evidence of acute calvarial fracture or intracranial hemorrhage. 3. Additional findings described in detail above.  CT MAXILLOFACIAL: 1. No definitive evidence of acute fracture or paranasal sinus air-fluid level. 2. Soft tissue edema with probable 2.5 cm subcutaneous hematoma lateral to the left mandible. Cellulitis may have an overlapping imaging appearance. Correlate with clinical findings. 3. Additional findings described in detail above.  CT CERVICAL SPINE: 1. No definitive evidence of acute fracture or prevertebral soft tissue swelling. Straightening of lordosis may reflect muscle spasm. 2. Additional findings, including those degenerative, described in detail above.    SWALLOW HISTORY: No reports in SCM or in PACS prior to this admission.

## 2024-09-08 NOTE — SWALLOW BEDSIDE ASSESSMENT ADULT - H & P REVIEW
84 Y/o Male with pmhx BPH, CAD, DM, htn and RA BIBA sp fall this morning.  As per son, patient fell early this morning and didn't remember what happened. As per patient, he thinks he tripped while ambulating with his walker but isnt sure. Patient denies LOC. Thinks it was around 3-4AM. Patient able to call son, who came from [place] and was able to call ems. Patient has been having sore throat, cough and chills for the past week and is currently taking antibiotic Patient lives at home with his wife who has parkinsons. Patient denies abd pain, nvd, tingling, numbness, and dysuria./yes

## 2024-09-08 NOTE — SWALLOW BEDSIDE ASSESSMENT ADULT - SWALLOW EVAL: DIAGNOSIS
Consult received and appreciated. Chart reviewed. As per GI consult - Pt with chronic dysphagia to solid food for years, denies regurgitation and is not impacted. He is still able to eat/drink despite feeling sensation of dysphagia. He is admitted for acute COVID infection. Will eventually need EGD once acute medical issues have resolved, in the meantime consider x-ray esophagram to non-invasively evaluate esophagus. D/w JACEK Somers, will hold on bedside swallow evaluation pending findings of esophagram.

## 2024-09-09 LAB
ANION GAP SERPL CALC-SCNC: 14 MMOL/L — SIGNIFICANT CHANGE UP (ref 5–17)
BUN SERPL-MCNC: 27 MG/DL — HIGH (ref 7–23)
CALCIUM SERPL-MCNC: 9.5 MG/DL — SIGNIFICANT CHANGE UP (ref 8.4–10.5)
CHLORIDE SERPL-SCNC: 96 MMOL/L — SIGNIFICANT CHANGE UP (ref 96–108)
CO2 SERPL-SCNC: 22 MMOL/L — SIGNIFICANT CHANGE UP (ref 22–31)
CREAT SERPL-MCNC: 1.51 MG/DL — HIGH (ref 0.5–1.3)
CULTURE RESULTS: SIGNIFICANT CHANGE UP
CULTURE RESULTS: SIGNIFICANT CHANGE UP
EGFR: 45 ML/MIN/1.73M2 — LOW
GLUCOSE BLDC GLUCOMTR-MCNC: 140 MG/DL — HIGH (ref 70–99)
GLUCOSE BLDC GLUCOMTR-MCNC: 172 MG/DL — HIGH (ref 70–99)
GLUCOSE BLDC GLUCOMTR-MCNC: 190 MG/DL — HIGH (ref 70–99)
GLUCOSE BLDC GLUCOMTR-MCNC: 272 MG/DL — HIGH (ref 70–99)
GLUCOSE SERPL-MCNC: 212 MG/DL — HIGH (ref 70–99)
POTASSIUM SERPL-MCNC: 4.4 MMOL/L — SIGNIFICANT CHANGE UP (ref 3.5–5.3)
POTASSIUM SERPL-SCNC: 4.4 MMOL/L — SIGNIFICANT CHANGE UP (ref 3.5–5.3)
SODIUM SERPL-SCNC: 132 MMOL/L — LOW (ref 135–145)
SPECIMEN SOURCE: SIGNIFICANT CHANGE UP
SPECIMEN SOURCE: SIGNIFICANT CHANGE UP

## 2024-09-09 RX ADMIN — ENOXAPARIN SODIUM 40 MILLIGRAM(S): 100 INJECTION SUBCUTANEOUS at 21:44

## 2024-09-09 RX ADMIN — REMDESIVIR 200 MILLIGRAM(S): 5 INJECTION INTRAVENOUS at 23:08

## 2024-09-09 RX ADMIN — INSULIN GLARGINE 10 UNIT(S): 100 INJECTION, SOLUTION SUBCUTANEOUS at 21:49

## 2024-09-09 RX ADMIN — Medication 3: at 12:12

## 2024-09-09 RX ADMIN — LATANOPROST 1 DROP(S): 50 SOLUTION OPHTHALMIC at 21:44

## 2024-09-09 RX ADMIN — Medication 10 MILLIGRAM(S): at 21:43

## 2024-09-09 RX ADMIN — Medication 81 MILLIGRAM(S): at 12:12

## 2024-09-09 RX ADMIN — Medication 40 MILLIGRAM(S): at 05:36

## 2024-09-09 RX ADMIN — Medication 3 UNIT(S): at 08:24

## 2024-09-09 RX ADMIN — ACETAMINOPHEN 650 MILLIGRAM(S): 325 TABLET ORAL at 21:44

## 2024-09-09 RX ADMIN — Medication 1: at 18:03

## 2024-09-09 RX ADMIN — ACETAMINOPHEN 650 MILLIGRAM(S): 325 TABLET ORAL at 23:45

## 2024-09-09 RX ADMIN — Medication 1: at 08:23

## 2024-09-09 RX ADMIN — Medication 3 UNIT(S): at 18:03

## 2024-09-09 RX ADMIN — EZETIMIBE 10 MILLIGRAM(S): 10 TABLET ORAL at 12:12

## 2024-09-09 NOTE — PROGRESS NOTE ADULT - SUBJECTIVE AND OBJECTIVE BOX
Date of Service: 09-09-24 @ 15:34    Patient is a 83y old  Male who presents with a chief complaint of Fall (09 Sep 2024 12:48)      Any change in ROS: NPO:  he seems to be doing great:   due for esophogram     MEDICATIONS  (STANDING):  aspirin  chewable 81 milliGRAM(s) Oral daily  atorvastatin 10 milliGRAM(s) Oral at bedtime  dextrose 5%. 1000 milliLiter(s) (50 mL/Hr) IV Continuous <Continuous>  dextrose 5%. 1000 milliLiter(s) (100 mL/Hr) IV Continuous <Continuous>  dextrose 50% Injectable 25 Gram(s) IV Push once  dextrose 50% Injectable 12.5 Gram(s) IV Push once  dextrose 50% Injectable 25 Gram(s) IV Push once  enoxaparin Injectable 40 milliGRAM(s) SubCutaneous every 24 hours  ezetimibe 10 milliGRAM(s) Oral daily  glucagon  Injectable 1 milliGRAM(s) IntraMuscular once  insulin glargine Injectable (LANTUS) 10 Unit(s) SubCutaneous at bedtime  insulin lispro (ADMELOG) corrective regimen sliding scale   SubCutaneous three times a day before meals  insulin lispro Injectable (ADMELOG) 3 Unit(s) SubCutaneous three times a day before meals  latanoprost 0.005% Ophthalmic Solution 1 Drop(s) Both EYES at bedtime  lisinopril 5 milliGRAM(s) Oral daily  metoprolol succinate ER 25 milliGRAM(s) Oral daily  pantoprazole    Tablet 40 milliGRAM(s) Oral before breakfast  remdesivir  IVPB   IV Intermittent   remdesivir  IVPB 100 milliGRAM(s) IV Intermittent every 24 hours  senna 2 Tablet(s) Oral at bedtime  tamsulosin 0.4 milliGRAM(s) Oral at bedtime    MEDICATIONS  (PRN):  acetaminophen     Tablet .. 650 milliGRAM(s) Oral every 6 hours PRN Temp greater or equal to 38C (100.4F), Moderate Pain (4 - 6)  bisacodyl 5 milliGRAM(s) Oral every 12 hours PRN Constipation  dextrose Oral Gel 15 Gram(s) Oral once PRN Blood Glucose LESS THAN 70 milliGRAM(s)/deciliter  melatonin 3 milliGRAM(s) Oral at bedtime PRN Insomnia  polyethylene glycol 3350 17 Gram(s) Oral two times a day PRN Constipation    Vital Signs Last 24 Hrs  T(C): 36.6 (09 Sep 2024 04:27), Max: 36.9 (08 Sep 2024 21:18)  T(F): 97.8 (09 Sep 2024 04:27), Max: 98.5 (08 Sep 2024 21:18)  HR: 84 (09 Sep 2024 10:13) (68 - 84)  BP: 131/63 (09 Sep 2024 10:13) (120/61 - 134/65)  BP(mean): --  RR: 18 (09 Sep 2024 07:58) (18 - 18)  SpO2: 98% (09 Sep 2024 10:13) (95% - 98%)    Parameters below as of 09 Sep 2024 10:13  Patient On (Oxygen Delivery Method): room air        I&O's Summary    08 Sep 2024 07:01  -  09 Sep 2024 07:00  --------------------------------------------------------  IN: 660 mL / OUT: 1850 mL / NET: -1190 mL    09 Sep 2024 07:01  -  09 Sep 2024 15:34  --------------------------------------------------------  IN: 120 mL / OUT: 800 mL / NET: -680 mL          Physical Exam:   GENERAL: NAD, well-groomed, well-developed  HEENT: LEW/   Atraumatic, Normocephalic  ENMT: No tonsillar erythema, exudates, or enlargement; Moist mucous membranes, Good dentition, No lesions  NECK: Supple, No JVD, Normal thyroid  CHEST/LUNG: Clear to auscultaion  CVS: Regular rate and rhythm; No murmurs, rubs, or gallops  GI: : Soft, Nontender, Nondistended; Bowel sounds present  NERVOUS SYSTEM:  Alert & Oriented X3  EXTREMITIES:  2+ Peripheral Pulses, No clubbing, cyanosis, or edema  LYMPH: No lymphadenopathy noted  SKIN: No rashes or lesions  ENDOCRINOLOGY: No Thyromegaly  PSYCH: Appropriate    Labs:  27        09-09    132<L>  |  96  |  27<H>  ----------------------------<  212<H>  4.4   |  22  |  1.51<H>  09-08    134<L>  |  98  |  22  ----------------------------<  148<H>  4.1   |  23  |  1.51<H>  09-07    134<L>  |  100  |  21  ----------------------------<  148<H>  3.8   |  21<L>  |  1.38<H>  09-06    132<L>  |  98  |  18  ----------------------------<  167<H>  3.5   |  22  |  1.49<H>    Ca    9.5      09 Sep 2024 07:37  Ca    9.4      08 Sep 2024 06:20    TPro  7.5  /  Alb  2.8<L>  /  TBili  0.3  /  DBili  x   /  AST  40  /  ALT  20  /  AlkPhos  72  09-07  TPro  7.1  /  Alb  2.7<L>  /  TBili  0.2  /  DBili  x   /  AST  51<H>  /  ALT  18  /  AlkPhos  71  09-06    CAPILLARY BLOOD GLUCOSE      POCT Blood Glucose.: 272 mg/dL (09 Sep 2024 11:45)  POCT Blood Glucose.: 172 mg/dL (09 Sep 2024 08:14)  POCT Blood Glucose.: 277 mg/dL (08 Sep 2024 21:04)  POCT Blood Glucose.: 182 mg/dL (08 Sep 2024 17:26)          Urinalysis Basic - ( 09 Sep 2024 07:37 )    Color: x / Appearance: x / SG: x / pH: x  Gluc: 212 mg/dL / Ketone: x  / Bili: x / Urobili: x   Blood: x / Protein: x / Nitrite: x   Leuk Esterase: x / RBC: x / WBC x   Sq Epi: x / Non Sq Epi: x / Bacteria: x      D-Dimer Assay, Quantitative: 408 ng/mL DDU (09-08 @ 06:20)        RECENT CULTURES:  09-04 @ 14:08 Clean Catch Clean Catch (Midstream)       < from: CT Head No Cont (09.04.24 @ 11:54) >  ORBITAL CONTENTS: Bilateral lens replacement surgery. Globes, intraconal   regions, extraocular muscles and optic nerves otherwise grossly  unremarkable, without evidence of traumatic injury.  REMAINING VISUALIZED BONES: Intact.  MISCELLANEOUS:  Edema with an approximate 2.5 x 1.0 x 0.8 cm lentiform   hyperdense mass involving the subcutaneous fat lateral to the left   mandible, with thickening of the subjacent platysma musculature. Severe   right temporomandibular arthrosis.    CT CERVICAL SPINE:    VERTEBRAE: No definitive evidence of acute fracture or prevertebral soft   tissue swelling, as on the prior. 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 Multilevel lower cervical   disc space narrowing, most pronounced at C5-C6, with multilevel   endplate-uncovertebral spurring, most pronounced at this level as well.  ALIGNMENT: Straightening of lordosis. No subluxation. No perched or   dislocated facets. Multilevel facet arthrosis, most pronounced and   moderate-severe at the right C4-C5 level.  INTERVERTEBRAL DISC SPACES: Although suboptimally evaluated on this exam,   there is evidence of multilevel cervical disc bulges and/or protrusions,   contributing to multilevel central canal and neural foramen stenosis.    VISUALIZED LUNGS: No suspicious left upper lung mass. Partially imaged   opacification right upper lung.    MISCELLANEOUS: No gross intrathecal-paraspinal mass or retropharyngeal   fluid collection, however suboptimally evaluated on this exam. Vascular   calcifications, including at bilateral carotid bifurcations. Evidence of   borderline basilar invagination.    IMPRESSION:    CT HEAD:  1. No significant change.  2. No evidence of acute calvarial fracture or intracranial hemorrhage.  3. Additional findings described in detail above.    CT MAXILLOFACIAL:  1. No definitive evidence of acute fracture or paranasal sinus air-fluid   level.  2. Soft tissue edema with probable 2.5 cm subcutaneous hematoma lateral   to the left mandible. Cellulitis may have an overlapping imaging   appearance. Correlate with clinical findings.  3. Additional findings described in detail above.    CT CERVICAL SPINE:  1. No definitive evidence of acute fracture or prevertebral soft tissue   swelling. Straightening of lordosis may reflect muscle spasm.  2. Additional findings, including those degenerative, described in detail   above.    --- End of Report ---            CORBIN FONG M.D., ATTENDING RADIOLOGIST  This document has been electronically signed. Sep  4 2024 12:24PM    < end of copied text >           No growth    09-04 @ 10:35 .Blood Blood-Peripheral                No growth at 4 days    09-04 @ 10:30 .Blood Blood-Peripheral                No growth at 4 days          RESPIRATORY CULTURES:          Studies  Chest X-RAY  CT SCAN Chest   Venous Dopplers: LE:   CT Abdomen  Others

## 2024-09-09 NOTE — PROGRESS NOTE ADULT - TIME BILLING
Agree with above ACP note.  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

## 2024-09-09 NOTE — PROGRESS NOTE ADULT - SUBJECTIVE AND OBJECTIVE BOX
Patient is a 83y old  Male who presents with a chief complaint of Fall (09 Sep 2024 15:34)    Date of servie : 09-09-24 @ 17:08  INTERVAL HPI/OVERNIGHT EVENTS:  T(C): 36.6 (09-09-24 @ 04:27), Max: 36.9 (09-08-24 @ 21:18)  HR: 84 (09-09-24 @ 10:13) (68 - 84)  BP: 131/63 (09-09-24 @ 10:13) (120/61 - 134/65)  RR: 18 (09-09-24 @ 07:58) (18 - 18)  SpO2: 98% (09-09-24 @ 10:13) (95% - 98%)  Wt(kg): --  I&O's Summary    08 Sep 2024 07:01  -  09 Sep 2024 07:00  --------------------------------------------------------  IN: 660 mL / OUT: 1850 mL / NET: -1190 mL    09 Sep 2024 07:01  -  09 Sep 2024 17:08  --------------------------------------------------------  IN: 120 mL / OUT: 800 mL / NET: -680 mL        LABS:    09-09    132<L>  |  96  |  27<H>  ----------------------------<  212<H>  4.4   |  22  |  1.51<H>    Ca    9.5      09 Sep 2024 07:37        Urinalysis Basic - ( 09 Sep 2024 07:37 )    Color: x / Appearance: x / SG: x / pH: x  Gluc: 212 mg/dL / Ketone: x  / Bili: x / Urobili: x   Blood: x / Protein: x / Nitrite: x   Leuk Esterase: x / RBC: x / WBC x   Sq Epi: x / Non Sq Epi: x / Bacteria: x      CAPILLARY BLOOD GLUCOSE      POCT Blood Glucose.: 272 mg/dL (09 Sep 2024 11:45)  POCT Blood Glucose.: 172 mg/dL (09 Sep 2024 08:14)  POCT Blood Glucose.: 277 mg/dL (08 Sep 2024 21:04)  POCT Blood Glucose.: 182 mg/dL (08 Sep 2024 17:26)        Urinalysis Basic - ( 09 Sep 2024 07:37 )    Color: x / Appearance: x / SG: x / pH: x  Gluc: 212 mg/dL / Ketone: x  / Bili: x / Urobili: x   Blood: x / Protein: x / Nitrite: x   Leuk Esterase: x / RBC: x / WBC x   Sq Epi: x / Non Sq Epi: x / Bacteria: x        MEDICATIONS  (STANDING):  aspirin  chewable 81 milliGRAM(s) Oral daily  atorvastatin 10 milliGRAM(s) Oral at bedtime  dextrose 5%. 1000 milliLiter(s) (100 mL/Hr) IV Continuous <Continuous>  dextrose 5%. 1000 milliLiter(s) (50 mL/Hr) IV Continuous <Continuous>  dextrose 50% Injectable 25 Gram(s) IV Push once  dextrose 50% Injectable 25 Gram(s) IV Push once  dextrose 50% Injectable 12.5 Gram(s) IV Push once  enoxaparin Injectable 40 milliGRAM(s) SubCutaneous every 24 hours  ezetimibe 10 milliGRAM(s) Oral daily  glucagon  Injectable 1 milliGRAM(s) IntraMuscular once  insulin glargine Injectable (LANTUS) 10 Unit(s) SubCutaneous at bedtime  insulin lispro (ADMELOG) corrective regimen sliding scale   SubCutaneous three times a day before meals  insulin lispro Injectable (ADMELOG) 3 Unit(s) SubCutaneous three times a day before meals  latanoprost 0.005% Ophthalmic Solution 1 Drop(s) Both EYES at bedtime  lisinopril 5 milliGRAM(s) Oral daily  metoprolol succinate ER 25 milliGRAM(s) Oral daily  pantoprazole    Tablet 40 milliGRAM(s) Oral before breakfast  remdesivir  IVPB   IV Intermittent   remdesivir  IVPB 100 milliGRAM(s) IV Intermittent every 24 hours  senna 2 Tablet(s) Oral at bedtime  tamsulosin 0.4 milliGRAM(s) Oral at bedtime    MEDICATIONS  (PRN):  acetaminophen     Tablet .. 650 milliGRAM(s) Oral every 6 hours PRN Temp greater or equal to 38C (100.4F), Moderate Pain (4 - 6)  bisacodyl 5 milliGRAM(s) Oral every 12 hours PRN Constipation  dextrose Oral Gel 15 Gram(s) Oral once PRN Blood Glucose LESS THAN 70 milliGRAM(s)/deciliter  melatonin 3 milliGRAM(s) Oral at bedtime PRN Insomnia  polyethylene glycol 3350 17 Gram(s) Oral two times a day PRN Constipation          PHYSICAL EXAM:  GENERAL: NAD, well-groomed, well-developed  HEAD:  Atraumatic, Normocephalic  CHEST/LUNG: Clear to percussion bilaterally; No rales, rhonchi, wheezing, or rubs  HEART: Regular rate and rhythm; No murmurs, rubs, or gallops  ABDOMEN: Soft, Nontender, Nondistended; Bowel sounds present  EXTREMITIES:  2+ Peripheral Pulses, No clubbing, cyanosis, or edema  LYMPH: No lymphadenopathy noted  SKIN: No rashes or lesions    Care Discussed with Consultants/Other Providers [ ] YES  [ ] NO

## 2024-09-09 NOTE — PROGRESS NOTE ADULT - SUBJECTIVE AND OBJECTIVE BOX
CARDIOLOGY FOLLOW UP - Dr. Downing  DATE OF SERVICE: 9/9/24    CC  no CP or SOB    REVIEW OF SYSTEMS:  CONSTITUTIONAL: No fever, weight loss, or fatigue  RESPIRATORY: No cough, wheezing, chills or hemoptysis; No Shortness of Breath  CARDIOVASCULAR: No chest pain, palpitations, passing out, dizziness  GASTROINTESTINAL: No abdominal or epigastric pain. No nausea, vomiting, or hematemesis; No diarrhea or constipation. No melena or hematochezia.      PHYSICAL EXAM:  T(C): 36.6 (09-09-24 @ 04:27), Max: 36.9 (09-08-24 @ 21:18)  HR: 84 (09-09-24 @ 10:13) (68 - 84)  BP: 131/63 (09-09-24 @ 10:13) (120/61 - 134/65)  RR: 18 (09-09-24 @ 07:58) (18 - 18)  SpO2: 98% (09-09-24 @ 10:13) (95% - 98%)  Wt(kg): --  I&O's Summary    08 Sep 2024 07:01  -  09 Sep 2024 07:00  --------------------------------------------------------  IN: 660 mL / OUT: 1850 mL / NET: -1190 mL    09 Sep 2024 07:01  -  09 Sep 2024 12:48  --------------------------------------------------------  IN: 120 mL / OUT: 0 mL / NET: 120 mL        Appearance: Normal	  Cardiovascular: Normal S1 S2,RRR, No JVD, No murmurs  Respiratory: Crackles at bases B/l  Gastrointestinal:  Soft, Non-tender, + BS	  Extremities: Normal range of motion, No clubbing, cyanosis or edema      Home Medications:  aspirin 81 mg oral tablet: 1 tab(s) orally once a day (04 Sep 2024 17:17)  cholecalciferol 25 mcg (1000 intl units) oral tablet: 1 tab(s) orally once a day (04 Sep 2024 17:17)  Enbrel Prefilled Syringe 50 mg/mL subcutaneous solution: 50 milligram(s) subcutaneously every other week (04 Sep 2024 17:17)  ezetimibe-simvastatin 10 mg-20 mg oral tablet: 1 tab(s) orally once a day (04 Sep 2024 17:17)  Flomax 0.4 mg oral capsule: 1 cap(s) orally once a day (at bedtime) (04 Sep 2024 17:17)  Lumigan 0.01% ophthalmic solution: 1 drop(s) in each affected eye once a day (in the evening) (04 Sep 2024 17:17)  NovoLOG FlexPen 100 units/mL injectable solution: 13 unit(s) injectable once a day (04 Sep 2024 17:17)  NovoLOG Mix 70/30 FlexPen subcutaneous suspension: 21 unit(s) subcutaneous once a day (at bedtime) (04 Sep 2024 17:17)  pantoprazole 40 mg oral delayed release tablet: 1 tab(s) orally once a day (04 Sep 2024 17:17)  PreserVision AREDS 2 oral capsule: 1 cap(s) orally once a day (04 Sep 2024 17:17)  ramipril 2.5 mg oral capsule: 1 cap(s) orally once a day (04 Sep 2024 17:17)  Vitamin C 500 mg oral tablet: 1 tab(s) orally once a day (04 Sep 2024 17:17)      MEDICATIONS  (STANDING):  aspirin  chewable 81 milliGRAM(s) Oral daily  atorvastatin 10 milliGRAM(s) Oral at bedtime  dextrose 5%. 1000 milliLiter(s) (100 mL/Hr) IV Continuous <Continuous>  dextrose 5%. 1000 milliLiter(s) (50 mL/Hr) IV Continuous <Continuous>  dextrose 50% Injectable 12.5 Gram(s) IV Push once  dextrose 50% Injectable 25 Gram(s) IV Push once  dextrose 50% Injectable 25 Gram(s) IV Push once  enoxaparin Injectable 40 milliGRAM(s) SubCutaneous every 24 hours  ezetimibe 10 milliGRAM(s) Oral daily  glucagon  Injectable 1 milliGRAM(s) IntraMuscular once  insulin glargine Injectable (LANTUS) 10 Unit(s) SubCutaneous at bedtime  insulin lispro (ADMELOG) corrective regimen sliding scale   SubCutaneous three times a day before meals  insulin lispro Injectable (ADMELOG) 3 Unit(s) SubCutaneous three times a day before meals  latanoprost 0.005% Ophthalmic Solution 1 Drop(s) Both EYES at bedtime  lisinopril 5 milliGRAM(s) Oral daily  metoprolol succinate ER 25 milliGRAM(s) Oral daily  pantoprazole    Tablet 40 milliGRAM(s) Oral before breakfast  remdesivir  IVPB   IV Intermittent   remdesivir  IVPB 100 milliGRAM(s) IV Intermittent every 24 hours  senna 2 Tablet(s) Oral at bedtime  tamsulosin 0.4 milliGRAM(s) Oral at bedtime      TELEMETRY: 	  NSR  ECG:  	  RADIOLOGY:   DIAGNOSTIC TESTING:  [ ] Echocardiogram:  [ ]  Catheterization:  [ ] Stress Test:    OTHER: 	    LABS:	 	    Troponin T, High Sensitivity Result: 678 ng/L [0 - 51] (09-04 @ 16:13)  Troponin T, High Sensitivity Result: 533 ng/L [0 - 51] (09-04 @ 10:56)      09-09    132<L>  |  96  |  27<H>  ----------------------------<  212<H>  4.4   |  22  |  1.51<H>    Ca    9.5      09 Sep 2024 07:37

## 2024-09-10 LAB
ANION GAP SERPL CALC-SCNC: 15 MMOL/L — SIGNIFICANT CHANGE UP (ref 5–17)
BUN SERPL-MCNC: 27 MG/DL — HIGH (ref 7–23)
CALCIUM SERPL-MCNC: 9.5 MG/DL — SIGNIFICANT CHANGE UP (ref 8.4–10.5)
CHLORIDE SERPL-SCNC: 99 MMOL/L — SIGNIFICANT CHANGE UP (ref 96–108)
CO2 SERPL-SCNC: 21 MMOL/L — LOW (ref 22–31)
CREAT SERPL-MCNC: 1.36 MG/DL — HIGH (ref 0.5–1.3)
EGFR: 51 ML/MIN/1.73M2 — LOW
GLUCOSE BLDC GLUCOMTR-MCNC: 127 MG/DL — HIGH (ref 70–99)
GLUCOSE BLDC GLUCOMTR-MCNC: 161 MG/DL — HIGH (ref 70–99)
GLUCOSE BLDC GLUCOMTR-MCNC: 199 MG/DL — HIGH (ref 70–99)
GLUCOSE BLDC GLUCOMTR-MCNC: 203 MG/DL — HIGH (ref 70–99)
GLUCOSE SERPL-MCNC: 212 MG/DL — HIGH (ref 70–99)
POTASSIUM SERPL-MCNC: 4.4 MMOL/L — SIGNIFICANT CHANGE UP (ref 3.5–5.3)
POTASSIUM SERPL-SCNC: 4.4 MMOL/L — SIGNIFICANT CHANGE UP (ref 3.5–5.3)
SODIUM SERPL-SCNC: 135 MMOL/L — SIGNIFICANT CHANGE UP (ref 135–145)

## 2024-09-10 RX ADMIN — ENOXAPARIN SODIUM 40 MILLIGRAM(S): 100 INJECTION SUBCUTANEOUS at 21:42

## 2024-09-10 RX ADMIN — METOPROLOL TARTRATE 25 MILLIGRAM(S): 100 TABLET ORAL at 05:08

## 2024-09-10 RX ADMIN — Medication 2: at 11:53

## 2024-09-10 RX ADMIN — Medication 10 MILLIGRAM(S): at 21:43

## 2024-09-10 RX ADMIN — Medication 3 UNIT(S): at 17:12

## 2024-09-10 RX ADMIN — ACETAMINOPHEN 650 MILLIGRAM(S): 325 TABLET ORAL at 21:43

## 2024-09-10 RX ADMIN — LISINOPRIL 5 MILLIGRAM(S): 10 TABLET ORAL at 05:08

## 2024-09-10 RX ADMIN — LATANOPROST 1 DROP(S): 50 SOLUTION OPHTHALMIC at 21:42

## 2024-09-10 RX ADMIN — Medication 1: at 17:11

## 2024-09-10 RX ADMIN — Medication 40 MILLIGRAM(S): at 05:08

## 2024-09-10 RX ADMIN — TAMSULOSIN HYDROCHLORIDE 0.4 MILLIGRAM(S): 0.4 CAPSULE ORAL at 21:43

## 2024-09-10 RX ADMIN — Medication 81 MILLIGRAM(S): at 11:54

## 2024-09-10 RX ADMIN — ACETAMINOPHEN 650 MILLIGRAM(S): 325 TABLET ORAL at 22:45

## 2024-09-10 RX ADMIN — EZETIMIBE 10 MILLIGRAM(S): 10 TABLET ORAL at 11:54

## 2024-09-10 RX ADMIN — INSULIN GLARGINE 10 UNIT(S): 100 INJECTION, SOLUTION SUBCUTANEOUS at 21:42

## 2024-09-10 NOTE — PROGRESS NOTE ADULT - SUBJECTIVE AND OBJECTIVE BOX
CARDIOLOGY FOLLOW UP - Dr. Downing  Date of Service: 09-10-24 @ 14:11    CC: no events    Review of Systems:  Constitutional: No fever, weight loss, or fatigue  Respiratory: No cough, wheezing, or hemoptysis, no shortness of breath  Cardiovascular: No chest pain, palpitations, passing out, dizziness, or leg swelling  Gastrointestinal: No abd or epigastric pain. No nausea, vomiting, or hematemesis; no diarrhea or consiptaiton, no melena or hematochezia  Vascular: No edema     TELEMETRY:    PHYSICAL EXAM:  T(C): 36.3 (09-10-24 @ 10:45), Max: 36.8 (09-10-24 @ 04:51)  HR: 73 (09-10-24 @ 10:45) (73 - 79)  BP: 100/63 (09-10-24 @ 10:45) (100/63 - 141/75)  RR: 18 (09-10-24 @ 10:45) (18 - 18)  SpO2: 97% (09-10-24 @ 10:45) (96% - 97%)  Wt(kg): --  I&O's Summary    09 Sep 2024 07:01  -  10 Sep 2024 07:00  --------------------------------------------------------  IN: 540 mL / OUT: 2300 mL / NET: -1760 mL    10 Sep 2024 07:01  -  10 Sep 2024 14:11  --------------------------------------------------------  IN: 120 mL / OUT: 400 mL / NET: -280 mL        Appearance: Normal	  Cardiovascular: Normal S1 S2,RRR, No JVD, No murmurs  Respiratory: Lungs clear to auscultation	  Gastrointestinal:  Soft, Non-tender, + BS	  Extremities: Normal range of motion, No clubbing, cyanosis or edema  Vascular: Peripheral pulses palpable 2+ bilaterally       Home Medications:  aspirin 81 mg oral tablet: 1 tab(s) orally once a day (04 Sep 2024 17:17)  cholecalciferol 25 mcg (1000 intl units) oral tablet: 1 tab(s) orally once a day (04 Sep 2024 17:17)  Enbrel Prefilled Syringe 50 mg/mL subcutaneous solution: 50 milligram(s) subcutaneously every other week (04 Sep 2024 17:17)  ezetimibe-simvastatin 10 mg-20 mg oral tablet: 1 tab(s) orally once a day (04 Sep 2024 17:17)  Flomax 0.4 mg oral capsule: 1 cap(s) orally once a day (at bedtime) (04 Sep 2024 17:17)  Lumigan 0.01% ophthalmic solution: 1 drop(s) in each affected eye once a day (in the evening) (04 Sep 2024 17:17)  NovoLOG FlexPen 100 units/mL injectable solution: 13 unit(s) injectable once a day (04 Sep 2024 17:17)  NovoLOG Mix 70/30 FlexPen subcutaneous suspension: 21 unit(s) subcutaneous once a day (at bedtime) (04 Sep 2024 17:17)  pantoprazole 40 mg oral delayed release tablet: 1 tab(s) orally once a day (04 Sep 2024 17:17)  PreserVision AREDS 2 oral capsule: 1 cap(s) orally once a day (04 Sep 2024 17:17)  ramipril 2.5 mg oral capsule: 1 cap(s) orally once a day (04 Sep 2024 17:17)  Vitamin C 500 mg oral tablet: 1 tab(s) orally once a day (04 Sep 2024 17:17)        MEDICATIONS  (STANDING):  aspirin  chewable 81 milliGRAM(s) Oral daily  atorvastatin 10 milliGRAM(s) Oral at bedtime  dextrose 5%. 1000 milliLiter(s) (100 mL/Hr) IV Continuous <Continuous>  dextrose 5%. 1000 milliLiter(s) (50 mL/Hr) IV Continuous <Continuous>  dextrose 50% Injectable 12.5 Gram(s) IV Push once  dextrose 50% Injectable 25 Gram(s) IV Push once  dextrose 50% Injectable 25 Gram(s) IV Push once  enoxaparin Injectable 40 milliGRAM(s) SubCutaneous every 24 hours  ezetimibe 10 milliGRAM(s) Oral daily  glucagon  Injectable 1 milliGRAM(s) IntraMuscular once  insulin glargine Injectable (LANTUS) 10 Unit(s) SubCutaneous at bedtime  insulin lispro (ADMELOG) corrective regimen sliding scale   SubCutaneous three times a day before meals  insulin lispro Injectable (ADMELOG) 3 Unit(s) SubCutaneous three times a day before meals  latanoprost 0.005% Ophthalmic Solution 1 Drop(s) Both EYES at bedtime  lisinopril 5 milliGRAM(s) Oral daily  metoprolol succinate ER 25 milliGRAM(s) Oral daily  pantoprazole    Tablet 40 milliGRAM(s) Oral before breakfast  senna 2 Tablet(s) Oral at bedtime  tamsulosin 0.4 milliGRAM(s) Oral at bedtime        EKG:  RADIOLOGY:  DIAGNOSTIC TESTING:  [ ] Echocardiogram:  [ ] Catherterization:  [ ] Stress Test:  OTHER:     LABS:	 	      09-10    135  |  99  |  27<H>  ----------------------------<  212<H>  4.4   |  21<L>  |  1.36<H>    Ca    9.5      10 Sep 2024 06:05            CARDIAC MARKERS:

## 2024-09-10 NOTE — PROGRESS NOTE ADULT - SUBJECTIVE AND OBJECTIVE BOX
Patient is a 83y old  Male who presents with a chief complaint of Fall (10 Sep 2024 14:10)    Date of servie : 09-10-24 @ 16:33  INTERVAL HPI/OVERNIGHT EVENTS:  T(C): 36.3 (09-10-24 @ 10:45), Max: 36.8 (09-10-24 @ 04:51)  HR: 73 (09-10-24 @ 10:45) (73 - 79)  BP: 100/63 (09-10-24 @ 10:45) (100/63 - 141/75)  RR: 18 (09-10-24 @ 10:45) (18 - 18)  SpO2: 97% (09-10-24 @ 10:45) (96% - 97%)  Wt(kg): --  I&O's Summary    09 Sep 2024 07:01  -  10 Sep 2024 07:00  --------------------------------------------------------  IN: 540 mL / OUT: 2300 mL / NET: -1760 mL    10 Sep 2024 07:01  -  10 Sep 2024 16:33  --------------------------------------------------------  IN: 120 mL / OUT: 1200 mL / NET: -1080 mL        LABS:    09-10    135  |  99  |  27<H>  ----------------------------<  212<H>  4.4   |  21<L>  |  1.36<H>    Ca    9.5      10 Sep 2024 06:05        Urinalysis Basic - ( 10 Sep 2024 06:05 )    Color: x / Appearance: x / SG: x / pH: x  Gluc: 212 mg/dL / Ketone: x  / Bili: x / Urobili: x   Blood: x / Protein: x / Nitrite: x   Leuk Esterase: x / RBC: x / WBC x   Sq Epi: x / Non Sq Epi: x / Bacteria: x      CAPILLARY BLOOD GLUCOSE      POCT Blood Glucose.: 203 mg/dL (10 Sep 2024 11:30)  POCT Blood Glucose.: 161 mg/dL (10 Sep 2024 07:42)  POCT Blood Glucose.: 140 mg/dL (09 Sep 2024 21:48)  POCT Blood Glucose.: 190 mg/dL (09 Sep 2024 17:21)        Urinalysis Basic - ( 10 Sep 2024 06:05 )    Color: x / Appearance: x / SG: x / pH: x  Gluc: 212 mg/dL / Ketone: x  / Bili: x / Urobili: x   Blood: x / Protein: x / Nitrite: x   Leuk Esterase: x / RBC: x / WBC x   Sq Epi: x / Non Sq Epi: x / Bacteria: x        MEDICATIONS  (STANDING):  aspirin  chewable 81 milliGRAM(s) Oral daily  atorvastatin 10 milliGRAM(s) Oral at bedtime  dextrose 5%. 1000 milliLiter(s) (100 mL/Hr) IV Continuous <Continuous>  dextrose 5%. 1000 milliLiter(s) (50 mL/Hr) IV Continuous <Continuous>  dextrose 50% Injectable 25 Gram(s) IV Push once  dextrose 50% Injectable 25 Gram(s) IV Push once  dextrose 50% Injectable 12.5 Gram(s) IV Push once  enoxaparin Injectable 40 milliGRAM(s) SubCutaneous every 24 hours  ezetimibe 10 milliGRAM(s) Oral daily  glucagon  Injectable 1 milliGRAM(s) IntraMuscular once  insulin glargine Injectable (LANTUS) 10 Unit(s) SubCutaneous at bedtime  insulin lispro (ADMELOG) corrective regimen sliding scale   SubCutaneous three times a day before meals  insulin lispro Injectable (ADMELOG) 3 Unit(s) SubCutaneous three times a day before meals  latanoprost 0.005% Ophthalmic Solution 1 Drop(s) Both EYES at bedtime  lisinopril 5 milliGRAM(s) Oral daily  metoprolol succinate ER 25 milliGRAM(s) Oral daily  pantoprazole    Tablet 40 milliGRAM(s) Oral before breakfast  senna 2 Tablet(s) Oral at bedtime  tamsulosin 0.4 milliGRAM(s) Oral at bedtime    MEDICATIONS  (PRN):  acetaminophen     Tablet .. 650 milliGRAM(s) Oral every 6 hours PRN Temp greater or equal to 38C (100.4F), Moderate Pain (4 - 6)  bisacodyl 5 milliGRAM(s) Oral every 12 hours PRN Constipation  dextrose Oral Gel 15 Gram(s) Oral once PRN Blood Glucose LESS THAN 70 milliGRAM(s)/deciliter  melatonin 3 milliGRAM(s) Oral at bedtime PRN Insomnia  polyethylene glycol 3350 17 Gram(s) Oral two times a day PRN Constipation          PHYSICAL EXAM:  GENERAL: NAD, well-groomed, well-developed  HEAD:  Atraumatic, Normocephalic  CHEST/LUNG: Clear to percussion bilaterally; No rales, rhonchi, wheezing, or rubs  HEART: Regular rate and rhythm; No murmurs, rubs, or gallops  ABDOMEN: Soft, Nontender, Nondistended; Bowel sounds present  EXTREMITIES:  2+ Peripheral Pulses, No clubbing, cyanosis, or edema  LYMPH: No lymphadenopathy noted  SKIN: No rashes or lesions    Care Discussed with Consultants/Other Providers [ ] YES  [ ] NO

## 2024-09-10 NOTE — PROGRESS NOTE ADULT - SUBJECTIVE AND OBJECTIVE BOX
Date of Service: 09-10-24 @ 16:49    Patient is a 83y old  Male who presents with a chief complaint of Fall (10 Sep 2024 16:33)      Any change in ROS: he is doing  ok :      MEDICATIONS  (STANDING):  aspirin  chewable 81 milliGRAM(s) Oral daily  atorvastatin 10 milliGRAM(s) Oral at bedtime  dextrose 5%. 1000 milliLiter(s) (50 mL/Hr) IV Continuous <Continuous>  dextrose 5%. 1000 milliLiter(s) (100 mL/Hr) IV Continuous <Continuous>  dextrose 50% Injectable 25 Gram(s) IV Push once  dextrose 50% Injectable 12.5 Gram(s) IV Push once  dextrose 50% Injectable 25 Gram(s) IV Push once  enoxaparin Injectable 40 milliGRAM(s) SubCutaneous every 24 hours  ezetimibe 10 milliGRAM(s) Oral daily  glucagon  Injectable 1 milliGRAM(s) IntraMuscular once  insulin glargine Injectable (LANTUS) 10 Unit(s) SubCutaneous at bedtime  insulin lispro (ADMELOG) corrective regimen sliding scale   SubCutaneous three times a day before meals  insulin lispro Injectable (ADMELOG) 3 Unit(s) SubCutaneous three times a day before meals  latanoprost 0.005% Ophthalmic Solution 1 Drop(s) Both EYES at bedtime  lisinopril 5 milliGRAM(s) Oral daily  metoprolol succinate ER 25 milliGRAM(s) Oral daily  pantoprazole    Tablet 40 milliGRAM(s) Oral before breakfast  senna 2 Tablet(s) Oral at bedtime  tamsulosin 0.4 milliGRAM(s) Oral at bedtime    MEDICATIONS  (PRN):  acetaminophen     Tablet .. 650 milliGRAM(s) Oral every 6 hours PRN Temp greater or equal to 38C (100.4F), Moderate Pain (4 - 6)  bisacodyl 5 milliGRAM(s) Oral every 12 hours PRN Constipation  dextrose Oral Gel 15 Gram(s) Oral once PRN Blood Glucose LESS THAN 70 milliGRAM(s)/deciliter  melatonin 3 milliGRAM(s) Oral at bedtime PRN Insomnia  polyethylene glycol 3350 17 Gram(s) Oral two times a day PRN Constipation    Vital Signs Last 24 Hrs  T(C): 36.3 (10 Sep 2024 10:45), Max: 36.8 (10 Sep 2024 04:51)  T(F): 97.4 (10 Sep 2024 10:45), Max: 98.3 (10 Sep 2024 04:51)  HR: 73 (10 Sep 2024 10:45) (73 - 79)  BP: 100/63 (10 Sep 2024 10:45) (100/63 - 141/75)  BP(mean): --  RR: 18 (10 Sep 2024 10:45) (18 - 18)  SpO2: 97% (10 Sep 2024 10:45) (96% - 97%)    Parameters below as of 10 Sep 2024 10:45  Patient On (Oxygen Delivery Method): room air        I&O's Summary    09 Sep 2024 07:01  -  10 Sep 2024 07:00  --------------------------------------------------------  IN: 540 mL / OUT: 2300 mL / NET: -1760 mL    10 Sep 2024 07:01  -  10 Sep 2024 16:49  --------------------------------------------------------  IN: 120 mL / OUT: 1200 mL / NET: -1080 mL          Physical Exam:   GENERAL: NAD, well-groomed, well-developed  HEENT: LEW/   Atraumatic, Normocephalic  ENMT: No tonsillar erythema, exudates, or enlargement; Moist mucous membranes, Good dentition, No lesions  NECK: Supple, No JVD, Normal thyroid  CHEST/LUNG: Clear to auscultaion  CVS: Regular rate and rhythm; No murmurs, rubs, or gallops  GI: : Soft, Nontender, Nondistended; Bowel sounds present  NERVOUS SYSTEM:  Alert & Oriented X3  EXTREMITIES:  -edema  LYMPH: No lymphadenopathy noted  SKIN: No rashes or lesions  ENDOCRINOLOGY: No Thyromegaly  PSYCH: Appropriate    Labs:  27        09-10    135  |  99  |  27<H>  ----------------------------<  212<H>  4.4   |  21<L>  |  1.36<H>  09-09    132<L>  |  96  |  27<H>  ----------------------------<  212<H>  4.4   |  22  |  1.51<H>  09-08    134<L>  |  98  |  22  ----------------------------<  148<H>  4.1   |  23  |  1.51<H>  09-07    134<L>  |  100  |  21  ----------------------------<  148<H>  3.8   |  21<L>  |  1.38<H>    Ca    9.5      10 Sep 2024 06:05  Ca    9.5      09 Sep 2024 07:37    TPro  7.5  /  Alb  2.8<L>  /  TBili  0.3  /  DBili  x   /  AST  40  /  ALT  20  /  AlkPhos  72  09-07    CAPILLARY BLOOD GLUCOSE      POCT Blood Glucose.: 203 mg/dL (10 Sep 2024 11:30)  POCT Blood Glucose.: 161 mg/dL (10 Sep 2024 07:42)  POCT Blood Glucose.: 140 mg/dL (09 Sep 2024 21:48)  POCT Blood Glucose.: 190 mg/dL (09 Sep 2024 17:21)          Urinalysis Basic - ( 10 Sep 2024 06:05 )    Color: x / Appearance: x / SG: x / pH: x  Gluc: 212 mg/dL / Ketone: x  / Bili: x / Urobili: x   Blood: x / Protein: x / Nitrite: x   Leuk Esterase: x / RBC: x / WBC x   Sq Epi: x / Non Sq Epi: x / Bacteria: x      D-Dimer Assay, Quantitative: 408 ng/mL DDU (09-08 @ 06:20)        RECENT CULTURES:  09-04 @ 14:08 Clean Catch Clean Catch (Midstream)            rad< from: CT Head No Cont (09.04.24 @ 11:54) >    CT HEAD:  1. No significant change.  2. No evidence of acute calvarial fracture or intracranial hemorrhage.  3. Additional findings described in detail above.    CT MAXILLOFACIAL:  1. No definitive evidence of acute fracture or paranasal sinus air-fluid   level.  2. Soft tissue edema with probable 2.5 cm subcutaneous hematoma lateral   to the left mandible. Cellulitis may have an overlapping imaging   appearance. Correlate with clinical findings.  3. Additional findings described in detail above.    CT CERVICAL SPINE:  1. No definitive evidence of acute fracture or prevertebral soft tissue   swelling. Straightening of lordosis may reflect muscle spasm.  2. Additional findings, including those degenerative, described in detail   above.    < end of copied text >      No growth    09-04 @ 10:35 .Blood Blood-Peripheral                No growth at 5 days    09-04 @ 10:30 .Blood Blood-Peripheral                No growth at 5 days          RESPIRATORY CULTURES:          Studies  Chest X-RAY  CT SCAN Chest   Venous Dopplers: LE:   CT Abdomen  Others

## 2024-09-11 ENCOUNTER — TRANSCRIPTION ENCOUNTER (OUTPATIENT)
Age: 84
End: 2024-09-11

## 2024-09-11 LAB
ANION GAP SERPL CALC-SCNC: 13 MMOL/L — SIGNIFICANT CHANGE UP (ref 5–17)
BUN SERPL-MCNC: 40 MG/DL — HIGH (ref 7–23)
CALCIUM SERPL-MCNC: 9.4 MG/DL — SIGNIFICANT CHANGE UP (ref 8.4–10.5)
CHLORIDE SERPL-SCNC: 101 MMOL/L — SIGNIFICANT CHANGE UP (ref 96–108)
CO2 SERPL-SCNC: 22 MMOL/L — SIGNIFICANT CHANGE UP (ref 22–31)
CREAT SERPL-MCNC: 1.88 MG/DL — HIGH (ref 0.5–1.3)
EGFR: 35 ML/MIN/1.73M2 — LOW
GLUCOSE BLDC GLUCOMTR-MCNC: 130 MG/DL — HIGH (ref 70–99)
GLUCOSE BLDC GLUCOMTR-MCNC: 173 MG/DL — HIGH (ref 70–99)
GLUCOSE BLDC GLUCOMTR-MCNC: 175 MG/DL — HIGH (ref 70–99)
GLUCOSE BLDC GLUCOMTR-MCNC: 177 MG/DL — HIGH (ref 70–99)
GLUCOSE SERPL-MCNC: 159 MG/DL — HIGH (ref 70–99)
POTASSIUM SERPL-MCNC: 4.5 MMOL/L — SIGNIFICANT CHANGE UP (ref 3.5–5.3)
POTASSIUM SERPL-SCNC: 4.5 MMOL/L — SIGNIFICANT CHANGE UP (ref 3.5–5.3)
SODIUM SERPL-SCNC: 136 MMOL/L — SIGNIFICANT CHANGE UP (ref 135–145)

## 2024-09-11 RX ADMIN — Medication 1: at 08:13

## 2024-09-11 RX ADMIN — Medication 3 UNIT(S): at 11:37

## 2024-09-11 RX ADMIN — LATANOPROST 1 DROP(S): 50 SOLUTION OPHTHALMIC at 21:31

## 2024-09-11 RX ADMIN — Medication 40 MILLIGRAM(S): at 05:19

## 2024-09-11 RX ADMIN — INSULIN GLARGINE 10 UNIT(S): 100 INJECTION, SOLUTION SUBCUTANEOUS at 21:31

## 2024-09-11 RX ADMIN — Medication 3 UNIT(S): at 17:14

## 2024-09-11 RX ADMIN — Medication 1: at 17:15

## 2024-09-11 RX ADMIN — Medication 3 UNIT(S): at 08:13

## 2024-09-11 RX ADMIN — EZETIMIBE 10 MILLIGRAM(S): 10 TABLET ORAL at 11:37

## 2024-09-11 RX ADMIN — ACETAMINOPHEN 650 MILLIGRAM(S): 325 TABLET ORAL at 21:32

## 2024-09-11 RX ADMIN — METOPROLOL TARTRATE 25 MILLIGRAM(S): 100 TABLET ORAL at 05:19

## 2024-09-11 RX ADMIN — Medication 1: at 11:37

## 2024-09-11 RX ADMIN — Medication 81 MILLIGRAM(S): at 11:37

## 2024-09-11 RX ADMIN — ACETAMINOPHEN 650 MILLIGRAM(S): 325 TABLET ORAL at 22:32

## 2024-09-11 NOTE — DISCHARGE NOTE PROVIDER - NSDCCPCAREPLAN_GEN_ALL_CORE_FT
PRINCIPAL DISCHARGE DIAGNOSIS  Diagnosis: Syncope  Assessment and Plan of Treatment: Fainting usually is caused by fear, stress, pain, standing too long, excessive tiredness, elevated temperature, using the bathroom, coughing, or low blood pressure.  Blood pressure can drop if you do not drink enough, are on blood pressure medications, drink alcohol, or experience bleeding.   Avoid activity or condition that causes your syncope.  Lay down with your feet up if you feel like you might faint; breath deeply until symptoms pass.  Consult with your doctor about driving, playing sports, or operating machinery.  If you pass out, call 911 to be taken to the nearest emergency room.  Also call 911 to be taken to the nearest emergency room if you experience dizziness or lightheadedness associated with  severe headache, slurred speech, vision changes, facial asymetry, chest pain, abdominal pain, or back pain.        SECONDARY DISCHARGE DIAGNOSES  Diagnosis: 2019 novel coronavirus disease (COVID-19)  Assessment and Plan of Treatment: You tested positive for COVID 19.  You no longer require hospitalization.  Please restrict activities outside of your home except for getting medical care.  Do not go to work, school, or public areas.  Avoid using public transportation, ride-sharing, or taxis.  Separate yourself from other people and animals in your home.  Call ahead before visiting your doctor.  Wear a facemask when you are around other people. Cover your cough and sneezes.  Clean your hands often.  Avoid sharing personal household items.  Clean all frequently touched surfaces daily.

## 2024-09-11 NOTE — PROGRESS NOTE ADULT - SUBJECTIVE AND OBJECTIVE BOX
Date of Service: 09-11-24 @ 14:54    Patient is a 83y old  Male who presents with a chief complaint of Fall (11 Sep 2024 11:52)      Any change in ROS: Mr Crisostomo is do ing very well : he is on room air:  no sob:      MEDICATIONS  (STANDING):  aspirin  chewable 81 milliGRAM(s) Oral daily  atorvastatin 10 milliGRAM(s) Oral at bedtime  dextrose 5%. 1000 milliLiter(s) (100 mL/Hr) IV Continuous <Continuous>  dextrose 5%. 1000 milliLiter(s) (50 mL/Hr) IV Continuous <Continuous>  dextrose 50% Injectable 25 Gram(s) IV Push once  dextrose 50% Injectable 25 Gram(s) IV Push once  dextrose 50% Injectable 12.5 Gram(s) IV Push once  enoxaparin Injectable 40 milliGRAM(s) SubCutaneous every 24 hours  ezetimibe 10 milliGRAM(s) Oral daily  glucagon  Injectable 1 milliGRAM(s) IntraMuscular once  insulin glargine Injectable (LANTUS) 10 Unit(s) SubCutaneous at bedtime  insulin lispro (ADMELOG) corrective regimen sliding scale   SubCutaneous three times a day before meals  insulin lispro Injectable (ADMELOG) 3 Unit(s) SubCutaneous three times a day before meals  latanoprost 0.005% Ophthalmic Solution 1 Drop(s) Both EYES at bedtime  lisinopril 5 milliGRAM(s) Oral daily  metoprolol succinate ER 25 milliGRAM(s) Oral daily  pantoprazole    Tablet 40 milliGRAM(s) Oral before breakfast  senna 2 Tablet(s) Oral at bedtime  tamsulosin 0.4 milliGRAM(s) Oral at bedtime    MEDICATIONS  (PRN):  acetaminophen     Tablet .. 650 milliGRAM(s) Oral every 6 hours PRN Temp greater or equal to 38C (100.4F), Moderate Pain (4 - 6)  bisacodyl 5 milliGRAM(s) Oral every 12 hours PRN Constipation  dextrose Oral Gel 15 Gram(s) Oral once PRN Blood Glucose LESS THAN 70 milliGRAM(s)/deciliter  melatonin 3 milliGRAM(s) Oral at bedtime PRN Insomnia  polyethylene glycol 3350 17 Gram(s) Oral two times a day PRN Constipation    Vital Signs Last 24 Hrs  T(C): 36.6 (11 Sep 2024 11:33), Max: 37 (10 Sep 2024 19:52)  T(F): 97.9 (11 Sep 2024 11:33), Max: 98.6 (10 Sep 2024 19:52)  HR: 63 (11 Sep 2024 11:33) (63 - 73)  BP: 94/62 (11 Sep 2024 11:33) (94/62 - 106/60)  BP(mean): --  RR: 18 (11 Sep 2024 11:33) (18 - 18)  SpO2: 96% (11 Sep 2024 11:33) (95% - 97%)    Parameters below as of 11 Sep 2024 11:33  Patient On (Oxygen Delivery Method): room air        I&O's Summary    10 Sep 2024 07:01  -  11 Sep 2024 07:00  --------------------------------------------------------  IN: 540 mL / OUT: 2750 mL / NET: -2210 mL    11 Sep 2024 07:01  -  11 Sep 2024 14:54  --------------------------------------------------------  IN: 360 mL / OUT: 600 mL / NET: -240 mL          Physical Exam:   GENERAL: NAD, well-groomed, well-developed  HEENT: LEW/   Atraumatic, Normocephalic  ENMT: No tonsillar erythema, exudates, or enlargement; Moist mucous membranes, Good dentition, No lesions  NECK: Supple, No JVD, Normal thyroid  CHEST/LUNG: Clear to auscultaion  CVS: Regular rate and rhythm; No murmurs, rubs, or gallops  GI: : Soft, Nontender, Nondistended; Bowel sounds present  NERVOUS SYSTEM:  Alert & Oriented X3  EXTREMITIES:  -edema  LYMPH: No lymphadenopathy noted  SKIN: No rashes or lesions  ENDOCRINOLOGY: No Thyromegaly  PSYCH: Appropriate    Labs:          09-11    136  |  101  |  40<H>  ----------------------------<  159<H>  4.5   |  22  |  1.88<H>  09-10    135  |  99  |  27<H>  ----------------------------<  212<H>  4.4   |  21<L>  |  1.36<H>  09-09    132<L>  |  96  |  27<H>  ----------------------------<  212<H>  4.4   |  22  |  1.51<H>  09-08    134<L>  |  98  |  22  ----------------------------<  148<H>  4.1   |  23  |  1.51<H>    Ca    9.4      11 Sep 2024 05:38  Ca    9.5      10 Sep 2024 06:05      CAPILLARY BLOOD GLUCOSE      POCT Blood Glucose.: 177 mg/dL (11 Sep 2024 11:27)  POCT Blood Glucose.: 173 mg/dL (11 Sep 2024 08:02)  POCT Blood Glucose.: 127 mg/dL (10 Sep 2024 21:40)  POCT Blood Glucose.: 199 mg/dL (10 Sep 2024 16:53)          Urinalysis Basic - ( 11 Sep 2024 05:38 )    Color: x / Appearance: x / SG: x / pH: x  Gluc: 159 mg/dL / Ketone: x  / Bili: x / Urobili: x   Blood: x / Protein: x / Nitrite: x   Leuk Esterase: x / RBC: x / WBC x   Sq Epi: x / Non Sq Epi: x / Bacteria: x      D-Dimer Assay, Quantitative: 408 ng/mL DDU (09-08 @ 06:20)      rad< from: CT Head No Cont (09.04.24 @ 11:54) >  MISCELLANEOUS: No gross intrathecal-paraspinal mass or retropharyngeal   fluid collection, however suboptimally evaluated on this exam. Vascular   calcifications, including at bilateral carotid bifurcations. Evidence of   borderline basilar invagination.    IMPRESSION:    CT HEAD:  1. No significant change.  2. No evidence of acute calvarial fracture or intracranial hemorrhage.  3. Additional findings described in detail above.    CT MAXILLOFACIAL:  1. No definitive evidence of acute fracture or paranasal sinus air-fluid   level.  2. Soft tissue edema with probable 2.5 cm subcutaneous hematoma lateral   to the left mandible. Cellulitis may have an overlapping imaging   appearance. Correlate with clinical findings.  3. Additional findings described in detail above.    CT CERVICAL SPINE:  1. No definitive evidence of acute fracture or prevertebral soft tissue   swelling. Straightening of lordosis may reflect muscle spasm.    < end of copied text >    RECENT CULTURES:        RESPIRATORY CULTURES:          Studies  Chest X-RAY  CT SCAN Chest   Venous Dopplers: LE:   CT Abdomen  Others      ct< from: CT Chest w/ IV Cont (09.04.24 @ 11:52) >  VESSELS: Atherosclerotic calcifications.  LYMPH NODES: No lymphadenopathy.  ABDOMINAL WALL: Fat-containing umbilical hernia.  BONES: Degenerative changes. Sclerotic lesion in the left femoral neck,   unchanged from prior    IMPRESSION:  *  No evidence of acute traumatic abnormality in the chest, abdomen, or   pelvis.  *  Pulmonary changes consistent with interstitial lung disease, similar   to findings present on prior examination.  *  Resolution of left pleural effusion.    < end of copied text >

## 2024-09-11 NOTE — DISCHARGE NOTE PROVIDER - NSDCMRMEDTOKEN_GEN_ALL_CORE_FT
aspirin 81 mg oral tablet: 1 tab(s) orally once a day  cholecalciferol 25 mcg (1000 intl units) oral tablet: 1 tab(s) orally once a day  Enbrel Prefilled Syringe 50 mg/mL subcutaneous solution: 50 milligram(s) subcutaneously every other week  ezetimibe-simvastatin 10 mg-20 mg oral tablet: 1 tab(s) orally once a day  Flomax 0.4 mg oral capsule: 1 cap(s) orally once a day (at bedtime)  Lumigan 0.01% ophthalmic solution: 1 drop(s) in each affected eye once a day (in the evening)  NovoLOG FlexPen 100 units/mL injectable solution: 13 unit(s) injectable once a day  NovoLOG Mix 70/30 FlexPen subcutaneous suspension: 21 unit(s) subcutaneous once a day (at bedtime)  pantoprazole 40 mg oral delayed release tablet: 1 tab(s) orally once a day  PreserVision AREDS 2 oral capsule: 1 cap(s) orally once a day  ramipril 2.5 mg oral capsule: 1 cap(s) orally once a day  Vitamin C 500 mg oral tablet: 1 tab(s) orally once a day   aspirin 81 mg oral tablet: 1 tab(s) orally once a day  cholecalciferol 25 mcg (1000 intl units) oral tablet: 1 tab(s) orally once a day  Enbrel Prefilled Syringe 50 mg/mL subcutaneous solution: 50 milligram(s) subcutaneously every other week  ezetimibe-simvastatin 10 mg-20 mg oral tablet: 1 tab(s) orally once a day  Flomax 0.4 mg oral capsule: 1 cap(s) orally once a day (at bedtime)  Lumigan 0.01% ophthalmic solution: 1 drop(s) in each affected eye once a day (in the evening)  metoprolol succinate 25 mg oral tablet, extended release: 1 tab(s) orally once a day  NovoLOG FlexPen 100 units/mL injectable solution: 13 unit(s) injectable once a day  NovoLOG Mix 70/30 FlexPen subcutaneous suspension: 21 unit(s) subcutaneous once a day (at bedtime)  pantoprazole 40 mg oral delayed release tablet: 1 tab(s) orally once a day (before a meal)  PreserVision AREDS 2 oral capsule: 1 cap(s) orally once a day  ramipril 2.5 mg oral capsule: 1 cap(s) orally once a day  Vitamin C 500 mg oral tablet: 1 tab(s) orally once a day

## 2024-09-11 NOTE — CHART NOTE - NSCHARTNOTEFT_GEN_A_CORE
82 Y/o Male with pmhx BPH, CAD, DM, htn and RA BIBA sp fall. Patient has been having sore throat, cough and chills for the past week and is currently taking antibiotic; pt found to be COVID-19 (+). GI consulted for years of dysphagia, feeling like food is becoming stuck in his throat/ chest when he eats. No choking, not spitting up food. Only with solid food. Tolerating soft diet at home, on consistent carb diet here and still tolerating it and getting food down. No weight loss. No smoking history. On PPI at home, though denies history of gerd. Never had a workup for this, including swallowing study.  Recommendations: -please obtain x-ray esophagram-Eventually will consider EGD once acute issues i.e. COVID has been stabilized -patient on soft diet at home, please change patient's diet     9/8 initial bedside swallow exam deferred per ACP Lizbet "As per GI consult - Pt with chronic dysphagia to solid food for years, denies regurgitation and is not impacted. He is still able to eat/drink despite feeling sensation of dysphagia. He is admitted for acute COVID infection. Will eventually need EGD once acute medical issues have resolved, in the meantime consider x-ray esophagram to non-invasively evaluate esophagus. D/w ACP Lizbet, will hold on bedside swallow evaluation pending findings of esophagram."  9/9 Per ACP Saud, defer swallow exam again as esophagram has not been performed  9/10 Per ACP Lizbet, defer swallow exam again given that per radiology, non-urgent esophagram may be deferred until after COVID-19 precautions are lifted; esophagram has not yet been performed.    TODAY, discussion held with ACP Robyn REYES regarding medical plan for esophagram and bedside swallow exam. Per ACP report, given that patient has been tolerating oral diet well since admission w/ no aspiration concern, remains on room air with SpO2 at 100%, and patient dysphagia symptoms are chronic w/o change, team opting for outpatient GI and swallow followup and team reporting no indication for in-patient w/u at this time.    As per team directives, swallow team to sign off; please re-consult if clinically indicated.    RECOMMENDATIONS  > Outpatient speech-language pathology   SPEECH PATHOLOGY  Kasandra Gonzáles CCC-SLP *Teams preferred* ([phone])

## 2024-09-11 NOTE — DISCHARGE NOTE PROVIDER - CARE PROVIDER_API CALL
[facility]  Internal Medicine  [address]  [place] [address]  Phone: [phone]  Fax: [phone]  Follow Up Time: 1 week

## 2024-09-11 NOTE — PROGRESS NOTE ADULT - SUBJECTIVE AND OBJECTIVE BOX
CARDIOLOGY FOLLOW UP - Dr. Downing  Date of Service: 09-11-24 @ 11:52    CC: no events    Review of Systems:  Constitutional: No fever, weight loss, or fatigue  Respiratory: No cough, wheezing, or hemoptysis, no shortness of breath  Cardiovascular: No chest pain, palpitations, passing out, dizziness, or leg swelling  Gastrointestinal: No abd or epigastric pain. No nausea, vomiting, or hematemesis; no diarrhea or consiptaiton, no melena or hematochezia  Vascular: No edema     TELEMETRY:    PHYSICAL EXAM:  T(C): 36.6 (09-11-24 @ 11:33), Max: 37 (09-10-24 @ 19:52)  HR: 63 (09-11-24 @ 11:33) (63 - 73)  BP: 94/62 (09-11-24 @ 11:33) (94/62 - 106/60)  RR: 18 (09-11-24 @ 11:33) (18 - 18)  SpO2: 96% (09-11-24 @ 11:33) (95% - 97%)  Wt(kg): --  I&O's Summary    10 Sep 2024 07:01  -  11 Sep 2024 07:00  --------------------------------------------------------  IN: 540 mL / OUT: 2750 mL / NET: -2210 mL    11 Sep 2024 07:01  -  11 Sep 2024 11:52  --------------------------------------------------------  IN: 120 mL / OUT: 0 mL / NET: 120 mL        Appearance: Normal	  Cardiovascular: Normal S1 S2,RRR, No JVD, No murmurs  Respiratory: Lungs clear to auscultation	  Gastrointestinal:  Soft, Non-tender, + BS	  Extremities: Normal range of motion, No clubbing, cyanosis or edema  Vascular: Peripheral pulses palpable 2+ bilaterally       Home Medications:  aspirin 81 mg oral tablet: 1 tab(s) orally once a day (04 Sep 2024 17:17)  cholecalciferol 25 mcg (1000 intl units) oral tablet: 1 tab(s) orally once a day (04 Sep 2024 17:17)  Enbrel Prefilled Syringe 50 mg/mL subcutaneous solution: 50 milligram(s) subcutaneously every other week (04 Sep 2024 17:17)  ezetimibe-simvastatin 10 mg-20 mg oral tablet: 1 tab(s) orally once a day (04 Sep 2024 17:17)  Flomax 0.4 mg oral capsule: 1 cap(s) orally once a day (at bedtime) (04 Sep 2024 17:17)  Lumigan 0.01% ophthalmic solution: 1 drop(s) in each affected eye once a day (in the evening) (04 Sep 2024 17:17)  NovoLOG FlexPen 100 units/mL injectable solution: 13 unit(s) injectable once a day (04 Sep 2024 17:17)  NovoLOG Mix 70/30 FlexPen subcutaneous suspension: 21 unit(s) subcutaneous once a day (at bedtime) (04 Sep 2024 17:17)  pantoprazole 40 mg oral delayed release tablet: 1 tab(s) orally once a day (04 Sep 2024 17:17)  PreserVision AREDS 2 oral capsule: 1 cap(s) orally once a day (04 Sep 2024 17:17)  ramipril 2.5 mg oral capsule: 1 cap(s) orally once a day (04 Sep 2024 17:17)  Vitamin C 500 mg oral tablet: 1 tab(s) orally once a day (04 Sep 2024 17:17)        MEDICATIONS  (STANDING):  aspirin  chewable 81 milliGRAM(s) Oral daily  atorvastatin 10 milliGRAM(s) Oral at bedtime  dextrose 5%. 1000 milliLiter(s) (100 mL/Hr) IV Continuous <Continuous>  dextrose 5%. 1000 milliLiter(s) (50 mL/Hr) IV Continuous <Continuous>  dextrose 50% Injectable 12.5 Gram(s) IV Push once  dextrose 50% Injectable 25 Gram(s) IV Push once  dextrose 50% Injectable 25 Gram(s) IV Push once  enoxaparin Injectable 40 milliGRAM(s) SubCutaneous every 24 hours  ezetimibe 10 milliGRAM(s) Oral daily  glucagon  Injectable 1 milliGRAM(s) IntraMuscular once  insulin glargine Injectable (LANTUS) 10 Unit(s) SubCutaneous at bedtime  insulin lispro (ADMELOG) corrective regimen sliding scale   SubCutaneous three times a day before meals  insulin lispro Injectable (ADMELOG) 3 Unit(s) SubCutaneous three times a day before meals  latanoprost 0.005% Ophthalmic Solution 1 Drop(s) Both EYES at bedtime  lisinopril 5 milliGRAM(s) Oral daily  metoprolol succinate ER 25 milliGRAM(s) Oral daily  pantoprazole    Tablet 40 milliGRAM(s) Oral before breakfast  senna 2 Tablet(s) Oral at bedtime  tamsulosin 0.4 milliGRAM(s) Oral at bedtime        EKG:  RADIOLOGY:  DIAGNOSTIC TESTING:  [ ] Echocardiogram:  [ ] Catherterization:  [ ] Stress Test:  OTHER:     LABS:	 	      09-11    136  |  101  |  40<H>  ----------------------------<  159<H>  4.5   |  22  |  1.88<H>    Ca    9.4      11 Sep 2024 05:38            CARDIAC MARKERS:

## 2024-09-11 NOTE — PROGRESS NOTE ADULT - SUBJECTIVE AND OBJECTIVE BOX
Patient is a 83y old  Male who presents with a chief complaint of Fall (11 Sep 2024 14:54)    Date of servie : 09-11-24 @ 15:36  INTERVAL HPI/OVERNIGHT EVENTS:  T(C): 36.6 (09-11-24 @ 11:33), Max: 37 (09-10-24 @ 19:52)  HR: 63 (09-11-24 @ 11:33) (63 - 73)  BP: 94/62 (09-11-24 @ 11:33) (94/62 - 106/60)  RR: 18 (09-11-24 @ 11:33) (18 - 18)  SpO2: 96% (09-11-24 @ 11:33) (95% - 97%)  Wt(kg): --  I&O's Summary    10 Sep 2024 07:01  -  11 Sep 2024 07:00  --------------------------------------------------------  IN: 540 mL / OUT: 2750 mL / NET: -2210 mL    11 Sep 2024 07:01  -  11 Sep 2024 15:36  --------------------------------------------------------  IN: 360 mL / OUT: 600 mL / NET: -240 mL        LABS:    09-11    136  |  101  |  40<H>  ----------------------------<  159<H>  4.5   |  22  |  1.88<H>    Ca    9.4      11 Sep 2024 05:38        Urinalysis Basic - ( 11 Sep 2024 05:38 )    Color: x / Appearance: x / SG: x / pH: x  Gluc: 159 mg/dL / Ketone: x  / Bili: x / Urobili: x   Blood: x / Protein: x / Nitrite: x   Leuk Esterase: x / RBC: x / WBC x   Sq Epi: x / Non Sq Epi: x / Bacteria: x      CAPILLARY BLOOD GLUCOSE      POCT Blood Glucose.: 177 mg/dL (11 Sep 2024 11:27)  POCT Blood Glucose.: 173 mg/dL (11 Sep 2024 08:02)  POCT Blood Glucose.: 127 mg/dL (10 Sep 2024 21:40)  POCT Blood Glucose.: 199 mg/dL (10 Sep 2024 16:53)        Urinalysis Basic - ( 11 Sep 2024 05:38 )    Color: x / Appearance: x / SG: x / pH: x  Gluc: 159 mg/dL / Ketone: x  / Bili: x / Urobili: x   Blood: x / Protein: x / Nitrite: x   Leuk Esterase: x / RBC: x / WBC x   Sq Epi: x / Non Sq Epi: x / Bacteria: x        MEDICATIONS  (STANDING):  aspirin  chewable 81 milliGRAM(s) Oral daily  atorvastatin 10 milliGRAM(s) Oral at bedtime  dextrose 5%. 1000 milliLiter(s) (100 mL/Hr) IV Continuous <Continuous>  dextrose 5%. 1000 milliLiter(s) (50 mL/Hr) IV Continuous <Continuous>  dextrose 50% Injectable 25 Gram(s) IV Push once  dextrose 50% Injectable 25 Gram(s) IV Push once  dextrose 50% Injectable 12.5 Gram(s) IV Push once  enoxaparin Injectable 40 milliGRAM(s) SubCutaneous every 24 hours  ezetimibe 10 milliGRAM(s) Oral daily  glucagon  Injectable 1 milliGRAM(s) IntraMuscular once  insulin glargine Injectable (LANTUS) 10 Unit(s) SubCutaneous at bedtime  insulin lispro (ADMELOG) corrective regimen sliding scale   SubCutaneous three times a day before meals  insulin lispro Injectable (ADMELOG) 3 Unit(s) SubCutaneous three times a day before meals  latanoprost 0.005% Ophthalmic Solution 1 Drop(s) Both EYES at bedtime  lisinopril 5 milliGRAM(s) Oral daily  metoprolol succinate ER 25 milliGRAM(s) Oral daily  pantoprazole    Tablet 40 milliGRAM(s) Oral before breakfast  senna 2 Tablet(s) Oral at bedtime  tamsulosin 0.4 milliGRAM(s) Oral at bedtime    MEDICATIONS  (PRN):  acetaminophen     Tablet .. 650 milliGRAM(s) Oral every 6 hours PRN Temp greater or equal to 38C (100.4F), Moderate Pain (4 - 6)  bisacodyl 5 milliGRAM(s) Oral every 12 hours PRN Constipation  dextrose Oral Gel 15 Gram(s) Oral once PRN Blood Glucose LESS THAN 70 milliGRAM(s)/deciliter  melatonin 3 milliGRAM(s) Oral at bedtime PRN Insomnia  polyethylene glycol 3350 17 Gram(s) Oral two times a day PRN Constipation          PHYSICAL EXAM:  GENERAL: NAD, well-groomed, well-developed  HEAD:  Atraumatic, Normocephalic  CHEST/LUNG: Clear to percussion bilaterally; No rales, rhonchi, wheezing, or rubs  HEART: Regular rate and rhythm; No murmurs, rubs, or gallops  ABDOMEN: Soft, Nontender, Nondistended; Bowel sounds present  EXTREMITIES:  2+ Peripheral Pulses, No clubbing, cyanosis, or edema  LYMPH: No lymphadenopathy noted  SKIN: No rashes or lesions    Care Discussed with Consultants/Other Providers [ ] YES  [ ] NO

## 2024-09-11 NOTE — DISCHARGE NOTE PROVIDER - NSDCFUSCHEDAPPT_GEN_ALL_CORE_FT
[facility]  [address] [facility] [address]  Scheduled Appointment: 09/18/2024    Stephanie Davison  [facility]  [address] [facility] [address]  Scheduled Appointment: 09/18/2024    [facility]  [address]  Scheduled Appointment: 10/11/2024

## 2024-09-11 NOTE — DISCHARGE NOTE PROVIDER - HOSPITAL COURSE
HPI:   82 Y/o Male with pmhx BPH, CAD, DM, htn and RA BIBA sp fall this morning.  As per son, patient fell early this morning and didn't remember what happened. As per patient, he thinks he tripped while ambulating with his walker but isnt sure. Patient denies LOC. Thinks it was around 3-4AM. Patient able to call son, who came from [place] and was able to call ems. Patient has been having sore throat, cough and chills for the past week and is currently taking antitbiotic. Patient lives at home with his wife who has parkinsons. Patient denies abd pain, nvd, tingling, numbness, and dysuria (04 Sep 2024 18:11)    Hospital Course:  Patient was admitted for syncope. CTH negative. Cardiac workup showed elevated troponins in the setting of demand ischemia, known CAD and COVID. Patient was covid positive on 9/4, received 5 days of remdesivir and completed treatment on 9/10. Reviewed SIMEON from 5/29 and compared to TTE on 9/6 showing Moderately decreased decreased LVSF; EF 35-40%; Regional wall motion abnormalities present - overall decline in LVEF. currently continuing only medical management. Evaluation for dysphagia: patient complaining of long-term difficulty swallowing, currently tolerating regular diet. Will recommend for outpatient speech language pathology follow up once completed rehab. PT evaluation recommends for Rehab.    Patient is medically cleared for discharge. Medication reconciliation reviewed, revised, and resolved with Dr. Guerra who had medically cleared patient for discharge with follow-up as advised. Patient is currently stable for discharge to subacute rehab at this time.    Important Medication Changes and Reason:    Active or Pending Issues Requiring Follow-up:  - pcp  - Speech language pathology  - Pulmonology    Advanced Directives:   [x] Full code  [ ] DNR  [ ] Hospice    Discharge Diagnoses:  Syncope  Covid-19  CKD  HLD  Dysphagia       HPI:   82 Y/o Male with pmhx BPH, CAD, DM, htn and RA BIBA sp fall this morning.  As per son, patient fell early this morning and didn't remember what happened. As per patient, he thinks he tripped while ambulating with his walker but isnt sure. Patient denies LOC. Thinks it was around 3-4AM. Patient able to call son, who came from [place] and was able to call ems. Patient has been having sore throat, cough and chills for the past week and is currently taking antitbiotic. Patient lives at home with his wife who has parkinsons. Patient denies abd pain, nvd, tingling, numbness, and dysuria (04 Sep 2024 18:11)    Hospital Course:  Patient was admitted for syncope. CTH negative. Cardiac workup showed elevated troponins in the setting of demand ischemia, known CAD and COVID. Patient was covid positive on 9/4, received 5 days of remdesivir and completed treatment on 9/10. Reviewed SIMEON from 5/29 and compared to TTE on 9/6 showing Moderately decreased decreased LVSF; EF 35-40%; Regional wall motion abnormalities present - overall decline in LVEF. currently continuing only medical management. Evaluation for dysphagia: patient complaining of long-term difficulty swallowing, currently tolerating regular diet. Will recommend for outpatient speech language pathology follow up once completed rehab. PT evaluation recommends for Rehab.    Patient is medically cleared for discharge. Medication reconciliation reviewed, revised, and resolved with Dr. Guerra who had medically cleared patient for discharge with follow-up as advised. Patient is currently stable for discharge to subacute rehab with SLP follow up at this time.    Important Medication Changes and Reason:    Active or Pending Issues Requiring Follow-up:  - pcp  - Speech language pathology for dysphagia evaluation  - Pulmonology    Advanced Directives:   [x] Full code  [ ] DNR  [ ] Hospice    Discharge Diagnoses:  Syncope  Covid-19  CKD  HLD  Dysphagia

## 2024-09-12 ENCOUNTER — APPOINTMENT (OUTPATIENT)
Dept: PULMONOLOGY | Facility: CLINIC | Age: 84
End: 2024-09-12

## 2024-09-12 ENCOUNTER — TRANSCRIPTION ENCOUNTER (OUTPATIENT)
Age: 84
End: 2024-09-12

## 2024-09-12 VITALS
OXYGEN SATURATION: 98 % | HEART RATE: 59 BPM | DIASTOLIC BLOOD PRESSURE: 62 MMHG | SYSTOLIC BLOOD PRESSURE: 123 MMHG | RESPIRATION RATE: 18 BRPM | TEMPERATURE: 98 F

## 2024-09-12 LAB
GLUCOSE BLDC GLUCOMTR-MCNC: 185 MG/DL — HIGH (ref 70–99)
GLUCOSE BLDC GLUCOMTR-MCNC: 192 MG/DL — HIGH (ref 70–99)

## 2024-09-12 PROCEDURE — 70486 CT MAXILLOFACIAL W/O DYE: CPT | Mod: MC

## 2024-09-12 PROCEDURE — 83605 ASSAY OF LACTIC ACID: CPT

## 2024-09-12 PROCEDURE — C8924: CPT

## 2024-09-12 PROCEDURE — 96375 TX/PRO/DX INJ NEW DRUG ADDON: CPT

## 2024-09-12 PROCEDURE — 73030 X-RAY EXAM OF SHOULDER: CPT

## 2024-09-12 PROCEDURE — 82962 GLUCOSE BLOOD TEST: CPT

## 2024-09-12 PROCEDURE — 70450 CT HEAD/BRAIN W/O DYE: CPT | Mod: MC

## 2024-09-12 PROCEDURE — 96374 THER/PROPH/DIAG INJ IV PUSH: CPT

## 2024-09-12 PROCEDURE — 83036 HEMOGLOBIN GLYCOSYLATED A1C: CPT

## 2024-09-12 PROCEDURE — 87637 SARSCOV2&INF A&B&RSV AMP PRB: CPT

## 2024-09-12 PROCEDURE — 80053 COMPREHEN METABOLIC PANEL: CPT

## 2024-09-12 PROCEDURE — 71260 CT THORAX DX C+: CPT | Mod: MC

## 2024-09-12 PROCEDURE — 87040 BLOOD CULTURE FOR BACTERIA: CPT

## 2024-09-12 PROCEDURE — 76377 3D RENDER W/INTRP POSTPROCES: CPT

## 2024-09-12 PROCEDURE — 85025 COMPLETE CBC W/AUTO DIFF WBC: CPT

## 2024-09-12 PROCEDURE — 93325 DOPPLER ECHO COLOR FLOW MAPG: CPT

## 2024-09-12 PROCEDURE — 99285 EMERGENCY DEPT VISIT HI MDM: CPT

## 2024-09-12 PROCEDURE — 85730 THROMBOPLASTIN TIME PARTIAL: CPT

## 2024-09-12 PROCEDURE — 97116 GAIT TRAINING THERAPY: CPT

## 2024-09-12 PROCEDURE — 84295 ASSAY OF SERUM SODIUM: CPT

## 2024-09-12 PROCEDURE — 85014 HEMATOCRIT: CPT

## 2024-09-12 PROCEDURE — 85610 PROTHROMBIN TIME: CPT

## 2024-09-12 PROCEDURE — 97530 THERAPEUTIC ACTIVITIES: CPT

## 2024-09-12 PROCEDURE — 82330 ASSAY OF CALCIUM: CPT

## 2024-09-12 PROCEDURE — 82435 ASSAY OF BLOOD CHLORIDE: CPT

## 2024-09-12 PROCEDURE — 82803 BLOOD GASES ANY COMBINATION: CPT

## 2024-09-12 PROCEDURE — 84132 ASSAY OF SERUM POTASSIUM: CPT

## 2024-09-12 PROCEDURE — 74177 CT ABD & PELVIS W/CONTRAST: CPT | Mod: MC

## 2024-09-12 PROCEDURE — 85018 HEMOGLOBIN: CPT

## 2024-09-12 PROCEDURE — 85379 FIBRIN DEGRADATION QUANT: CPT

## 2024-09-12 PROCEDURE — 97161 PT EVAL LOW COMPLEX 20 MIN: CPT

## 2024-09-12 PROCEDURE — 84484 ASSAY OF TROPONIN QUANT: CPT

## 2024-09-12 PROCEDURE — 87086 URINE CULTURE/COLONY COUNT: CPT

## 2024-09-12 PROCEDURE — 82947 ASSAY GLUCOSE BLOOD QUANT: CPT

## 2024-09-12 PROCEDURE — 80048 BASIC METABOLIC PNL TOTAL CA: CPT

## 2024-09-12 PROCEDURE — 72125 CT NECK SPINE W/O DYE: CPT | Mod: MC

## 2024-09-12 PROCEDURE — 36415 COLL VENOUS BLD VENIPUNCTURE: CPT

## 2024-09-12 PROCEDURE — 81001 URINALYSIS AUTO W/SCOPE: CPT

## 2024-09-12 RX ORDER — PANTOPRAZOLE SODIUM 40 MG
1 TABLET, DELAYED RELEASE (ENTERIC COATED) ORAL
Qty: 0 | Refills: 0 | DISCHARGE
Start: 2024-09-12

## 2024-09-12 RX ORDER — METOPROLOL TARTRATE 100 MG/1
1 TABLET ORAL
Qty: 0 | Refills: 0 | DISCHARGE
Start: 2024-09-12

## 2024-09-12 RX ORDER — PANTOPRAZOLE SODIUM 40 MG
1 TABLET, DELAYED RELEASE (ENTERIC COATED) ORAL
Refills: 0 | DISCHARGE

## 2024-09-12 RX ADMIN — Medication 1: at 08:52

## 2024-09-12 RX ADMIN — Medication 81 MILLIGRAM(S): at 12:37

## 2024-09-12 RX ADMIN — Medication 40 MILLIGRAM(S): at 05:39

## 2024-09-12 RX ADMIN — Medication 3 UNIT(S): at 12:36

## 2024-09-12 RX ADMIN — METOPROLOL TARTRATE 25 MILLIGRAM(S): 100 TABLET ORAL at 05:39

## 2024-09-12 RX ADMIN — EZETIMIBE 10 MILLIGRAM(S): 10 TABLET ORAL at 12:37

## 2024-09-12 RX ADMIN — Medication 3 UNIT(S): at 08:53

## 2024-09-12 RX ADMIN — Medication 1: at 12:36

## 2024-09-12 NOTE — PROGRESS NOTE ADULT - REASON FOR ADMISSION
Fall

## 2024-09-12 NOTE — DISCHARGE NOTE NURSING/CASE MANAGEMENT/SOCIAL WORK - PATIENT PORTAL LINK FT
You can access the FollowMyHealth Patient Portal offered by [facility] by registering at the following website: [email or web address]. By joining [facility]’s FollowMyHealth portal, you will also be able to view your health information using other applications (apps) compatible with our system.

## 2024-09-12 NOTE — DISCHARGE NOTE NURSING/CASE MANAGEMENT/SOCIAL WORK - NSDCVIVACCINE_GEN_ALL_CORE_FT
Tdap; 06-Feb-2017 21:51; Hollie Almaraz (RN); [employer]; [id number]; IntraMuscular; Deltoid Left.; 0.5 milliLiter(s); VIS (VIS Published: 09-May-2013, VIS Presented: 06-Feb-2017);

## 2024-09-12 NOTE — PROGRESS NOTE ADULT - SUBJECTIVE AND OBJECTIVE BOX
CARDIOLOGY FOLLOW UP - Dr. Downing  Date of Service: 09-12-24 @ 12:07    CC: no events    Review of Systems:  Constitutional: No fever, weight loss, or fatigue  Respiratory: No cough, wheezing, or hemoptysis, no shortness of breath  Cardiovascular: No chest pain, palpitations, passing out, dizziness, or leg swelling  Gastrointestinal: No abd or epigastric pain. No nausea, vomiting, or hematemesis; no diarrhea or consiptaiton, no melena or hematochezia  Vascular: No edema     TELEMETRY:    PHYSICAL EXAM:  T(C): 36.3 (09-12-24 @ 07:35), Max: 36.8 (09-12-24 @ 04:48)  HR: 63 (09-12-24 @ 07:35) (60 - 64)  BP: 127/63 (09-12-24 @ 07:35) (101/62 - 127/63)  RR: 18 (09-12-24 @ 07:35) (18 - 18)  SpO2: 97% (09-12-24 @ 07:35) (97% - 98%)  Wt(kg): --  I&O's Summary    11 Sep 2024 07:01  -  12 Sep 2024 07:00  --------------------------------------------------------  IN: 580 mL / OUT: 1725 mL / NET: -1145 mL    12 Sep 2024 07:01  -  12 Sep 2024 12:07  --------------------------------------------------------  IN: 240 mL / OUT: 600 mL / NET: -360 mL        Appearance: Normal	  Cardiovascular: Normal S1 S2,RRR, No JVD, No murmurs  Respiratory: Lungs clear to auscultation	  Gastrointestinal:  Soft, Non-tender, + BS	  Extremities: Normal range of motion, No clubbing, cyanosis or edema  Vascular: Peripheral pulses palpable 2+ bilaterally       Home Medications:  aspirin 81 mg oral tablet: 1 tab(s) orally once a day (04 Sep 2024 17:17)  cholecalciferol 25 mcg (1000 intl units) oral tablet: 1 tab(s) orally once a day (04 Sep 2024 17:17)  Enbrel Prefilled Syringe 50 mg/mL subcutaneous solution: 50 milligram(s) subcutaneously every other week (04 Sep 2024 17:17)  ezetimibe-simvastatin 10 mg-20 mg oral tablet: 1 tab(s) orally once a day (04 Sep 2024 17:17)  Flomax 0.4 mg oral capsule: 1 cap(s) orally once a day (at bedtime) (04 Sep 2024 17:17)  Lumigan 0.01% ophthalmic solution: 1 drop(s) in each affected eye once a day (in the evening) (04 Sep 2024 17:17)  metoprolol succinate 25 mg oral tablet, extended release: 1 tab(s) orally once a day (12 Sep 2024 11:09)  NovoLOG FlexPen 100 units/mL injectable solution: 13 unit(s) injectable once a day (04 Sep 2024 17:17)  NovoLOG Mix 70/30 FlexPen subcutaneous suspension: 21 unit(s) subcutaneous once a day (at bedtime) (04 Sep 2024 17:17)  pantoprazole 40 mg oral delayed release tablet: 1 tab(s) orally once a day (before a meal) (12 Sep 2024 11:09)  PreserVision AREDS 2 oral capsule: 1 cap(s) orally once a day (04 Sep 2024 17:17)  ramipril 2.5 mg oral capsule: 1 cap(s) orally once a day (04 Sep 2024 17:17)  Vitamin C 500 mg oral tablet: 1 tab(s) orally once a day (04 Sep 2024 17:17)        MEDICATIONS  (STANDING):  aspirin  chewable 81 milliGRAM(s) Oral daily  atorvastatin 10 milliGRAM(s) Oral at bedtime  dextrose 5%. 1000 milliLiter(s) (100 mL/Hr) IV Continuous <Continuous>  dextrose 5%. 1000 milliLiter(s) (50 mL/Hr) IV Continuous <Continuous>  dextrose 50% Injectable 25 Gram(s) IV Push once  dextrose 50% Injectable 25 Gram(s) IV Push once  dextrose 50% Injectable 12.5 Gram(s) IV Push once  enoxaparin Injectable 40 milliGRAM(s) SubCutaneous every 24 hours  ezetimibe 10 milliGRAM(s) Oral daily  glucagon  Injectable 1 milliGRAM(s) IntraMuscular once  insulin glargine Injectable (LANTUS) 10 Unit(s) SubCutaneous at bedtime  insulin lispro (ADMELOG) corrective regimen sliding scale   SubCutaneous three times a day before meals  insulin lispro Injectable (ADMELOG) 3 Unit(s) SubCutaneous three times a day before meals  latanoprost 0.005% Ophthalmic Solution 1 Drop(s) Both EYES at bedtime  lisinopril 5 milliGRAM(s) Oral daily  metoprolol succinate ER 25 milliGRAM(s) Oral daily  pantoprazole    Tablet 40 milliGRAM(s) Oral before breakfast  senna 2 Tablet(s) Oral at bedtime  tamsulosin 0.4 milliGRAM(s) Oral at bedtime        EKG:  RADIOLOGY:  DIAGNOSTIC TESTING:  [ ] Echocardiogram:  [ ] Catherterization:  [ ] Stress Test:  OTHER:     LABS:	 	      09-11    136  |  101  |  40<H>  ----------------------------<  159<H>  4.5   |  22  |  1.88<H>    Ca    9.4      11 Sep 2024 05:38            CARDIAC MARKERS:

## 2024-09-12 NOTE — PROVIDER CONTACT NOTE (OTHER) - ASSESSMENT
RN observed documented pause strip from 06:46am on AM tele check. On assessment pt states he was sleeping at time of event, denies symptoms. VSS on RN assessment see flowsheet.

## 2024-09-12 NOTE — PHARMACOTHERAPY INTERVENTION NOTE - COMMENTS
Heart Failure Medication Education      HFrEF (EF = 35-40% on 9/6/24)  Guideline directed medical therapy as below (name/dose/frequency):   Diuretic: pt is euvolemic  BB: metoprolol succinate ER 25mg once a day  ARNI/ACE-I/ARB: ramipril 2.5mg once a day  MRA: defer to outpatient f/u  SGLT2i: defer to outpatient f/u  Hydralazine/Nitrate: --    HFrEF – Recommend diuretics, BB, ARNI preferred/ACE-I/ARB, MRA, & SGLT2i     Counseled patient/caregiver on above medication names (brand/generic), indication, and possible side effects and provided medication cards.     Counseled patient to observe and obtain daily weights and to notify doctor if >2-3 lbs/day or >5lbs/week weight gain, increased short of breath or using more pillows at nighttime. Answered all of the patient’s questions to the best of my ability. Patient exhibited understanding of heart failure medication regimen and management. Patient understood importance of compliance and to follow up with cardiologist outpatient after discharge.     -Was the patient offered Meds to Beds? No, patient is being discharged to [facility]  -Was medication coverage confirmed for any new medications? Yes    Marilee Cohen, PharmD, BCPS  Clinical Pharmacy Specialist  Available on [employer] Teams (preferred)  Cell: [phone]

## 2024-09-12 NOTE — PROGRESS NOTE ADULT - SUBJECTIVE AND OBJECTIVE BOX
Date of Service: 09-12-24 @ 16:03    Patient is a 83y old  Male who presents with a chief complaint of Fall (12 Sep 2024 12:07)      Any change in ROS: seems OK:  no sob:  no phlegm  : on room air:  for dc today     MEDICATIONS  (STANDING):  aspirin  chewable 81 milliGRAM(s) Oral daily  atorvastatin 10 milliGRAM(s) Oral at bedtime  dextrose 5%. 1000 milliLiter(s) (100 mL/Hr) IV Continuous <Continuous>  dextrose 5%. 1000 milliLiter(s) (50 mL/Hr) IV Continuous <Continuous>  dextrose 50% Injectable 12.5 Gram(s) IV Push once  dextrose 50% Injectable 25 Gram(s) IV Push once  dextrose 50% Injectable 25 Gram(s) IV Push once  enoxaparin Injectable 40 milliGRAM(s) SubCutaneous every 24 hours  ezetimibe 10 milliGRAM(s) Oral daily  glucagon  Injectable 1 milliGRAM(s) IntraMuscular once  insulin glargine Injectable (LANTUS) 10 Unit(s) SubCutaneous at bedtime  insulin lispro (ADMELOG) corrective regimen sliding scale   SubCutaneous three times a day before meals  insulin lispro Injectable (ADMELOG) 3 Unit(s) SubCutaneous three times a day before meals  latanoprost 0.005% Ophthalmic Solution 1 Drop(s) Both EYES at bedtime  lisinopril 5 milliGRAM(s) Oral daily  metoprolol succinate ER 25 milliGRAM(s) Oral daily  pantoprazole    Tablet 40 milliGRAM(s) Oral before breakfast  senna 2 Tablet(s) Oral at bedtime  tamsulosin 0.4 milliGRAM(s) Oral at bedtime    MEDICATIONS  (PRN):  acetaminophen     Tablet .. 650 milliGRAM(s) Oral every 6 hours PRN Temp greater or equal to 38C (100.4F), Moderate Pain (4 - 6)  bisacodyl 5 milliGRAM(s) Oral every 12 hours PRN Constipation  dextrose Oral Gel 15 Gram(s) Oral once PRN Blood Glucose LESS THAN 70 milliGRAM(s)/deciliter  melatonin 3 milliGRAM(s) Oral at bedtime PRN Insomnia  polyethylene glycol 3350 17 Gram(s) Oral two times a day PRN Constipation    Vital Signs Last 24 Hrs  T(C): 36.6 (12 Sep 2024 12:35), Max: 36.8 (12 Sep 2024 04:48)  T(F): 97.8 (12 Sep 2024 12:35), Max: 98.2 (12 Sep 2024 04:48)  HR: 59 (12 Sep 2024 12:35) (59 - 64)  BP: 123/62 (12 Sep 2024 12:35) (101/62 - 127/63)  BP(mean): --  RR: 18 (12 Sep 2024 12:35) (18 - 18)  SpO2: 98% (12 Sep 2024 12:35) (97% - 98%)    Parameters below as of 12 Sep 2024 12:35  Patient On (Oxygen Delivery Method): room air        I&O's Summary    11 Sep 2024 07:01  -  12 Sep 2024 07:00  --------------------------------------------------------  IN: 580 mL / OUT: 1725 mL / NET: -1145 mL    12 Sep 2024 07:01  -  12 Sep 2024 16:03  --------------------------------------------------------  IN: 480 mL / OUT: 900 mL / NET: -420 mL          Physical Exam:   GENERAL: NAD, well-groomed, well-developed  HEENT: LEW/   Atraumatic, Normocephalic  ENMT: No tonsillar erythema, exudates, or enlargement; Moist mucous membranes, Good dentition, No lesions  NECK: Supple, No JVD, Normal thyroid  CHEST/LUNG: Clear to auscultaion  CVS: Regular rate and rhythm; No murmurs, rubs, or gallops  GI: : Soft, Nontender, Nondistended; Bowel sounds present  NERVOUS SYSTEM:  Alert & Oriented X3  EXTREMITIES:  2+ Peripheral Pulses, No clubbing, cyanosis, or edema  LYMPH: No lymphadenopathy noted  SKIN: No rashes or lesions  ENDOCRINOLOGY: No Thyromegaly  PSYCH: Appropriate    Labs:          09-11    136  |  101  |  40<H>  ----------------------------<  159<H>  4.5   |  22  |  1.88<H>  09-10    135  |  99  |  27<H>  ----------------------------<  212<H>  4.4   |  21<L>  |  1.36<H>  09-09    132<L>  |  96  |  27<H>  ----------------------------<  212<H>  4.4   |  22  |  1.51<H>    Ca    9.4      11 Sep 2024 05:38      CAPILLARY BLOOD GLUCOSE      POCT Blood Glucose.: 192 mg/dL (12 Sep 2024 11:55)  POCT Blood Glucose.: 185 mg/dL (12 Sep 2024 08:07)  POCT Blood Glucose.: 130 mg/dL (11 Sep 2024 21:10)  POCT Blood Glucose.: 175 mg/dL (11 Sep 2024 16:58)          Urinalysis Basic - ( 11 Sep 2024 05:38 )    Color: x / Appearance: x / SG: x / pH: x  Gluc: 159 mg/dL / Ketone: x  / Bili: x / Urobili: x   Blood: x / Protein: x / Nitrite: x   Leuk Esterase: x / RBC: x / WBC x   Sq Epi: x / Non Sq Epi: x / Bacteria: x      D-Dimer Assay, Quantitative: 408 ng/mL DDU (09-08 @ 06:20)        RECENT CULTURES:  rad< from: CT Head No Cont (09.04.24 @ 11:54) >  right temporomandibular arthrosis.    CT CERVICAL SPINE:    VERTEBRAE: No definitive evidence of acute fracture or prevertebral soft   tissue swelling, as on the prior. 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 Multilevel lower cervical   disc space narrowing, most pronounced at C5-C6, with multilevel   endplate-uncovertebral spurring, most pronounced at this level as well.  ALIGNMENT: Straightening of lordosis. No subluxation. No perched or   dislocated facets. Multilevel facet arthrosis, most pronounced and   moderate-severe at the right C4-C5 level.  INTERVERTEBRAL DISC SPACES: Although suboptimally evaluated on this exam,   there is evidence of multilevel cervical disc bulges and/or protrusions,   contributing to multilevel central canal and neural foramen stenosis.    VISUALIZED LUNGS: No suspicious left upper lung mass. Partially imaged   opacification right upper lung.    MISCELLANEOUS: No gross intrathecal-paraspinal mass or retropharyngeal   fluid collection, however suboptimally evaluated on this exam. Vascular   calcifications, including at bilateral carotid bifurcations. Evidence of   borderline basilar invagination.    IMPRESSION:    CT HEAD:  1. No significant change.  2. No evidence of acute calvarial fracture or intracranial hemorrhage.  3. Additional findings described in detail above.    CT MAXILLOFACIAL:  1. No definitive evidence of acute fracture or paranasal sinus air-fluid   level.  2. Soft tissue edema with probable 2.5 cm subcutaneous hematoma lateral   to the left mandible. Cellulitis may have an overlapping imaging   appearance. Correlate with clinical findings.  3. Additional findings described in detail above.    CT CERVICAL SPINE:  1. No definitive evidence of acute fracture or prevertebral soft tissue   swelling. Straightening of lordosis may reflect muscle spasm.  2. Additional findings, including those degenerative, described in detail   above.    --- End of Report ---            CORBIN FONG M.D., ATTENDING RADIOLOGIST  This document has been electronically signed. Sep  4 2024 12:24PM    < end of copied text >  < from: CT Maxillofacial No Cont (09.04.24 @ 11:54) >  TYMPANOMASTOID CAVITIES:  Stable partial opacification with sclerosis   right mastoid tip, possibly the sequela of a remote prior inflammatory   process. Visualized temporal bones are intact.    ORBITAL CONTENTS: Bilateral lens replacement surgery. Globes, intraconal   regions, extraocular muscles and optic nerves otherwise grossly  unremarkable, without evidence of traumatic injury.  REMAINING VISUALIZED BONES: Intact.  MISCELLANEOUS:  Edema with an approximate 2.5 x 1.0 x 0.8 cm lentiform   hyperdense mass involving the subcutaneous fat lateral to the left   mandible, with thickening of the subjacent platysma musculature. Severe   right temporomandibular arthrosis.    CT CERVICAL SPINE:    VERTEBRAE: No definitive evidence of acute fracture or prevertebral soft   tissue swelling, as on the prior. 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 Multilevel lower cervical   disc space narrowing, most pronounced at C5-C6, with multilevel   endplate-uncovertebral spurring, most pronounced at this level as well.  ALIGNMENT: Straightening of lordosis. No subluxation. No perched or   dislocated facets. Multilevel facet arthrosis, most pronounced and   moderate-severe at the right C4-C5 level.  INTERVERTEBRAL DISC SPACES: Although suboptimally evaluated on this exam,   there is evidence of multilevel cervical disc bulges and/or protrusions,   contributing to multilevel central canal and neural foramen stenosis.    VISUALIZED LUNGS: No suspicious left upper lung mass. Partially imaged   opacification right upper lung.    MISCELLANEOUS: No gross intrathecal-paraspinal mass or retropharyngeal   fluid collection, however suboptimally evaluated on this exam. Vascular   calcifications, including at bilateral carotid bifurcations. Evidence of   borderline basilar invagination.    IMPRESSION:    CT HEAD:  1. No significant change.  2. No evidence of acute calvarial fracture or intracranial hemorrhage.  3. Additional findings described in detail above.    CT MAXILLOFACIAL:  1. No definitive evidence of acute fracture or paranasal sinus air-fluid   level.  2. Soft tissue edema with probable 2.5 cm subcutaneous hematoma lateral   to the left mandible. Cellulitis may have an overlapping imaging   appearance. Correlate with clinical findings.  3. Additional findings described in detail above.    CT CERVICAL SPINE:  1. No definitive evidence of acute fracture or prevertebral soft tissue   swelling. Straightening of lordosis may reflect muscle spasm.  2. Additional findings, including those degenerative, described in detail   above.    --- End of Report ---            CORBIN FONG M.D., ATTENDING RADIOLOGIST  This document has been electronically signed. Sep  4 2024 12:24PM    < end of copied text >  < from: CT Chest w/ IV Cont (09.04.24 @ 11:52) >  REPRODUCTIVE ORGANS: Prostate mildly enlarged.    BOWEL: No bowel obstruction. Appendix is normal.  PERITONEUM/RETROPERITONEUM: Within normal limits.  VESSELS: Atherosclerotic calcifications.  LYMPH NODES: No lymphadenopathy.  ABDOMINAL WALL: Fat-containing umbilical hernia.  BONES: Degenerative changes. Sclerotic lesion in the left femoral neck,   unchanged from prior    IMPRESSION:  *  No evidence of acute traumatic abnormality in the chest, abdomen, or   pelvis.  *  Pulmonary changes consistent with interstitial lung disease, similar   to findings present on prior examination.  *  Resolution of left pleural effusion.    < end of copied text >        RESPIRATORY CULTURES:          Studies  Chest X-RAY  CT SCAN Chest   Venous Dopplers: LE:   CT Abdomen  Others

## 2024-09-12 NOTE — PROGRESS NOTE ADULT - ASSESSMENT
82 Y/o Male with pmhx BPH, CAD, DM, htn and RA BIBA sp fall this morning.  As per son, patient fell early this morning and didn't remember what happened. As per patient, he thinks he tripped while ambulating with his walker but isnt sure. Patient denies LOC. Thinks it was around 3-4AM. Patient able to call son, who came from [place] and was able to call ems. Patient has been having sore throat, cough and chills for the past week and is currently taking antitbiotic. Patient lives at home with his wife who has parkinsons. Patient denies abd pain, nvd, tingling, numbness, and dysuria    Syncope  - telemonitor  - cards fu appreciated  - CT head neg  - will monitor   - check ECHO  - -cath 2023 with closed mid LAD stent with distal LAD filling with well developed collaterals   -would defer further ischemic w/u for now in setting of acute Covid infection and no active chest pain; continue medical management  -continue ASA, has been off of plavix     COVID 19  - symptomatic care  - stable on RA    CKD  - renal fu   - monitor cr    HLD  - cw statin    RA  - cw outpt meds on dc    DVT prophylaxis 
 82 Y/o Male with pmhx BPH, CAD, DM, htn and RA BIBA sp fall this morning.  As per son, patient fell early this morning and didn't remember what happened. As per patient, he thinks he tripped while ambulating with his walker but isnt sure. Patient denies LOC. Thinks it was around 3-4AM. Patient able to call son, who came from [place] and was able to call ems. Patient has been having sore throat, cough and chills for the past week and is currently taking antitbiotic. Patient lives at home with his wife who has parkinsons. Patient denies abd pain, nvd, tingling, numbness, and dysuria    Syncope  - telemonitor  - cards fu appreciated  - CT head neg  - will monitor   - check ECHO  - -cath 2023 with closed mid LAD stent with distal LAD filling with well developed collaterals   -would defer further ischemic w/u for now in setting of acute Covid infection and no active chest pain; continue medical management  -continue ASA, has been off of plavix     COVID 19  - symptomatic care  - stable on RA    CKD  - renal fu   - monitor cr    HLD  - cw statin    RA  - cw outpt meds on dc    DVT prophylaxis     
 84 Y/o Male with pmhx BPH, CAD, DM, htn and RA BIBA sp fall this morning.  As per son, patient fell early this morning and didn't remember what happened. As per patient, he thinks he tripped while ambulating with his walker but isnt sure. Patient denies LOC. Thinks it was around 3-4AM. Patient able to call son, who came from [place] and was able to call ems. Patient has been having sore throat, cough and chills for the past week and is currently taking antitbiotic. Patient lives at home with his wife who has parkinsons. Patient denies abd pain, nvd, tingling, numbness, and dysuria    Syncope  - telemonitor  - cards fu appreciated  - CT head neg  - will monitor   - check ECHO  - -cath 2023 with closed mid LAD stent with distal LAD filling with well developed collaterals   -would defer further ischemic w/u for now in setting of acute Covid infection and no active chest pain; continue medical management  -continue ASA, has been off of plavix     COVID 19  - symptomatic care  - finished  remdesivir     CKD  - renal fu   - monitor cr    HLD  - cw statin    RA  - cw outpt meds on dc    DVT prophylaxis     
 84 Y/o Male with pmhx BPH, CAD, DM, htn and RA BIBA sp fall this morning.  As per son, patient fell early this morning and didn't remember what happened. As per patient, he thinks he tripped while ambulating with his walker but isnt sure. Patient denies LOC. Thinks it was around 3-4AM. Patient able to call son, who came from [place] and was able to call ems. Patient has been having sore throat, cough and chills for the past week and is currently taking antitbiotic. Patient lives at home with his wife who has parkinsons. Patient denies abd pain, nvd, tingling, numbness, and dysuria (04 Sep 2024 18:11)    now he seems OK: alert and awake and able to answer questions:  he says he has no underlying lung disease:  now has cough : no chest pain : never had pe or dvt     he has underlying ILD : has RA:    covid infection: /FALL  RA/ILD  CAD:  DM  BPH    covid infection: /FALL  -he is immunocompromised : start remdesivir: :  -on room air : no need for dexa:   9/6:  d2 of dexa of 3:  -remains on room air:   9/7:  d3/3 of remdesivir: remains on room air   -D Dimnrr ordered for AM   9/9:  -remdesivir cont till today :  -d dimer is slightly high  but clinical probability for vte and pe is very low:   -hei son room air   9/10: finsihed remdesivir:   -on room air:   no chest pain  , or sob and remains on room air:   -dc planning if no other tests needed from pulm side   9/11:   -remains on room air:   -no chest pain or sob:   -normoxic:   9/12:  seems OK:  no sob:  no cough : no phlegm    -no sob:  no fever:   for dc today       difficulty in swallowing   -? barium swallow:   9/7;  --gi following   9/8: for ba esophgram today    9/10; not done:  as he has covid infection:  defer to gi   9/11: pt is swallowing properly:  now the plan is to do outside workup   9/12: no inpt workup now:     RA/ ILD  -he has ILD:  : he has ra:  likely related to it     CAD:  -cont current meds:   -9/6: < from: Cardiac Catheterization (02.27.23 @ 16:09) >  Cath with left dominant system with luminal LCX and RCA disease.  Mid LAD is totally occluded at site of previous stent with collaterals filling the distal LAD retrograde to distal edge of stent. Will continue medical treatment of CAD, LAD  in light of lack of angina, dyspnea,any exertional symptoms, advanced age,  and decreased GFR.   Patient and son both agree and prefer medical treatment.  If develops any exertional symptoms or angina, he can return for attempt of PCI to mid LAD .    < end of copied text >      DM  - monitor and control   -glucagon  Injectable 1 milliGRAM(s) IntraMuscular once  -insulin glargine Injectable (LANTUS) 10 Unit(s) SubCutaneous at bedtime  -insulin lispro (ADMELOG) corrective regimen sliding scale   SubCutaneous three times a day before meals  -insulin lispro Injectable (ADMELOG) 3 Unit(s) SubCutaneous three times a day before meals    BPH  -watch urine output :    alfredo collins 
A/P    82 Y/o Male with pmhx BPH, CAD sp PCI, CKD, DM, HTN, and RA BIBA sp fall this morning.     #SP Fall  -pt denies cardiac prodrome surrounding fall  -HS Trops elevated ,demand ischemia in setting of covid, known CAD, icmp  -mild ST elevation noted in V2; no CP on exam; no need to trend trop  -ECG shows NSR HR 82 with TWI in V1-V6, pt denies CP, no concern for ACS; likely new TWI d/t demand ischemia in setting of Covid infection  -CT Head 9/4/24 shows no acute calvarial fracture or intracranial hemorrhage  -infectious w/u per med  -SIMEON from 5/29/24 shows normal LV systolic function, no regional wall motion abnormalities, normal RV systolic function, mild to moderate aortic regurgitation, mild to moderate pulmonic regurgitation.  -ECHO 9/6/24 shows Moderately decreased LVSF; EF 35-40%; Regional wall motion abnormalities present; entire apex is akinetic; The mid anteroseptal segment, mid inferoseptal segment, mid anterolateral segment, mid anterior segment, and mid inferior segment are hypokinetic. Compared to the SIMEON performed on 5/29/2024, there are new regional wall motion abnormalities and a decline in LVEF  -continue medical management    #Covid  -stable on RA  -CT Chest 9/4/24 shows resolution of left pleural effusion. Right pleural effusion is   unchanged.  No evidence of acute traumatic abnormality in the chest, abdomen, or   pelvis. Pulmonary changes consistent with interstitial lung disease, similar   to findings present on prior examination  -med f/u    #HTN  -Trend BP, cont bb/ace    #CAD s/p PCI,iCMP  -SIMEON and ECHO as above  -HS Trop elevated; mild ST elevation noted in V2; no CP on exam; no need to trend trop  -cath 2023 with closed mid LAD stent with distal LAD filling with well developed collaterals   -would defer further ischemic w/u for now in setting of acute Covid infection and no active chest pain; continue medical management  -continue ASA, Lipitor, has been off of plavix   -echo with new swa, interval dec in lv fxn  -prev echo with swa and lad territory/apical hypo, current echo poss representing stress induced cmp on top of chronic LV dysfxn  -cont med tx of cmp    #CKD  -Creat noted; trend renal function    #H/O rheumatoid arthritis  -tx per med    dvt ppx      55 minutes spent on total encounter; more than 50% of the visit was spent counseling and/or coordinating care by the attending physician.  
A/P    82 Y/o Male with pmhx BPH, CAD sp PCI, CKD, DM, HTN, and RA BIBA sp fall this morning.     #SP Fall  -pt denies cardiac prodrome surrounding fall  -HS Trops elevated, demand ischemia in setting of covid, known CAD, icmp  -mild ST elevation noted in V2; no CP on exam; no need to trend trop  -ECG shows NSR HR 82 with TWI in V1-V6, pt denies CP, no concern for ACS; likely new TWI d/t demand ischemia in setting of Covid infection  -CT Head 9/4/24 shows no acute calvarial fracture or intracranial hemorrhage  -infectious w/u per med  -SIMEON from 5/29/24 shows normal LV systolic function, no regional wall motion abnormalities, normal RV systolic function, mild to moderate aortic regurgitation, mild to moderate pulmonic regurgitation.  -ECHO 9/6/24 shows Moderately decreased LVSF; EF 35-40%; Regional wall motion abnormalities present; entire apex is akinetic; The mid anteroseptal segment, mid inferoseptal segment, mid anterolateral segment, mid anterior segment, and mid inferior segment are hypokinetic. Compared to the SIMEON performed on 5/29/2024, there are new regional wall motion abnormalities and a decline in LVEF  -continue medical management    #Covid  -stable on RA  -CT Chest 9/4/24 shows resolution of left pleural effusion. Right pleural effusion is   unchanged.  No evidence of acute traumatic abnormality in the chest, abdomen, or   pelvis. Pulmonary changes consistent with interstitial lung disease, similar   to findings present on prior examination  -med f/u    #HTN  -BP stable, cont bb/ace    #CAD s/p PCI,iCMP  -SIMEON and ECHO as above  -HS Trop elevated; mild ST elevation noted in V2; no CP on exam; no need to trend trop  -cath 2023 with closed mid LAD stent with distal LAD filling with well developed collaterals   -would defer further ischemic w/u for now in setting of acute Covid infection and no active chest pain; continue medical management  -continue ASA, Lipitor, has been off of plavix   -echo with new swa, interval dec in lv fxn  -prev echo with swa and lad territory/apical hypo, current echo poss representing stress induced cmp on top of chronic LV dysfxn  -cont med tx of cmp    #CKD  -Creat noted; trend renal function    #H/O rheumatoid arthritis  -tx per med    dvt ppx    35 minutes spent on total encounter; more than 50% of the visit was spent counseling and/or coordinating care by the attending physician.  
 82 Y/o Male with pmhx BPH, CAD, DM, htn and RA BIBA sp fall this morning.  As per son, patient fell early this morning and didn't remember what happened. As per patient, he thinks he tripped while ambulating with his walker but isnt sure. Patient denies LOC. Thinks it was around 3-4AM. Patient able to call son, who came from [place] and was able to call ems. Patient has been having sore throat, cough and chills for the past week and is currently taking antitbiotic. Patient lives at home with his wife who has parkinsons. Patient denies abd pain, nvd, tingling, numbness, and dysuria    Syncope  - telemonitor  - cards fu appreciated  - CT head neg  - will monitor   - -cath 2023 with closed mid LAD stent with distal LAD filling with well developed collaterals   -would defer further ischemic w/u for now in setting of acute Covid infection and no active chest pain; continue medical management  -continue ASA, has been off of plavix     COVID 19  - symptomatic care  - finished  remdesivir     CKD  - renal fu   - monitor cr    HLD  - cw statin    RA  - cw outpt meds on dc    Dysphagia  - outpt esophagogram     DVT prophylaxis         
 82 Y/o Male with pmhx BPH, CAD, DM, htn and RA BIBA sp fall this morning.  As per son, patient fell early this morning and didn't remember what happened. As per patient, he thinks he tripped while ambulating with his walker but isnt sure. Patient denies LOC. Thinks it was around 3-4AM. Patient able to call son, who came from [place] and was able to call ems. Patient has been having sore throat, cough and chills for the past week and is currently taking antitbiotic. Patient lives at home with his wife who has parkinsons. Patient denies abd pain, nvd, tingling, numbness, and dysuria    Syncope  - telemonitor  - cards fu appreciated  - CT head neg  - will monitor   - check ECHO  - -cath 2023 with closed mid LAD stent with distal LAD filling with well developed collaterals   -would defer further ischemic w/u for now in setting of acute Covid infection and no active chest pain; continue medical management  -continue ASA, has been off of plavix     COVID 19  - symptomatic care  - cw remdesivir     CKD  - renal fu   - monitor cr    HLD  - cw statin    RA  - cw outpt meds on dc    DVT prophylaxis 
 82 Y/o Male with pmhx BPH, CAD, DM, htn and RA BIBA sp fall this morning.  As per son, patient fell early this morning and didn't remember what happened. As per patient, he thinks he tripped while ambulating with his walker but isnt sure. Patient denies LOC. Thinks it was around 3-4AM. Patient able to call son, who came from [place] and was able to call ems. Patient has been having sore throat, cough and chills for the past week and is currently taking antitbiotic. Patient lives at home with his wife who has parkinsons. Patient denies abd pain, nvd, tingling, numbness, and dysuria (04 Sep 2024 18:11)    now he seems OK: alert and awake and able to answer questions:  he says he has no underlying lung disease:  now has cough : no chest pain : never had pe or dvt     he has underlying ILD : has RA:    covid infection: /FALL  RA/ILD  CAD:  DM  BPH    covid infection: /FALL  -he is immunocompromised : start remdesivir: :  -on room air : no need for dexa:   9/6:  d2 of dexa of 3:  -remains on room air:   9/7:  d3/3 of remdesivir: remains on room air   -D Dimnrr ordered for AM   9/9:  -remdesivir cont till today :  -d dimer is slightly high  but clinical probability for vte and pe is very low:   -hei son room air   9/10: finsihed remdesivir:   -on room air:   no chest pain  , or sob and remains on room air:   -dc planning if no other tests needed from pulm side   9/11:   -remains on room air:   -no chest pain or sob:   -normoxic:       difficulty in swallowing   -? barium swallow:   9/7;  --gi following   9/8: for ba esophgram today    9/10; not done:  as he has covid infection:  defer to gi   9/11: pt is swallowing properly:  now the plan is to do outside workup     RA/ ILD  -he has ILD:  : he has ra:  likely related to it     CAD:  -cont current meds:   -9/6: < from: Cardiac Catheterization (02.27.23 @ 16:09) >  Cath with left dominant system with luminal LCX and RCA disease.  Mid LAD is totally occluded at site of previous stent with collaterals filling the distal LAD retrograde to distal edge of stent. Will continue medical treatment of CAD, LAD  in light of lack of angina, dyspnea,any exertional symptoms, advanced age,  and decreased GFR.   Patient and son both agree and prefer medical treatment.  If develops any exertional symptoms or angina, he can return for attempt of PCI to mid LAD .    < end of copied text >      DM  - monitor and control   -glucagon  Injectable 1 milliGRAM(s) IntraMuscular once  -insulin glargine Injectable (LANTUS) 10 Unit(s) SubCutaneous at bedtime  -insulin lispro (ADMELOG) corrective regimen sliding scale   SubCutaneous three times a day before meals  -insulin lispro Injectable (ADMELOG) 3 Unit(s) SubCutaneous three times a day before meals    BPH  -watch urine output :    alfredo collins 
 84 Y/o Male with pmhx BPH, CAD, DM, htn and RA BIBA sp fall this morning.  As per son, patient fell early this morning and didn't remember what happened. As per patient, he thinks he tripped while ambulating with his walker but isnt sure. Patient denies LOC. Thinks it was around 3-4AM. Patient able to call son, who came from [place] and was able to call ems. Patient has been having sore throat, cough and chills for the past week and is currently taking antitbiotic. Patient lives at home with his wife who has parkinsons. Patient denies abd pain, nvd, tingling, numbness, and dysuria (04 Sep 2024 18:11)    now he seems OK: alert and awake and able to answer questions:  he says he has no underlying lung disease:  now has cough : no chest pain : never had pe or dvt     he has underlying ILD : has RA:    covid infection: /FALL  RA/ILD  CAD:  DM  BPH    covid infection: /FALL  -he is immunocompromised : start remdesivir: :  -on room air : no need for dexa:   9/6:  d2 of dexa of 3:  -remains on room air:   9/7:  d3/3 of remdesivir: remains on room air   -D Dimnrr ordered for AM   9/9:  -remdesivir cont till today :  -d dimer is slightly high  but clinical probability for vte and pe is very low:   -hei son room air   9/10: finsihed remdesivir:   -on room air:   no chest pain  , or sob and remains on room air:   -dc planning if no other tests needed from pulm side     difficulty in swallowing   -? barium swallow:   9/7;  --gi following   9/8: for ba esophgram today    9/10; not done:  as he has covid infection:  defer to gi     RA/ ILD  -he has ILD:  : he has ra:  likely related to it     CAD:  -cont current meds:   -9/6: < from: Cardiac Catheterization (02.27.23 @ 16:09) >  Cath with left dominant system with luminal LCX and RCA disease.  Mid LAD is totally occluded at site of previous stent with collaterals filling the distal LAD retrograde to distal edge of stent. Will continue medical treatment of CAD, LAD  in light of lack of angina, dyspnea,any exertional symptoms, advanced age,  and decreased GFR.   Patient and son both agree and prefer medical treatment.  If develops any exertional symptoms or angina, he can return for attempt of PCI to mid LAD .    < end of copied text >      DM  - monitor and control     BPH  -watch urine output :    alfredo acp 
A/P    82 Y/o Male with pmhx BPH, CAD sp PCI, CKD, DM, HTN, and RA BIBA sp fall this morning.     #SP Fall  -pt denies cardiac prodrome surrounding fall  -HS Trops elevated ,demand ischemia in setting of covid, known CAD, icmp  -mild ST elevation noted in V2; no CP on exam; no need to trend trop  -ECG shows NSR HR 82 with TWI in V1-V6, pt denies CP, no concern for ACS; likely new TWI d/t demand ischemia in setting of Covid infection  -CT Head 9/4/24 shows no acute calvarial fracture or intracranial hemorrhage  -infectious w/u per med  -SIMEON from 5/29/24 shows normal LV systolic function, no regional wall motion abnormalities, normal RV systolic function, mild to moderate aortic regurgitation, mild to moderate pulmonic regurgitation.  -ECHO 9/6/24 shows Moderately decreased LVSF; EF 35-40%; Regional wall motion abnormalities present; entire apex is akinetic; The mid anteroseptal segment, mid inferoseptal segment, mid anterolateral segment, mid anterior segment, and mid inferior segment are hypokinetic. Compared to the SMIEON performed on 5/29/2024, there are new regional wall motion abnormalities and a decline in LVEF  -continue medical management    #Covid  -stable on RA  -CT Chest 9/4/24 shows resolution of left pleural effusion. Right pleural effusion is   unchanged.  No evidence of acute traumatic abnormality in the chest, abdomen, or   pelvis. Pulmonary changes consistent with interstitial lung disease, similar   to findings present on prior examination  -med f/u    #HTN  -Trend BP, cont bb/ace    #CAD s/p PCI,iCMP  -SIMEON and ECHO as above  -HS Trop elevated; mild ST elevation noted in V2; no CP on exam; no need to trend trop  -cath 2023 with closed mid LAD stent with distal LAD filling with well developed collaterals   -would defer further ischemic w/u for now in setting of acute Covid infection and no active chest pain; continue medical management  -continue ASA, Lipitor, has been off of plavix   -echo with new swa, interval dec in lv fxn  -prev echo with swa and lad territory/apical hypo, current echo poss representing stress induced cmp on top of chronic LV dysfxn  -cont med tx of cmp    #CKD  -Creat noted; trend renal function    #H/O rheumatoid arthritis  -tx per med    dvt ppx      55 minutes spent on total encounter; more than 50% of the visit was spent counseling and/or coordinating care by the attending physician.  
A/P    82 Y/o Male with pmhx BPH, CAD sp PCI, CKD, DM, HTN, and RA BIBA sp fall this morning.     #SP Fall  -pt denies cardiac prodrome surrounding fall  -HS Trops elevated, demand ischemia in setting of covid, known CAD, icmp  -mild ST elevation noted in V2; no CP on exam; no need to trend trop  -ECG shows NSR HR 82 with TWI in V1-V6, pt denies CP, no concern for ACS; likely new TWI d/t demand ischemia in setting of Covid infection  -CT Head 9/4/24 shows no acute calvarial fracture or intracranial hemorrhage  -infectious w/u per med  -SIMEON from 5/29/24 shows normal LV systolic function, no regional wall motion abnormalities, normal RV systolic function, mild to moderate aortic regurgitation, mild to moderate pulmonic regurgitation.  -ECHO 9/6/24 shows Moderately decreased LVSF; EF 35-40%; Regional wall motion abnormalities present; entire apex is akinetic; The mid anteroseptal segment, mid inferoseptal segment, mid anterolateral segment, mid anterior segment, and mid inferior segment are hypokinetic. Compared to the SIMEON performed on 5/29/2024, there are new regional wall motion abnormalities and a decline in LVEF  -continue medical management    #Covid  -stable on RA  -CT Chest 9/4/24 shows resolution of left pleural effusion. Right pleural effusion is   unchanged.  No evidence of acute traumatic abnormality in the chest, abdomen, or   pelvis. Pulmonary changes consistent with interstitial lung disease, similar   to findings present on prior examination  -med f/u    #HTN  -BP stable, cont bb/ace    #CAD s/p PCI,iCMP  -SIMEON and ECHO as above  -HS Trop elevated; mild ST elevation noted in V2; no CP on exam; no need to trend trop  -cath 2023 with closed mid LAD stent with distal LAD filling with well developed collaterals   -would defer further ischemic w/u for now in setting of acute Covid infection and no active chest pain; continue medical management  -continue ASA, Lipitor, has been off of plavix   -echo with new swa, interval dec in lv fxn  -prev echo with swa and lad territory/apical hypo, current echo poss representing stress induced cmp on top of chronic LV dysfxn  -cont med tx of cmp    #CKD  -Creat noted; trend renal function    #H/O rheumatoid arthritis  -tx per med    dvt ppx    
 82 Y/o Male with pmhx BPH, CAD, DM, htn and RA BIBA sp fall this morning.  As per son, patient fell early this morning and didn't remember what happened. As per patient, he thinks he tripped while ambulating with his walker but isnt sure. Patient denies LOC. Thinks it was around 3-4AM. Patient able to call son, who came from [place] and was able to call ems. Patient has been having sore throat, cough and chills for the past week and is currently taking antitbiotic. Patient lives at home with his wife who has parkinsons. Patient denies abd pain, nvd, tingling, numbness, and dysuria (04 Sep 2024 18:11)    now he seems OK: alert and awake and able to answer questions:  he says he has no underlying lung disease:  now has cough : no chest pain : never had pe or dvt     he has underlying ILD : has RA:    covid infection: /FALL  RA/ILD  CAD:  DM  BPH    covid infection: /FALL  -he is immunocompromised : start remdesivir: :  -on room air : no need for dexa:   9/6:  d2 of dexa of 3:  -remains on room air:   9/7:  d3/3 of remdesivir: remains on room air   -D Dimnr ordered for AM     difficulty in swallowing   -? barium swallow:   9/7;  --gi following     RA/ ILD  -he has ILD:  : he has ra:  likely related to it     CAD:  -cont current meds:   -9/6: < from: Cardiac Catheterization (02.27.23 @ 16:09) >  Cath with left dominant system with luminal LCX and RCA disease.  Mid LAD is totally occluded at site of previous stent with collaterals filling the distal LAD retrograde to distal edge of stent. Will continue medical treatment of CAD, LAD  in light of lack of angina, dyspnea,any exertional symptoms, advanced age,  and decreased GFR.   Patient and son both agree and prefer medical treatment.  If develops any exertional symptoms or angina, he can return for attempt of PCI to mid LAD .    < end of copied text >      DM  - monitor and control     BPH  -watch urine output :    alfredo acp 
 82 Y/o Male with pmhx BPH, CAD, DM, htn and RA BIBA sp fall this morning.  As per son, patient fell early this morning and didn't remember what happened. As per patient, he thinks he tripped while ambulating with his walker but isnt sure. Patient denies LOC. Thinks it was around 3-4AM. Patient able to call son, who came from [place] and was able to call ems. Patient has been having sore throat, cough and chills for the past week and is currently taking antitbiotic. Patient lives at home with his wife who has parkinsons. Patient denies abd pain, nvd, tingling, numbness, and dysuria (04 Sep 2024 18:11)    now he seems OK: alert and awake and able to answer questions:  he says he has no underlying lung disease:  now has cough : no chest pain : never had pe or dvt     he has underlying ILD : has RA:    covid infection: /FALL  RA/ILD  CAD:  DM  BPH    covid infection: /FALL  -he is immunocompromised : start remdesivir: :  -on room air : no need for dexa:   9/6:  d2 of dexa of 3:  -remains on room air:   9/7:  d3/3 of remdesivir: remains on room air   -D Dimnrr ordered for AM   9/9:  -remdesivir cont till today :  -d dimer is slightly high  but clinical probability for vte and pe is very low:   -hei son room air     difficulty in swallowing   -? barium swallow:   9/7;  --gi following   9/8: for ba esophgram today      RA/ ILD  -he has ILD:  : he has ra:  likely related to it     CAD:  -cont current meds:   -9/6: < from: Cardiac Catheterization (02.27.23 @ 16:09) >  Cath with left dominant system with luminal LCX and RCA disease.  Mid LAD is totally occluded at site of previous stent with collaterals filling the distal LAD retrograde to distal edge of stent. Will continue medical treatment of CAD, LAD  in light of lack of angina, dyspnea,any exertional symptoms, advanced age,  and decreased GFR.   Patient and son both agree and prefer medical treatment.  If develops any exertional symptoms or angina, he can return for attempt of PCI to mid LAD .    < end of copied text >      DM  - monitor and control     BPH  -watch urine output :    alfredo collins 
 84 Y/o Male with pmhx BPH, CAD, DM, htn and RA BIBA sp fall this morning.  As per son, patient fell early this morning and didn't remember what happened. As per patient, he thinks he tripped while ambulating with his walker but isnt sure. Patient denies LOC. Thinks it was around 3-4AM. Patient able to call son, who came from [place] and was able to call ems. Patient has been having sore throat, cough and chills for the past week and is currently taking antitbiotic. Patient lives at home with his wife who has parkinsons. Patient denies abd pain, nvd, tingling, numbness, and dysuria    Syncope  - telemonitor  - cards fu appreciated  - CT head neg  - will monitor   - check ECHO  - -cath 2023 with closed mid LAD stent with distal LAD filling with well developed collaterals   -would defer further ischemic w/u for now in setting of acute Covid infection and no active chest pain; continue medical management  -continue ASA, has been off of plavix     COVID 19  - symptomatic care  - stable on RA    CKD  - renal fu   - monitor cr    HLD  - cw statin    RA  - cw outpt meds on dc    DVT prophylaxis   
A/P    82 Y/o Male with pmhx BPH, CAD sp PCI, CKD, DM, HTN, and RA BIBA sp fall this morning.     #SP Fall  -pt denies cardiac prodrome surrounding fall  -HS Trops elevated, demand ischemia in setting of covid, known CAD, icmp  -mild ST elevation noted in V2; no CP on exam; no need to trend trop  -ECG shows NSR HR 82 with TWI in V1-V6, pt denies CP, no concern for ACS; likely new TWI d/t demand ischemia in setting of Covid infection  -CT Head 9/4/24 shows no acute calvarial fracture or intracranial hemorrhage  -infectious w/u per med  -SIMEON from 5/29/24 shows normal LV systolic function, no regional wall motion abnormalities, normal RV systolic function, mild to moderate aortic regurgitation, mild to moderate pulmonic regurgitation.  -ECHO 9/6/24 shows Moderately decreased LVSF; EF 35-40%; Regional wall motion abnormalities present; entire apex is akinetic; The mid anteroseptal segment, mid inferoseptal segment, mid anterolateral segment, mid anterior segment, and mid inferior segment are hypokinetic. Compared to the SIMEON performed on 5/29/2024, there are new regional wall motion abnormalities and a decline in LVEF  -continue medical management    #Covid  -stable on RA  -CT Chest 9/4/24 shows resolution of left pleural effusion. Right pleural effusion is   unchanged.  No evidence of acute traumatic abnormality in the chest, abdomen, or   pelvis. Pulmonary changes consistent with interstitial lung disease, similar   to findings present on prior examination  -med f/u    #HTN  -BP stable, cont bb/ace    #CAD s/p PCI,iCMP  -SIMEON and ECHO as above  -HS Trop elevated; mild ST elevation noted in V2; no CP on exam; no need to trend trop  -cath 2023 with closed mid LAD stent with distal LAD filling with well developed collaterals   -would defer further ischemic w/u for now in setting of acute Covid infection and no active chest pain; continue medical management  -continue ASA, Lipitor, has been off of plavix   -echo with new swa, interval dec in lv fxn  -prev echo with swa and lad territory/apical hypo, current echo poss representing stress induced cmp on top of chronic LV dysfxn  -cont med tx of cmp    #CKD  -Creat noted; trend renal function    #H/O rheumatoid arthritis  -tx per med    dvt ppx    35 minutes spent on total encounter; more than 50% of the visit was spent counseling and/or coordinating care by the attending physician.  
A/P    84 Y/o Male with pmhx BPH, CAD sp PCI, CKD, DM, HTN, and RA BIBA sp fall this morning.     #SP Fall  -pt denies cardiac prodrome surrounding fall  -HS Trops elevated, demand ischemia in setting of covid, known CAD, icmp  -mild ST elevation noted in V2; no CP on exam; no need to trend trop  -ECG shows NSR HR 82 with TWI in V1-V6, pt denies CP, no concern for ACS; likely new TWI d/t demand ischemia in setting of Covid infection  -CT Head 9/4/24 shows no acute calvarial fracture or intracranial hemorrhage  -infectious w/u per med  -SIMEON from 5/29/24 shows normal LV systolic function, no regional wall motion abnormalities, normal RV systolic function, mild to moderate aortic regurgitation, mild to moderate pulmonic regurgitation.  -ECHO 9/6/24 shows Moderately decreased LVSF; EF 35-40%; Regional wall motion abnormalities present; entire apex is akinetic; The mid anteroseptal segment, mid inferoseptal segment, mid anterolateral segment, mid anterior segment, and mid inferior segment are hypokinetic. Compared to the SIMEON performed on 5/29/2024, there are new regional wall motion abnormalities and a decline in LVEF  -continue medical management    #Covid  -stable on RA  -CT Chest 9/4/24 shows resolution of left pleural effusion. Right pleural effusion is   unchanged.  No evidence of acute traumatic abnormality in the chest, abdomen, or   pelvis. Pulmonary changes consistent with interstitial lung disease, similar   to findings present on prior examination  -med f/u    #HTN  -BP stable, cont bb/ace    #CAD s/p PCI,iCMP  -SIMEON and ECHO as above  -HS Trop elevated; mild ST elevation noted in V2; no CP on exam; no need to trend trop  -cath 2023 with closed mid LAD stent with distal LAD filling with well developed collaterals   -would defer further ischemic w/u for now in setting of acute Covid infection and no active chest pain; continue medical management  -continue ASA, Lipitor, has been off of plavix   -echo with new swa, interval dec in lv fxn  -prev echo with swa and lad territory/apical hypo, current echo poss representing stress induced cmp on top of chronic LV dysfxn  -cont med tx of cmp    #CKD  -Creat noted; trend renal function    #H/O rheumatoid arthritis  -tx per med    dvt ppx    35 minutes spent on total encounter; more than 50% of the visit was spent counseling and/or coordinating care by the attending physician.  
 84 Y/o Male with pmhx BPH, CAD, DM, htn and RA BIBA sp fall this morning.  As per son, patient fell early this morning and didn't remember what happened. As per patient, he thinks he tripped while ambulating with his walker but isnt sure. Patient denies LOC. Thinks it was around 3-4AM. Patient able to call son, who came from [place] and was able to call ems. Patient has been having sore throat, cough and chills for the past week and is currently taking antitbiotic. Patient lives at home with his wife who has parkinsons. Patient denies abd pain, nvd, tingling, numbness, and dysuria    Syncope  - telemonitor  - cards fu appreciated  - CT head neg  - will monitor   - check ECHO  - -cath 2023 with closed mid LAD stent with distal LAD filling with well developed collaterals   -would defer further ischemic w/u for now in setting of acute Covid infection and no active chest pain; continue medical management  -continue ASA, has been off of plavix     COVID 19  - symptomatic care  - stable on RA    CKD  - renal fu   - monitor cr    HLD  - cw statin    RA  - cw outpt meds on dc    DVT prophylaxis   
 84 Y/o Male with pmhx BPH, CAD, DM, htn and RA BIBA sp fall this morning.  As per son, patient fell early this morning and didn't remember what happened. As per patient, he thinks he tripped while ambulating with his walker but isnt sure. Patient denies LOC. Thinks it was around 3-4AM. Patient able to call son, who came from [place] and was able to call ems. Patient has been having sore throat, cough and chills for the past week and is currently taking antitbiotic. Patient lives at home with his wife who has parkinsons. Patient denies abd pain, nvd, tingling, numbness, and dysuria (04 Sep 2024 18:11)    now he seems OK: alert and awake and able to answer questions:  he says he has no underlying lung disease:  now has cough : no chest pain : never had pe or dvt     he has underlying ILD : has RA:    covid infection: /FALL  RA/ILD  CAD:  DM  BPH    covid infection: /FALL  -he is immunocompromised : start remdesivir: :  -on room air : no need for dexa:   9/6:  d2 of dexa of 3:  -remains on room air:     difficulty in swallowing   -? barium swallow:     RA/ ILD  -he has ILD:  : he has ra:  likely related to it     CAD:  -cont current meds:   -9/6: < from: Cardiac Catheterization (02.27.23 @ 16:09) >  Cath with left dominant system with luminal LCX and RCA disease.  Mid LAD is totally occluded at site of previous stent with collaterals filling the distal LAD retrograde to distal edge of stent. Will continue medical treatment of CAD, LAD  in light of lack of angina, dyspnea,any exertional symptoms, advanced age,  and decreased GFR.   Patient and son both agree and prefer medical treatment.  If develops any exertional symptoms or angina, he can return for attempt of PCI to mid LAD .    < end of copied text >      DM  - monitor and control     BPH  -watch urine output :    alfredo acp 
A/P    84 Y/o Male with pmhx BPH, CAD sp PCI, CKD, DM, HTN, and RA BIBA sp fall this morning.     #SP Fall  -pt denies cardiac prodrome surrounding fall  -HS Trops elevated; mild ST elevation noted in V2; no CP on exam; no need to trend trop  -ECG shows NSR HR 82 with TWI in V1-V6, pt denies CP, no concern for ACS; likely new TWI d/t demand ischemia in setting of Covid infection  -CT Head 9/4/24 shows no acute calvarial fracture or intracranial hemorrhage  -SIMEON from 5/29/24 shows normal LV systolic function, no regional wall motion abnormalities, normal RV systolic function, mild to moderate aortic regurgitation, mild to moderate pulmonic regurgitation.  -Recheck ECHO  -infectious w/u per med    #Covid  -stable on RA  -CT Chest 9/4/24 shows resolution of left pleural effusion. Right pleural effusion is   unchanged.  No evidence of acute traumatic abnormality in the chest, abdomen, or   pelvis. Pulmonary changes consistent with interstitial lung disease, similar   to findings present on prior examination  -med f/u    #HTN  -Trend BP  -Continue lisinopril    #CAD s/p PCI  -SIMEON as above  -HS Trop elevated; mild ST elevation noted in V2; no CP on exam; no need to trend trop  -cath 2023 with closed mid LAD stent with distal LAD filling with well developed collaterals   -would defer further ischemic w/u for now in setting of acute Covid infection and no active chest pain; continue medical management  -continue ASA, Lipitor, has been off of plavix     #CKD  -stable, trend renal function    #H/O rheumatoid arthritis  -tx per med    dvt ppx  
A/P    82 Y/o Male with pmhx BPH, CAD sp PCI, CKD, DM, HTN, and RA BIBA sp fall this morning.     #SP Fall  -pt denies cardiac prodrome surrounding fall  -HS Trops elevated; mild ST elevation noted in V2; no CP on exam; no need to trend trop  -ECG shows NSR HR 82 with TWI in V1-V6, pt denies CP, no concern for ACS; likely new TWI d/t demand ischemia in setting of Covid infection  -CT Head 9/4/24 shows no acute calvarial fracture or intracranial hemorrhage  -infectious w/u per med  -SIMEON from 5/29/24 shows normal LV systolic function, no regional wall motion abnormalities, normal RV systolic function, mild to moderate aortic regurgitation, mild to moderate pulmonic regurgitation.  -ECHO 9/6/24 shows Moderately decreased LVSF; EF 35-40%; Regional wall motion abnormalities present; entire apex is akinetic; The mid anteroseptal segment, mid inferoseptal segment, mid anterolateral segment, mid anterior segment, and mid inferior segment are hypokinetic. Compared to the SIMEON performed on 5/29/2024, there are new regional wall motion abnormalities and a decline in LVEF  -continue medical management    #Covid  -stable on RA  -CT Chest 9/4/24 shows resolution of left pleural effusion. Right pleural effusion is   unchanged.  No evidence of acute traumatic abnormality in the chest, abdomen, or   pelvis. Pulmonary changes consistent with interstitial lung disease, similar   to findings present on prior examination  -med f/u    #HTN  -Trend BP  -Continue lisinopril    #CAD s/p PCI  -SIMEON and ECHO as above  -HS Trop elevated; mild ST elevation noted in V2; no CP on exam; no need to trend trop  -cath 2023 with closed mid LAD stent with distal LAD filling with well developed collaterals   -would defer further ischemic w/u for now in setting of acute Covid infection and no active chest pain; continue medical management  -continue ASA, Lipitor, has been off of plavix     #CKD  -Creat noted; trend renal function    #H/O rheumatoid arthritis  -tx per med    dvt ppx

## 2024-09-12 NOTE — PROGRESS NOTE ADULT - PROVIDER SPECIALTY LIST ADULT
Cardiology
Pulmonology
Hospitalist
Pulmonology
Pulmonology
Cardiology
Hospitalist
Pulmonology
Cardiology
Hospitalist

## 2024-09-18 ENCOUNTER — APPOINTMENT (OUTPATIENT)
Dept: PULMONOLOGY | Facility: CLINIC | Age: 84
End: 2024-09-18

## 2024-10-11 ENCOUNTER — OUTPATIENT (OUTPATIENT)
Dept: OUTPATIENT SERVICES | Facility: HOSPITAL | Age: 84
LOS: 1 days | End: 2024-10-11

## 2024-10-11 ENCOUNTER — APPOINTMENT (OUTPATIENT)
Dept: RADIOLOGY | Facility: HOSPITAL | Age: 84
End: 2024-10-11
Payer: MEDICARE

## 2024-10-11 DIAGNOSIS — K22.0 ACHALASIA OF CARDIA: ICD-10-CM

## 2024-10-11 PROCEDURE — 74220 X-RAY XM ESOPHAGUS 1CNTRST: CPT | Mod: 26
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
## 2025-07-26 ENCOUNTER — INPATIENT (INPATIENT)
Facility: HOSPITAL | Age: 85
LOS: 10 days | Discharge: NURSING, REHAB OR LONG-TERM CARE | End: 2025-08-06
Attending: HOSPITALIST | Admitting: HOSPITALIST
Payer: MEDICARE

## 2025-07-26 VITALS
WEIGHT: 134.92 LBS | OXYGEN SATURATION: 97 % | HEART RATE: 86 BPM | DIASTOLIC BLOOD PRESSURE: 51 MMHG | RESPIRATION RATE: 15 BRPM | TEMPERATURE: 98 F | SYSTOLIC BLOOD PRESSURE: 101 MMHG

## 2025-07-26 DIAGNOSIS — Z95.5 PRESENCE OF CORONARY ANGIOPLASTY IMPLANT AND GRAFT: Chronic | ICD-10-CM

## 2025-07-26 DIAGNOSIS — J18.9 PNEUMONIA, UNSPECIFIED ORGANISM: ICD-10-CM

## 2025-07-26 LAB
A1C WITH ESTIMATED AVERAGE GLUCOSE RESULT: 6.9 % — HIGH (ref 4–5.6)
ADD ON TEST-SPECIMEN IN LAB: SIGNIFICANT CHANGE UP
ADD ON TEST-SPECIMEN IN LAB: SIGNIFICANT CHANGE UP
ALBUMIN SERPL ELPH-MCNC: 3.4 G/DL — SIGNIFICANT CHANGE UP (ref 3.3–5)
ALP SERPL-CCNC: 86 U/L — SIGNIFICANT CHANGE UP (ref 40–120)
ALT FLD-CCNC: 11 U/L — SIGNIFICANT CHANGE UP (ref 4–41)
ANION GAP SERPL CALC-SCNC: 11 MMOL/L — SIGNIFICANT CHANGE UP (ref 7–14)
APPEARANCE UR: CLEAR — SIGNIFICANT CHANGE UP
AST SERPL-CCNC: 26 U/L — SIGNIFICANT CHANGE UP (ref 4–40)
B PERT DNA SPEC QL NAA+PROBE: SIGNIFICANT CHANGE UP
B PERT+PARAPERT DNA PNL SPEC NAA+PROBE: SIGNIFICANT CHANGE UP
B-OH-BUTYR SERPL-SCNC: <0 MMOL/L — SIGNIFICANT CHANGE UP (ref 0–0.4)
BACTERIA # UR AUTO: NEGATIVE /HPF — SIGNIFICANT CHANGE UP
BASOPHILS # BLD AUTO: 0.04 K/UL — SIGNIFICANT CHANGE UP (ref 0–0.2)
BASOPHILS # BLD MANUAL: 0.09 K/UL — SIGNIFICANT CHANGE UP (ref 0–0.2)
BASOPHILS NFR BLD AUTO: 0.4 % — SIGNIFICANT CHANGE UP (ref 0–2)
BASOPHILS NFR BLD MANUAL: 0.9 % — SIGNIFICANT CHANGE UP (ref 0–2)
BILIRUB SERPL-MCNC: 0.9 MG/DL — SIGNIFICANT CHANGE UP (ref 0.2–1.2)
BILIRUB UR-MCNC: NEGATIVE — SIGNIFICANT CHANGE UP
BLOOD GAS VENOUS COMPREHENSIVE RESULT: SIGNIFICANT CHANGE UP
BUN SERPL-MCNC: 29 MG/DL — HIGH (ref 7–23)
C PNEUM DNA SPEC QL NAA+PROBE: SIGNIFICANT CHANGE UP
CALCIUM SERPL-MCNC: 9.1 MG/DL — SIGNIFICANT CHANGE UP (ref 8.4–10.5)
CAST: 2 /LPF — SIGNIFICANT CHANGE UP (ref 0–4)
CHLORIDE SERPL-SCNC: 95 MMOL/L — LOW (ref 98–107)
CO2 SERPL-SCNC: 23 MMOL/L — SIGNIFICANT CHANGE UP (ref 22–31)
COLOR SPEC: YELLOW — SIGNIFICANT CHANGE UP
CREAT SERPL-MCNC: 1.66 MG/DL — HIGH (ref 0.5–1.3)
DIFF PNL FLD: NEGATIVE — SIGNIFICANT CHANGE UP
EGFR: 40 ML/MIN/1.73M2 — LOW
EGFR: 40 ML/MIN/1.73M2 — LOW
EOSINOPHIL # BLD AUTO: 0.02 K/UL — SIGNIFICANT CHANGE UP (ref 0–0.5)
EOSINOPHIL # BLD MANUAL: 0 K/UL — SIGNIFICANT CHANGE UP (ref 0–0.5)
EOSINOPHIL NFR BLD AUTO: 0.2 % — SIGNIFICANT CHANGE UP (ref 0–6)
EOSINOPHIL NFR BLD MANUAL: 0 % — SIGNIFICANT CHANGE UP (ref 0–6)
ESTIMATED AVERAGE GLUCOSE: 151 — SIGNIFICANT CHANGE UP
FLUAV AG NPH QL: SIGNIFICANT CHANGE UP
FLUAV SUBTYP SPEC NAA+PROBE: SIGNIFICANT CHANGE UP
FLUBV AG NPH QL: SIGNIFICANT CHANGE UP
FLUBV RNA SPEC QL NAA+PROBE: SIGNIFICANT CHANGE UP
GAS PNL BLDV: SIGNIFICANT CHANGE UP
GIANT PLATELETS BLD QL SMEAR: PRESENT
GLUCOSE BLDC GLUCOMTR-MCNC: 266 MG/DL — HIGH (ref 70–99)
GLUCOSE SERPL-MCNC: 489 MG/DL — CRITICAL HIGH (ref 70–99)
GLUCOSE UR QL: 500 MG/DL
HADV DNA SPEC QL NAA+PROBE: SIGNIFICANT CHANGE UP
HCOV 229E RNA SPEC QL NAA+PROBE: SIGNIFICANT CHANGE UP
HCOV HKU1 RNA SPEC QL NAA+PROBE: SIGNIFICANT CHANGE UP
HCOV NL63 RNA SPEC QL NAA+PROBE: SIGNIFICANT CHANGE UP
HCOV OC43 RNA SPEC QL NAA+PROBE: SIGNIFICANT CHANGE UP
HCT VFR BLD CALC: 33.1 % — LOW (ref 39–50)
HGB BLD-MCNC: 10.8 G/DL — LOW (ref 13–17)
HMPV RNA SPEC QL NAA+PROBE: SIGNIFICANT CHANGE UP
HPIV1 RNA SPEC QL NAA+PROBE: SIGNIFICANT CHANGE UP
HPIV2 RNA SPEC QL NAA+PROBE: SIGNIFICANT CHANGE UP
HPIV3 RNA SPEC QL NAA+PROBE: SIGNIFICANT CHANGE UP
HPIV4 RNA SPEC QL NAA+PROBE: SIGNIFICANT CHANGE UP
IMM GRANULOCYTES # BLD AUTO: 0.04 K/UL — SIGNIFICANT CHANGE UP (ref 0–0.07)
IMM GRANULOCYTES NFR BLD AUTO: 0.4 % — SIGNIFICANT CHANGE UP (ref 0–0.9)
KETONES UR QL: NEGATIVE MG/DL — SIGNIFICANT CHANGE UP
LEUKOCYTE ESTERASE UR-ACNC: NEGATIVE — SIGNIFICANT CHANGE UP
LIDOCAIN IGE QN: 24 U/L — SIGNIFICANT CHANGE UP (ref 7–60)
LYMPHOCYTES # BLD AUTO: 1.3 K/UL — SIGNIFICANT CHANGE UP (ref 1–3.3)
LYMPHOCYTES # BLD MANUAL: 1.41 K/UL — SIGNIFICANT CHANGE UP (ref 1–3.3)
LYMPHOCYTES NFR BLD AUTO: 12.7 % — LOW (ref 13–44)
LYMPHOCYTES NFR BLD MANUAL: 13.8 % — SIGNIFICANT CHANGE UP (ref 13–44)
M PNEUMO DNA SPEC QL NAA+PROBE: SIGNIFICANT CHANGE UP
MAGNESIUM SERPL-MCNC: 2.1 MG/DL — SIGNIFICANT CHANGE UP (ref 1.6–2.6)
MANUAL METAMYELOCYTE #: 0.09 K/UL — HIGH (ref 0–0)
MANUAL NEUTROPHIL BANDS #: 2.65 K/UL — HIGH (ref 0–0.84)
MCHC RBC-ENTMCNC: 29.3 PG — SIGNIFICANT CHANGE UP (ref 27–34)
MCHC RBC-ENTMCNC: 32.6 G/DL — SIGNIFICANT CHANGE UP (ref 32–36)
MCV RBC AUTO: 89.9 FL — SIGNIFICANT CHANGE UP (ref 80–100)
METAMYELOCYTES # FLD: 0.9 % — HIGH (ref 0–0)
METAMYELOCYTES NFR BLD: 0.9 % — HIGH (ref 0–0)
MONOCYTES # BLD AUTO: 1.18 K/UL — HIGH (ref 0–0.9)
MONOCYTES # BLD MANUAL: 1.5 K/UL — HIGH (ref 0–0.9)
MONOCYTES NFR BLD AUTO: 11.5 % — SIGNIFICANT CHANGE UP (ref 2–14)
MONOCYTES NFR BLD MANUAL: 14.7 % — HIGH (ref 2–14)
NEUTROPHILS # BLD AUTO: 7.65 K/UL — HIGH (ref 1.8–7.4)
NEUTROPHILS # BLD MANUAL: 4.48 K/UL — SIGNIFICANT CHANGE UP (ref 1.8–7.4)
NEUTROPHILS NFR BLD AUTO: 74.8 % — SIGNIFICANT CHANGE UP (ref 43–77)
NEUTROPHILS NFR BLD MANUAL: 43.8 % — SIGNIFICANT CHANGE UP (ref 43–77)
NEUTS BAND # BLD: 25.9 % — CRITICAL HIGH (ref 0–8)
NEUTS BAND NFR BLD: 25.9 % — CRITICAL HIGH (ref 0–8)
NITRITE UR-MCNC: NEGATIVE — SIGNIFICANT CHANGE UP
NRBC # BLD AUTO: 0 K/UL — SIGNIFICANT CHANGE UP (ref 0–0)
NRBC # FLD: 0 K/UL — SIGNIFICANT CHANGE UP (ref 0–0)
NRBC BLD AUTO-RTO: 0 /100 WBCS — SIGNIFICANT CHANGE UP (ref 0–0)
PH UR: 6.5 — SIGNIFICANT CHANGE UP (ref 5–8)
PLAT MORPH BLD: NORMAL — SIGNIFICANT CHANGE UP
PLATELET # BLD AUTO: 149 K/UL — LOW (ref 150–400)
PLATELET COUNT - ESTIMATE: NORMAL — SIGNIFICANT CHANGE UP
PMV BLD: 13.5 FL — HIGH (ref 7–13)
POTASSIUM SERPL-MCNC: 5 MMOL/L — SIGNIFICANT CHANGE UP (ref 3.5–5.3)
POTASSIUM SERPL-SCNC: 5 MMOL/L — SIGNIFICANT CHANGE UP (ref 3.5–5.3)
PROT SERPL-MCNC: 7.6 G/DL — SIGNIFICANT CHANGE UP (ref 6–8.3)
PROT UR-MCNC: 30 MG/DL
RAPID RVP RESULT: SIGNIFICANT CHANGE UP
RBC # BLD: 3.68 M/UL — LOW (ref 4.2–5.8)
RBC # FLD: 14.6 % — HIGH (ref 10.3–14.5)
RBC BLD AUTO: NORMAL — SIGNIFICANT CHANGE UP
RBC CASTS # UR COMP ASSIST: 0 /HPF — SIGNIFICANT CHANGE UP (ref 0–4)
RSV RNA NPH QL NAA+NON-PROBE: SIGNIFICANT CHANGE UP
RSV RNA SPEC QL NAA+PROBE: SIGNIFICANT CHANGE UP
RV+EV RNA SPEC QL NAA+PROBE: SIGNIFICANT CHANGE UP
SARS-COV-2 RNA SPEC QL NAA+PROBE: SIGNIFICANT CHANGE UP
SARS-COV-2 RNA SPEC QL NAA+PROBE: SIGNIFICANT CHANGE UP
SMUDGE CELLS # BLD: PRESENT
SODIUM SERPL-SCNC: 129 MMOL/L — LOW (ref 135–145)
SOURCE RESPIRATORY: SIGNIFICANT CHANGE UP
SP GR SPEC: 1.02 — SIGNIFICANT CHANGE UP (ref 1–1.03)
SQUAMOUS # UR AUTO: 0 /HPF — SIGNIFICANT CHANGE UP (ref 0–5)
UROBILINOGEN FLD QL: 0.2 MG/DL — SIGNIFICANT CHANGE UP (ref 0.2–1)
WBC # BLD: 10.23 K/UL — SIGNIFICANT CHANGE UP (ref 3.8–10.5)
WBC # FLD AUTO: 10.23 K/UL — SIGNIFICANT CHANGE UP (ref 3.8–10.5)
WBC UR QL: 0 /HPF — SIGNIFICANT CHANGE UP (ref 0–5)

## 2025-07-26 PROCEDURE — 99497 ADVNCD CARE PLAN 30 MIN: CPT | Mod: 25

## 2025-07-26 PROCEDURE — 99223 1ST HOSP IP/OBS HIGH 75: CPT

## 2025-07-26 PROCEDURE — 71045 X-RAY EXAM CHEST 1 VIEW: CPT | Mod: 26

## 2025-07-26 PROCEDURE — 99285 EMERGENCY DEPT VISIT HI MDM: CPT | Mod: GC

## 2025-07-26 RX ORDER — INSULIN LISPRO 100 U/ML
2 INJECTION, SOLUTION INTRAVENOUS; SUBCUTANEOUS ONCE
Refills: 0 | Status: COMPLETED | OUTPATIENT
Start: 2025-07-26 | End: 2025-07-26

## 2025-07-26 RX ORDER — VANCOMYCIN HCL IN 5 % DEXTROSE 1.5G/250ML
1000 PLASTIC BAG, INJECTION (ML) INTRAVENOUS ONCE
Refills: 0 | Status: COMPLETED | OUTPATIENT
Start: 2025-07-26 | End: 2025-07-26

## 2025-07-26 RX ORDER — INSULIN GLARGINE-YFGN 100 [IU]/ML
10 INJECTION, SOLUTION SUBCUTANEOUS ONCE
Refills: 0 | Status: COMPLETED | OUTPATIENT
Start: 2025-07-26 | End: 2025-07-26

## 2025-07-26 RX ORDER — SODIUM CHLORIDE 9 G/1000ML
1000 INJECTION, SOLUTION INTRAVENOUS
Refills: 0 | Status: DISCONTINUED | OUTPATIENT
Start: 2025-07-26 | End: 2025-08-06

## 2025-07-26 RX ORDER — INSULIN LISPRO 100 U/ML
INJECTION, SOLUTION INTRAVENOUS; SUBCUTANEOUS
Refills: 0 | Status: DISCONTINUED | OUTPATIENT
Start: 2025-07-26 | End: 2025-07-27

## 2025-07-26 RX ORDER — ACETAMINOPHEN 500 MG/5ML
1000 LIQUID (ML) ORAL ONCE
Refills: 0 | Status: COMPLETED | OUTPATIENT
Start: 2025-07-26 | End: 2025-07-26

## 2025-07-26 RX ORDER — GLUCAGON 3 MG/1
1 POWDER NASAL ONCE
Refills: 0 | Status: DISCONTINUED | OUTPATIENT
Start: 2025-07-26 | End: 2025-08-06

## 2025-07-26 RX ORDER — DEXTROSE 50 % IN WATER 50 %
15 SYRINGE (ML) INTRAVENOUS ONCE
Refills: 0 | Status: DISCONTINUED | OUTPATIENT
Start: 2025-07-26 | End: 2025-08-06

## 2025-07-26 RX ORDER — DEXTROSE 50 % IN WATER 50 %
25 SYRINGE (ML) INTRAVENOUS ONCE
Refills: 0 | Status: DISCONTINUED | OUTPATIENT
Start: 2025-07-26 | End: 2025-08-06

## 2025-07-26 RX ORDER — DEXTROSE 50 % IN WATER 50 %
12.5 SYRINGE (ML) INTRAVENOUS ONCE
Refills: 0 | Status: DISCONTINUED | OUTPATIENT
Start: 2025-07-26 | End: 2025-08-06

## 2025-07-26 RX ORDER — PIPERACILLIN-TAZO-DEXTROSE,ISO 3.375G/5
3.38 IV SOLUTION, PIGGYBACK PREMIX FROZEN(ML) INTRAVENOUS ONCE
Refills: 0 | Status: COMPLETED | OUTPATIENT
Start: 2025-07-26 | End: 2025-07-26

## 2025-07-26 RX ORDER — AZITHROMYCIN 250 MG
500 CAPSULE ORAL ONCE
Refills: 0 | Status: COMPLETED | OUTPATIENT
Start: 2025-07-26 | End: 2025-07-26

## 2025-07-26 RX ORDER — INSULIN LISPRO 100 U/ML
15 INJECTION, SOLUTION INTRAVENOUS; SUBCUTANEOUS ONCE
Refills: 0 | Status: DISCONTINUED | OUTPATIENT
Start: 2025-07-26 | End: 2025-07-26

## 2025-07-26 RX ADMIN — INSULIN GLARGINE-YFGN 10 UNIT(S): 100 INJECTION, SOLUTION SUBCUTANEOUS at 21:32

## 2025-07-26 RX ADMIN — Medication 250 MILLIGRAM(S): at 22:07

## 2025-07-26 RX ADMIN — Medication 200 GRAM(S): at 19:53

## 2025-07-26 RX ADMIN — INSULIN LISPRO 10: 100 INJECTION, SOLUTION INTRAVENOUS; SUBCUTANEOUS at 19:54

## 2025-07-26 RX ADMIN — Medication 400 MILLIGRAM(S): at 19:52

## 2025-07-26 RX ADMIN — Medication 1000 MILLILITER(S): at 21:15

## 2025-07-26 RX ADMIN — Medication 1000 MILLILITER(S): at 19:40

## 2025-07-26 RX ADMIN — Medication 1000 MILLILITER(S): at 18:40

## 2025-07-26 RX ADMIN — Medication 250 MILLIGRAM(S): at 20:54

## 2025-07-27 DIAGNOSIS — E11.65 TYPE 2 DIABETES MELLITUS WITH HYPERGLYCEMIA: ICD-10-CM

## 2025-07-27 DIAGNOSIS — J84.9 INTERSTITIAL PULMONARY DISEASE, UNSPECIFIED: ICD-10-CM

## 2025-07-27 DIAGNOSIS — H10.9 UNSPECIFIED CONJUNCTIVITIS: ICD-10-CM

## 2025-07-27 DIAGNOSIS — I50.20 UNSPECIFIED SYSTOLIC (CONGESTIVE) HEART FAILURE: ICD-10-CM

## 2025-07-27 DIAGNOSIS — I10 ESSENTIAL (PRIMARY) HYPERTENSION: ICD-10-CM

## 2025-07-27 DIAGNOSIS — M06.9 RHEUMATOID ARTHRITIS, UNSPECIFIED: ICD-10-CM

## 2025-07-27 DIAGNOSIS — Z79.899 OTHER LONG TERM (CURRENT) DRUG THERAPY: ICD-10-CM

## 2025-07-27 DIAGNOSIS — A41.9 SEPSIS, UNSPECIFIED ORGANISM: ICD-10-CM

## 2025-07-27 DIAGNOSIS — E87.1 HYPO-OSMOLALITY AND HYPONATREMIA: ICD-10-CM

## 2025-07-27 DIAGNOSIS — J18.9 PNEUMONIA, UNSPECIFIED ORGANISM: ICD-10-CM

## 2025-07-27 DIAGNOSIS — I25.10 ATHEROSCLEROTIC HEART DISEASE OF NATIVE CORONARY ARTERY WITHOUT ANGINA PECTORIS: ICD-10-CM

## 2025-07-27 DIAGNOSIS — N18.9 CHRONIC KIDNEY DISEASE, UNSPECIFIED: ICD-10-CM

## 2025-07-27 DIAGNOSIS — Z29.9 ENCOUNTER FOR PROPHYLACTIC MEASURES, UNSPECIFIED: ICD-10-CM

## 2025-07-27 LAB
ANION GAP SERPL CALC-SCNC: 12 MMOL/L — SIGNIFICANT CHANGE UP (ref 7–14)
APPEARANCE UR: CLEAR — SIGNIFICANT CHANGE UP
BACTERIA # UR AUTO: NEGATIVE /HPF — SIGNIFICANT CHANGE UP
BASOPHILS # BLD AUTO: 0.04 K/UL — SIGNIFICANT CHANGE UP (ref 0–0.2)
BASOPHILS NFR BLD AUTO: 0.4 % — SIGNIFICANT CHANGE UP (ref 0–2)
BILIRUB UR-MCNC: NEGATIVE — SIGNIFICANT CHANGE UP
BUN SERPL-MCNC: 25 MG/DL — HIGH (ref 7–23)
CALCIUM SERPL-MCNC: 8.4 MG/DL — SIGNIFICANT CHANGE UP (ref 8.4–10.5)
CAST: 0 /LPF — SIGNIFICANT CHANGE UP (ref 0–4)
CHLORIDE SERPL-SCNC: 103 MMOL/L — SIGNIFICANT CHANGE UP (ref 98–107)
CO2 SERPL-SCNC: 20 MMOL/L — LOW (ref 22–31)
COLOR SPEC: YELLOW — SIGNIFICANT CHANGE UP
CREAT SERPL-MCNC: 1.56 MG/DL — HIGH (ref 0.5–1.3)
CULTURE RESULTS: SIGNIFICANT CHANGE UP
DIFF PNL FLD: NEGATIVE — SIGNIFICANT CHANGE UP
EGFR: 43 ML/MIN/1.73M2 — LOW
EGFR: 43 ML/MIN/1.73M2 — LOW
EOSINOPHIL # BLD AUTO: 0.07 K/UL — SIGNIFICANT CHANGE UP (ref 0–0.5)
EOSINOPHIL NFR BLD AUTO: 0.7 % — SIGNIFICANT CHANGE UP (ref 0–6)
GLUCOSE BLDC GLUCOMTR-MCNC: 129 MG/DL — HIGH (ref 70–99)
GLUCOSE BLDC GLUCOMTR-MCNC: 191 MG/DL — HIGH (ref 70–99)
GLUCOSE BLDC GLUCOMTR-MCNC: 264 MG/DL — HIGH (ref 70–99)
GLUCOSE BLDC GLUCOMTR-MCNC: 267 MG/DL — HIGH (ref 70–99)
GLUCOSE BLDC GLUCOMTR-MCNC: 83 MG/DL — SIGNIFICANT CHANGE UP (ref 70–99)
GLUCOSE SERPL-MCNC: 116 MG/DL — HIGH (ref 70–99)
GLUCOSE UR QL: 250 MG/DL
HCT VFR BLD CALC: 29.8 % — LOW (ref 39–50)
HGB BLD-MCNC: 9.7 G/DL — LOW (ref 13–17)
IMM GRANULOCYTES # BLD AUTO: 0.05 K/UL — SIGNIFICANT CHANGE UP (ref 0–0.07)
IMM GRANULOCYTES NFR BLD AUTO: 0.5 % — SIGNIFICANT CHANGE UP (ref 0–0.9)
KETONES UR QL: NEGATIVE MG/DL — SIGNIFICANT CHANGE UP
LEGIONELLA AG UR QL: NEGATIVE — SIGNIFICANT CHANGE UP
LEUKOCYTE ESTERASE UR-ACNC: NEGATIVE — SIGNIFICANT CHANGE UP
LYMPHOCYTES # BLD AUTO: 1.64 K/UL — SIGNIFICANT CHANGE UP (ref 1–3.3)
LYMPHOCYTES NFR BLD AUTO: 15.4 % — SIGNIFICANT CHANGE UP (ref 13–44)
MAGNESIUM SERPL-MCNC: 1.9 MG/DL — SIGNIFICANT CHANGE UP (ref 1.6–2.6)
MCHC RBC-ENTMCNC: 29.1 PG — SIGNIFICANT CHANGE UP (ref 27–34)
MCHC RBC-ENTMCNC: 32.6 G/DL — SIGNIFICANT CHANGE UP (ref 32–36)
MCV RBC AUTO: 89.5 FL — SIGNIFICANT CHANGE UP (ref 80–100)
MONOCYTES # BLD AUTO: 1.72 K/UL — HIGH (ref 0–0.9)
MONOCYTES NFR BLD AUTO: 16.2 % — HIGH (ref 2–14)
MRSA PCR RESULT.: SIGNIFICANT CHANGE UP
NEUTROPHILS # BLD AUTO: 7.11 K/UL — SIGNIFICANT CHANGE UP (ref 1.8–7.4)
NEUTROPHILS NFR BLD AUTO: 66.8 % — SIGNIFICANT CHANGE UP (ref 43–77)
NITRITE UR-MCNC: NEGATIVE — SIGNIFICANT CHANGE UP
NRBC # BLD AUTO: 0 K/UL — SIGNIFICANT CHANGE UP (ref 0–0)
NRBC # FLD: 0 K/UL — SIGNIFICANT CHANGE UP (ref 0–0)
NRBC BLD AUTO-RTO: 0 /100 WBCS — SIGNIFICANT CHANGE UP (ref 0–0)
PH UR: 6.5 — SIGNIFICANT CHANGE UP (ref 5–8)
PHOSPHATE SERPL-MCNC: 2.3 MG/DL — LOW (ref 2.5–4.5)
PLATELET # BLD AUTO: 142 K/UL — LOW (ref 150–400)
PMV BLD: 13.7 FL — HIGH (ref 7–13)
POTASSIUM SERPL-MCNC: 4.5 MMOL/L — SIGNIFICANT CHANGE UP (ref 3.5–5.3)
POTASSIUM SERPL-SCNC: 4.5 MMOL/L — SIGNIFICANT CHANGE UP (ref 3.5–5.3)
PROCALCITONIN SERPL-MCNC: 0.86 NG/ML — HIGH (ref 0.02–0.1)
PROT UR-MCNC: 30 MG/DL
RBC # BLD: 3.33 M/UL — LOW (ref 4.2–5.8)
RBC # FLD: 14.6 % — HIGH (ref 10.3–14.5)
RBC CASTS # UR COMP ASSIST: 0 /HPF — SIGNIFICANT CHANGE UP (ref 0–4)
S AUREUS DNA NOSE QL NAA+PROBE: SIGNIFICANT CHANGE UP
S PNEUM AG UR QL: NEGATIVE — SIGNIFICANT CHANGE UP
SODIUM SERPL-SCNC: 135 MMOL/L — SIGNIFICANT CHANGE UP (ref 135–145)
SP GR SPEC: 1.02 — SIGNIFICANT CHANGE UP (ref 1–1.03)
SPECIMEN SOURCE: SIGNIFICANT CHANGE UP
SQUAMOUS # UR AUTO: 0 /HPF — SIGNIFICANT CHANGE UP (ref 0–5)
UROBILINOGEN FLD QL: 0.2 MG/DL — SIGNIFICANT CHANGE UP (ref 0.2–1)
WBC # BLD: 10.63 K/UL — HIGH (ref 3.8–10.5)
WBC # FLD AUTO: 10.63 K/UL — HIGH (ref 3.8–10.5)
WBC UR QL: 0 /HPF — SIGNIFICANT CHANGE UP (ref 0–5)

## 2025-07-27 PROCEDURE — 71250 CT THORAX DX C-: CPT | Mod: 26

## 2025-07-27 PROCEDURE — 99233 SBSQ HOSP IP/OBS HIGH 50: CPT | Mod: GC

## 2025-07-27 RX ORDER — VIBEGRON 75 MG/1
1 TABLET, FILM COATED ORAL
Refills: 0 | DISCHARGE

## 2025-07-27 RX ORDER — LANOLIN/MINERAL OIL/PETROLATUM
1 OINTMENT (GRAM) OPHTHALMIC (EYE)
Refills: 0 | Status: DISCONTINUED | OUTPATIENT
Start: 2025-07-27 | End: 2025-08-06

## 2025-07-27 RX ORDER — PIPERACILLIN-TAZO-DEXTROSE,ISO 3.375G/5
3.38 IV SOLUTION, PIGGYBACK PREMIX FROZEN(ML) INTRAVENOUS EVERY 8 HOURS
Refills: 0 | Status: DISCONTINUED | OUTPATIENT
Start: 2025-07-27 | End: 2025-07-27

## 2025-07-27 RX ORDER — ERYTHROMYCIN 5 MG/G
1 OINTMENT OPHTHALMIC
Refills: 0 | Status: COMPLETED | OUTPATIENT
Start: 2025-07-27 | End: 2025-07-31

## 2025-07-27 RX ORDER — SODIUM CHLORIDE 9 G/1000ML
500 INJECTION, SOLUTION INTRAVENOUS ONCE
Refills: 0 | Status: DISCONTINUED | OUTPATIENT
Start: 2025-07-27 | End: 2025-07-27

## 2025-07-27 RX ORDER — AZITHROMYCIN 250 MG
500 CAPSULE ORAL EVERY 24 HOURS
Refills: 0 | Status: DISCONTINUED | OUTPATIENT
Start: 2025-07-27 | End: 2025-07-28

## 2025-07-27 RX ORDER — ACETAMINOPHEN 500 MG/5ML
1000 LIQUID (ML) ORAL ONCE
Refills: 0 | Status: COMPLETED | OUTPATIENT
Start: 2025-07-27 | End: 2025-07-27

## 2025-07-27 RX ORDER — INSULIN LISPRO 100 U/ML
INJECTION, SOLUTION INTRAVENOUS; SUBCUTANEOUS EVERY 6 HOURS
Refills: 0 | Status: DISCONTINUED | OUTPATIENT
Start: 2025-07-27 | End: 2025-07-27

## 2025-07-27 RX ORDER — CLOPIDOGREL BISULFATE 75 MG/1
75 TABLET, FILM COATED ORAL DAILY
Refills: 0 | Status: DISCONTINUED | OUTPATIENT
Start: 2025-07-27 | End: 2025-08-06

## 2025-07-27 RX ORDER — INSULIN GLARGINE-YFGN 100 [IU]/ML
12 INJECTION, SOLUTION SUBCUTANEOUS AT BEDTIME
Refills: 0 | Status: DISCONTINUED | OUTPATIENT
Start: 2025-07-27 | End: 2025-07-29

## 2025-07-27 RX ORDER — ATORVASTATIN CALCIUM 80 MG/1
10 TABLET, FILM COATED ORAL AT BEDTIME
Refills: 0 | Status: DISCONTINUED | OUTPATIENT
Start: 2025-07-27 | End: 2025-08-06

## 2025-07-27 RX ORDER — INSULIN LISPRO 100 U/ML
4 INJECTION, SOLUTION INTRAVENOUS; SUBCUTANEOUS
Refills: 0 | Status: DISCONTINUED | OUTPATIENT
Start: 2025-07-27 | End: 2025-07-29

## 2025-07-27 RX ORDER — MELOXICAM 15 MG/1
1 TABLET ORAL
Refills: 0 | DISCHARGE

## 2025-07-27 RX ORDER — INSULIN LISPRO 100 U/ML
INJECTION, SOLUTION INTRAVENOUS; SUBCUTANEOUS
Refills: 0 | Status: DISCONTINUED | OUTPATIENT
Start: 2025-07-27 | End: 2025-08-06

## 2025-07-27 RX ORDER — INSULIN GLARGINE-YFGN 100 [IU]/ML
10 INJECTION, SOLUTION SUBCUTANEOUS EVERY MORNING
Refills: 0 | Status: DISCONTINUED | OUTPATIENT
Start: 2025-07-27 | End: 2025-07-27

## 2025-07-27 RX ORDER — HEPARIN SODIUM 1000 [USP'U]/ML
5000 INJECTION INTRAVENOUS; SUBCUTANEOUS EVERY 8 HOURS
Refills: 0 | Status: DISCONTINUED | OUTPATIENT
Start: 2025-07-27 | End: 2025-08-06

## 2025-07-27 RX ORDER — CEFTRIAXONE 500 MG/1
1000 INJECTION, POWDER, FOR SOLUTION INTRAMUSCULAR; INTRAVENOUS EVERY 24 HOURS
Refills: 0 | Status: DISCONTINUED | OUTPATIENT
Start: 2025-07-27 | End: 2025-07-30

## 2025-07-27 RX ORDER — SODIUM PHOSPHATE,DIBASIC DIHYD
15 POWDER (GRAM) MISCELLANEOUS ONCE
Refills: 0 | Status: COMPLETED | OUTPATIENT
Start: 2025-07-27 | End: 2025-07-27

## 2025-07-27 RX ORDER — EZETIMIBE 10 MG/1
10 TABLET ORAL DAILY
Refills: 0 | Status: DISCONTINUED | OUTPATIENT
Start: 2025-07-27 | End: 2025-08-06

## 2025-07-27 RX ORDER — SODIUM CHLORIDE 9 G/1000ML
500 INJECTION, SOLUTION INTRAVENOUS ONCE
Refills: 0 | Status: COMPLETED | OUTPATIENT
Start: 2025-07-27 | End: 2025-07-27

## 2025-07-27 RX ORDER — ACETAMINOPHEN 500 MG/5ML
650 LIQUID (ML) ORAL EVERY 6 HOURS
Refills: 0 | Status: DISCONTINUED | OUTPATIENT
Start: 2025-07-27 | End: 2025-08-06

## 2025-07-27 RX ADMIN — Medication 650 MILLIGRAM(S): at 23:48

## 2025-07-27 RX ADMIN — Medication 1 DROP(S): at 18:01

## 2025-07-27 RX ADMIN — HEPARIN SODIUM 5000 UNIT(S): 1000 INJECTION INTRAVENOUS; SUBCUTANEOUS at 23:49

## 2025-07-27 RX ADMIN — Medication 250 MILLIGRAM(S): at 10:00

## 2025-07-27 RX ADMIN — ERYTHROMYCIN 1 APPLICATION(S): 5 OINTMENT OPHTHALMIC at 05:31

## 2025-07-27 RX ADMIN — Medication 650 MILLIGRAM(S): at 15:00

## 2025-07-27 RX ADMIN — Medication 25 GRAM(S): at 05:31

## 2025-07-27 RX ADMIN — INSULIN LISPRO 4 UNIT(S): 100 INJECTION, SOLUTION INTRAVENOUS; SUBCUTANEOUS at 12:23

## 2025-07-27 RX ADMIN — Medication 63.75 MILLIMOLE(S): at 11:20

## 2025-07-27 RX ADMIN — SODIUM CHLORIDE 1000 MILLILITER(S): 9 INJECTION, SOLUTION INTRAVENOUS at 04:19

## 2025-07-27 RX ADMIN — INSULIN LISPRO 3: 100 INJECTION, SOLUTION INTRAVENOUS; SUBCUTANEOUS at 18:03

## 2025-07-27 RX ADMIN — HEPARIN SODIUM 5000 UNIT(S): 1000 INJECTION INTRAVENOUS; SUBCUTANEOUS at 05:30

## 2025-07-27 RX ADMIN — CLOPIDOGREL BISULFATE 75 MILLIGRAM(S): 75 TABLET, FILM COATED ORAL at 02:30

## 2025-07-27 RX ADMIN — HEPARIN SODIUM 5000 UNIT(S): 1000 INJECTION INTRAVENOUS; SUBCUTANEOUS at 13:42

## 2025-07-27 RX ADMIN — Medication 1 DROP(S): at 02:32

## 2025-07-27 RX ADMIN — EZETIMIBE 10 MILLIGRAM(S): 10 TABLET ORAL at 12:22

## 2025-07-27 RX ADMIN — Medication 650 MILLIGRAM(S): at 14:21

## 2025-07-27 RX ADMIN — Medication 400 MILLIGRAM(S): at 03:14

## 2025-07-27 RX ADMIN — INSULIN LISPRO 1: 100 INJECTION, SOLUTION INTRAVENOUS; SUBCUTANEOUS at 23:51

## 2025-07-27 RX ADMIN — ATORVASTATIN CALCIUM 10 MILLIGRAM(S): 80 TABLET, FILM COATED ORAL at 02:29

## 2025-07-27 RX ADMIN — ERYTHROMYCIN 1 APPLICATION(S): 5 OINTMENT OPHTHALMIC at 12:22

## 2025-07-27 RX ADMIN — INSULIN GLARGINE-YFGN 12 UNIT(S): 100 INJECTION, SOLUTION SUBCUTANEOUS at 23:48

## 2025-07-27 RX ADMIN — ERYTHROMYCIN 1 APPLICATION(S): 5 OINTMENT OPHTHALMIC at 23:49

## 2025-07-27 RX ADMIN — ATORVASTATIN CALCIUM 10 MILLIGRAM(S): 80 TABLET, FILM COATED ORAL at 23:48

## 2025-07-27 RX ADMIN — INSULIN LISPRO 4 UNIT(S): 100 INJECTION, SOLUTION INTRAVENOUS; SUBCUTANEOUS at 18:04

## 2025-07-27 RX ADMIN — Medication 40 MILLIGRAM(S): at 05:31

## 2025-07-27 RX ADMIN — CEFTRIAXONE 100 MILLIGRAM(S): 500 INJECTION, POWDER, FOR SOLUTION INTRAMUSCULAR; INTRAVENOUS at 17:59

## 2025-07-27 RX ADMIN — ERYTHROMYCIN 1 APPLICATION(S): 5 OINTMENT OPHTHALMIC at 18:02

## 2025-07-27 RX ADMIN — INSULIN LISPRO 3: 100 INJECTION, SOLUTION INTRAVENOUS; SUBCUTANEOUS at 12:23

## 2025-07-28 LAB
ALBUMIN SERPL ELPH-MCNC: 3 G/DL — LOW (ref 3.3–5)
ALP SERPL-CCNC: 100 U/L — SIGNIFICANT CHANGE UP (ref 40–120)
ALT FLD-CCNC: 11 U/L — SIGNIFICANT CHANGE UP (ref 4–41)
ANION GAP SERPL CALC-SCNC: 14 MMOL/L — SIGNIFICANT CHANGE UP (ref 7–14)
AST SERPL-CCNC: 22 U/L — SIGNIFICANT CHANGE UP (ref 4–40)
BASOPHILS # BLD AUTO: 0.07 K/UL — SIGNIFICANT CHANGE UP (ref 0–0.2)
BASOPHILS # BLD MANUAL: 0 K/UL — SIGNIFICANT CHANGE UP (ref 0–0.2)
BASOPHILS NFR BLD AUTO: 0.5 % — SIGNIFICANT CHANGE UP (ref 0–2)
BASOPHILS NFR BLD MANUAL: 0 % — SIGNIFICANT CHANGE UP (ref 0–2)
BILIRUB SERPL-MCNC: 0.6 MG/DL — SIGNIFICANT CHANGE UP (ref 0.2–1.2)
BUN SERPL-MCNC: 21 MG/DL — SIGNIFICANT CHANGE UP (ref 7–23)
CALCIUM SERPL-MCNC: 9 MG/DL — SIGNIFICANT CHANGE UP (ref 8.4–10.5)
CHLORIDE SERPL-SCNC: 101 MMOL/L — SIGNIFICANT CHANGE UP (ref 98–107)
CO2 SERPL-SCNC: 20 MMOL/L — LOW (ref 22–31)
CREAT SERPL-MCNC: 1.48 MG/DL — HIGH (ref 0.5–1.3)
EGFR: 46 ML/MIN/1.73M2 — LOW
EGFR: 46 ML/MIN/1.73M2 — LOW
EOSINOPHIL # BLD AUTO: 0.06 K/UL — SIGNIFICANT CHANGE UP (ref 0–0.5)
EOSINOPHIL # BLD MANUAL: 0 K/UL — SIGNIFICANT CHANGE UP (ref 0–0.5)
EOSINOPHIL NFR BLD AUTO: 0.4 % — SIGNIFICANT CHANGE UP (ref 0–6)
EOSINOPHIL NFR BLD MANUAL: 0 % — SIGNIFICANT CHANGE UP (ref 0–6)
GIANT PLATELETS BLD QL SMEAR: PRESENT
GLUCOSE BLDC GLUCOMTR-MCNC: 153 MG/DL — HIGH (ref 70–99)
GLUCOSE BLDC GLUCOMTR-MCNC: 193 MG/DL — HIGH (ref 70–99)
GLUCOSE BLDC GLUCOMTR-MCNC: 199 MG/DL — HIGH (ref 70–99)
GLUCOSE BLDC GLUCOMTR-MCNC: 295 MG/DL — HIGH (ref 70–99)
GLUCOSE SERPL-MCNC: 152 MG/DL — HIGH (ref 70–99)
HCT VFR BLD CALC: 31.9 % — LOW (ref 39–50)
HGB BLD-MCNC: 10.4 G/DL — LOW (ref 13–17)
IMM GRANULOCYTES # BLD AUTO: 0.07 K/UL — SIGNIFICANT CHANGE UP (ref 0–0.07)
IMM GRANULOCYTES NFR BLD AUTO: 0.5 % — SIGNIFICANT CHANGE UP (ref 0–0.9)
LYMPHOCYTES # BLD AUTO: 2.35 K/UL — SIGNIFICANT CHANGE UP (ref 1–3.3)
LYMPHOCYTES # BLD MANUAL: 1.58 K/UL — SIGNIFICANT CHANGE UP (ref 1–3.3)
LYMPHOCYTES NFR BLD AUTO: 17.1 % — SIGNIFICANT CHANGE UP (ref 13–44)
LYMPHOCYTES NFR BLD MANUAL: 11.5 % — LOW (ref 13–44)
MAGNESIUM SERPL-MCNC: 2 MG/DL — SIGNIFICANT CHANGE UP (ref 1.6–2.6)
MANUAL NEUTROPHIL BANDS #: 1.46 K/UL — HIGH (ref 0–0.84)
MCHC RBC-ENTMCNC: 29.2 PG — SIGNIFICANT CHANGE UP (ref 27–34)
MCHC RBC-ENTMCNC: 32.6 G/DL — SIGNIFICANT CHANGE UP (ref 32–36)
MCV RBC AUTO: 89.6 FL — SIGNIFICANT CHANGE UP (ref 80–100)
MONOCYTES # BLD AUTO: 1.77 K/UL — HIGH (ref 0–0.9)
MONOCYTES # BLD MANUAL: 0.85 K/UL — SIGNIFICANT CHANGE UP (ref 0–0.9)
MONOCYTES NFR BLD AUTO: 12.9 % — SIGNIFICANT CHANGE UP (ref 2–14)
MONOCYTES NFR BLD MANUAL: 6.2 % — SIGNIFICANT CHANGE UP (ref 2–14)
NEUTROPHILS # BLD AUTO: 9.45 K/UL — HIGH (ref 1.8–7.4)
NEUTROPHILS # BLD MANUAL: 9.87 K/UL — HIGH (ref 1.8–7.4)
NEUTROPHILS NFR BLD AUTO: 68.6 % — SIGNIFICANT CHANGE UP (ref 43–77)
NEUTROPHILS NFR BLD MANUAL: 71.7 % — SIGNIFICANT CHANGE UP (ref 43–77)
NEUTS BAND # BLD: 10.6 % — CRITICAL HIGH (ref 0–8)
NEUTS BAND NFR BLD: 10.6 % — CRITICAL HIGH (ref 0–8)
NRBC # BLD AUTO: 0 K/UL — SIGNIFICANT CHANGE UP (ref 0–0)
NRBC # FLD: 0 K/UL — SIGNIFICANT CHANGE UP (ref 0–0)
NRBC BLD AUTO-RTO: 0 /100 WBCS — SIGNIFICANT CHANGE UP (ref 0–0)
PHOSPHATE SERPL-MCNC: 2.8 MG/DL — SIGNIFICANT CHANGE UP (ref 2.5–4.5)
PLAT MORPH BLD: NORMAL — SIGNIFICANT CHANGE UP
PLATELET # BLD AUTO: 156 K/UL — SIGNIFICANT CHANGE UP (ref 150–400)
PLATELET COUNT - ESTIMATE: NORMAL — SIGNIFICANT CHANGE UP
PMV BLD: 13.3 FL — HIGH (ref 7–13)
POTASSIUM SERPL-MCNC: 4.3 MMOL/L — SIGNIFICANT CHANGE UP (ref 3.5–5.3)
POTASSIUM SERPL-SCNC: 4.3 MMOL/L — SIGNIFICANT CHANGE UP (ref 3.5–5.3)
PROT SERPL-MCNC: 7.2 G/DL — SIGNIFICANT CHANGE UP (ref 6–8.3)
RBC # BLD: 3.56 M/UL — LOW (ref 4.2–5.8)
RBC # FLD: 14.6 % — HIGH (ref 10.3–14.5)
RBC BLD AUTO: NORMAL — SIGNIFICANT CHANGE UP
SMUDGE CELLS # BLD: PRESENT
SODIUM SERPL-SCNC: 135 MMOL/L — SIGNIFICANT CHANGE UP (ref 135–145)
WBC # BLD: 13.77 K/UL — HIGH (ref 3.8–10.5)
WBC # FLD AUTO: 13.77 K/UL — HIGH (ref 3.8–10.5)

## 2025-07-28 PROCEDURE — 99232 SBSQ HOSP IP/OBS MODERATE 35: CPT

## 2025-07-28 RX ORDER — LACTOBACILLUS ACIDOPHILUS/PECT 75 MM-100
1 CAPSULE ORAL DAILY
Refills: 0 | Status: DISCONTINUED | OUTPATIENT
Start: 2025-07-28 | End: 2025-08-06

## 2025-07-28 RX ORDER — METOPROLOL SUCCINATE 50 MG/1
25 TABLET, EXTENDED RELEASE ORAL DAILY
Refills: 0 | Status: DISCONTINUED | OUTPATIENT
Start: 2025-07-28 | End: 2025-08-06

## 2025-07-28 RX ORDER — MELATONIN 5 MG
3 TABLET ORAL ONCE
Refills: 0 | Status: COMPLETED | OUTPATIENT
Start: 2025-07-28 | End: 2025-07-28

## 2025-07-28 RX ADMIN — ERYTHROMYCIN 1 APPLICATION(S): 5 OINTMENT OPHTHALMIC at 06:27

## 2025-07-28 RX ADMIN — Medication 250 MILLIGRAM(S): at 10:42

## 2025-07-28 RX ADMIN — INSULIN LISPRO 1: 100 INJECTION, SOLUTION INTRAVENOUS; SUBCUTANEOUS at 18:33

## 2025-07-28 RX ADMIN — Medication 3 MILLIGRAM(S): at 22:59

## 2025-07-28 RX ADMIN — HEPARIN SODIUM 5000 UNIT(S): 1000 INJECTION INTRAVENOUS; SUBCUTANEOUS at 06:27

## 2025-07-28 RX ADMIN — Medication 1 DROP(S): at 18:36

## 2025-07-28 RX ADMIN — Medication 650 MILLIGRAM(S): at 00:48

## 2025-07-28 RX ADMIN — INSULIN GLARGINE-YFGN 12 UNIT(S): 100 INJECTION, SOLUTION SUBCUTANEOUS at 23:14

## 2025-07-28 RX ADMIN — CLOPIDOGREL BISULFATE 75 MILLIGRAM(S): 75 TABLET, FILM COATED ORAL at 12:40

## 2025-07-28 RX ADMIN — Medication 650 MILLIGRAM(S): at 23:24

## 2025-07-28 RX ADMIN — Medication 650 MILLIGRAM(S): at 23:54

## 2025-07-28 RX ADMIN — ERYTHROMYCIN 1 APPLICATION(S): 5 OINTMENT OPHTHALMIC at 18:36

## 2025-07-28 RX ADMIN — HEPARIN SODIUM 5000 UNIT(S): 1000 INJECTION INTRAVENOUS; SUBCUTANEOUS at 13:31

## 2025-07-28 RX ADMIN — EZETIMIBE 10 MILLIGRAM(S): 10 TABLET ORAL at 12:40

## 2025-07-28 RX ADMIN — Medication 650 MILLIGRAM(S): at 14:15

## 2025-07-28 RX ADMIN — Medication 40 MILLIGRAM(S): at 06:27

## 2025-07-28 RX ADMIN — INSULIN LISPRO 4 UNIT(S): 100 INJECTION, SOLUTION INTRAVENOUS; SUBCUTANEOUS at 09:12

## 2025-07-28 RX ADMIN — CEFTRIAXONE 100 MILLIGRAM(S): 500 INJECTION, POWDER, FOR SOLUTION INTRAMUSCULAR; INTRAVENOUS at 18:34

## 2025-07-28 RX ADMIN — ERYTHROMYCIN 1 APPLICATION(S): 5 OINTMENT OPHTHALMIC at 23:15

## 2025-07-28 RX ADMIN — INSULIN LISPRO 4 UNIT(S): 100 INJECTION, SOLUTION INTRAVENOUS; SUBCUTANEOUS at 18:34

## 2025-07-28 RX ADMIN — INSULIN LISPRO 3: 100 INJECTION, SOLUTION INTRAVENOUS; SUBCUTANEOUS at 12:38

## 2025-07-28 RX ADMIN — INSULIN LISPRO 4 UNIT(S): 100 INJECTION, SOLUTION INTRAVENOUS; SUBCUTANEOUS at 12:39

## 2025-07-28 RX ADMIN — INSULIN LISPRO 1: 100 INJECTION, SOLUTION INTRAVENOUS; SUBCUTANEOUS at 23:14

## 2025-07-28 RX ADMIN — Medication 1 DROP(S): at 06:27

## 2025-07-28 RX ADMIN — INSULIN LISPRO 1: 100 INJECTION, SOLUTION INTRAVENOUS; SUBCUTANEOUS at 09:12

## 2025-07-28 RX ADMIN — HEPARIN SODIUM 5000 UNIT(S): 1000 INJECTION INTRAVENOUS; SUBCUTANEOUS at 22:59

## 2025-07-28 RX ADMIN — ATORVASTATIN CALCIUM 10 MILLIGRAM(S): 80 TABLET, FILM COATED ORAL at 22:59

## 2025-07-28 RX ADMIN — ERYTHROMYCIN 1 APPLICATION(S): 5 OINTMENT OPHTHALMIC at 12:39

## 2025-07-29 DIAGNOSIS — R09.02 HYPOXEMIA: ICD-10-CM

## 2025-07-29 LAB
ALBUMIN SERPL ELPH-MCNC: 2.8 G/DL — LOW (ref 3.3–5)
ALP SERPL-CCNC: 115 U/L — SIGNIFICANT CHANGE UP (ref 40–120)
ALT FLD-CCNC: 22 U/L — SIGNIFICANT CHANGE UP (ref 4–41)
ANION GAP SERPL CALC-SCNC: 12 MMOL/L — SIGNIFICANT CHANGE UP (ref 7–14)
AST SERPL-CCNC: 37 U/L — SIGNIFICANT CHANGE UP (ref 4–40)
BASOPHILS # BLD AUTO: 0.05 K/UL — SIGNIFICANT CHANGE UP (ref 0–0.2)
BASOPHILS NFR BLD AUTO: 0.3 % — SIGNIFICANT CHANGE UP (ref 0–2)
BILIRUB SERPL-MCNC: 0.5 MG/DL — SIGNIFICANT CHANGE UP (ref 0.2–1.2)
BUN SERPL-MCNC: 22 MG/DL — SIGNIFICANT CHANGE UP (ref 7–23)
CALCIUM SERPL-MCNC: 9.3 MG/DL — SIGNIFICANT CHANGE UP (ref 8.4–10.5)
CHLORIDE SERPL-SCNC: 100 MMOL/L — SIGNIFICANT CHANGE UP (ref 98–107)
CO2 SERPL-SCNC: 20 MMOL/L — LOW (ref 22–31)
CREAT SERPL-MCNC: 1.37 MG/DL — HIGH (ref 0.5–1.3)
EGFR: 50 ML/MIN/1.73M2 — LOW
EGFR: 50 ML/MIN/1.73M2 — LOW
EOSINOPHIL # BLD AUTO: 0.03 K/UL — SIGNIFICANT CHANGE UP (ref 0–0.5)
EOSINOPHIL NFR BLD AUTO: 0.2 % — SIGNIFICANT CHANGE UP (ref 0–6)
GLUCOSE BLDC GLUCOMTR-MCNC: 198 MG/DL — HIGH (ref 70–99)
GLUCOSE BLDC GLUCOMTR-MCNC: 217 MG/DL — HIGH (ref 70–99)
GLUCOSE BLDC GLUCOMTR-MCNC: 221 MG/DL — HIGH (ref 70–99)
GLUCOSE BLDC GLUCOMTR-MCNC: 237 MG/DL — HIGH (ref 70–99)
GLUCOSE BLDC GLUCOMTR-MCNC: 237 MG/DL — HIGH (ref 70–99)
GLUCOSE SERPL-MCNC: 259 MG/DL — HIGH (ref 70–99)
HCT VFR BLD CALC: 29.1 % — LOW (ref 39–50)
HGB BLD-MCNC: 9.7 G/DL — LOW (ref 13–17)
IMM GRANULOCYTES # BLD AUTO: 0.2 K/UL — HIGH (ref 0–0.07)
IMM GRANULOCYTES NFR BLD AUTO: 1.2 % — HIGH (ref 0–0.9)
LYMPHOCYTES # BLD AUTO: 2.22 K/UL — SIGNIFICANT CHANGE UP (ref 1–3.3)
LYMPHOCYTES NFR BLD AUTO: 13.8 % — SIGNIFICANT CHANGE UP (ref 13–44)
MAGNESIUM SERPL-MCNC: 2.1 MG/DL — SIGNIFICANT CHANGE UP (ref 1.6–2.6)
MCHC RBC-ENTMCNC: 29 PG — SIGNIFICANT CHANGE UP (ref 27–34)
MCHC RBC-ENTMCNC: 33.3 G/DL — SIGNIFICANT CHANGE UP (ref 32–36)
MCV RBC AUTO: 86.9 FL — SIGNIFICANT CHANGE UP (ref 80–100)
MONOCYTES # BLD AUTO: 1.78 K/UL — HIGH (ref 0–0.9)
MONOCYTES NFR BLD AUTO: 11 % — SIGNIFICANT CHANGE UP (ref 2–14)
NEUTROPHILS # BLD AUTO: 11.86 K/UL — HIGH (ref 1.8–7.4)
NEUTROPHILS NFR BLD AUTO: 73.5 % — SIGNIFICANT CHANGE UP (ref 43–77)
NRBC # BLD AUTO: 0 K/UL — SIGNIFICANT CHANGE UP (ref 0–0)
NRBC # FLD: 0 K/UL — SIGNIFICANT CHANGE UP (ref 0–0)
NRBC BLD AUTO-RTO: 0 /100 WBCS — SIGNIFICANT CHANGE UP (ref 0–0)
PHOSPHATE SERPL-MCNC: 3.6 MG/DL — SIGNIFICANT CHANGE UP (ref 2.5–4.5)
PLATELET # BLD AUTO: 161 K/UL — SIGNIFICANT CHANGE UP (ref 150–400)
PMV BLD: 13.2 FL — HIGH (ref 7–13)
POTASSIUM SERPL-MCNC: 5.2 MMOL/L — SIGNIFICANT CHANGE UP (ref 3.5–5.3)
POTASSIUM SERPL-SCNC: 5.2 MMOL/L — SIGNIFICANT CHANGE UP (ref 3.5–5.3)
PROT SERPL-MCNC: 7.4 G/DL — SIGNIFICANT CHANGE UP (ref 6–8.3)
RBC # BLD: 3.35 M/UL — LOW (ref 4.2–5.8)
RBC # FLD: 14.3 % — SIGNIFICANT CHANGE UP (ref 10.3–14.5)
SODIUM SERPL-SCNC: 132 MMOL/L — LOW (ref 135–145)
WBC # BLD: 16.14 K/UL — HIGH (ref 3.8–10.5)
WBC # FLD AUTO: 16.14 K/UL — HIGH (ref 3.8–10.5)

## 2025-07-29 PROCEDURE — 71045 X-RAY EXAM CHEST 1 VIEW: CPT | Mod: 26

## 2025-07-29 PROCEDURE — 99233 SBSQ HOSP IP/OBS HIGH 50: CPT

## 2025-07-29 RX ORDER — DEXTROMETHORPHAN HBR, GUAIFENESIN 200 MG/10ML
1200 LIQUID ORAL EVERY 12 HOURS
Refills: 0 | Status: DISCONTINUED | OUTPATIENT
Start: 2025-07-29 | End: 2025-08-06

## 2025-07-29 RX ORDER — IPRATROPIUM BROMIDE AND ALBUTEROL SULFATE .5; 2.5 MG/3ML; MG/3ML
3 SOLUTION RESPIRATORY (INHALATION) EVERY 6 HOURS
Refills: 0 | Status: DISCONTINUED | OUTPATIENT
Start: 2025-07-29 | End: 2025-08-06

## 2025-07-29 RX ORDER — MELATONIN 5 MG
3 TABLET ORAL AT BEDTIME
Refills: 0 | Status: DISCONTINUED | OUTPATIENT
Start: 2025-07-29 | End: 2025-08-06

## 2025-07-29 RX ORDER — INSULIN LISPRO 100 U/ML
5 INJECTION, SOLUTION INTRAVENOUS; SUBCUTANEOUS
Refills: 0 | Status: DISCONTINUED | OUTPATIENT
Start: 2025-07-29 | End: 2025-07-30

## 2025-07-29 RX ORDER — ALPRAZOLAM 0.5 MG
0.25 TABLET, EXTENDED RELEASE 24 HR ORAL ONCE
Refills: 0 | Status: DISCONTINUED | OUTPATIENT
Start: 2025-07-29 | End: 2025-07-29

## 2025-07-29 RX ORDER — IPRATROPIUM BROMIDE AND ALBUTEROL SULFATE .5; 2.5 MG/3ML; MG/3ML
3 SOLUTION RESPIRATORY (INHALATION) ONCE
Refills: 0 | Status: COMPLETED | OUTPATIENT
Start: 2025-07-29 | End: 2025-07-29

## 2025-07-29 RX ORDER — INSULIN GLARGINE-YFGN 100 [IU]/ML
16 INJECTION, SOLUTION SUBCUTANEOUS AT BEDTIME
Refills: 0 | Status: DISCONTINUED | OUTPATIENT
Start: 2025-07-29 | End: 2025-07-30

## 2025-07-29 RX ADMIN — Medication 0.25 MILLIGRAM(S): at 23:09

## 2025-07-29 RX ADMIN — Medication 650 MILLIGRAM(S): at 21:30

## 2025-07-29 RX ADMIN — INSULIN GLARGINE-YFGN 16 UNIT(S): 100 INJECTION, SOLUTION SUBCUTANEOUS at 21:19

## 2025-07-29 RX ADMIN — IPRATROPIUM BROMIDE AND ALBUTEROL SULFATE 3 MILLILITER(S): .5; 2.5 SOLUTION RESPIRATORY (INHALATION) at 12:26

## 2025-07-29 RX ADMIN — HEPARIN SODIUM 5000 UNIT(S): 1000 INJECTION INTRAVENOUS; SUBCUTANEOUS at 05:32

## 2025-07-29 RX ADMIN — ERYTHROMYCIN 1 APPLICATION(S): 5 OINTMENT OPHTHALMIC at 05:33

## 2025-07-29 RX ADMIN — Medication 1 TABLET(S): at 11:17

## 2025-07-29 RX ADMIN — INSULIN LISPRO 2: 100 INJECTION, SOLUTION INTRAVENOUS; SUBCUTANEOUS at 21:19

## 2025-07-29 RX ADMIN — METOPROLOL SUCCINATE 25 MILLIGRAM(S): 50 TABLET, EXTENDED RELEASE ORAL at 05:32

## 2025-07-29 RX ADMIN — INSULIN LISPRO 2: 100 INJECTION, SOLUTION INTRAVENOUS; SUBCUTANEOUS at 12:12

## 2025-07-29 RX ADMIN — INSULIN LISPRO 2: 100 INJECTION, SOLUTION INTRAVENOUS; SUBCUTANEOUS at 09:09

## 2025-07-29 RX ADMIN — Medication 1 DROP(S): at 18:27

## 2025-07-29 RX ADMIN — ATORVASTATIN CALCIUM 10 MILLIGRAM(S): 80 TABLET, FILM COATED ORAL at 21:00

## 2025-07-29 RX ADMIN — EZETIMIBE 10 MILLIGRAM(S): 10 TABLET ORAL at 11:19

## 2025-07-29 RX ADMIN — INSULIN LISPRO 5 UNIT(S): 100 INJECTION, SOLUTION INTRAVENOUS; SUBCUTANEOUS at 12:13

## 2025-07-29 RX ADMIN — ERYTHROMYCIN 1 APPLICATION(S): 5 OINTMENT OPHTHALMIC at 18:27

## 2025-07-29 RX ADMIN — Medication 650 MILLIGRAM(S): at 21:00

## 2025-07-29 RX ADMIN — INSULIN LISPRO 2: 100 INJECTION, SOLUTION INTRAVENOUS; SUBCUTANEOUS at 18:11

## 2025-07-29 RX ADMIN — Medication 40 MILLIGRAM(S): at 05:32

## 2025-07-29 RX ADMIN — IPRATROPIUM BROMIDE AND ALBUTEROL SULFATE 3 MILLILITER(S): .5; 2.5 SOLUTION RESPIRATORY (INHALATION) at 16:44

## 2025-07-29 RX ADMIN — Medication 3 MILLIGRAM(S): at 21:18

## 2025-07-29 RX ADMIN — HEPARIN SODIUM 5000 UNIT(S): 1000 INJECTION INTRAVENOUS; SUBCUTANEOUS at 14:33

## 2025-07-29 RX ADMIN — INSULIN LISPRO 4 UNIT(S): 100 INJECTION, SOLUTION INTRAVENOUS; SUBCUTANEOUS at 09:10

## 2025-07-29 RX ADMIN — IPRATROPIUM BROMIDE AND ALBUTEROL SULFATE 3 MILLILITER(S): .5; 2.5 SOLUTION RESPIRATORY (INHALATION) at 13:48

## 2025-07-29 RX ADMIN — HEPARIN SODIUM 5000 UNIT(S): 1000 INJECTION INTRAVENOUS; SUBCUTANEOUS at 21:00

## 2025-07-29 RX ADMIN — Medication 4 MILLILITER(S): at 19:07

## 2025-07-29 RX ADMIN — CLOPIDOGREL BISULFATE 75 MILLIGRAM(S): 75 TABLET, FILM COATED ORAL at 11:17

## 2025-07-29 RX ADMIN — DEXTROMETHORPHAN HBR, GUAIFENESIN 1200 MILLIGRAM(S): 200 LIQUID ORAL at 18:26

## 2025-07-29 RX ADMIN — Medication 4 MILLILITER(S): at 12:26

## 2025-07-29 RX ADMIN — Medication 1 DROP(S): at 05:33

## 2025-07-29 RX ADMIN — ERYTHROMYCIN 1 APPLICATION(S): 5 OINTMENT OPHTHALMIC at 11:18

## 2025-07-29 RX ADMIN — Medication 4 MILLILITER(S): at 23:53

## 2025-07-29 RX ADMIN — CEFTRIAXONE 100 MILLIGRAM(S): 500 INJECTION, POWDER, FOR SOLUTION INTRAMUSCULAR; INTRAVENOUS at 18:34

## 2025-07-29 RX ADMIN — IPRATROPIUM BROMIDE AND ALBUTEROL SULFATE 3 MILLILITER(S): .5; 2.5 SOLUTION RESPIRATORY (INHALATION) at 19:06

## 2025-07-29 RX ADMIN — INSULIN LISPRO 5 UNIT(S): 100 INJECTION, SOLUTION INTRAVENOUS; SUBCUTANEOUS at 18:27

## 2025-07-30 DIAGNOSIS — A41.9 SEPSIS, UNSPECIFIED ORGANISM: ICD-10-CM

## 2025-07-30 DIAGNOSIS — J96.01 ACUTE RESPIRATORY FAILURE WITH HYPOXIA: ICD-10-CM

## 2025-07-30 DIAGNOSIS — R74.01 ELEVATION OF LEVELS OF LIVER TRANSAMINASE LEVELS: ICD-10-CM

## 2025-07-30 LAB
ADD ON TEST-SPECIMEN IN LAB: SIGNIFICANT CHANGE UP
ADD ON TEST-SPECIMEN IN LAB: SIGNIFICANT CHANGE UP
ALBUMIN SERPL ELPH-MCNC: 2.5 G/DL — LOW (ref 3.3–5)
ALBUMIN SERPL ELPH-MCNC: 2.8 G/DL — LOW (ref 3.3–5)
ALP SERPL-CCNC: 157 U/L — HIGH (ref 40–120)
ALP SERPL-CCNC: 166 U/L — HIGH (ref 40–120)
ALT FLD-CCNC: 557 U/L — HIGH (ref 4–41)
ALT FLD-CCNC: 640 U/L — HIGH (ref 4–41)
ANION GAP SERPL CALC-SCNC: 12 MMOL/L — SIGNIFICANT CHANGE UP (ref 7–14)
ANION GAP SERPL CALC-SCNC: 16 MMOL/L — HIGH (ref 7–14)
AST SERPL-CCNC: 629 U/L — HIGH (ref 4–40)
AST SERPL-CCNC: 971 U/L — HIGH (ref 4–40)
BASOPHILS # BLD AUTO: 0.05 K/UL — SIGNIFICANT CHANGE UP (ref 0–0.2)
BASOPHILS NFR BLD AUTO: 0.3 % — SIGNIFICANT CHANGE UP (ref 0–2)
BILIRUB SERPL-MCNC: 0.4 MG/DL — SIGNIFICANT CHANGE UP (ref 0.2–1.2)
BILIRUB SERPL-MCNC: 0.5 MG/DL — SIGNIFICANT CHANGE UP (ref 0.2–1.2)
BUN SERPL-MCNC: 37 MG/DL — HIGH (ref 7–23)
BUN SERPL-MCNC: 38 MG/DL — HIGH (ref 7–23)
CALCIUM SERPL-MCNC: 9 MG/DL — SIGNIFICANT CHANGE UP (ref 8.4–10.5)
CALCIUM SERPL-MCNC: 9.3 MG/DL — SIGNIFICANT CHANGE UP (ref 8.4–10.5)
CHLORIDE SERPL-SCNC: 101 MMOL/L — SIGNIFICANT CHANGE UP (ref 98–107)
CHLORIDE SERPL-SCNC: 98 MMOL/L — SIGNIFICANT CHANGE UP (ref 98–107)
CO2 SERPL-SCNC: 18 MMOL/L — LOW (ref 22–31)
CO2 SERPL-SCNC: 19 MMOL/L — LOW (ref 22–31)
CREAT SERPL-MCNC: 1.33 MG/DL — HIGH (ref 0.5–1.3)
CREAT SERPL-MCNC: 1.49 MG/DL — HIGH (ref 0.5–1.3)
DSDNA AB FLD-ACNC: <0.2 AI — SIGNIFICANT CHANGE UP
EGFR: 45 ML/MIN/1.73M2 — LOW
EGFR: 45 ML/MIN/1.73M2 — LOW
EGFR: 52 ML/MIN/1.73M2 — LOW
EGFR: 52 ML/MIN/1.73M2 — LOW
ENA SCL70 AB SER-ACNC: <0.2 AI — SIGNIFICANT CHANGE UP
ENA SS-A AB FLD IA-ACNC: <0.2 AI — SIGNIFICANT CHANGE UP
EOSINOPHIL # BLD AUTO: 0 K/UL — SIGNIFICANT CHANGE UP (ref 0–0.5)
EOSINOPHIL NFR BLD AUTO: 0 % — SIGNIFICANT CHANGE UP (ref 0–6)
GLUCOSE BLDC GLUCOMTR-MCNC: 297 MG/DL — HIGH (ref 70–99)
GLUCOSE SERPL-MCNC: 231 MG/DL — HIGH (ref 70–99)
GLUCOSE SERPL-MCNC: 275 MG/DL — HIGH (ref 70–99)
HCT VFR BLD CALC: 30.1 % — LOW (ref 39–50)
HGB BLD-MCNC: 10.2 G/DL — LOW (ref 13–17)
IMM GRANULOCYTES # BLD AUTO: 0.38 K/UL — HIGH (ref 0–0.07)
IMM GRANULOCYTES NFR BLD AUTO: 2.6 % — HIGH (ref 0–0.9)
LYMPHOCYTES # BLD AUTO: 2.32 K/UL — SIGNIFICANT CHANGE UP (ref 1–3.3)
LYMPHOCYTES NFR BLD AUTO: 15.9 % — SIGNIFICANT CHANGE UP (ref 13–44)
MAGNESIUM SERPL-MCNC: 2 MG/DL — SIGNIFICANT CHANGE UP (ref 1.6–2.6)
MAGNESIUM SERPL-MCNC: 2.1 MG/DL — SIGNIFICANT CHANGE UP (ref 1.6–2.6)
MCHC RBC-ENTMCNC: 29.1 PG — SIGNIFICANT CHANGE UP (ref 27–34)
MCHC RBC-ENTMCNC: 33.9 G/DL — SIGNIFICANT CHANGE UP (ref 32–36)
MCV RBC AUTO: 86 FL — SIGNIFICANT CHANGE UP (ref 80–100)
MONOCYTES # BLD AUTO: 1.09 K/UL — HIGH (ref 0–0.9)
MONOCYTES NFR BLD AUTO: 7.5 % — SIGNIFICANT CHANGE UP (ref 2–14)
NEUTROPHILS # BLD AUTO: 10.79 K/UL — HIGH (ref 1.8–7.4)
NEUTROPHILS NFR BLD AUTO: 73.7 % — SIGNIFICANT CHANGE UP (ref 43–77)
NRBC # BLD AUTO: 0.02 K/UL — HIGH (ref 0–0)
NRBC # FLD: 0.02 K/UL — HIGH (ref 0–0)
NRBC BLD AUTO-RTO: 0 /100 WBCS — SIGNIFICANT CHANGE UP (ref 0–0)
PHOSPHATE SERPL-MCNC: 2.6 MG/DL — SIGNIFICANT CHANGE UP (ref 2.5–4.5)
PHOSPHATE SERPL-MCNC: 3.5 MG/DL — SIGNIFICANT CHANGE UP (ref 2.5–4.5)
PLATELET # BLD AUTO: 189 K/UL — SIGNIFICANT CHANGE UP (ref 150–400)
PMV BLD: 13.2 FL — HIGH (ref 7–13)
POTASSIUM SERPL-MCNC: 4.3 MMOL/L — SIGNIFICANT CHANGE UP (ref 3.5–5.3)
POTASSIUM SERPL-MCNC: 5.2 MMOL/L — SIGNIFICANT CHANGE UP (ref 3.5–5.3)
POTASSIUM SERPL-SCNC: 4.3 MMOL/L — SIGNIFICANT CHANGE UP (ref 3.5–5.3)
POTASSIUM SERPL-SCNC: 5.2 MMOL/L — SIGNIFICANT CHANGE UP (ref 3.5–5.3)
PROT SERPL-MCNC: 7.3 G/DL — SIGNIFICANT CHANGE UP (ref 6–8.3)
PROT SERPL-MCNC: 7.5 G/DL — SIGNIFICANT CHANGE UP (ref 6–8.3)
RBC # BLD: 3.5 M/UL — LOW (ref 4.2–5.8)
RBC # FLD: 14.1 % — SIGNIFICANT CHANGE UP (ref 10.3–14.5)
RHEUMATOID FACT SERPL-ACNC: 19 IU/ML — HIGH (ref 0–13)
SODIUM SERPL-SCNC: 132 MMOL/L — LOW (ref 135–145)
SODIUM SERPL-SCNC: 132 MMOL/L — LOW (ref 135–145)
TROPONIN T, HIGH SENSITIVITY RESULT: 186 NG/L — CRITICAL HIGH
WBC # BLD: 14.63 K/UL — HIGH (ref 3.8–10.5)
WBC # FLD AUTO: 14.63 K/UL — HIGH (ref 3.8–10.5)

## 2025-07-30 PROCEDURE — 76705 ECHO EXAM OF ABDOMEN: CPT | Mod: 26

## 2025-07-30 PROCEDURE — 99233 SBSQ HOSP IP/OBS HIGH 50: CPT

## 2025-07-30 RX ORDER — INSULIN GLARGINE-YFGN 100 [IU]/ML
19 INJECTION, SOLUTION SUBCUTANEOUS AT BEDTIME
Refills: 0 | Status: DISCONTINUED | OUTPATIENT
Start: 2025-07-30 | End: 2025-07-31

## 2025-07-30 RX ORDER — INSULIN LISPRO 100 U/ML
7 INJECTION, SOLUTION INTRAVENOUS; SUBCUTANEOUS
Refills: 0 | Status: DISCONTINUED | OUTPATIENT
Start: 2025-07-30 | End: 2025-07-31

## 2025-07-30 RX ORDER — CEFEPIME 2 G/20ML
2000 INJECTION, POWDER, FOR SOLUTION INTRAVENOUS EVERY 12 HOURS
Refills: 0 | Status: DISCONTINUED | OUTPATIENT
Start: 2025-07-30 | End: 2025-08-05

## 2025-07-30 RX ORDER — METHYLPREDNISOLONE ACETATE 80 MG/ML
20 INJECTION, SUSPENSION INTRA-ARTICULAR; INTRALESIONAL; INTRAMUSCULAR; SOFT TISSUE EVERY 8 HOURS
Refills: 0 | Status: COMPLETED | OUTPATIENT
Start: 2025-07-31 | End: 2025-08-02

## 2025-07-30 RX ORDER — METHYLPREDNISOLONE ACETATE 80 MG/ML
40 INJECTION, SUSPENSION INTRA-ARTICULAR; INTRALESIONAL; INTRAMUSCULAR; SOFT TISSUE EVERY 8 HOURS
Refills: 0 | Status: COMPLETED | OUTPATIENT
Start: 2025-07-30 | End: 2025-07-31

## 2025-07-30 RX ORDER — METHYLPREDNISOLONE ACETATE 80 MG/ML
INJECTION, SUSPENSION INTRA-ARTICULAR; INTRALESIONAL; INTRAMUSCULAR; SOFT TISSUE
Refills: 0 | Status: COMPLETED | OUTPATIENT
Start: 2025-07-30 | End: 2025-08-02

## 2025-07-30 RX ADMIN — INSULIN LISPRO 2: 100 INJECTION, SOLUTION INTRAVENOUS; SUBCUTANEOUS at 12:37

## 2025-07-30 RX ADMIN — Medication 4 MILLILITER(S): at 21:49

## 2025-07-30 RX ADMIN — CLOPIDOGREL BISULFATE 75 MILLIGRAM(S): 75 TABLET, FILM COATED ORAL at 13:44

## 2025-07-30 RX ADMIN — Medication 650 MILLIGRAM(S): at 22:41

## 2025-07-30 RX ADMIN — IPRATROPIUM BROMIDE AND ALBUTEROL SULFATE 3 MILLILITER(S): .5; 2.5 SOLUTION RESPIRATORY (INHALATION) at 21:48

## 2025-07-30 RX ADMIN — ERYTHROMYCIN 1 APPLICATION(S): 5 OINTMENT OPHTHALMIC at 13:43

## 2025-07-30 RX ADMIN — INSULIN GLARGINE-YFGN 19 UNIT(S): 100 INJECTION, SOLUTION SUBCUTANEOUS at 22:05

## 2025-07-30 RX ADMIN — IPRATROPIUM BROMIDE AND ALBUTEROL SULFATE 3 MILLILITER(S): .5; 2.5 SOLUTION RESPIRATORY (INHALATION) at 03:05

## 2025-07-30 RX ADMIN — INSULIN LISPRO 5 UNIT(S): 100 INJECTION, SOLUTION INTRAVENOUS; SUBCUTANEOUS at 09:01

## 2025-07-30 RX ADMIN — METHYLPREDNISOLONE ACETATE 40 MILLIGRAM(S): 80 INJECTION, SUSPENSION INTRA-ARTICULAR; INTRALESIONAL; INTRAMUSCULAR; SOFT TISSUE at 13:45

## 2025-07-30 RX ADMIN — EZETIMIBE 10 MILLIGRAM(S): 10 TABLET ORAL at 13:43

## 2025-07-30 RX ADMIN — INSULIN LISPRO 7 UNIT(S): 100 INJECTION, SOLUTION INTRAVENOUS; SUBCUTANEOUS at 17:23

## 2025-07-30 RX ADMIN — ERYTHROMYCIN 1 APPLICATION(S): 5 OINTMENT OPHTHALMIC at 00:13

## 2025-07-30 RX ADMIN — ERYTHROMYCIN 1 APPLICATION(S): 5 OINTMENT OPHTHALMIC at 05:17

## 2025-07-30 RX ADMIN — CEFEPIME 100 MILLIGRAM(S): 2 INJECTION, POWDER, FOR SOLUTION INTRAVENOUS at 17:24

## 2025-07-30 RX ADMIN — METHYLPREDNISOLONE ACETATE 40 MILLIGRAM(S): 80 INJECTION, SUSPENSION INTRA-ARTICULAR; INTRALESIONAL; INTRAMUSCULAR; SOFT TISSUE at 22:05

## 2025-07-30 RX ADMIN — DEXTROMETHORPHAN HBR, GUAIFENESIN 1200 MILLIGRAM(S): 200 LIQUID ORAL at 05:17

## 2025-07-30 RX ADMIN — INSULIN LISPRO 3: 100 INJECTION, SOLUTION INTRAVENOUS; SUBCUTANEOUS at 17:23

## 2025-07-30 RX ADMIN — Medication 4 MILLILITER(S): at 09:28

## 2025-07-30 RX ADMIN — Medication 3 MILLIGRAM(S): at 22:10

## 2025-07-30 RX ADMIN — IPRATROPIUM BROMIDE AND ALBUTEROL SULFATE 3 MILLILITER(S): .5; 2.5 SOLUTION RESPIRATORY (INHALATION) at 15:43

## 2025-07-30 RX ADMIN — DEXTROMETHORPHAN HBR, GUAIFENESIN 1200 MILLIGRAM(S): 200 LIQUID ORAL at 17:24

## 2025-07-30 RX ADMIN — ATORVASTATIN CALCIUM 10 MILLIGRAM(S): 80 TABLET, FILM COATED ORAL at 22:05

## 2025-07-30 RX ADMIN — Medication 1 DROP(S): at 05:17

## 2025-07-30 RX ADMIN — Medication 40 MILLIGRAM(S): at 05:18

## 2025-07-30 RX ADMIN — HEPARIN SODIUM 5000 UNIT(S): 1000 INJECTION INTRAVENOUS; SUBCUTANEOUS at 05:18

## 2025-07-30 RX ADMIN — INSULIN LISPRO 3: 100 INJECTION, SOLUTION INTRAVENOUS; SUBCUTANEOUS at 09:01

## 2025-07-30 RX ADMIN — METOPROLOL SUCCINATE 25 MILLIGRAM(S): 50 TABLET, EXTENDED RELEASE ORAL at 05:19

## 2025-07-30 RX ADMIN — IPRATROPIUM BROMIDE AND ALBUTEROL SULFATE 3 MILLILITER(S): .5; 2.5 SOLUTION RESPIRATORY (INHALATION) at 09:28

## 2025-07-30 RX ADMIN — Medication 1 TABLET(S): at 13:44

## 2025-07-30 RX ADMIN — HEPARIN SODIUM 5000 UNIT(S): 1000 INJECTION INTRAVENOUS; SUBCUTANEOUS at 22:05

## 2025-07-30 RX ADMIN — ERYTHROMYCIN 1 APPLICATION(S): 5 OINTMENT OPHTHALMIC at 17:23

## 2025-07-30 RX ADMIN — INSULIN LISPRO 5 UNIT(S): 100 INJECTION, SOLUTION INTRAVENOUS; SUBCUTANEOUS at 12:37

## 2025-07-30 RX ADMIN — INSULIN LISPRO 3: 100 INJECTION, SOLUTION INTRAVENOUS; SUBCUTANEOUS at 22:06

## 2025-07-30 RX ADMIN — Medication 650 MILLIGRAM(S): at 22:11

## 2025-07-30 RX ADMIN — Medication 1 DROP(S): at 17:23

## 2025-07-30 RX ADMIN — HEPARIN SODIUM 5000 UNIT(S): 1000 INJECTION INTRAVENOUS; SUBCUTANEOUS at 13:48

## 2025-07-31 ENCOUNTER — RESULT REVIEW (OUTPATIENT)
Age: 85
End: 2025-07-31

## 2025-07-31 LAB
ALBUMIN SERPL ELPH-MCNC: 2.5 G/DL — LOW (ref 3.3–5)
ALP SERPL-CCNC: 200 U/L — HIGH (ref 40–120)
ALT FLD-CCNC: 489 U/L — HIGH (ref 4–41)
ANION GAP SERPL CALC-SCNC: 13 MMOL/L — SIGNIFICANT CHANGE UP (ref 7–14)
ANION GAP SERPL CALC-SCNC: 16 MMOL/L — HIGH (ref 7–14)
AST SERPL-CCNC: 337 U/L — HIGH (ref 4–40)
AUTO DIFF PNL BLD: ABNORMAL
BILIRUB SERPL-MCNC: 0.6 MG/DL — SIGNIFICANT CHANGE UP (ref 0.2–1.2)
BUN SERPL-MCNC: 36 MG/DL — HIGH (ref 7–23)
BUN SERPL-MCNC: 38 MG/DL — HIGH (ref 7–23)
C-ANCA SER-ACNC: NEGATIVE — SIGNIFICANT CHANGE UP
CALCIUM SERPL-MCNC: 8.9 MG/DL — SIGNIFICANT CHANGE UP (ref 8.4–10.5)
CALCIUM SERPL-MCNC: 9 MG/DL — SIGNIFICANT CHANGE UP (ref 8.4–10.5)
CHLORIDE SERPL-SCNC: 100 MMOL/L — SIGNIFICANT CHANGE UP (ref 98–107)
CHLORIDE SERPL-SCNC: 100 MMOL/L — SIGNIFICANT CHANGE UP (ref 98–107)
CO2 SERPL-SCNC: 18 MMOL/L — LOW (ref 22–31)
CO2 SERPL-SCNC: 18 MMOL/L — LOW (ref 22–31)
CREAT SERPL-MCNC: 1.16 MG/DL — SIGNIFICANT CHANGE UP (ref 0.5–1.3)
CREAT SERPL-MCNC: 1.23 MG/DL — SIGNIFICANT CHANGE UP (ref 0.5–1.3)
EGFR: 57 ML/MIN/1.73M2 — LOW
EGFR: 57 ML/MIN/1.73M2 — LOW
EGFR: 61 ML/MIN/1.73M2 — SIGNIFICANT CHANGE UP
EGFR: 61 ML/MIN/1.73M2 — SIGNIFICANT CHANGE UP
GLUCOSE SERPL-MCNC: 285 MG/DL — HIGH (ref 70–99)
GLUCOSE SERPL-MCNC: 357 MG/DL — HIGH (ref 70–99)
HCT VFR BLD CALC: 29.3 % — LOW (ref 39–50)
HGB BLD-MCNC: 9.9 G/DL — LOW (ref 13–17)
MAGNESIUM SERPL-MCNC: 2 MG/DL — SIGNIFICANT CHANGE UP (ref 1.6–2.6)
MAGNESIUM SERPL-MCNC: 2.1 MG/DL — SIGNIFICANT CHANGE UP (ref 1.6–2.6)
MCHC RBC-ENTMCNC: 28.7 PG — SIGNIFICANT CHANGE UP (ref 27–34)
MCHC RBC-ENTMCNC: 33.8 G/DL — SIGNIFICANT CHANGE UP (ref 32–36)
MCV RBC AUTO: 84.9 FL — SIGNIFICANT CHANGE UP (ref 80–100)
NRBC # BLD AUTO: 0.02 K/UL — HIGH (ref 0–0)
NRBC # FLD: 0.02 K/UL — HIGH (ref 0–0)
NRBC BLD AUTO-RTO: 0 /100 WBCS — SIGNIFICANT CHANGE UP (ref 0–0)
P-ANCA SER-ACNC: NEGATIVE — SIGNIFICANT CHANGE UP
PHOSPHATE SERPL-MCNC: 3.3 MG/DL — SIGNIFICANT CHANGE UP (ref 2.5–4.5)
PHOSPHATE SERPL-MCNC: 3.3 MG/DL — SIGNIFICANT CHANGE UP (ref 2.5–4.5)
PLATELET # BLD AUTO: 205 K/UL — SIGNIFICANT CHANGE UP (ref 150–400)
PMV BLD: 12.9 FL — SIGNIFICANT CHANGE UP (ref 7–13)
POTASSIUM SERPL-MCNC: 4.2 MMOL/L — SIGNIFICANT CHANGE UP (ref 3.5–5.3)
POTASSIUM SERPL-MCNC: 6.6 MMOL/L — CRITICAL HIGH (ref 3.5–5.3)
POTASSIUM SERPL-SCNC: 4.2 MMOL/L — SIGNIFICANT CHANGE UP (ref 3.5–5.3)
POTASSIUM SERPL-SCNC: 6.6 MMOL/L — CRITICAL HIGH (ref 3.5–5.3)
PROT SERPL-MCNC: 7.4 G/DL — SIGNIFICANT CHANGE UP (ref 6–8.3)
RBC # BLD: 3.45 M/UL — LOW (ref 4.2–5.8)
RBC # FLD: 13.9 % — SIGNIFICANT CHANGE UP (ref 10.3–14.5)
SODIUM SERPL-SCNC: 131 MMOL/L — LOW (ref 135–145)
SODIUM SERPL-SCNC: 134 MMOL/L — LOW (ref 135–145)
TROPONIN T, HIGH SENSITIVITY RESULT: 141 NG/L — CRITICAL HIGH
WBC # BLD: 10.82 K/UL — HIGH (ref 3.8–10.5)
WBC # FLD AUTO: 10.82 K/UL — HIGH (ref 3.8–10.5)

## 2025-07-31 PROCEDURE — 93306 TTE W/DOPPLER COMPLETE: CPT | Mod: 26

## 2025-07-31 PROCEDURE — 99233 SBSQ HOSP IP/OBS HIGH 50: CPT

## 2025-07-31 RX ORDER — INSULIN LISPRO 100 U/ML
10 INJECTION, SOLUTION INTRAVENOUS; SUBCUTANEOUS
Refills: 0 | Status: DISCONTINUED | OUTPATIENT
Start: 2025-07-31 | End: 2025-08-01

## 2025-07-31 RX ORDER — MAGNESIUM, ALUMINUM HYDROXIDE 200-200 MG
30 TABLET,CHEWABLE ORAL EVERY 6 HOURS
Refills: 0 | Status: DISCONTINUED | OUTPATIENT
Start: 2025-07-31 | End: 2025-08-06

## 2025-07-31 RX ORDER — INSULIN GLARGINE-YFGN 100 [IU]/ML
22 INJECTION, SOLUTION SUBCUTANEOUS AT BEDTIME
Refills: 0 | Status: DISCONTINUED | OUTPATIENT
Start: 2025-07-31 | End: 2025-08-01

## 2025-07-31 RX ORDER — ACETAMINOPHEN 500 MG/5ML
1000 LIQUID (ML) ORAL ONCE
Refills: 0 | Status: COMPLETED | OUTPATIENT
Start: 2025-07-31 | End: 2025-07-31

## 2025-07-31 RX ORDER — FUROSEMIDE 10 MG/ML
40 INJECTION INTRAMUSCULAR; INTRAVENOUS ONCE
Refills: 0 | Status: COMPLETED | OUTPATIENT
Start: 2025-07-31 | End: 2025-07-31

## 2025-07-31 RX ADMIN — Medication 650 MILLIGRAM(S): at 23:41

## 2025-07-31 RX ADMIN — HEPARIN SODIUM 5000 UNIT(S): 1000 INJECTION INTRAVENOUS; SUBCUTANEOUS at 05:10

## 2025-07-31 RX ADMIN — Medication 4 MILLILITER(S): at 07:25

## 2025-07-31 RX ADMIN — METOPROLOL SUCCINATE 25 MILLIGRAM(S): 50 TABLET, EXTENDED RELEASE ORAL at 05:10

## 2025-07-31 RX ADMIN — Medication 400 MILLIGRAM(S): at 19:37

## 2025-07-31 RX ADMIN — INSULIN GLARGINE-YFGN 22 UNIT(S): 100 INJECTION, SOLUTION SUBCUTANEOUS at 23:10

## 2025-07-31 RX ADMIN — FUROSEMIDE 40 MILLIGRAM(S): 10 INJECTION INTRAMUSCULAR; INTRAVENOUS at 14:15

## 2025-07-31 RX ADMIN — Medication 4 MILLILITER(S): at 21:26

## 2025-07-31 RX ADMIN — INSULIN LISPRO 5: 100 INJECTION, SOLUTION INTRAVENOUS; SUBCUTANEOUS at 17:29

## 2025-07-31 RX ADMIN — HEPARIN SODIUM 5000 UNIT(S): 1000 INJECTION INTRAVENOUS; SUBCUTANEOUS at 23:10

## 2025-07-31 RX ADMIN — CEFEPIME 100 MILLIGRAM(S): 2 INJECTION, POWDER, FOR SOLUTION INTRAVENOUS at 05:10

## 2025-07-31 RX ADMIN — METHYLPREDNISOLONE ACETATE 40 MILLIGRAM(S): 80 INJECTION, SUSPENSION INTRA-ARTICULAR; INTRALESIONAL; INTRAMUSCULAR; SOFT TISSUE at 05:10

## 2025-07-31 RX ADMIN — Medication 1 TABLET(S): at 13:08

## 2025-07-31 RX ADMIN — INSULIN LISPRO 3: 100 INJECTION, SOLUTION INTRAVENOUS; SUBCUTANEOUS at 12:31

## 2025-07-31 RX ADMIN — INSULIN LISPRO 7 UNIT(S): 100 INJECTION, SOLUTION INTRAVENOUS; SUBCUTANEOUS at 12:31

## 2025-07-31 RX ADMIN — IPRATROPIUM BROMIDE AND ALBUTEROL SULFATE 3 MILLILITER(S): .5; 2.5 SOLUTION RESPIRATORY (INHALATION) at 04:00

## 2025-07-31 RX ADMIN — CEFEPIME 100 MILLIGRAM(S): 2 INJECTION, POWDER, FOR SOLUTION INTRAVENOUS at 17:36

## 2025-07-31 RX ADMIN — IPRATROPIUM BROMIDE AND ALBUTEROL SULFATE 3 MILLILITER(S): .5; 2.5 SOLUTION RESPIRATORY (INHALATION) at 21:26

## 2025-07-31 RX ADMIN — ERYTHROMYCIN 1 APPLICATION(S): 5 OINTMENT OPHTHALMIC at 13:08

## 2025-07-31 RX ADMIN — Medication 650 MILLIGRAM(S): at 23:11

## 2025-07-31 RX ADMIN — Medication 500 MILLILITER(S): at 19:28

## 2025-07-31 RX ADMIN — ATORVASTATIN CALCIUM 10 MILLIGRAM(S): 80 TABLET, FILM COATED ORAL at 23:11

## 2025-07-31 RX ADMIN — INSULIN LISPRO 7 UNIT(S): 100 INJECTION, SOLUTION INTRAVENOUS; SUBCUTANEOUS at 08:41

## 2025-07-31 RX ADMIN — IPRATROPIUM BROMIDE AND ALBUTEROL SULFATE 3 MILLILITER(S): .5; 2.5 SOLUTION RESPIRATORY (INHALATION) at 07:24

## 2025-07-31 RX ADMIN — DEXTROMETHORPHAN HBR, GUAIFENESIN 1200 MILLIGRAM(S): 200 LIQUID ORAL at 17:33

## 2025-07-31 RX ADMIN — Medication 1 DROP(S): at 05:09

## 2025-07-31 RX ADMIN — INSULIN LISPRO 10 UNIT(S): 100 INJECTION, SOLUTION INTRAVENOUS; SUBCUTANEOUS at 17:29

## 2025-07-31 RX ADMIN — INSULIN LISPRO 3: 100 INJECTION, SOLUTION INTRAVENOUS; SUBCUTANEOUS at 08:41

## 2025-07-31 RX ADMIN — IPRATROPIUM BROMIDE AND ALBUTEROL SULFATE 3 MILLILITER(S): .5; 2.5 SOLUTION RESPIRATORY (INHALATION) at 15:07

## 2025-07-31 RX ADMIN — Medication 3 MILLIGRAM(S): at 23:11

## 2025-07-31 RX ADMIN — METHYLPREDNISOLONE ACETATE 20 MILLIGRAM(S): 80 INJECTION, SUSPENSION INTRA-ARTICULAR; INTRALESIONAL; INTRAMUSCULAR; SOFT TISSUE at 13:25

## 2025-07-31 RX ADMIN — INSULIN LISPRO 4: 100 INJECTION, SOLUTION INTRAVENOUS; SUBCUTANEOUS at 23:10

## 2025-07-31 RX ADMIN — CLOPIDOGREL BISULFATE 75 MILLIGRAM(S): 75 TABLET, FILM COATED ORAL at 13:08

## 2025-07-31 RX ADMIN — HEPARIN SODIUM 5000 UNIT(S): 1000 INJECTION INTRAVENOUS; SUBCUTANEOUS at 13:09

## 2025-07-31 RX ADMIN — ERYTHROMYCIN 1 APPLICATION(S): 5 OINTMENT OPHTHALMIC at 23:12

## 2025-07-31 RX ADMIN — ERYTHROMYCIN 1 APPLICATION(S): 5 OINTMENT OPHTHALMIC at 05:09

## 2025-07-31 RX ADMIN — Medication 40 MILLIGRAM(S): at 05:10

## 2025-07-31 RX ADMIN — Medication 1 DROP(S): at 17:32

## 2025-07-31 RX ADMIN — ERYTHROMYCIN 1 APPLICATION(S): 5 OINTMENT OPHTHALMIC at 17:31

## 2025-07-31 RX ADMIN — Medication 1000 MILLIGRAM(S): at 19:46

## 2025-07-31 RX ADMIN — METHYLPREDNISOLONE ACETATE 20 MILLIGRAM(S): 80 INJECTION, SUSPENSION INTRA-ARTICULAR; INTRALESIONAL; INTRAMUSCULAR; SOFT TISSUE at 23:12

## 2025-07-31 RX ADMIN — Medication 30 MILLILITER(S): at 19:33

## 2025-07-31 RX ADMIN — ERYTHROMYCIN 1 APPLICATION(S): 5 OINTMENT OPHTHALMIC at 00:22

## 2025-07-31 RX ADMIN — DEXTROMETHORPHAN HBR, GUAIFENESIN 1200 MILLIGRAM(S): 200 LIQUID ORAL at 05:10

## 2025-07-31 RX ADMIN — EZETIMIBE 10 MILLIGRAM(S): 10 TABLET ORAL at 13:08

## 2025-08-01 LAB
ANION GAP SERPL CALC-SCNC: 15 MMOL/L — HIGH (ref 7–14)
BUN SERPL-MCNC: 55 MG/DL — HIGH (ref 7–23)
CALCIUM SERPL-MCNC: 8.8 MG/DL — SIGNIFICANT CHANGE UP (ref 8.4–10.5)
CHLORIDE SERPL-SCNC: 98 MMOL/L — SIGNIFICANT CHANGE UP (ref 98–107)
CO2 SERPL-SCNC: 20 MMOL/L — LOW (ref 22–31)
CREAT SERPL-MCNC: 1.5 MG/DL — HIGH (ref 0.5–1.3)
CULTURE RESULTS: SIGNIFICANT CHANGE UP
CULTURE RESULTS: SIGNIFICANT CHANGE UP
EGFR: 45 ML/MIN/1.73M2 — LOW
EGFR: 45 ML/MIN/1.73M2 — LOW
GLUCOSE SERPL-MCNC: 368 MG/DL — HIGH (ref 70–99)
HCT VFR BLD CALC: 28.2 % — LOW (ref 39–50)
HGB BLD-MCNC: 9.9 G/DL — LOW (ref 13–17)
MAGNESIUM SERPL-MCNC: 2.1 MG/DL — SIGNIFICANT CHANGE UP (ref 1.6–2.6)
MCHC RBC-ENTMCNC: 29.6 PG — SIGNIFICANT CHANGE UP (ref 27–34)
MCHC RBC-ENTMCNC: 35.1 G/DL — SIGNIFICANT CHANGE UP (ref 32–36)
MCV RBC AUTO: 84.2 FL — SIGNIFICANT CHANGE UP (ref 80–100)
NRBC # BLD AUTO: 0.04 K/UL — HIGH (ref 0–0)
NRBC # FLD: 0.04 K/UL — HIGH (ref 0–0)
NRBC BLD AUTO-RTO: 0 /100 WBCS — SIGNIFICANT CHANGE UP (ref 0–0)
PHOSPHATE SERPL-MCNC: 3.4 MG/DL — SIGNIFICANT CHANGE UP (ref 2.5–4.5)
PLATELET # BLD AUTO: 262 K/UL — SIGNIFICANT CHANGE UP (ref 150–400)
PMV BLD: 12.8 FL — SIGNIFICANT CHANGE UP (ref 7–13)
POTASSIUM SERPL-MCNC: 4 MMOL/L — SIGNIFICANT CHANGE UP (ref 3.5–5.3)
POTASSIUM SERPL-SCNC: 4 MMOL/L — SIGNIFICANT CHANGE UP (ref 3.5–5.3)
RBC # BLD: 3.35 M/UL — LOW (ref 4.2–5.8)
RBC # FLD: 13.7 % — SIGNIFICANT CHANGE UP (ref 10.3–14.5)
SODIUM SERPL-SCNC: 133 MMOL/L — LOW (ref 135–145)
SPECIMEN SOURCE: SIGNIFICANT CHANGE UP
SPECIMEN SOURCE: SIGNIFICANT CHANGE UP
WBC # BLD: 16.76 K/UL — HIGH (ref 3.8–10.5)
WBC # FLD AUTO: 16.76 K/UL — HIGH (ref 3.8–10.5)

## 2025-08-01 PROCEDURE — 99232 SBSQ HOSP IP/OBS MODERATE 35: CPT

## 2025-08-01 PROCEDURE — 71045 X-RAY EXAM CHEST 1 VIEW: CPT | Mod: 26

## 2025-08-01 RX ORDER — POLYETHYLENE GLYCOL 3350 17 G/17G
17 POWDER, FOR SOLUTION ORAL ONCE
Refills: 0 | Status: COMPLETED | OUTPATIENT
Start: 2025-08-01 | End: 2025-08-02

## 2025-08-01 RX ORDER — INSULIN LISPRO 100 U/ML
13 INJECTION, SOLUTION INTRAVENOUS; SUBCUTANEOUS
Refills: 0 | Status: DISCONTINUED | OUTPATIENT
Start: 2025-08-01 | End: 2025-08-03

## 2025-08-01 RX ORDER — INSULIN GLARGINE-YFGN 100 [IU]/ML
26 INJECTION, SOLUTION SUBCUTANEOUS AT BEDTIME
Refills: 0 | Status: DISCONTINUED | OUTPATIENT
Start: 2025-08-01 | End: 2025-08-03

## 2025-08-01 RX ADMIN — Medication 4 MILLILITER(S): at 07:20

## 2025-08-01 RX ADMIN — DEXTROMETHORPHAN HBR, GUAIFENESIN 1200 MILLIGRAM(S): 200 LIQUID ORAL at 17:19

## 2025-08-01 RX ADMIN — METOPROLOL SUCCINATE 25 MILLIGRAM(S): 50 TABLET, EXTENDED RELEASE ORAL at 05:54

## 2025-08-01 RX ADMIN — Medication 4 MILLILITER(S): at 20:25

## 2025-08-01 RX ADMIN — EZETIMIBE 10 MILLIGRAM(S): 10 TABLET ORAL at 12:28

## 2025-08-01 RX ADMIN — IPRATROPIUM BROMIDE AND ALBUTEROL SULFATE 3 MILLILITER(S): .5; 2.5 SOLUTION RESPIRATORY (INHALATION) at 07:20

## 2025-08-01 RX ADMIN — DEXTROMETHORPHAN HBR, GUAIFENESIN 1200 MILLIGRAM(S): 200 LIQUID ORAL at 05:54

## 2025-08-01 RX ADMIN — METHYLPREDNISOLONE ACETATE 20 MILLIGRAM(S): 80 INJECTION, SUSPENSION INTRA-ARTICULAR; INTRALESIONAL; INTRAMUSCULAR; SOFT TISSUE at 05:53

## 2025-08-01 RX ADMIN — INSULIN LISPRO 10 UNIT(S): 100 INJECTION, SOLUTION INTRAVENOUS; SUBCUTANEOUS at 08:47

## 2025-08-01 RX ADMIN — IPRATROPIUM BROMIDE AND ALBUTEROL SULFATE 3 MILLILITER(S): .5; 2.5 SOLUTION RESPIRATORY (INHALATION) at 15:42

## 2025-08-01 RX ADMIN — Medication 1 DROP(S): at 05:54

## 2025-08-01 RX ADMIN — Medication 40 MILLIGRAM(S): at 05:57

## 2025-08-01 RX ADMIN — HEPARIN SODIUM 5000 UNIT(S): 1000 INJECTION INTRAVENOUS; SUBCUTANEOUS at 05:54

## 2025-08-01 RX ADMIN — HEPARIN SODIUM 5000 UNIT(S): 1000 INJECTION INTRAVENOUS; SUBCUTANEOUS at 21:41

## 2025-08-01 RX ADMIN — Medication 3 MILLIGRAM(S): at 22:43

## 2025-08-01 RX ADMIN — Medication 1 TABLET(S): at 12:28

## 2025-08-01 RX ADMIN — CLOPIDOGREL BISULFATE 75 MILLIGRAM(S): 75 TABLET, FILM COATED ORAL at 12:28

## 2025-08-01 RX ADMIN — Medication 650 MILLIGRAM(S): at 23:48

## 2025-08-01 RX ADMIN — Medication 650 MILLIGRAM(S): at 23:18

## 2025-08-01 RX ADMIN — ATORVASTATIN CALCIUM 10 MILLIGRAM(S): 80 TABLET, FILM COATED ORAL at 21:38

## 2025-08-01 RX ADMIN — INSULIN GLARGINE-YFGN 26 UNIT(S): 100 INJECTION, SOLUTION SUBCUTANEOUS at 21:39

## 2025-08-01 RX ADMIN — METHYLPREDNISOLONE ACETATE 20 MILLIGRAM(S): 80 INJECTION, SUSPENSION INTRA-ARTICULAR; INTRALESIONAL; INTRAMUSCULAR; SOFT TISSUE at 13:18

## 2025-08-01 RX ADMIN — INSULIN LISPRO 13 UNIT(S): 100 INJECTION, SOLUTION INTRAVENOUS; SUBCUTANEOUS at 17:17

## 2025-08-01 RX ADMIN — INSULIN LISPRO 1: 100 INJECTION, SOLUTION INTRAVENOUS; SUBCUTANEOUS at 17:17

## 2025-08-01 RX ADMIN — CEFEPIME 100 MILLIGRAM(S): 2 INJECTION, POWDER, FOR SOLUTION INTRAVENOUS at 05:55

## 2025-08-01 RX ADMIN — METHYLPREDNISOLONE ACETATE 20 MILLIGRAM(S): 80 INJECTION, SUSPENSION INTRA-ARTICULAR; INTRALESIONAL; INTRAMUSCULAR; SOFT TISSUE at 21:40

## 2025-08-01 RX ADMIN — INSULIN LISPRO 10 UNIT(S): 100 INJECTION, SOLUTION INTRAVENOUS; SUBCUTANEOUS at 12:26

## 2025-08-01 RX ADMIN — INSULIN LISPRO 3: 100 INJECTION, SOLUTION INTRAVENOUS; SUBCUTANEOUS at 12:26

## 2025-08-01 RX ADMIN — CEFEPIME 100 MILLIGRAM(S): 2 INJECTION, POWDER, FOR SOLUTION INTRAVENOUS at 17:19

## 2025-08-01 RX ADMIN — INSULIN LISPRO 5: 100 INJECTION, SOLUTION INTRAVENOUS; SUBCUTANEOUS at 08:47

## 2025-08-01 RX ADMIN — Medication 1 DROP(S): at 17:20

## 2025-08-01 RX ADMIN — HEPARIN SODIUM 5000 UNIT(S): 1000 INJECTION INTRAVENOUS; SUBCUTANEOUS at 13:18

## 2025-08-01 RX ADMIN — IPRATROPIUM BROMIDE AND ALBUTEROL SULFATE 3 MILLILITER(S): .5; 2.5 SOLUTION RESPIRATORY (INHALATION) at 20:24

## 2025-08-02 LAB
ADD ON TEST-SPECIMEN IN LAB: SIGNIFICANT CHANGE UP
ANION GAP SERPL CALC-SCNC: 15 MMOL/L — HIGH (ref 7–14)
BUN SERPL-MCNC: 58 MG/DL — HIGH (ref 7–23)
CALCIUM SERPL-MCNC: 9.1 MG/DL — SIGNIFICANT CHANGE UP (ref 8.4–10.5)
CHLORIDE SERPL-SCNC: 101 MMOL/L — SIGNIFICANT CHANGE UP (ref 98–107)
CO2 SERPL-SCNC: 19 MMOL/L — LOW (ref 22–31)
CREAT SERPL-MCNC: 1.44 MG/DL — HIGH (ref 0.5–1.3)
EGFR: 47 ML/MIN/1.73M2 — LOW
EGFR: 47 ML/MIN/1.73M2 — LOW
GLUCOSE SERPL-MCNC: 174 MG/DL — HIGH (ref 70–99)
HCT VFR BLD CALC: 29.4 % — LOW (ref 39–50)
HGB BLD-MCNC: 9.8 G/DL — LOW (ref 13–17)
MAGNESIUM SERPL-MCNC: 2.2 MG/DL — SIGNIFICANT CHANGE UP (ref 1.6–2.6)
MCHC RBC-ENTMCNC: 28.8 PG — SIGNIFICANT CHANGE UP (ref 27–34)
MCHC RBC-ENTMCNC: 33.3 G/DL — SIGNIFICANT CHANGE UP (ref 32–36)
MCV RBC AUTO: 86.5 FL — SIGNIFICANT CHANGE UP (ref 80–100)
NRBC # BLD AUTO: 0.12 K/UL — HIGH (ref 0–0)
NRBC # FLD: 0.12 K/UL — HIGH (ref 0–0)
NRBC BLD AUTO-RTO: 0 /100 WBCS — SIGNIFICANT CHANGE UP (ref 0–0)
PHOSPHATE SERPL-MCNC: 3.5 MG/DL — SIGNIFICANT CHANGE UP (ref 2.5–4.5)
PLATELET # BLD AUTO: 304 K/UL — SIGNIFICANT CHANGE UP (ref 150–400)
PMV BLD: 12.5 FL — SIGNIFICANT CHANGE UP (ref 7–13)
POTASSIUM SERPL-MCNC: 4.1 MMOL/L — SIGNIFICANT CHANGE UP (ref 3.5–5.3)
POTASSIUM SERPL-SCNC: 4.1 MMOL/L — SIGNIFICANT CHANGE UP (ref 3.5–5.3)
RBC # BLD: 3.4 M/UL — LOW (ref 4.2–5.8)
RBC # FLD: 14.2 % — SIGNIFICANT CHANGE UP (ref 10.3–14.5)
SODIUM SERPL-SCNC: 135 MMOL/L — SIGNIFICANT CHANGE UP (ref 135–145)
WBC # BLD: 18.6 K/UL — HIGH (ref 3.8–10.5)
WBC # FLD AUTO: 18.6 K/UL — HIGH (ref 3.8–10.5)

## 2025-08-02 PROCEDURE — 99232 SBSQ HOSP IP/OBS MODERATE 35: CPT

## 2025-08-02 RX ADMIN — Medication 40 MILLIGRAM(S): at 05:29

## 2025-08-02 RX ADMIN — Medication 4 MILLILITER(S): at 20:42

## 2025-08-02 RX ADMIN — INSULIN LISPRO 13 UNIT(S): 100 INJECTION, SOLUTION INTRAVENOUS; SUBCUTANEOUS at 12:22

## 2025-08-02 RX ADMIN — HEPARIN SODIUM 5000 UNIT(S): 1000 INJECTION INTRAVENOUS; SUBCUTANEOUS at 21:53

## 2025-08-02 RX ADMIN — INSULIN GLARGINE-YFGN 26 UNIT(S): 100 INJECTION, SOLUTION SUBCUTANEOUS at 21:53

## 2025-08-02 RX ADMIN — Medication 1 TABLET(S): at 12:25

## 2025-08-02 RX ADMIN — IPRATROPIUM BROMIDE AND ALBUTEROL SULFATE 3 MILLILITER(S): .5; 2.5 SOLUTION RESPIRATORY (INHALATION) at 03:57

## 2025-08-02 RX ADMIN — IPRATROPIUM BROMIDE AND ALBUTEROL SULFATE 3 MILLILITER(S): .5; 2.5 SOLUTION RESPIRATORY (INHALATION) at 20:42

## 2025-08-02 RX ADMIN — CEFEPIME 100 MILLIGRAM(S): 2 INJECTION, POWDER, FOR SOLUTION INTRAVENOUS at 17:47

## 2025-08-02 RX ADMIN — IPRATROPIUM BROMIDE AND ALBUTEROL SULFATE 3 MILLILITER(S): .5; 2.5 SOLUTION RESPIRATORY (INHALATION) at 15:48

## 2025-08-02 RX ADMIN — HEPARIN SODIUM 5000 UNIT(S): 1000 INJECTION INTRAVENOUS; SUBCUTANEOUS at 05:26

## 2025-08-02 RX ADMIN — Medication 1 DROP(S): at 05:29

## 2025-08-02 RX ADMIN — CLOPIDOGREL BISULFATE 75 MILLIGRAM(S): 75 TABLET, FILM COATED ORAL at 12:25

## 2025-08-02 RX ADMIN — INSULIN LISPRO 13 UNIT(S): 100 INJECTION, SOLUTION INTRAVENOUS; SUBCUTANEOUS at 17:13

## 2025-08-02 RX ADMIN — Medication 4 MILLILITER(S): at 10:53

## 2025-08-02 RX ADMIN — DEXTROMETHORPHAN HBR, GUAIFENESIN 1200 MILLIGRAM(S): 200 LIQUID ORAL at 05:29

## 2025-08-02 RX ADMIN — INSULIN LISPRO 1: 100 INJECTION, SOLUTION INTRAVENOUS; SUBCUTANEOUS at 08:43

## 2025-08-02 RX ADMIN — Medication 1 DROP(S): at 17:46

## 2025-08-02 RX ADMIN — METOPROLOL SUCCINATE 25 MILLIGRAM(S): 50 TABLET, EXTENDED RELEASE ORAL at 05:29

## 2025-08-02 RX ADMIN — HEPARIN SODIUM 5000 UNIT(S): 1000 INJECTION INTRAVENOUS; SUBCUTANEOUS at 13:10

## 2025-08-02 RX ADMIN — INSULIN LISPRO 13 UNIT(S): 100 INJECTION, SOLUTION INTRAVENOUS; SUBCUTANEOUS at 08:43

## 2025-08-02 RX ADMIN — CEFEPIME 100 MILLIGRAM(S): 2 INJECTION, POWDER, FOR SOLUTION INTRAVENOUS at 05:26

## 2025-08-02 RX ADMIN — DEXTROMETHORPHAN HBR, GUAIFENESIN 1200 MILLIGRAM(S): 200 LIQUID ORAL at 17:47

## 2025-08-02 RX ADMIN — POLYETHYLENE GLYCOL 3350 17 GRAM(S): 17 POWDER, FOR SOLUTION ORAL at 07:36

## 2025-08-02 RX ADMIN — INSULIN LISPRO 1: 100 INJECTION, SOLUTION INTRAVENOUS; SUBCUTANEOUS at 12:22

## 2025-08-02 RX ADMIN — ATORVASTATIN CALCIUM 10 MILLIGRAM(S): 80 TABLET, FILM COATED ORAL at 21:53

## 2025-08-02 RX ADMIN — EZETIMIBE 10 MILLIGRAM(S): 10 TABLET ORAL at 12:24

## 2025-08-02 RX ADMIN — METHYLPREDNISOLONE ACETATE 20 MILLIGRAM(S): 80 INJECTION, SUSPENSION INTRA-ARTICULAR; INTRALESIONAL; INTRAMUSCULAR; SOFT TISSUE at 05:26

## 2025-08-02 RX ADMIN — Medication 3 MILLIGRAM(S): at 21:54

## 2025-08-02 RX ADMIN — IPRATROPIUM BROMIDE AND ALBUTEROL SULFATE 3 MILLILITER(S): .5; 2.5 SOLUTION RESPIRATORY (INHALATION) at 10:53

## 2025-08-03 LAB
ANION GAP SERPL CALC-SCNC: 12 MMOL/L — SIGNIFICANT CHANGE UP (ref 7–14)
BUN SERPL-MCNC: 57 MG/DL — HIGH (ref 7–23)
CALCIUM SERPL-MCNC: 9 MG/DL — SIGNIFICANT CHANGE UP (ref 8.4–10.5)
CHLORIDE SERPL-SCNC: 104 MMOL/L — SIGNIFICANT CHANGE UP (ref 98–107)
CO2 SERPL-SCNC: 20 MMOL/L — LOW (ref 22–31)
CREAT SERPL-MCNC: 1.41 MG/DL — HIGH (ref 0.5–1.3)
EGFR: 49 ML/MIN/1.73M2 — LOW
EGFR: 49 ML/MIN/1.73M2 — LOW
GLUCOSE SERPL-MCNC: 81 MG/DL — SIGNIFICANT CHANGE UP (ref 70–99)
HCT VFR BLD CALC: 29.5 % — LOW (ref 39–50)
HGB BLD-MCNC: 10.1 G/DL — LOW (ref 13–17)
MAGNESIUM SERPL-MCNC: 2.3 MG/DL — SIGNIFICANT CHANGE UP (ref 1.6–2.6)
MCHC RBC-ENTMCNC: 29.2 PG — SIGNIFICANT CHANGE UP (ref 27–34)
MCHC RBC-ENTMCNC: 34.2 G/DL — SIGNIFICANT CHANGE UP (ref 32–36)
MCV RBC AUTO: 85.3 FL — SIGNIFICANT CHANGE UP (ref 80–100)
NRBC # BLD AUTO: 0.15 K/UL — HIGH (ref 0–0)
NRBC # FLD: 0.15 K/UL — HIGH (ref 0–0)
NRBC BLD AUTO-RTO: 0 /100 WBCS — SIGNIFICANT CHANGE UP (ref 0–0)
PHOSPHATE SERPL-MCNC: 3 MG/DL — SIGNIFICANT CHANGE UP (ref 2.5–4.5)
PLATELET # BLD AUTO: 350 K/UL — SIGNIFICANT CHANGE UP (ref 150–400)
PMV BLD: 12.3 FL — SIGNIFICANT CHANGE UP (ref 7–13)
POTASSIUM SERPL-MCNC: 4.1 MMOL/L — SIGNIFICANT CHANGE UP (ref 3.5–5.3)
POTASSIUM SERPL-SCNC: 4.1 MMOL/L — SIGNIFICANT CHANGE UP (ref 3.5–5.3)
RBC # BLD: 3.46 M/UL — LOW (ref 4.2–5.8)
RBC # FLD: 14.6 % — HIGH (ref 10.3–14.5)
SODIUM SERPL-SCNC: 136 MMOL/L — SIGNIFICANT CHANGE UP (ref 135–145)
WBC # BLD: 19.18 K/UL — HIGH (ref 3.8–10.5)
WBC # FLD AUTO: 19.18 K/UL — HIGH (ref 3.8–10.5)

## 2025-08-03 PROCEDURE — 99232 SBSQ HOSP IP/OBS MODERATE 35: CPT

## 2025-08-03 RX ORDER — DEXTROSE 50 % IN WATER 50 %
12.5 SYRINGE (ML) INTRAVENOUS ONCE
Refills: 0 | Status: COMPLETED | OUTPATIENT
Start: 2025-08-03 | End: 2025-08-03

## 2025-08-03 RX ADMIN — INSULIN LISPRO 2: 100 INJECTION, SOLUTION INTRAVENOUS; SUBCUTANEOUS at 12:17

## 2025-08-03 RX ADMIN — Medication 4 MILLILITER(S): at 10:01

## 2025-08-03 RX ADMIN — INSULIN LISPRO 13 UNIT(S): 100 INJECTION, SOLUTION INTRAVENOUS; SUBCUTANEOUS at 12:15

## 2025-08-03 RX ADMIN — INSULIN LISPRO 13 UNIT(S): 100 INJECTION, SOLUTION INTRAVENOUS; SUBCUTANEOUS at 09:35

## 2025-08-03 RX ADMIN — HEPARIN SODIUM 5000 UNIT(S): 1000 INJECTION INTRAVENOUS; SUBCUTANEOUS at 14:01

## 2025-08-03 RX ADMIN — HEPARIN SODIUM 5000 UNIT(S): 1000 INJECTION INTRAVENOUS; SUBCUTANEOUS at 05:33

## 2025-08-03 RX ADMIN — HEPARIN SODIUM 5000 UNIT(S): 1000 INJECTION INTRAVENOUS; SUBCUTANEOUS at 22:14

## 2025-08-03 RX ADMIN — METOPROLOL SUCCINATE 25 MILLIGRAM(S): 50 TABLET, EXTENDED RELEASE ORAL at 05:34

## 2025-08-03 RX ADMIN — IPRATROPIUM BROMIDE AND ALBUTEROL SULFATE 3 MILLILITER(S): .5; 2.5 SOLUTION RESPIRATORY (INHALATION) at 20:49

## 2025-08-03 RX ADMIN — Medication 1 TABLET(S): at 12:18

## 2025-08-03 RX ADMIN — CLOPIDOGREL BISULFATE 75 MILLIGRAM(S): 75 TABLET, FILM COATED ORAL at 12:18

## 2025-08-03 RX ADMIN — Medication 650 MILLIGRAM(S): at 22:37

## 2025-08-03 RX ADMIN — CEFEPIME 100 MILLIGRAM(S): 2 INJECTION, POWDER, FOR SOLUTION INTRAVENOUS at 05:33

## 2025-08-03 RX ADMIN — DEXTROMETHORPHAN HBR, GUAIFENESIN 1200 MILLIGRAM(S): 200 LIQUID ORAL at 18:01

## 2025-08-03 RX ADMIN — CEFEPIME 100 MILLIGRAM(S): 2 INJECTION, POWDER, FOR SOLUTION INTRAVENOUS at 18:01

## 2025-08-03 RX ADMIN — Medication 4 MILLILITER(S): at 20:49

## 2025-08-03 RX ADMIN — Medication 3 MILLIGRAM(S): at 22:20

## 2025-08-03 RX ADMIN — Medication 1 DROP(S): at 18:00

## 2025-08-03 RX ADMIN — DEXTROMETHORPHAN HBR, GUAIFENESIN 1200 MILLIGRAM(S): 200 LIQUID ORAL at 05:34

## 2025-08-03 RX ADMIN — Medication 1 DROP(S): at 05:34

## 2025-08-03 RX ADMIN — EZETIMIBE 10 MILLIGRAM(S): 10 TABLET ORAL at 12:18

## 2025-08-03 RX ADMIN — Medication 40 MILLIGRAM(S): at 05:33

## 2025-08-03 RX ADMIN — ATORVASTATIN CALCIUM 10 MILLIGRAM(S): 80 TABLET, FILM COATED ORAL at 22:14

## 2025-08-03 RX ADMIN — IPRATROPIUM BROMIDE AND ALBUTEROL SULFATE 3 MILLILITER(S): .5; 2.5 SOLUTION RESPIRATORY (INHALATION) at 10:00

## 2025-08-03 RX ADMIN — Medication 650 MILLIGRAM(S): at 23:05

## 2025-08-03 RX ADMIN — IPRATROPIUM BROMIDE AND ALBUTEROL SULFATE 3 MILLILITER(S): .5; 2.5 SOLUTION RESPIRATORY (INHALATION) at 16:25

## 2025-08-03 RX ADMIN — Medication 12.5 GRAM(S): at 16:44

## 2025-08-04 DIAGNOSIS — R07.89 OTHER CHEST PAIN: ICD-10-CM

## 2025-08-04 LAB
ADD ON TEST-SPECIMEN IN LAB: SIGNIFICANT CHANGE UP
ALBUMIN SERPL ELPH-MCNC: 2.6 G/DL — LOW (ref 3.3–5)
ALP SERPL-CCNC: 163 U/L — HIGH (ref 40–120)
ALT FLD-CCNC: 242 U/L — HIGH (ref 4–41)
ANION GAP SERPL CALC-SCNC: 9 MMOL/L — SIGNIFICANT CHANGE UP (ref 7–14)
AST SERPL-CCNC: 73 U/L — HIGH (ref 4–40)
BILIRUB SERPL-MCNC: 0.8 MG/DL — SIGNIFICANT CHANGE UP (ref 0.2–1.2)
BUN SERPL-MCNC: 46 MG/DL — HIGH (ref 7–23)
CALCIUM SERPL-MCNC: 8.7 MG/DL — SIGNIFICANT CHANGE UP (ref 8.4–10.5)
CHLORIDE SERPL-SCNC: 102 MMOL/L — SIGNIFICANT CHANGE UP (ref 98–107)
CK MB BLD-MCNC: 10.6 % — HIGH (ref 0–2.5)
CK MB BLD-MCNC: 11.5 % — HIGH (ref 0–2.5)
CK MB CFR SERPL CALC: 3.7 NG/ML — SIGNIFICANT CHANGE UP
CK MB CFR SERPL CALC: 3.9 NG/ML — SIGNIFICANT CHANGE UP
CK SERPL-CCNC: 34 U/L — SIGNIFICANT CHANGE UP (ref 30–200)
CK SERPL-CCNC: 35 U/L — SIGNIFICANT CHANGE UP (ref 30–200)
CO2 SERPL-SCNC: 26 MMOL/L — SIGNIFICANT CHANGE UP (ref 22–31)
CREAT SERPL-MCNC: 1.42 MG/DL — HIGH (ref 0.5–1.3)
EGFR: 48 ML/MIN/1.73M2 — LOW
EGFR: 48 ML/MIN/1.73M2 — LOW
GLUCOSE BLDC GLUCOMTR-MCNC: 190 MG/DL — HIGH (ref 70–99)
GLUCOSE BLDC GLUCOMTR-MCNC: 261 MG/DL — HIGH (ref 70–99)
GLUCOSE BLDC GLUCOMTR-MCNC: 290 MG/DL — HIGH (ref 70–99)
GLUCOSE BLDC GLUCOMTR-MCNC: 307 MG/DL — HIGH (ref 70–99)
GLUCOSE SERPL-MCNC: 80 MG/DL — SIGNIFICANT CHANGE UP (ref 70–99)
HCT VFR BLD CALC: 32.2 % — LOW (ref 39–50)
HGB BLD-MCNC: 10.8 G/DL — LOW (ref 13–17)
MAGNESIUM SERPL-MCNC: 2.2 MG/DL — SIGNIFICANT CHANGE UP (ref 1.6–2.6)
MCHC RBC-ENTMCNC: 29.3 PG — SIGNIFICANT CHANGE UP (ref 27–34)
MCHC RBC-ENTMCNC: 33.5 G/DL — SIGNIFICANT CHANGE UP (ref 32–36)
MCV RBC AUTO: 87.3 FL — SIGNIFICANT CHANGE UP (ref 80–100)
NRBC # BLD AUTO: 0.08 K/UL — HIGH (ref 0–0)
NRBC # FLD: 0.08 K/UL — HIGH (ref 0–0)
NRBC BLD AUTO-RTO: 0 /100 WBCS — SIGNIFICANT CHANGE UP (ref 0–0)
PHOSPHATE SERPL-MCNC: 3.4 MG/DL — SIGNIFICANT CHANGE UP (ref 2.5–4.5)
PLATELET # BLD AUTO: 383 K/UL — SIGNIFICANT CHANGE UP (ref 150–400)
PMV BLD: 11.6 FL — SIGNIFICANT CHANGE UP (ref 7–13)
POTASSIUM SERPL-MCNC: 4.2 MMOL/L — SIGNIFICANT CHANGE UP (ref 3.5–5.3)
POTASSIUM SERPL-SCNC: 4.2 MMOL/L — SIGNIFICANT CHANGE UP (ref 3.5–5.3)
PROT SERPL-MCNC: 6.5 G/DL — SIGNIFICANT CHANGE UP (ref 6–8.3)
RBC # BLD: 3.69 M/UL — LOW (ref 4.2–5.8)
RBC # FLD: 14.7 % — HIGH (ref 10.3–14.5)
SODIUM SERPL-SCNC: 137 MMOL/L — SIGNIFICANT CHANGE UP (ref 135–145)
TROPONIN T, HIGH SENSITIVITY RESULT: 205 NG/L — CRITICAL HIGH
TROPONIN T, HIGH SENSITIVITY RESULT: 213 NG/L — CRITICAL HIGH
TROPONIN T, HIGH SENSITIVITY RESULT: 259 NG/L — CRITICAL HIGH
WBC # BLD: 15.1 K/UL — HIGH (ref 3.8–10.5)
WBC # FLD AUTO: 15.1 K/UL — HIGH (ref 3.8–10.5)

## 2025-08-04 PROCEDURE — 93010 ELECTROCARDIOGRAM REPORT: CPT

## 2025-08-04 PROCEDURE — 99233 SBSQ HOSP IP/OBS HIGH 50: CPT

## 2025-08-04 RX ADMIN — CLOPIDOGREL BISULFATE 75 MILLIGRAM(S): 75 TABLET, FILM COATED ORAL at 11:37

## 2025-08-04 RX ADMIN — INSULIN LISPRO 4: 100 INJECTION, SOLUTION INTRAVENOUS; SUBCUTANEOUS at 17:29

## 2025-08-04 RX ADMIN — Medication 40 MILLIGRAM(S): at 06:25

## 2025-08-04 RX ADMIN — EZETIMIBE 10 MILLIGRAM(S): 10 TABLET ORAL at 11:37

## 2025-08-04 RX ADMIN — Medication 1 TABLET(S): at 11:37

## 2025-08-04 RX ADMIN — IPRATROPIUM BROMIDE AND ALBUTEROL SULFATE 3 MILLILITER(S): .5; 2.5 SOLUTION RESPIRATORY (INHALATION) at 15:58

## 2025-08-04 RX ADMIN — IPRATROPIUM BROMIDE AND ALBUTEROL SULFATE 3 MILLILITER(S): .5; 2.5 SOLUTION RESPIRATORY (INHALATION) at 10:53

## 2025-08-04 RX ADMIN — DEXTROMETHORPHAN HBR, GUAIFENESIN 1200 MILLIGRAM(S): 200 LIQUID ORAL at 17:29

## 2025-08-04 RX ADMIN — HEPARIN SODIUM 5000 UNIT(S): 1000 INJECTION INTRAVENOUS; SUBCUTANEOUS at 13:24

## 2025-08-04 RX ADMIN — CEFEPIME 100 MILLIGRAM(S): 2 INJECTION, POWDER, FOR SOLUTION INTRAVENOUS at 17:28

## 2025-08-04 RX ADMIN — ATORVASTATIN CALCIUM 10 MILLIGRAM(S): 80 TABLET, FILM COATED ORAL at 22:33

## 2025-08-04 RX ADMIN — INSULIN LISPRO 3: 100 INJECTION, SOLUTION INTRAVENOUS; SUBCUTANEOUS at 22:31

## 2025-08-04 RX ADMIN — IPRATROPIUM BROMIDE AND ALBUTEROL SULFATE 3 MILLILITER(S): .5; 2.5 SOLUTION RESPIRATORY (INHALATION) at 03:32

## 2025-08-04 RX ADMIN — Medication 1 DROP(S): at 17:29

## 2025-08-04 RX ADMIN — INSULIN LISPRO 1: 100 INJECTION, SOLUTION INTRAVENOUS; SUBCUTANEOUS at 12:39

## 2025-08-04 RX ADMIN — Medication 3 MILLIGRAM(S): at 22:32

## 2025-08-04 RX ADMIN — METOPROLOL SUCCINATE 25 MILLIGRAM(S): 50 TABLET, EXTENDED RELEASE ORAL at 06:24

## 2025-08-04 RX ADMIN — Medication 1 DROP(S): at 06:25

## 2025-08-04 RX ADMIN — HEPARIN SODIUM 5000 UNIT(S): 1000 INJECTION INTRAVENOUS; SUBCUTANEOUS at 06:25

## 2025-08-04 RX ADMIN — Medication 4 MILLILITER(S): at 10:53

## 2025-08-04 RX ADMIN — HEPARIN SODIUM 5000 UNIT(S): 1000 INJECTION INTRAVENOUS; SUBCUTANEOUS at 22:31

## 2025-08-04 RX ADMIN — CEFEPIME 100 MILLIGRAM(S): 2 INJECTION, POWDER, FOR SOLUTION INTRAVENOUS at 06:24

## 2025-08-05 LAB
ALBUMIN SERPL ELPH-MCNC: 2.9 G/DL — LOW (ref 3.3–5)
ALP SERPL-CCNC: 175 U/L — HIGH (ref 40–120)
ALT FLD-CCNC: 169 U/L — HIGH (ref 4–41)
ANION GAP SERPL CALC-SCNC: 12 MMOL/L — SIGNIFICANT CHANGE UP (ref 7–14)
AST SERPL-CCNC: 31 U/L — SIGNIFICANT CHANGE UP (ref 4–40)
BILIRUB SERPL-MCNC: 0.9 MG/DL — SIGNIFICANT CHANGE UP (ref 0.2–1.2)
BUN SERPL-MCNC: 41 MG/DL — HIGH (ref 7–23)
CALCIUM SERPL-MCNC: 9 MG/DL — SIGNIFICANT CHANGE UP (ref 8.4–10.5)
CHLORIDE SERPL-SCNC: 98 MMOL/L — SIGNIFICANT CHANGE UP (ref 98–107)
CO2 SERPL-SCNC: 24 MMOL/L — SIGNIFICANT CHANGE UP (ref 22–31)
CREAT SERPL-MCNC: 1.34 MG/DL — HIGH (ref 0.5–1.3)
EGFR: 52 ML/MIN/1.73M2 — LOW
EGFR: 52 ML/MIN/1.73M2 — LOW
GLUCOSE BLDC GLUCOMTR-MCNC: 156 MG/DL — HIGH (ref 70–99)
GLUCOSE BLDC GLUCOMTR-MCNC: 226 MG/DL — HIGH (ref 70–99)
GLUCOSE BLDC GLUCOMTR-MCNC: 227 MG/DL — HIGH (ref 70–99)
GLUCOSE BLDC GLUCOMTR-MCNC: 230 MG/DL — HIGH (ref 70–99)
GLUCOSE BLDC GLUCOMTR-MCNC: 239 MG/DL — HIGH (ref 70–99)
GLUCOSE BLDC GLUCOMTR-MCNC: 331 MG/DL — HIGH (ref 70–99)
GLUCOSE BLDC GLUCOMTR-MCNC: 337 MG/DL — HIGH (ref 70–99)
GLUCOSE BLDC GLUCOMTR-MCNC: 72 MG/DL — SIGNIFICANT CHANGE UP (ref 70–99)
GLUCOSE SERPL-MCNC: 249 MG/DL — HIGH (ref 70–99)
HCT VFR BLD CALC: 34.3 % — LOW (ref 39–50)
HGB BLD-MCNC: 11.5 G/DL — LOW (ref 13–17)
MAGNESIUM SERPL-MCNC: 2.1 MG/DL — SIGNIFICANT CHANGE UP (ref 1.6–2.6)
MCHC RBC-ENTMCNC: 28.5 PG — SIGNIFICANT CHANGE UP (ref 27–34)
MCHC RBC-ENTMCNC: 33.5 G/DL — SIGNIFICANT CHANGE UP (ref 32–36)
MCV RBC AUTO: 85.1 FL — SIGNIFICANT CHANGE UP (ref 80–100)
NRBC # BLD AUTO: 0.03 K/UL — HIGH (ref 0–0)
NRBC # FLD: 0.03 K/UL — HIGH (ref 0–0)
NRBC BLD AUTO-RTO: 0 /100 WBCS — SIGNIFICANT CHANGE UP (ref 0–0)
PHOSPHATE SERPL-MCNC: 3 MG/DL — SIGNIFICANT CHANGE UP (ref 2.5–4.5)
PLATELET # BLD AUTO: 425 K/UL — HIGH (ref 150–400)
PMV BLD: 11.3 FL — SIGNIFICANT CHANGE UP (ref 7–13)
POTASSIUM SERPL-MCNC: 4.5 MMOL/L — SIGNIFICANT CHANGE UP (ref 3.5–5.3)
POTASSIUM SERPL-SCNC: 4.5 MMOL/L — SIGNIFICANT CHANGE UP (ref 3.5–5.3)
PROT SERPL-MCNC: 6.9 G/DL — SIGNIFICANT CHANGE UP (ref 6–8.3)
RBC # BLD: 4.03 M/UL — LOW (ref 4.2–5.8)
RBC # FLD: 14.6 % — HIGH (ref 10.3–14.5)
SODIUM SERPL-SCNC: 134 MMOL/L — LOW (ref 135–145)
TROPONIN T, HIGH SENSITIVITY RESULT: 194 NG/L — CRITICAL HIGH
WBC # BLD: 16.4 K/UL — HIGH (ref 3.8–10.5)
WBC # FLD AUTO: 16.4 K/UL — HIGH (ref 3.8–10.5)

## 2025-08-05 PROCEDURE — 99232 SBSQ HOSP IP/OBS MODERATE 35: CPT

## 2025-08-05 RX ORDER — SENNA 187 MG
2 TABLET ORAL AT BEDTIME
Refills: 0 | Status: DISCONTINUED | OUTPATIENT
Start: 2025-08-05 | End: 2025-08-06

## 2025-08-05 RX ORDER — INSULIN LISPRO 100 U/ML
3 INJECTION, SOLUTION INTRAVENOUS; SUBCUTANEOUS
Refills: 0 | Status: DISCONTINUED | OUTPATIENT
Start: 2025-08-05 | End: 2025-08-06

## 2025-08-05 RX ORDER — POLYETHYLENE GLYCOL 3350 17 G/17G
17 POWDER, FOR SOLUTION ORAL DAILY
Refills: 0 | Status: DISCONTINUED | OUTPATIENT
Start: 2025-08-05 | End: 2025-08-06

## 2025-08-05 RX ORDER — INSULIN GLARGINE-YFGN 100 [IU]/ML
9 INJECTION, SOLUTION SUBCUTANEOUS AT BEDTIME
Refills: 0 | Status: DISCONTINUED | OUTPATIENT
Start: 2025-08-05 | End: 2025-08-06

## 2025-08-05 RX ADMIN — INSULIN GLARGINE-YFGN 9 UNIT(S): 100 INJECTION, SOLUTION SUBCUTANEOUS at 21:55

## 2025-08-05 RX ADMIN — ATORVASTATIN CALCIUM 10 MILLIGRAM(S): 80 TABLET, FILM COATED ORAL at 21:56

## 2025-08-05 RX ADMIN — Medication 4 MILLILITER(S): at 10:19

## 2025-08-05 RX ADMIN — INSULIN LISPRO 4: 100 INJECTION, SOLUTION INTRAVENOUS; SUBCUTANEOUS at 12:19

## 2025-08-05 RX ADMIN — INSULIN LISPRO 2: 100 INJECTION, SOLUTION INTRAVENOUS; SUBCUTANEOUS at 21:56

## 2025-08-05 RX ADMIN — HEPARIN SODIUM 5000 UNIT(S): 1000 INJECTION INTRAVENOUS; SUBCUTANEOUS at 05:13

## 2025-08-05 RX ADMIN — DEXTROMETHORPHAN HBR, GUAIFENESIN 1200 MILLIGRAM(S): 200 LIQUID ORAL at 05:14

## 2025-08-05 RX ADMIN — IPRATROPIUM BROMIDE AND ALBUTEROL SULFATE 3 MILLILITER(S): .5; 2.5 SOLUTION RESPIRATORY (INHALATION) at 18:39

## 2025-08-05 RX ADMIN — IPRATROPIUM BROMIDE AND ALBUTEROL SULFATE 3 MILLILITER(S): .5; 2.5 SOLUTION RESPIRATORY (INHALATION) at 15:14

## 2025-08-05 RX ADMIN — EZETIMIBE 10 MILLIGRAM(S): 10 TABLET ORAL at 14:14

## 2025-08-05 RX ADMIN — CEFEPIME 100 MILLIGRAM(S): 2 INJECTION, POWDER, FOR SOLUTION INTRAVENOUS at 05:14

## 2025-08-05 RX ADMIN — INSULIN LISPRO 2: 100 INJECTION, SOLUTION INTRAVENOUS; SUBCUTANEOUS at 07:50

## 2025-08-05 RX ADMIN — Medication 40 MILLIGRAM(S): at 06:20

## 2025-08-05 RX ADMIN — HEPARIN SODIUM 5000 UNIT(S): 1000 INJECTION INTRAVENOUS; SUBCUTANEOUS at 14:16

## 2025-08-05 RX ADMIN — Medication 4 MILLILITER(S): at 18:40

## 2025-08-05 RX ADMIN — Medication 1 DROP(S): at 17:14

## 2025-08-05 RX ADMIN — METOPROLOL SUCCINATE 25 MILLIGRAM(S): 50 TABLET, EXTENDED RELEASE ORAL at 05:14

## 2025-08-05 RX ADMIN — INSULIN LISPRO 3 UNIT(S): 100 INJECTION, SOLUTION INTRAVENOUS; SUBCUTANEOUS at 17:58

## 2025-08-05 RX ADMIN — INSULIN LISPRO 2: 100 INJECTION, SOLUTION INTRAVENOUS; SUBCUTANEOUS at 17:57

## 2025-08-05 RX ADMIN — INSULIN LISPRO 3 UNIT(S): 100 INJECTION, SOLUTION INTRAVENOUS; SUBCUTANEOUS at 12:20

## 2025-08-05 RX ADMIN — Medication 3 MILLIGRAM(S): at 23:01

## 2025-08-05 RX ADMIN — IPRATROPIUM BROMIDE AND ALBUTEROL SULFATE 3 MILLILITER(S): .5; 2.5 SOLUTION RESPIRATORY (INHALATION) at 10:19

## 2025-08-05 RX ADMIN — Medication 1 TABLET(S): at 12:09

## 2025-08-05 RX ADMIN — POLYETHYLENE GLYCOL 3350 17 GRAM(S): 17 POWDER, FOR SOLUTION ORAL at 17:15

## 2025-08-05 RX ADMIN — HEPARIN SODIUM 5000 UNIT(S): 1000 INJECTION INTRAVENOUS; SUBCUTANEOUS at 21:57

## 2025-08-05 RX ADMIN — CLOPIDOGREL BISULFATE 75 MILLIGRAM(S): 75 TABLET, FILM COATED ORAL at 12:09

## 2025-08-05 RX ADMIN — Medication 650 MILLIGRAM(S): at 23:01

## 2025-08-05 RX ADMIN — DEXTROMETHORPHAN HBR, GUAIFENESIN 1200 MILLIGRAM(S): 200 LIQUID ORAL at 17:14

## 2025-08-05 RX ADMIN — Medication 2 TABLET(S): at 22:03

## 2025-08-05 RX ADMIN — Medication 1 DROP(S): at 05:14

## 2025-08-06 ENCOUNTER — TRANSCRIPTION ENCOUNTER (OUTPATIENT)
Age: 85
End: 2025-08-06

## 2025-08-06 VITALS
RESPIRATION RATE: 18 BRPM | DIASTOLIC BLOOD PRESSURE: 52 MMHG | TEMPERATURE: 98 F | HEART RATE: 77 BPM | OXYGEN SATURATION: 100 % | SYSTOLIC BLOOD PRESSURE: 101 MMHG

## 2025-08-06 PROBLEM — J84.9 INTERSTITIAL PULMONARY DISEASE, UNSPECIFIED: Chronic | Status: ACTIVE | Noted: 2025-07-26

## 2025-08-06 LAB
GLUCOSE BLDC GLUCOMTR-MCNC: 111 MG/DL — HIGH (ref 70–99)
GLUCOSE BLDC GLUCOMTR-MCNC: 178 MG/DL — HIGH (ref 70–99)
GLUCOSE BLDC GLUCOMTR-MCNC: 264 MG/DL — HIGH (ref 70–99)

## 2025-08-06 PROCEDURE — 99239 HOSP IP/OBS DSCHRG MGMT >30: CPT

## 2025-08-06 RX ORDER — INSULIN LISPRO 100 U/ML
4 INJECTION, SOLUTION INTRAVENOUS; SUBCUTANEOUS
Refills: 0 | Status: DISCONTINUED | OUTPATIENT
Start: 2025-08-06 | End: 2025-08-06

## 2025-08-06 RX ORDER — POLYETHYLENE GLYCOL 3350 17 G/17G
17 POWDER, FOR SOLUTION ORAL
Qty: 0 | Refills: 0 | DISCHARGE
Start: 2025-08-06

## 2025-08-06 RX ORDER — MELATONIN 5 MG
1 TABLET ORAL
Qty: 0 | Refills: 0 | DISCHARGE
Start: 2025-08-06

## 2025-08-06 RX ORDER — CLOPIDOGREL BISULFATE 75 MG/1
1 TABLET, FILM COATED ORAL
Qty: 0 | Refills: 0 | DISCHARGE

## 2025-08-06 RX ORDER — INSULIN GLARGINE-YFGN 100 [IU]/ML
10 INJECTION, SOLUTION SUBCUTANEOUS AT BEDTIME
Refills: 0 | Status: DISCONTINUED | OUTPATIENT
Start: 2025-08-06 | End: 2025-08-06

## 2025-08-06 RX ORDER — INSULIN GLARGINE-YFGN 100 [IU]/ML
10 INJECTION, SOLUTION SUBCUTANEOUS
Qty: 0 | Refills: 0 | DISCHARGE
Start: 2025-08-06

## 2025-08-06 RX ORDER — SENNA 187 MG
2 TABLET ORAL
Qty: 0 | Refills: 0 | DISCHARGE
Start: 2025-08-06

## 2025-08-06 RX ORDER — METOPROLOL SUCCINATE 50 MG/1
1 TABLET, EXTENDED RELEASE ORAL
Qty: 0 | Refills: 0 | DISCHARGE
Start: 2025-08-06

## 2025-08-06 RX ORDER — IPRATROPIUM BROMIDE AND ALBUTEROL SULFATE .5; 2.5 MG/3ML; MG/3ML
3 SOLUTION RESPIRATORY (INHALATION)
Qty: 0 | Refills: 0 | DISCHARGE
Start: 2025-08-06

## 2025-08-06 RX ORDER — SALINE 7; 19 G/118ML; G/118ML
1 ENEMA RECTAL ONCE
Refills: 0 | Status: COMPLETED | OUTPATIENT
Start: 2025-08-06 | End: 2025-08-06

## 2025-08-06 RX ORDER — INSULIN LISPRO 100 U/ML
4 INJECTION, SOLUTION INTRAVENOUS; SUBCUTANEOUS
Qty: 0 | Refills: 0 | DISCHARGE
Start: 2025-08-06

## 2025-08-06 RX ADMIN — SALINE 1 ENEMA: 7; 19 ENEMA RECTAL at 16:27

## 2025-08-06 RX ADMIN — INSULIN LISPRO 3: 100 INJECTION, SOLUTION INTRAVENOUS; SUBCUTANEOUS at 17:09

## 2025-08-06 RX ADMIN — Medication 650 MILLIGRAM(S): at 00:00

## 2025-08-06 RX ADMIN — INSULIN LISPRO 3 UNIT(S): 100 INJECTION, SOLUTION INTRAVENOUS; SUBCUTANEOUS at 11:58

## 2025-08-06 RX ADMIN — INSULIN LISPRO 1: 100 INJECTION, SOLUTION INTRAVENOUS; SUBCUTANEOUS at 08:36

## 2025-08-06 RX ADMIN — EZETIMIBE 10 MILLIGRAM(S): 10 TABLET ORAL at 11:48

## 2025-08-06 RX ADMIN — IPRATROPIUM BROMIDE AND ALBUTEROL SULFATE 3 MILLILITER(S): .5; 2.5 SOLUTION RESPIRATORY (INHALATION) at 08:58

## 2025-08-06 RX ADMIN — CLOPIDOGREL BISULFATE 75 MILLIGRAM(S): 75 TABLET, FILM COATED ORAL at 11:48

## 2025-08-06 RX ADMIN — HEPARIN SODIUM 5000 UNIT(S): 1000 INJECTION INTRAVENOUS; SUBCUTANEOUS at 05:38

## 2025-08-06 RX ADMIN — Medication 1 DROP(S): at 05:39

## 2025-08-06 RX ADMIN — IPRATROPIUM BROMIDE AND ALBUTEROL SULFATE 3 MILLILITER(S): .5; 2.5 SOLUTION RESPIRATORY (INHALATION) at 16:01

## 2025-08-06 RX ADMIN — Medication 4 MILLILITER(S): at 08:58

## 2025-08-06 RX ADMIN — Medication 1 TABLET(S): at 11:49

## 2025-08-06 RX ADMIN — Medication 1 DROP(S): at 17:08

## 2025-08-06 RX ADMIN — INSULIN LISPRO 3 UNIT(S): 100 INJECTION, SOLUTION INTRAVENOUS; SUBCUTANEOUS at 08:37

## 2025-08-06 RX ADMIN — POLYETHYLENE GLYCOL 3350 17 GRAM(S): 17 POWDER, FOR SOLUTION ORAL at 11:49

## 2025-08-06 RX ADMIN — Medication 40 MILLIGRAM(S): at 05:38

## 2025-08-06 RX ADMIN — DEXTROMETHORPHAN HBR, GUAIFENESIN 1200 MILLIGRAM(S): 200 LIQUID ORAL at 05:38

## 2025-08-06 RX ADMIN — INSULIN LISPRO 3 UNIT(S): 100 INJECTION, SOLUTION INTRAVENOUS; SUBCUTANEOUS at 17:09

## 2025-08-06 RX ADMIN — METOPROLOL SUCCINATE 25 MILLIGRAM(S): 50 TABLET, EXTENDED RELEASE ORAL at 05:38
